# Patient Record
Sex: MALE | Race: WHITE | NOT HISPANIC OR LATINO | Employment: FULL TIME | URBAN - METROPOLITAN AREA
[De-identification: names, ages, dates, MRNs, and addresses within clinical notes are randomized per-mention and may not be internally consistent; named-entity substitution may affect disease eponyms.]

---

## 2023-08-27 ENCOUNTER — HOSPITAL ENCOUNTER (INPATIENT)
Facility: MEDICAL CENTER | Age: 38
LOS: 10 days | DRG: 602 | End: 2023-09-07
Attending: EMERGENCY MEDICINE | Admitting: INTERNAL MEDICINE

## 2023-08-27 ENCOUNTER — APPOINTMENT (OUTPATIENT)
Dept: RADIOLOGY | Facility: MEDICAL CENTER | Age: 38
DRG: 602 | End: 2023-08-27
Attending: EMERGENCY MEDICINE

## 2023-08-27 DIAGNOSIS — L03.313 CELLULITIS OF CHEST WALL: ICD-10-CM

## 2023-08-27 DIAGNOSIS — E66.01 CLASS 3 SEVERE OBESITY DUE TO EXCESS CALORIES WITH SERIOUS COMORBIDITY AND BODY MASS INDEX (BMI) GREATER THAN OR EQUAL TO 70 IN ADULT (HCC): ICD-10-CM

## 2023-08-27 DIAGNOSIS — L03.311 CELLULITIS OF ABDOMINAL WALL: ICD-10-CM

## 2023-08-27 DIAGNOSIS — E66.01 MORBID OBESITY (HCC): ICD-10-CM

## 2023-08-27 DIAGNOSIS — R26.2 UNABLE TO WALK: ICD-10-CM

## 2023-08-27 DIAGNOSIS — D50.9 IRON DEFICIENCY ANEMIA, UNSPECIFIED IRON DEFICIENCY ANEMIA TYPE: ICD-10-CM

## 2023-08-27 DIAGNOSIS — I10 PRIMARY HYPERTENSION: ICD-10-CM

## 2023-08-27 DIAGNOSIS — R53.81 PHYSICAL DECONDITIONING: ICD-10-CM

## 2023-08-27 DIAGNOSIS — J96.01 ACUTE RESPIRATORY FAILURE WITH HYPOXIA (HCC): ICD-10-CM

## 2023-08-27 LAB
ALBUMIN SERPL BCP-MCNC: 3.7 G/DL (ref 3.2–4.9)
ALBUMIN/GLOB SERPL: 1.4 G/DL
ALP SERPL-CCNC: 75 U/L (ref 30–99)
ALT SERPL-CCNC: 13 U/L (ref 2–50)
ANION GAP SERPL CALC-SCNC: 10 MMOL/L (ref 7–16)
ANISOCYTOSIS BLD QL SMEAR: ABNORMAL
AST SERPL-CCNC: 18 U/L (ref 12–45)
BASOPHILS # BLD AUTO: 0.4 % (ref 0–1.8)
BASOPHILS # BLD: 0.03 K/UL (ref 0–0.12)
BILIRUB SERPL-MCNC: 1 MG/DL (ref 0.1–1.5)
BUN SERPL-MCNC: 12 MG/DL (ref 8–22)
CALCIUM ALBUM COR SERPL-MCNC: 9.1 MG/DL (ref 8.5–10.5)
CALCIUM SERPL-MCNC: 8.9 MG/DL (ref 8.5–10.5)
CHLORIDE SERPL-SCNC: 100 MMOL/L (ref 96–112)
CO2 SERPL-SCNC: 27 MMOL/L (ref 20–33)
COMMENT 1642: NORMAL
CREAT SERPL-MCNC: 0.81 MG/DL (ref 0.5–1.4)
EOSINOPHIL # BLD AUTO: 0.21 K/UL (ref 0–0.51)
EOSINOPHIL NFR BLD: 2.7 % (ref 0–6.9)
ERYTHROCYTE [DISTWIDTH] IN BLOOD BY AUTOMATED COUNT: 56.7 FL (ref 35.9–50)
FLUAV RNA SPEC QL NAA+PROBE: NEGATIVE
FLUBV RNA SPEC QL NAA+PROBE: NEGATIVE
GFR SERPLBLD CREATININE-BSD FMLA CKD-EPI: 115 ML/MIN/1.73 M 2
GLOBULIN SER CALC-MCNC: 2.7 G/DL (ref 1.9–3.5)
GLUCOSE SERPL-MCNC: 113 MG/DL (ref 65–99)
HCT VFR BLD AUTO: 37.5 % (ref 42–52)
HGB BLD-MCNC: 10.5 G/DL (ref 14–18)
IMM GRANULOCYTES # BLD AUTO: 0.03 K/UL (ref 0–0.11)
IMM GRANULOCYTES NFR BLD AUTO: 0.4 % (ref 0–0.9)
LACTATE SERPL-SCNC: 1.4 MMOL/L (ref 0.5–2)
LYMPHOCYTES # BLD AUTO: 1.68 K/UL (ref 1–4.8)
LYMPHOCYTES NFR BLD: 21.4 % (ref 22–41)
MACROCYTES BLD QL SMEAR: ABNORMAL
MCH RBC QN AUTO: 20.7 PG (ref 27–33)
MCHC RBC AUTO-ENTMCNC: 28 G/DL (ref 32.3–36.5)
MCV RBC AUTO: 73.8 FL (ref 81.4–97.8)
MICROCYTES BLD QL SMEAR: ABNORMAL
MONOCYTES # BLD AUTO: 0.81 K/UL (ref 0–0.85)
MONOCYTES NFR BLD AUTO: 10.3 % (ref 0–13.4)
MORPHOLOGY BLD-IMP: NORMAL
NEUTROPHILS # BLD AUTO: 5.09 K/UL (ref 1.82–7.42)
NEUTROPHILS NFR BLD: 64.8 % (ref 44–72)
NRBC # BLD AUTO: 0 K/UL
NRBC BLD-RTO: 0 /100 WBC (ref 0–0.2)
OVALOCYTES BLD QL SMEAR: NORMAL
PLATELET # BLD AUTO: 320 K/UL (ref 164–446)
PLATELET BLD QL SMEAR: NORMAL
PMV BLD AUTO: 9.6 FL (ref 9–12.9)
POIKILOCYTOSIS BLD QL SMEAR: NORMAL
POLYCHROMASIA BLD QL SMEAR: NORMAL
POTASSIUM SERPL-SCNC: 4.1 MMOL/L (ref 3.6–5.5)
PROT SERPL-MCNC: 6.4 G/DL (ref 6–8.2)
RBC # BLD AUTO: 5.08 M/UL (ref 4.7–6.1)
RBC BLD AUTO: PRESENT
RSV RNA SPEC QL NAA+PROBE: NEGATIVE
SARS-COV-2 RNA RESP QL NAA+PROBE: NOTDETECTED
SODIUM SERPL-SCNC: 137 MMOL/L (ref 135–145)
SPECIMEN SOURCE: NORMAL
STOMATOCYTES BLD QL SMEAR: NORMAL
TARGETS BLD QL SMEAR: NORMAL
WBC # BLD AUTO: 7.9 K/UL (ref 4.8–10.8)

## 2023-08-27 PROCEDURE — A9270 NON-COVERED ITEM OR SERVICE: HCPCS | Performed by: EMERGENCY MEDICINE

## 2023-08-27 PROCEDURE — 36415 COLL VENOUS BLD VENIPUNCTURE: CPT

## 2023-08-27 PROCEDURE — 99285 EMERGENCY DEPT VISIT HI MDM: CPT

## 2023-08-27 PROCEDURE — 87040 BLOOD CULTURE FOR BACTERIA: CPT

## 2023-08-27 PROCEDURE — 71045 X-RAY EXAM CHEST 1 VIEW: CPT

## 2023-08-27 PROCEDURE — 700102 HCHG RX REV CODE 250 W/ 637 OVERRIDE(OP): Performed by: EMERGENCY MEDICINE

## 2023-08-27 PROCEDURE — C9803 HOPD COVID-19 SPEC COLLECT: HCPCS | Performed by: EMERGENCY MEDICINE

## 2023-08-27 PROCEDURE — 0241U HCHG SARS-COV-2 COVID-19 NFCT DS RESP RNA 4 TRGT MIC: CPT

## 2023-08-27 PROCEDURE — 83605 ASSAY OF LACTIC ACID: CPT

## 2023-08-27 PROCEDURE — 85025 COMPLETE CBC W/AUTO DIFF WBC: CPT

## 2023-08-27 PROCEDURE — 80053 COMPREHEN METABOLIC PANEL: CPT

## 2023-08-27 RX ORDER — SULFAMETHOXAZOLE AND TRIMETHOPRIM 800; 160 MG/1; MG/1
1 TABLET ORAL EVERY 12 HOURS
Status: DISCONTINUED | OUTPATIENT
Start: 2023-08-27 | End: 2023-08-28

## 2023-08-27 RX ADMIN — SULFAMETHOXAZOLE AND TRIMETHOPRIM 1 TABLET: 800; 160 TABLET ORAL at 21:54

## 2023-08-28 PROBLEM — I10 HTN (HYPERTENSION): Status: ACTIVE | Noted: 2023-08-28

## 2023-08-28 PROBLEM — J96.01 ACUTE RESPIRATORY FAILURE WITH HYPOXIA (HCC): Status: ACTIVE | Noted: 2023-08-28

## 2023-08-28 PROBLEM — L03.311 ABDOMINAL WALL CELLULITIS: Status: ACTIVE | Noted: 2023-08-28

## 2023-08-28 PROBLEM — E66.01 CLASS 3 SEVERE OBESITY DUE TO EXCESS CALORIES WITH SERIOUS COMORBIDITY AND BODY MASS INDEX (BMI) GREATER THAN OR EQUAL TO 70 IN ADULT (HCC): Status: ACTIVE | Noted: 2023-08-28

## 2023-08-28 PROBLEM — R06.02 SHORTNESS OF BREATH: Status: ACTIVE | Noted: 2023-08-28

## 2023-08-28 PROBLEM — E66.813 CLASS 3 SEVERE OBESITY DUE TO EXCESS CALORIES WITH SERIOUS COMORBIDITY AND BODY MASS INDEX (BMI) GREATER THAN OR EQUAL TO 70 IN ADULT (HCC): Status: ACTIVE | Noted: 2023-08-28

## 2023-08-28 LAB
APPEARANCE UR: CLEAR
BACTERIA #/AREA URNS HPF: NEGATIVE /HPF
BILIRUB UR QL STRIP.AUTO: NEGATIVE
COLOR UR: YELLOW
EKG IMPRESSION: NORMAL
EPI CELLS #/AREA URNS HPF: NEGATIVE /HPF
EST. AVERAGE GLUCOSE BLD GHB EST-MCNC: 123 MG/DL
GLUCOSE UR STRIP.AUTO-MCNC: NEGATIVE MG/DL
HBA1C MFR BLD: 5.9 % (ref 4–5.6)
HYALINE CASTS #/AREA URNS LPF: ABNORMAL /LPF
KETONES UR STRIP.AUTO-MCNC: ABNORMAL MG/DL
LEUKOCYTE ESTERASE UR QL STRIP.AUTO: ABNORMAL
MICRO URNS: ABNORMAL
NITRITE UR QL STRIP.AUTO: NEGATIVE
NT-PROBNP SERPL IA-MCNC: 1967 PG/ML (ref 0–125)
PH UR STRIP.AUTO: 5.5 [PH] (ref 5–8)
PROT UR QL STRIP: NEGATIVE MG/DL
RBC # URNS HPF: ABNORMAL /HPF
RBC UR QL AUTO: NEGATIVE
SP GR UR STRIP.AUTO: 1.02
UROBILINOGEN UR STRIP.AUTO-MCNC: 1 MG/DL
WBC #/AREA URNS HPF: ABNORMAL /HPF

## 2023-08-28 PROCEDURE — 83036 HEMOGLOBIN GLYCOSYLATED A1C: CPT

## 2023-08-28 PROCEDURE — 700111 HCHG RX REV CODE 636 W/ 250 OVERRIDE (IP): Performed by: INTERNAL MEDICINE

## 2023-08-28 PROCEDURE — 87086 URINE CULTURE/COLONY COUNT: CPT

## 2023-08-28 PROCEDURE — A9270 NON-COVERED ITEM OR SERVICE: HCPCS | Performed by: EMERGENCY MEDICINE

## 2023-08-28 PROCEDURE — 36415 COLL VENOUS BLD VENIPUNCTURE: CPT

## 2023-08-28 PROCEDURE — 93005 ELECTROCARDIOGRAM TRACING: CPT | Performed by: INTERNAL MEDICINE

## 2023-08-28 PROCEDURE — 99223 1ST HOSP IP/OBS HIGH 75: CPT | Mod: AI | Performed by: INTERNAL MEDICINE

## 2023-08-28 PROCEDURE — 93010 ELECTROCARDIOGRAM REPORT: CPT | Performed by: INTERNAL MEDICINE

## 2023-08-28 PROCEDURE — 700102 HCHG RX REV CODE 250 W/ 637 OVERRIDE(OP): Performed by: INTERNAL MEDICINE

## 2023-08-28 PROCEDURE — A9270 NON-COVERED ITEM OR SERVICE: HCPCS | Performed by: INTERNAL MEDICINE

## 2023-08-28 PROCEDURE — 770006 HCHG ROOM/CARE - MED/SURG/GYN SEMI*

## 2023-08-28 PROCEDURE — 700102 HCHG RX REV CODE 250 W/ 637 OVERRIDE(OP): Performed by: EMERGENCY MEDICINE

## 2023-08-28 PROCEDURE — 83880 ASSAY OF NATRIURETIC PEPTIDE: CPT

## 2023-08-28 PROCEDURE — 81001 URINALYSIS AUTO W/SCOPE: CPT

## 2023-08-28 RX ORDER — FUROSEMIDE 10 MG/ML
40 INJECTION INTRAMUSCULAR; INTRAVENOUS
Status: DISCONTINUED | OUTPATIENT
Start: 2023-08-28 | End: 2023-09-01

## 2023-08-28 RX ORDER — BISACODYL 10 MG
10 SUPPOSITORY, RECTAL RECTAL
Status: DISCONTINUED | OUTPATIENT
Start: 2023-08-28 | End: 2023-09-07 | Stop reason: HOSPADM

## 2023-08-28 RX ORDER — LINEZOLID 600 MG/1
600 TABLET, FILM COATED ORAL EVERY 8 HOURS
Status: DISCONTINUED | OUTPATIENT
Start: 2023-08-28 | End: 2023-08-30

## 2023-08-28 RX ORDER — ENOXAPARIN SODIUM 100 MG/ML
60 INJECTION SUBCUTANEOUS EVERY 12 HOURS
Status: DISCONTINUED | OUTPATIENT
Start: 2023-08-28 | End: 2023-08-31

## 2023-08-28 RX ORDER — AMOXICILLIN 250 MG
2 CAPSULE ORAL 2 TIMES DAILY
Status: DISCONTINUED | OUTPATIENT
Start: 2023-08-28 | End: 2023-09-07 | Stop reason: HOSPADM

## 2023-08-28 RX ORDER — IBUPROFEN 200 MG
800 TABLET ORAL
COMMUNITY
End: 2024-02-01

## 2023-08-28 RX ORDER — IBUPROFEN, ACETAMINOPHEN 125; 250 MG/1; MG/1
1-2 TABLET, FILM COATED ORAL PRN
Status: ON HOLD | COMMUNITY
End: 2023-09-06

## 2023-08-28 RX ORDER — LISINOPRIL 10 MG/1
10 TABLET ORAL
Status: DISCONTINUED | OUTPATIENT
Start: 2023-08-28 | End: 2023-09-07 | Stop reason: HOSPADM

## 2023-08-28 RX ORDER — POLYETHYLENE GLYCOL 3350 17 G/17G
1 POWDER, FOR SOLUTION ORAL
Status: DISCONTINUED | OUTPATIENT
Start: 2023-08-28 | End: 2023-09-07 | Stop reason: HOSPADM

## 2023-08-28 RX ADMIN — LINEZOLID 600 MG: 600 TABLET, FILM COATED ORAL at 22:02

## 2023-08-28 RX ADMIN — LINEZOLID 600 MG: 600 TABLET, FILM COATED ORAL at 14:17

## 2023-08-28 RX ADMIN — FUROSEMIDE 40 MG: 10 INJECTION INTRAMUSCULAR; INTRAVENOUS at 14:18

## 2023-08-28 RX ADMIN — SULFAMETHOXAZOLE AND TRIMETHOPRIM 1 TABLET: 800; 160 TABLET ORAL at 06:46

## 2023-08-28 RX ADMIN — LISINOPRIL 10 MG: 10 TABLET ORAL at 14:17

## 2023-08-28 RX ADMIN — ENOXAPARIN SODIUM 60 MG: 100 INJECTION SUBCUTANEOUS at 17:49

## 2023-08-28 ASSESSMENT — COGNITIVE AND FUNCTIONAL STATUS - GENERAL
MOVING FROM LYING ON BACK TO SITTING ON SIDE OF FLAT BED: A LOT
WALKING IN HOSPITAL ROOM: A LITTLE
SUGGESTED CMS G CODE MODIFIER MOBILITY: CK
TURNING FROM BACK TO SIDE WHILE IN FLAT BAD: A LOT
STANDING UP FROM CHAIR USING ARMS: A LITTLE
DRESSING REGULAR LOWER BODY CLOTHING: A LITTLE
TOILETING: A LITTLE
SUGGESTED CMS G CODE MODIFIER DAILY ACTIVITY: CJ
MOBILITY SCORE: 16
MOVING TO AND FROM BED TO CHAIR: A LITTLE
DAILY ACTIVITIY SCORE: 22
CLIMB 3 TO 5 STEPS WITH RAILING: A LITTLE

## 2023-08-28 ASSESSMENT — ENCOUNTER SYMPTOMS
NAUSEA: 0
PND: 1
CHILLS: 0
FEVER: 0
MYALGIAS: 0
ORTHOPNEA: 1
VOMITING: 0
SHORTNESS OF BREATH: 1
ABDOMINAL PAIN: 1
COUGH: 0
WHEEZING: 0
HEMOPTYSIS: 0

## 2023-08-28 ASSESSMENT — PATIENT HEALTH QUESTIONNAIRE - PHQ9
3. TROUBLE FALLING OR STAYING ASLEEP OR SLEEPING TOO MUCH: SEVERAL DAYS
9. THOUGHTS THAT YOU WOULD BE BETTER OFF DEAD, OR OF HURTING YOURSELF: NOT AT ALL
7. TROUBLE CONCENTRATING ON THINGS, SUCH AS READING THE NEWSPAPER OR WATCHING TELEVISION: SEVERAL DAYS
1. LITTLE INTEREST OR PLEASURE IN DOING THINGS: SEVERAL DAYS
4. FEELING TIRED OR HAVING LITTLE ENERGY: SEVERAL DAYS
8. MOVING OR SPEAKING SO SLOWLY THAT OTHER PEOPLE COULD HAVE NOTICED. OR THE OPPOSITE, BEING SO FIGETY OR RESTLESS THAT YOU HAVE BEEN MOVING AROUND A LOT MORE THAN USUAL: SEVERAL DAYS
6. FEELING BAD ABOUT YOURSELF - OR THAT YOU ARE A FAILURE OR HAVE LET YOURSELF OR YOUR FAMILY DOWN: SEVERAL DAYS
SUM OF ALL RESPONSES TO PHQ QUESTIONS 1-9: 7
2. FEELING DOWN, DEPRESSED, IRRITABLE, OR HOPELESS: SEVERAL DAYS
SUM OF ALL RESPONSES TO PHQ9 QUESTIONS 1 AND 2: 2
5. POOR APPETITE OR OVEREATING: NOT AT ALL

## 2023-08-28 ASSESSMENT — LIFESTYLE VARIABLES
HAVE PEOPLE ANNOYED YOU BY CRITICIZING YOUR DRINKING: NO
ON A TYPICAL DAY WHEN YOU DRINK ALCOHOL HOW MANY DRINKS DO YOU HAVE: 0
DOES PATIENT WANT TO STOP DRINKING: NO
TOTAL SCORE: 0
HOW MANY TIMES IN THE PAST YEAR HAVE YOU HAD 5 OR MORE DRINKS IN A DAY: 0
TOTAL SCORE: 0
TOTAL SCORE: 0
ALCOHOL_USE: NO
CONSUMPTION TOTAL: NEGATIVE
HAVE YOU EVER FELT YOU SHOULD CUT DOWN ON YOUR DRINKING: NO
AVERAGE NUMBER OF DAYS PER WEEK YOU HAVE A DRINK CONTAINING ALCOHOL: 0
EVER FELT BAD OR GUILTY ABOUT YOUR DRINKING: NO
EVER HAD A DRINK FIRST THING IN THE MORNING TO STEADY YOUR NERVES TO GET RID OF A HANGOVER: NO

## 2023-08-28 ASSESSMENT — PAIN DESCRIPTION - PAIN TYPE
TYPE: ACUTE PAIN
TYPE: ACUTE PAIN

## 2023-08-28 NOTE — DISCHARGE PLANNING
Medical Social Work    MSW spoke with ERP regarding pt.  Per report pt usually ambulates with a cane but hasn't been able to ambulate today so his sister encouraged him to come to the hospital.  PT/OT orders requested.

## 2023-08-28 NOTE — ED NOTES
New bariatric bed ordered for pt as current one is not working on one side, trauma RN called to have someone come fix and no one is available at this time

## 2023-08-28 NOTE — DISCHARGE SUMMARY
ED Observation Discharge Summary    Patient:Cole Jaramillo  Patient : 1985  Patient MRN: [id number]  Patient PCP: Pcp Pt States None    Admit Date: 2023  Discharge Date and Time: 23 12:20 PM  Discharge Diagnosis:   1. Morbid obesity (HCC)        2. Physical deconditioning        3. Unable to walk        4. Cellulitis of abdominal wall        5. Cellulitis of chest wall          Discharge Attending: Casandra Taylor D.O.  Discharge Service: ED Observation    ED Course    Care signed out from nighttime ERP.  Patient's been here for about 16 hours.  Initially seen by my colleague Dr. Méndez.  He presented with inability to ambulate due to his morbid obesity.  He has had to call fire twice to help him get up.  His estimated weight here in the ED is 376 kg.  He was noted to have cellulitis of the left chest wall, breast, abdominal wall.  Sepsis protocol was instituted.  Patient was placed on oral antibiotics.  No clear indication for hospital admission at this time.  Case management was involved for placement.    Social work note noted from early this morning.  PT and OT have been requested.    9:48 AM D/W , Kala, regarding patient goals. Has been on a diet, no weight loss. In November he will be able eligible for Medicaid open enrollment.    10:30 AM patient seen at the bedside.  Patient notes that his condition declined starting in December with the poor winter weather.  However, he was still able to get himself up out of his bedroom, get to the bathroom, get to the living room couch.  He lives with a roommate who is just a roommate, not any sort of care provider.  His sister comes by and also helps him.  He states a week ago, he fell and had to call fire department to help him get up.  Then yesterday morning, he was also unable to get up.  He has been trying to lose weight but he has not been particularly successful.  He is highly concerned about being in assisted living or  "long-term care facility.  However, at this point, he is not able to go home.  He cannot get up.  He may meet criteria for inpatient hospitalization due to his chest wall, abdominal wall cellulitis.  Hospitalist has been paged.    10:51 AM discussed with Dr. Kerr, hospitalist regarding patient's disposition.  He agrees to see the patient in consultation and evaluation for possible admission.  Unfortunately, there is worry that given his weight, there are not facilities in the area that can take care of him as they do not have the proper resources.    11:19 AM D/W Dr. Kerr, hospitalist, who after he assessed the patient thinks hell need hospital admit. He will also speak with care coordination.       Discharge Exam:  BP (!) (P) 147/66   Pulse (P) 92   Temp 36.8 °C (98.2 °F) (Temporal)   Resp (P) 16   Ht 1.803 m (5' 11\")   Wt (!) 376 kg (828 lb)   SpO2 (P) 93%   .48 kg/m² .    Constitutional: Awake and alert. Nontoxic  HENT:  Grossly normal  Eyes: Grossly normal  Neck: Normal range of motion  Cardiovascular: Normal heart rate   Thorax & Lungs: No respiratory distress  Abdomen: Nontender  Skin:  No pathologic rash.   Extremities: Well perfused  Psychiatric: Affect normal    Labs  Results for orders placed or performed during the hospital encounter of 08/27/23   Lactic acid (lactate)   Result Value Ref Range    Lactic Acid 1.4 0.5 - 2.0 mmol/L   CBC With Differential   Result Value Ref Range    WBC 7.9 4.8 - 10.8 K/uL    RBC 5.08 4.70 - 6.10 M/uL    Hemoglobin 10.5 (L) 14.0 - 18.0 g/dL    Hematocrit 37.5 (L) 42.0 - 52.0 %    MCV 73.8 (L) 81.4 - 97.8 fL    MCH 20.7 (L) 27.0 - 33.0 pg    MCHC 28.0 (L) 32.3 - 36.5 g/dL    RDW 56.7 (H) 35.9 - 50.0 fL    Platelet Count 320 164 - 446 K/uL    MPV 9.6 9.0 - 12.9 fL    Neutrophils-Polys 64.80 44.00 - 72.00 %    Lymphocytes 21.40 (L) 22.00 - 41.00 %    Monocytes 10.30 0.00 - 13.40 %    Eosinophils 2.70 0.00 - 6.90 %    Basophils 0.40 0.00 - 1.80 %    " Immature Granulocytes 0.40 0.00 - 0.90 %    Nucleated RBC 0.00 0.00 - 0.20 /100 WBC    Neutrophils (Absolute) 5.09 1.82 - 7.42 K/uL    Lymphs (Absolute) 1.68 1.00 - 4.80 K/uL    Monos (Absolute) 0.81 0.00 - 0.85 K/uL    Eos (Absolute) 0.21 0.00 - 0.51 K/uL    Baso (Absolute) 0.03 0.00 - 0.12 K/uL    Immature Granulocytes (abs) 0.03 0.00 - 0.11 K/uL    NRBC (Absolute) 0.00 K/uL    Anisocytosis 2+ (A)     Macrocytosis 1+     Microcytosis 2+ (A)    Comp Metabolic Panel   Result Value Ref Range    Sodium 137 135 - 145 mmol/L    Potassium 4.1 3.6 - 5.5 mmol/L    Chloride 100 96 - 112 mmol/L    Co2 27 20 - 33 mmol/L    Anion Gap 10.0 7.0 - 16.0    Glucose 113 (H) 65 - 99 mg/dL    Bun 12 8 - 22 mg/dL    Creatinine 0.81 0.50 - 1.40 mg/dL    Calcium 8.9 8.5 - 10.5 mg/dL    Correct Calcium 9.1 8.5 - 10.5 mg/dL    AST(SGOT) 18 12 - 45 U/L    ALT(SGPT) 13 2 - 50 U/L    Alkaline Phosphatase 75 30 - 99 U/L    Total Bilirubin 1.0 0.1 - 1.5 mg/dL    Albumin 3.7 3.2 - 4.9 g/dL    Total Protein 6.4 6.0 - 8.2 g/dL    Globulin 2.7 1.9 - 3.5 g/dL    A-G Ratio 1.4 g/dL   Urinalysis    Specimen: Urine   Result Value Ref Range    Color Yellow     Character Clear     Specific Gravity 1.021 <1.035    Ph 5.5 5.0 - 8.0    Glucose Negative Negative mg/dL    Ketones Trace (A) Negative mg/dL    Protein Negative Negative mg/dL    Bilirubin Negative Negative    Urobilinogen, Urine 1.0 Negative    Nitrite Negative Negative    Leukocyte Esterase Small (A) Negative    Occult Blood Negative Negative    Micro Urine Req Microscopic    Blood Culture - Draw one set from central line if present    Specimen: Peripheral; Blood   Result Value Ref Range    Significant Indicator NEG     Source BLD     Site PERIPHERAL     Culture Result       No Growth  Note: Blood cultures are incubated for 5 days and  are monitored continuously.Positive blood cultures  are called to the RN and reported as soon as  they are identified.     Blood Culture - Draw one set from  central line if present    Specimen: Line; Blood   Result Value Ref Range    Significant Indicator NEG     Source BLD     Site LINE     Culture Result       No Growth  Note: Blood cultures are incubated for 5 days and  are monitored continuously.Positive blood cultures  are called to the RN and reported as soon as  they are identified.     CoV-2, Flu A/B, And RSV by PCR (Echolocation)    Specimen: Respirate   Result Value Ref Range    Influenza virus A RNA Negative Negative    Influenza virus B, PCR Negative Negative    RSV, PCR Negative Negative    SARS-CoV-2 by PCR NotDetected     SARS-CoV-2 Source NP Swab    ESTIMATED GFR   Result Value Ref Range    GFR (CKD-EPI) 115 >60 mL/min/1.73 m 2   PLATELET ESTIMATE   Result Value Ref Range    Plt Estimation Normal    MORPHOLOGY   Result Value Ref Range    RBC Morphology Present     Polychromia 1+     Poikilocytosis 1+     Ovalocytes 1+     Target Cells 1+     Stomatocytes 1+    PERIPHERAL SMEAR REVIEW   Result Value Ref Range    Peripheral Smear Review see below    DIFFERENTIAL COMMENT   Result Value Ref Range    Comments-Diff see below    URINE MICROSCOPIC (W/UA)   Result Value Ref Range    WBC 2-5 (A) /hpf    RBC 2-5 (A) /hpf    Bacteria Negative None /hpf    Epithelial Cells Negative /hpf    Hyaline Cast 0-2 /lpf       Radiology  DX-CHEST-PORTABLE (1 VIEW)   Final Result      1.  No acute cardiopulmonary abnormality identified.      2.  Marked enlargement of the cardiac silhouette especially for age      EC-ECHOCARDIOGRAM COMPLETE W/ CONT    (Results Pending)   US-EXTREMITY VENOUS LOWER BILAT    (Results Pending)       My final assessment includes   1. Morbid obesity (HCC)        2. Physical deconditioning        3. Unable to walk        4. Cellulitis of abdominal wall        5. Cellulitis of chest wall          Upon Reevaluation, the patient's condition has: not improved; and will be escalated to hospitalization.    Patient discharged from ED Observation status at 1130AM  (Time) August 28, 2023 (Date).     Total time spent on this ED Observation discharge encounter is > 30 Minutes    Electronically signed by: Casandra Taylor D.O., 8/28/2023 12:20 PM

## 2023-08-28 NOTE — ED NOTES
Pt awake in bed, monitor connected, side rails up, call light in reach, no acute distress, VSS.

## 2023-08-28 NOTE — ED NOTES
Bedside report received from RN Joanne. Pt resting in bed, connected to monitor. Cardiac monitoring IN PLACE, pt on 4lNC, pt is AOx 4, Side rails up. Call light in reach. Family at bedside.

## 2023-08-28 NOTE — ASSESSMENT & PLAN NOTE
Persistently elevated in the ER  Will start lisinopril 10 mg daily  Start IV lasix 40 mg daily  Defer BB until ECHO comes back  Adjust prn  9/2: iv lasix changed to po

## 2023-08-28 NOTE — ED NOTES
Patient completed warm food tray.  Blood drawn and sent.  Purewick functioning to suction.  Awaiting to be transferred to another bariatric bed prior to be transported to the floor.

## 2023-08-28 NOTE — ASSESSMENT & PLAN NOTE
Body mass index is 115.48 kg/m².  Cannot take care of himself now. Not a safe discharge home.  Will need to work with CM/SW about eventual disposition options once patient has his acute medical issues resolved/improved  RD consult  Outpatient bariatric surgery referred

## 2023-08-28 NOTE — H&P
Hospital Medicine History & Physical Note    Date of Service  8/28/2023    Primary Care Physician  Pcp Pt States None    Consultants  none    Specialist Names: none    Code Status  Full Code    Chief Complaint  Chief Complaint   Patient presents with    Other     Pt BIBA from home for issues w/ immobility that started approx 1-2 hours ago. Pt is morbidly obese w/ estimated weight of 700lb. Pt states this is new and can generally walk w/ a cane for stability, and move from chairs       History of Presenting Illness  Cole Jaramillo is a 38 y.o. male with super massive obesity with a BMI of 116 and other unknown medical issues who presented to the hospital with above chief complaint.  Patient states that he struggled with his weight for many years but gained significantly more weight this year.  Up until about a month ago he was able to ambulate around his house and bathe himself and get in the shower.  However over the last couple weeks has had significantly worsening shortness of breath and significantly increasing lower extremity swelling.  He was finally brought into the emergency room because he could no longer move himself and had to call the fire department twice in the last week.  Patient denies any clear chest pain.  He endorses PND and orthopnea.  He has not seen a doctor in over 10 years.    Additionally he was noted to have severe excoriations of the bilateral lower quadrants of the abdomen which she scratches incessantly.  They are clearly infected and the patient was placed on oral Bactrim with absolutely no improvement in cellulitis.  Additionally there is purulent drainage of the left lower quadrant lesion.  Additionally he has significant cellulitis and induration of the left breast and side flank as well that has not improved with oral Bactrim either.  Lastly in the emergency room he was found to be acutely hypoxic requiring 2 to 3 L nasal cannula which is a new diagnosis for him.    I spoke with  the ER physician Dr. Taylor as well as the ER  and social workers.  This patient needs to be admitted to the hospital as inpatient given high concern for new onset heart failure, cellulitis refractory to oral antibiotics, inability to take care of self and inability to move and unsafe discharge.    Patient expressed to me that he absolutely does not want to die and he wants to get help for his medical conditions.  He is willing to stay and do what it will take to get better.    I discussed the plan of care with patient.    Review of Systems  Review of Systems   Constitutional:  Positive for malaise/fatigue. Negative for chills and fever.   Respiratory:  Positive for shortness of breath. Negative for cough, hemoptysis and wheezing.    Cardiovascular:  Positive for orthopnea, leg swelling and PND. Negative for chest pain.   Gastrointestinal:  Positive for abdominal pain. Negative for nausea and vomiting.   Musculoskeletal:  Negative for joint pain and myalgias.   Skin:  Positive for itching.       Past Medical History   has a past medical history of Morbid obesity (HCC).     Surgical History  No prior surgeries     Family History  Family history of weight issues    Social History   reports that he has never smoked. He has never used smokeless tobacco. He reports current alcohol use. He reports that he does not use drugs.    Allergies  Allergies   Allergen Reactions    Other Misc Unspecified     Unknown antibiotic for a staph infection, blocked arteries, rash.        Medications  Prior to Admission Medications   Prescriptions Last Dose Informant Patient Reported? Taking?   Ibuprofen-Acetaminophen (ADVIL DUAL ACTION) 125-250 MG Tab > 2 WEEKS at PRN Patient Yes Yes   Sig: Take 1-2 Tablets by mouth as needed.   Pamabrom 50 MG Cap > 2 WEEKS at PRN Patient Yes Yes   Sig: Take 50 mg by mouth 1 time a day as needed.   ibuprofen (MOTRIN) 200 MG Tab FEW DAYS AGO at PRN Patient Yes Yes   Sig: Take 800 mg by mouth 1  time a day as needed.      Facility-Administered Medications: None       Physical Exam  Temp:  [36.7 °C (98 °F)-36.8 °C (98.2 °F)] 36.8 °C (98.2 °F)  Pulse:  [85-98] 85  Resp:  [12-31] 12  BP: (139-168)/(57-78) 158/72  SpO2:  [90 %-99 %] 93 %  Blood Pressure: (!) 158/72   Temperature: 36.8 °C (98.2 °F)   Pulse: 85   Respiration: 12   Pulse Oximetry: 93 %       Physical Exam  Vitals and nursing note reviewed.   Constitutional:       General: He is not in acute distress.     Appearance: He is well-developed.      Comments: Pleasant and interactive  Laying at 75 degree angle in bed  Body mass index is 115.48 kg/m².     HENT:      Head: Normocephalic and atraumatic.      Nose:      Comments: NC in place     Mouth/Throat:      Pharynx: No oropharyngeal exudate.   Eyes:      General: No scleral icterus.     Pupils: Pupils are equal, round, and reactive to light.   Neck:      Thyroid: No thyromegaly.   Cardiovascular:      Rate and Rhythm: Normal rate and regular rhythm.      Heart sounds: Normal heart sounds. No murmur heard.  Pulmonary:      Effort: Pulmonary effort is normal. No respiratory distress.      Breath sounds: Rales present. No wheezing.      Comments: Distant breath sounds  Abdominal:      General: Bowel sounds are normal. There is no distension.      Palpations: Abdomen is soft.      Tenderness: There is abdominal tenderness (B/L LQ's).   Musculoskeletal:         General: No tenderness. Normal range of motion.      Cervical back: Normal range of motion and neck supple.      Right lower leg: Edema present.      Left lower leg: Edema present.      Comments: Massive tense edema B/L LE's to the mid-thighs  Extensive lichenification of the skin B/L LE's  No clear open lesions of the B/l LE's   Skin:     General: Skin is warm and dry.      Findings: Erythema and lesion present. No rash.      Comments: B/L LQ abdominal wall lesions about 1 cm X 6 cm with some purulence in the LLQ and significant surrounding  "erythema, moderately TTP    Extensive erythema and induration about 20X20 over the entire L breast and L upper flank area   Neurological:      Mental Status: He is alert and oriented to person, place, and time.      Cranial Nerves: No cranial nerve deficit.         Laboratory:  Recent Labs     08/27/23  1805   WBC 7.9   RBC 5.08   HEMOGLOBIN 10.5*   HEMATOCRIT 37.5*   MCV 73.8*   MCH 20.7*   MCHC 28.0*   RDW 56.7*   PLATELETCT 320   MPV 9.6     Recent Labs     08/27/23  1805   SODIUM 137   POTASSIUM 4.1   CHLORIDE 100   CO2 27   GLUCOSE 113*   BUN 12   CREATININE 0.81   CALCIUM 8.9     Recent Labs     08/27/23  1805   ALTSGPT 13   ASTSGOT 18   ALKPHOSPHAT 75   TBILIRUBIN 1.0   GLUCOSE 113*         No results for input(s): \"NTPROBNP\" in the last 72 hours.      No results for input(s): \"TROPONINT\" in the last 72 hours.    Imaging:  DX-CHEST-PORTABLE (1 VIEW)   Final Result      1.  No acute cardiopulmonary abnormality identified.      2.  Marked enlargement of the cardiac silhouette especially for age      EC-ECHOCARDIOGRAM COMPLETE W/ CONT    (Results Pending)       Chest x-ray previously viewed by me shows significant enlarged cardiac silhouette.  Concerning for new onset heart failure    I personally reviewed the CBC, blood cultures, COVID test, CMP.  My clinical insertion interpretation and clinical portability as outlined below.    Assessment/Plan:  Justification for Admission Status  I anticipate this patient will require at least two midnights for appropriate medical management, necessitating inpatient admission because likely new onset heart failure with acute respiratory failure with hypoxia and significant cellulitis refractory to oral antibiotics in the setting of super massive obesity and high risk for death    Patient will need a Med/Surg bed on MEDICAL service .  The need is secondary to above.    * Abdominal wall cellulitis- (present on admission)  Assessment & Plan  Refractory to oral " bactrim  Significant lesions in the B/L LQ's of the abdominal wall along with L breast cellulitis  Switch to IV zyvox and IV CTX empirically  Check MRSA Nares PCR  F/U blood cultures  Will be hard to get wound cultures  Wound care  Very high risk for developing sepsis and abscesses given his massive obesity and other medical issues    Shortness of breath- (present on admission)  Assessment & Plan  Highly c/f new onset CHF  ECHO  IV lasix  Check pro-bnp (unclear how accurate it will be)  Check B/L LE US    HTN (hypertension)- (present on admission)  Assessment & Plan  Persistently elevated in the ER  Will start lisinopril 10 mg daily  Start IV lasix 40 mg daily  Defer BB until ECHO comes back  Adjust prn    Class 3 severe obesity due to excess calories with serious comorbidity and body mass index (BMI) greater than or equal to 70 in adult (HCC)- (present on admission)  Assessment & Plan  Body mass index is 115.48 kg/m².  Cannot take care of himself now. Not a safe discharge home.  Will need to work with CM/SW about eventual disposition options once patient has his acute medical issues resolved/improved  RD consult  Consider initiation of inpatient ozempic (which we have done with other patients)    Acute respiratory failure with hypoxia (HCC)- (present on admission)  Assessment & Plan  Persistently hypoxic requiring 3 L NC  Very concerning for new onset CHF (unknown EF)  Certainly has an element of MC  Consider an ABG        VTE prophylaxis: enoxaparin ppx

## 2023-08-28 NOTE — ED NOTES
Pt moved over to new bariatric bed w/ team of staff and derrick   Pt resting more comfortably and bed is working  Call light in reach  Friend at BS

## 2023-08-28 NOTE — ASSESSMENT & PLAN NOTE
Refractory to oral bactrim  Significant lesions in the B/L LQ's of the abdominal wall along with L breast cellulitis  Check MRSA Nares PCR-- negative  Continue iv zyvox and ceftriaxone  F/U blood cultures  9/2: continue po Augmentin. Afebrile. Cont monitoring

## 2023-08-28 NOTE — ASSESSMENT & PLAN NOTE
Persistently hypoxic requiring 3 L NC  Doppler BLE: limited study, negative for DVT  Echo normal LVEF  Continue iv lasix -- switch to po  Wean O2 as tolerated     9/4: on 2L O2, continue po lasix and wean O2 as tolerated  Walking O2

## 2023-08-28 NOTE — ED PROVIDER NOTES
"ED Provider Note    CHIEF COMPLAINT  Chief Complaint   Patient presents with    Other     Pt BIBA from home for issues w/ immobility that started approx 1-2 hours ago. Pt is morbidly obese w/ estimated weight of 700lb. Pt states this is new and can generally walk w/ a cane for stability, and move from chairs       HPI/ROS    Cole Jaramillo is a 38 y.o. male who presents with difficulty with ambulation.  The patient is morbidly obese but has been doing well at home until the last week or so.  He states he had to call fire twice to help get him up.  Today's sister talked him into coming in for help as he could not get out of the chair.  He states he does have some chronic open wound to the anterior abdominal wall.  He states he has had some dizziness when he gets up recently but no chest pain or change in his breathing pattern.  He states he has been attempting calorie restriction for weight loss without success.  He has not had any vomiting or diarrhea.  He typically walks with a cane.    PAST MEDICAL HISTORY   has a past medical history of Morbid obesity (HCC).    SURGICAL HISTORY  patient denies any surgical history    FAMILY HISTORY  History reviewed. No pertinent family history.    SOCIAL HISTORY  Social History     Tobacco Use    Smoking status: Never    Smokeless tobacco: Never   Substance and Sexual Activity    Alcohol use: Yes     Comment: rarely    Drug use: Never    Sexual activity: Not on file       CURRENT MEDICATIONS  Home Medications       Reviewed by Nicolle Becerril R.N. (Registered Nurse) on 08/27/23 at 1746  Med List Status: Partial     Medication Last Dose Status        Patient Daron Taking any Medications                           ALLERGIES  No Known Allergies    PHYSICAL EXAM  VITAL SIGNS: BP (!) 148/78   Pulse 98   Temp 36.7 °C (98 °F) (Temporal)   Resp (!) 24   Ht 1.803 m (5' 11\")   Wt (!) 318 kg (700 lb)   SpO2 99%   BMI 97.63 kg/m²    In general the patient is unkept and " morbidly obese    HEENT unremarkable    Pulmonary the patient's lungs are diminished throughout    Cardiovascular S1-S2 with a tachycardic rate    GI no tenderness with some superficial ulcerations    Skin no abdominal ulceration as described above the patient also some cellulitic change to the left breast    Extremities swelling and distal pulses are difficult to palpate due to his body habitus    Neurologic examination is grossly intact    DIAGNOSTIC STUDIES  Results for orders placed or performed during the hospital encounter of 08/27/23   Lactic acid (lactate)   Result Value Ref Range    Lactic Acid 1.4 0.5 - 2.0 mmol/L   CBC With Differential   Result Value Ref Range    WBC 7.9 4.8 - 10.8 K/uL    RBC 5.08 4.70 - 6.10 M/uL    Hemoglobin 10.5 (L) 14.0 - 18.0 g/dL    Hematocrit 37.5 (L) 42.0 - 52.0 %    MCV 73.8 (L) 81.4 - 97.8 fL    MCH 20.7 (L) 27.0 - 33.0 pg    MCHC 28.0 (L) 32.3 - 36.5 g/dL    RDW 56.7 (H) 35.9 - 50.0 fL    Platelet Count 320 164 - 446 K/uL    MPV 9.6 9.0 - 12.9 fL    Neutrophils-Polys 64.80 44.00 - 72.00 %    Lymphocytes 21.40 (L) 22.00 - 41.00 %    Monocytes 10.30 0.00 - 13.40 %    Eosinophils 2.70 0.00 - 6.90 %    Basophils 0.40 0.00 - 1.80 %    Immature Granulocytes 0.40 0.00 - 0.90 %    Nucleated RBC 0.00 0.00 - 0.20 /100 WBC    Neutrophils (Absolute) 5.09 1.82 - 7.42 K/uL    Lymphs (Absolute) 1.68 1.00 - 4.80 K/uL    Monos (Absolute) 0.81 0.00 - 0.85 K/uL    Eos (Absolute) 0.21 0.00 - 0.51 K/uL    Baso (Absolute) 0.03 0.00 - 0.12 K/uL    Immature Granulocytes (abs) 0.03 0.00 - 0.11 K/uL    NRBC (Absolute) 0.00 K/uL    Anisocytosis 2+ (A)     Macrocytosis 1+     Microcytosis 2+ (A)    Comp Metabolic Panel   Result Value Ref Range    Sodium 137 135 - 145 mmol/L    Potassium 4.1 3.6 - 5.5 mmol/L    Chloride 100 96 - 112 mmol/L    Co2 27 20 - 33 mmol/L    Anion Gap 10.0 7.0 - 16.0    Glucose 113 (H) 65 - 99 mg/dL    Bun 12 8 - 22 mg/dL    Creatinine 0.81 0.50 - 1.40 mg/dL    Calcium 8.9 8.5  - 10.5 mg/dL    Correct Calcium 9.1 8.5 - 10.5 mg/dL    AST(SGOT) 18 12 - 45 U/L    ALT(SGPT) 13 2 - 50 U/L    Alkaline Phosphatase 75 30 - 99 U/L    Total Bilirubin 1.0 0.1 - 1.5 mg/dL    Albumin 3.7 3.2 - 4.9 g/dL    Total Protein 6.4 6.0 - 8.2 g/dL    Globulin 2.7 1.9 - 3.5 g/dL    A-G Ratio 1.4 g/dL   ESTIMATED GFR   Result Value Ref Range    GFR (CKD-EPI) 115 >60 mL/min/1.73 m 2   PLATELET ESTIMATE   Result Value Ref Range    Plt Estimation Normal    MORPHOLOGY   Result Value Ref Range    RBC Morphology Present     Polychromia 1+     Poikilocytosis 1+     Ovalocytes 1+     Target Cells 1+     Stomatocytes 1+    PERIPHERAL SMEAR REVIEW   Result Value Ref Range    Peripheral Smear Review see below    DIFFERENTIAL COMMENT   Result Value Ref Range    Comments-Diff see below        RADIOLOGY  DX-CHEST-PORTABLE (1 VIEW)   Final Result      1.  No acute cardiopulmonary abnormality identified.      2.  Marked enlargement of the cardiac silhouette especially for age          COURSE & MEDICAL DECISION MAKING    ED Observation Status? Yes; I am placing the patient in to an observation status due to a diagnostic uncertainty as well as therapeutic intensity. Patient placed in observation status at 6:13 PM, 8/27/2023.     Observation plan is as follows: The patient presents with morbid obesity and difficulty with ambulation I suspect this is the source.  Does have some evidence of cellulitic change to the left breast as well as some wounds to the anterior abdominal wall.  Sepsis protocol has been instituted by nursing staff and the patient admitted for ED observation pending laboratory analysis with case management involvement for placement.    2050 the patient is reexamined he is not oxygen dependent.  He states that he feels like he is been third spacing fluid and that he is dehydrated.  Laboratory analysis does not show any evidence of dehydration on reluctant to provide IV hydration as this may third space and make his  current situation even worse.  He does have cellulitic change to the left breast as well as the abdominal wall and he states this is new.  I will place the patient on Bactrim.  At this time it will have a clear indication for admission.  Case management has been notified and the patient will likely require placement for rehabilitation.      FINAL DIAGNOSIS  1.  Morbid obesity  2.  Inability to ambulate  3.  Left breast cellulitis  4.  Abdominal wall cellulitis  5.  Chronic wounds to the anterior abdominal wall       Electronically signed by: Albert Méndez M.D., 8/27/2023 6:11 PM

## 2023-08-28 NOTE — ED NOTES
Pt resting in bed, monitor connected, side rails up, call light in reach, no acute distress, VSS.

## 2023-08-28 NOTE — ED NOTES
PT/OT eval requested, awaiting their eval to determine possible placement of patient.   contacted to assist patient in obtaining medicaid, as patient has no insurance.

## 2023-08-28 NOTE — PROGRESS NOTES
Patient Cole Jaramillo admitted to room 629-1 in bariatric bed via transport.  Patient is A&Ox4, denies pain, voiding with use of purewick, 2L NC in use, mild WOB, LUE PIV flushing and blood return.

## 2023-08-28 NOTE — ED NOTES
Purewick replaced. Pt moved up into bed. Pt unable to fully turn on bariatric bed at this time, anterior skin check completed and clinical photos attached to pt chart.

## 2023-08-28 NOTE — ED NOTES
Med Rec complete per patient  Allergies reviewed  Preferred Pharmacy: [facility]  Patient denies any prescription medications in the last 30 days

## 2023-08-28 NOTE — ED NOTES
Pt resting comfortably in bed. Bed locked and in lowest position. Call light within reach. ABCs intact. No further needs at this time.

## 2023-08-28 NOTE — ED NOTES
Assumed care of patient, bedside report received from off coming RN Awais.  Introduced self as RN, POC discussed, call light in reach, oxygen safety measures in place and fall risk interventions in place.

## 2023-08-28 NOTE — ED TRIAGE NOTES
"Chief Complaint   Patient presents with    Other     Pt BIBA from home for issues w/ immobility that started approx 1-2 hours ago. Pt is morbidly obese w/ estimated weight of 700lb. Pt states this is new and can generally walk w/ a cane for stability, and move from chairs     BP (!) 148/78   Pulse 98   Temp 36.7 °C (98 °F) (Temporal)   Resp (!) 24   Ht 1.803 m (5' 11\")   Wt (!) 318 kg (700 lb)   SpO2 99%   BMI 97.63 kg/m²     Pt here for above cc  Pt denies any PMH or knowing any as he cannot remember when he last went to a doctor- several years ago. Pt does not have a pharmacy either  Pt weight estimated to be at 700lb approx  Pt states mobility is limited but today is new where he could not get up at all     Pt placed on bariatric bed UA to ED  "

## 2023-08-28 NOTE — ED NOTES
Transport and additional staff at bedside for pt transfer onto bariatric bed. Pt placed on 2L oxygen via NC. Pt aox4, NAD, and with all belongings. Transport to S629 bed 1.

## 2023-08-28 NOTE — DISCHARGE PLANNING
SW met with patient and had lengthy conversation regarding patient goals.  Patient desires to get medically cleared in order to go home and live as independently as he can.  He currently has help from his sister, mother, and room mate (although room mate is basic companionship, not care services).  Patient does not have insurance as he opted not to get it during the open enrollment period.  Patient is willing to seek insurance during the next open enrollment period coming up in November.  Patient's long term goals are to not be bed ridden, not to live in a long term facility, and to eventually go to a bariatric surgeon for bariatric surgery.    SW spoke to patient about [facility] and PCA services and will bring resources to patient.  Patient was also interested in pursuing a nutritionist in the future.  Patient has anxiety regarding his transportation home.  Patient feels he would need [facility] to transport him back home but is anxious about all the costs that are accumulating during this time of medical necessities.      Patient states he wants to get as much medical information as he can (ie: diabetes, etc) as he hasn't seen a doctor in 10 years.    MICHAEL responded to consult with Dr Taylor and provided information from conversation with patient.

## 2023-08-29 ENCOUNTER — APPOINTMENT (OUTPATIENT)
Dept: RADIOLOGY | Facility: MEDICAL CENTER | Age: 38
DRG: 602 | End: 2023-08-29
Attending: INTERNAL MEDICINE

## 2023-08-29 LAB
ANION GAP SERPL CALC-SCNC: 10 MMOL/L (ref 7–16)
BASOPHILS # BLD AUTO: 0.3 % (ref 0–1.8)
BASOPHILS # BLD: 0.02 K/UL (ref 0–0.12)
BUN SERPL-MCNC: 8 MG/DL (ref 8–22)
CALCIUM SERPL-MCNC: 8.8 MG/DL (ref 8.5–10.5)
CHLORIDE SERPL-SCNC: 100 MMOL/L (ref 96–112)
CO2 SERPL-SCNC: 28 MMOL/L (ref 20–33)
CREAT SERPL-MCNC: 0.77 MG/DL (ref 0.5–1.4)
EOSINOPHIL # BLD AUTO: 0.29 K/UL (ref 0–0.51)
EOSINOPHIL NFR BLD: 4 % (ref 0–6.9)
ERYTHROCYTE [DISTWIDTH] IN BLOOD BY AUTOMATED COUNT: 59.2 FL (ref 35.9–50)
GFR SERPLBLD CREATININE-BSD FMLA CKD-EPI: 116 ML/MIN/1.73 M 2
GLUCOSE SERPL-MCNC: 107 MG/DL (ref 65–99)
HCT VFR BLD AUTO: 40.1 % (ref 42–52)
HGB BLD-MCNC: 10.6 G/DL (ref 14–18)
IMM GRANULOCYTES # BLD AUTO: 0.01 K/UL (ref 0–0.11)
IMM GRANULOCYTES NFR BLD AUTO: 0.1 % (ref 0–0.9)
LYMPHOCYTES # BLD AUTO: 1.81 K/UL (ref 1–4.8)
LYMPHOCYTES NFR BLD: 25.2 % (ref 22–41)
MCH RBC QN AUTO: 20.3 PG (ref 27–33)
MCHC RBC AUTO-ENTMCNC: 26.4 G/DL (ref 32.3–36.5)
MCV RBC AUTO: 76.8 FL (ref 81.4–97.8)
MONOCYTES # BLD AUTO: 0.8 K/UL (ref 0–0.85)
MONOCYTES NFR BLD AUTO: 11.2 % (ref 0–13.4)
NEUTROPHILS # BLD AUTO: 4.24 K/UL (ref 1.82–7.42)
NEUTROPHILS NFR BLD: 59.2 % (ref 44–72)
NRBC # BLD AUTO: 0 K/UL
NRBC BLD-RTO: 0 /100 WBC (ref 0–0.2)
PLATELET # BLD AUTO: 320 K/UL (ref 164–446)
PMV BLD AUTO: 9.3 FL (ref 9–12.9)
POTASSIUM SERPL-SCNC: 4.8 MMOL/L (ref 3.6–5.5)
RBC # BLD AUTO: 5.22 M/UL (ref 4.7–6.1)
SCCMEC + MECA PNL NOSE NAA+PROBE: NEGATIVE
SODIUM SERPL-SCNC: 138 MMOL/L (ref 135–145)
WBC # BLD AUTO: 7.2 K/UL (ref 4.8–10.8)

## 2023-08-29 PROCEDURE — 700111 HCHG RX REV CODE 636 W/ 250 OVERRIDE (IP): Mod: JZ | Performed by: INTERNAL MEDICINE

## 2023-08-29 PROCEDURE — 700102 HCHG RX REV CODE 250 W/ 637 OVERRIDE(OP): Performed by: INTERNAL MEDICINE

## 2023-08-29 PROCEDURE — 93971 EXTREMITY STUDY: CPT | Mod: RT

## 2023-08-29 PROCEDURE — A9270 NON-COVERED ITEM OR SERVICE: HCPCS | Performed by: INTERNAL MEDICINE

## 2023-08-29 PROCEDURE — 770006 HCHG ROOM/CARE - MED/SURG/GYN SEMI*

## 2023-08-29 PROCEDURE — 99233 SBSQ HOSP IP/OBS HIGH 50: CPT | Performed by: STUDENT IN AN ORGANIZED HEALTH CARE EDUCATION/TRAINING PROGRAM

## 2023-08-29 PROCEDURE — 87641 MR-STAPH DNA AMP PROBE: CPT

## 2023-08-29 PROCEDURE — 97162 PT EVAL MOD COMPLEX 30 MIN: CPT

## 2023-08-29 PROCEDURE — 700111 HCHG RX REV CODE 636 W/ 250 OVERRIDE (IP): Performed by: STUDENT IN AN ORGANIZED HEALTH CARE EDUCATION/TRAINING PROGRAM

## 2023-08-29 PROCEDURE — 97535 SELF CARE MNGMENT TRAINING: CPT

## 2023-08-29 PROCEDURE — A9270 NON-COVERED ITEM OR SERVICE: HCPCS | Performed by: STUDENT IN AN ORGANIZED HEALTH CARE EDUCATION/TRAINING PROGRAM

## 2023-08-29 PROCEDURE — 36415 COLL VENOUS BLD VENIPUNCTURE: CPT

## 2023-08-29 PROCEDURE — 80048 BASIC METABOLIC PNL TOTAL CA: CPT

## 2023-08-29 PROCEDURE — 700102 HCHG RX REV CODE 250 W/ 637 OVERRIDE(OP): Performed by: STUDENT IN AN ORGANIZED HEALTH CARE EDUCATION/TRAINING PROGRAM

## 2023-08-29 PROCEDURE — 97167 OT EVAL HIGH COMPLEX 60 MIN: CPT

## 2023-08-29 PROCEDURE — 85025 COMPLETE CBC W/AUTO DIFF WBC: CPT

## 2023-08-29 RX ORDER — AMMONIUM LACTATE 12 G/100G
LOTION TOPICAL 2 TIMES DAILY
Status: DISCONTINUED | OUTPATIENT
Start: 2023-08-29 | End: 2023-09-07 | Stop reason: HOSPADM

## 2023-08-29 RX ADMIN — SENNOSIDES AND DOCUSATE SODIUM 2 TABLET: 50; 8.6 TABLET ORAL at 05:35

## 2023-08-29 RX ADMIN — FUROSEMIDE 40 MG: 10 INJECTION INTRAMUSCULAR; INTRAVENOUS at 05:36

## 2023-08-29 RX ADMIN — Medication: at 16:42

## 2023-08-29 RX ADMIN — LISINOPRIL 10 MG: 10 TABLET ORAL at 05:36

## 2023-08-29 RX ADMIN — LINEZOLID 600 MG: 600 TABLET, FILM COATED ORAL at 05:32

## 2023-08-29 RX ADMIN — LINEZOLID 600 MG: 600 TABLET, FILM COATED ORAL at 21:57

## 2023-08-29 RX ADMIN — LINEZOLID 600 MG: 600 TABLET, FILM COATED ORAL at 13:23

## 2023-08-29 RX ADMIN — ENOXAPARIN SODIUM 60 MG: 100 INJECTION SUBCUTANEOUS at 05:32

## 2023-08-29 RX ADMIN — CEFTRIAXONE SODIUM 2000 MG: 10 INJECTION, POWDER, FOR SOLUTION INTRAVENOUS at 16:55

## 2023-08-29 RX ADMIN — ENOXAPARIN SODIUM 60 MG: 100 INJECTION SUBCUTANEOUS at 16:40

## 2023-08-29 RX ADMIN — Medication: at 11:45

## 2023-08-29 ASSESSMENT — ENCOUNTER SYMPTOMS
WHEEZING: 1
WEAKNESS: 1
SHORTNESS OF BREATH: 1

## 2023-08-29 ASSESSMENT — COGNITIVE AND FUNCTIONAL STATUS - GENERAL
SUGGESTED CMS G CODE MODIFIER DAILY ACTIVITY: CK
DAILY ACTIVITIY SCORE: 14
DRESSING REGULAR UPPER BODY CLOTHING: A LITTLE
TOILETING: TOTAL
DRESSING REGULAR LOWER BODY CLOTHING: TOTAL
PERSONAL GROOMING: A LITTLE
HELP NEEDED FOR BATHING: A LOT

## 2023-08-29 ASSESSMENT — GAIT ASSESSMENTS: GAIT LEVEL OF ASSIST: UNABLE TO PARTICIPATE

## 2023-08-29 ASSESSMENT — ACTIVITIES OF DAILY LIVING (ADL): TOILETING: INDEPENDENT

## 2023-08-29 NOTE — PROGRESS NOTES
Hospital Medicine Daily Progress Note    Date of Service  8/29/2023    Chief Complaint  Cole Jaramillo is a 38 y.o. male admitted 8/27/2023 with difficulty ambulation and wound care    Hospital Course  38 y.o. male with super massive obesity with a BMI of 116 and other unknown medical issues who presented to the hospital 8/27 with difficulty ambulation and wound care and unable to take care of himself. Up until about a month ago he was able to ambulate around his house and bathe himself and get in the shower.  However over the last couple weeks has had significantly worsening shortness of breath and significantly increasing lower extremity swelling.  He was finally brought into the emergency room because he could no longer move himself and had to call the fire department twice in the last week.  Patient denies any clear chest pain.  He endorses PND and orthopnea.  He has not seen a doctor in over 10 years.    Noted severe excoriations of the bilateral lower quadrants of the abdomen which she scratches incessantly.      Interval Problem Update  -Notes were reviewed from ER, radiology, nursing etc.  -Reviewed labs to date, microbiology for current admit and prior  -Imaging was independently reviewed and interpreted    Noted elevated BNP, ordered iv lasix  Echo and doppler BLE pending  I have reviewed CXR independently, no acute cardiopulmonary abnormalities.     I have discussed this patient's plan of care and discharge plan at IDT rounds today with Case Management, Nursing, Nursing leadership, and other members of the IDT team.    Consultants/Specialty  na    Code Status  Full Code    Disposition  The patient is not medically cleared for discharge to home or a post-acute facility.      I have placed the appropriate orders for post-discharge needs.    Review of Systems  Review of Systems   Respiratory:  Positive for shortness of breath and wheezing.    Cardiovascular:  Positive for leg swelling.   Neurological:   Positive for weakness.   All other systems reviewed and are negative.       Physical Exam  Temp:  [36.1 °C (97 °F)-36.7 °C (98.1 °F)] 36.1 °C (97 °F)  Pulse:  [70-93] 90  Resp:  [18-20] 18  BP: (129-153)/(67-82) 134/69  SpO2:  [92 %-97 %] 97 %    Physical Exam  Vitals and nursing note reviewed.   Constitutional:       Appearance: Normal appearance. He is obese. He is ill-appearing and diaphoretic.   HENT:      Head: Normocephalic and atraumatic.      Mouth/Throat:      Pharynx: Oropharynx is clear.   Eyes:      Pupils: Pupils are equal, round, and reactive to light.   Neck:      Vascular: No carotid bruit.   Cardiovascular:      Rate and Rhythm: Normal rate and regular rhythm.   Pulmonary:      Effort: Pulmonary effort is normal.      Breath sounds: Wheezing present.      Comments: On oxygen supplements  Diminished breath sounds   Abdominal:      General: Abdomen is flat. Bowel sounds are normal.      Palpations: Abdomen is soft. There is no mass.   Musculoskeletal:         General: Normal range of motion.      Cervical back: Neck supple.      Right lower leg: Edema present.      Left lower leg: Edema present.      Comments: Massive tense edema B/L LE's to the mid-thighs  Extensive lichenification of the skin B/L LE's  No clear open lesions of the B/l LE's    Skin:     General: Skin is warm.      Findings: Erythema and lesion present.      Comments: B/L LQ abdominal wall lesions with some purulence in the LLQ and significant surrounding erythema, moderately TTP      Neurological:      General: No focal deficit present.      Mental Status: He is alert and oriented to person, place, and time.   Psychiatric:         Mood and Affect: Mood normal.         Behavior: Behavior normal.         Fluids    Intake/Output Summary (Last 24 hours) at 8/29/2023 1600  Last data filed at 8/29/2023 1000  Gross per 24 hour   Intake 720 ml   Output --   Net 720 ml       Laboratory  Recent Labs     08/27/23  1805 08/29/23  0501   WBC 7.9  7.2   RBC 5.08 5.22   HEMOGLOBIN 10.5* 10.6*   HEMATOCRIT 37.5* 40.1*   MCV 73.8* 76.8*   MCH 20.7* 20.3*   MCHC 28.0* 26.4*   RDW 56.7* 59.2*   PLATELETCT 320 320   MPV 9.6 9.3     Recent Labs     08/27/23  1805 08/29/23  0501   SODIUM 137 138   POTASSIUM 4.1 4.8   CHLORIDE 100 100   CO2 27 28   GLUCOSE 113* 107*   BUN 12 8   CREATININE 0.81 0.77   CALCIUM 8.9 8.8                   Imaging  US-EXTREMITY VENOUS LOWER UNILAT RIGHT         DX-CHEST-PORTABLE (1 VIEW)   Final Result      1.  No acute cardiopulmonary abnormality identified.      2.  Marked enlargement of the cardiac silhouette especially for age      EC-ECHOCARDIOGRAM COMPLETE W/ CONT    (Results Pending)        Assessment/Plan  * Abdominal wall cellulitis- (present on admission)  Assessment & Plan  Refractory to oral bactrim  Significant lesions in the B/L LQ's of the abdominal wall along with L breast cellulitis  Check MRSA Nares PCR  Continue iv zyvox and ceftriaxone  F/U blood cultures  Wound care  Very high risk for developing sepsis and abscesses given his massive obesity and other medical issues    Shortness of breath- (present on admission)  Assessment & Plan  BNP elevated   ECHO  IV lasix  Check B/L LE US    HTN (hypertension)- (present on admission)  Assessment & Plan  Persistently elevated in the ER  Will start lisinopril 10 mg daily  Start IV lasix 40 mg daily  Defer BB until ECHO comes back  Adjust prn    Class 3 severe obesity due to excess calories with serious comorbidity and body mass index (BMI) greater than or equal to 70 in adult (HCC)- (present on admission)  Assessment & Plan  Body mass index is 115.48 kg/m².  Cannot take care of himself now. Not a safe discharge home.  Will need to work with CM/SW about eventual disposition options once patient has his acute medical issues resolved/improved  RD consult  Outpatient bariatric surgery    Acute respiratory failure with hypoxia ([facility])- (present on admission)  Assessment &  Plan  Persistently hypoxic requiring 3 L NC  Very concerning for new onset CHF (unknown EF)  Echo and doppler  Continue iv lasix          VTE prophylaxis:    enoxaparin ppx      I have performed a physical exam and reviewed and updated ROS and Plan today (8/29/2023). In review of yesterday's note (8/28/2023), there are no changes except as documented above.    Patient is has a high medical complexity, complex decision making and is at high risk for complication, morbidity, and mortality.  I spent 51 minutes, reviewing the chart, obtaining and/or reviewing separately obtained history. Performing a medically appropriate examination and evaluation.  Counseling and educating the patient. Ordering and reviewing medications, tests, or procedures.  Documenting clinical information in EPIC. Independently interpreting results and communicating results to patient. Discussing future disposition of care with patient, RN and case management.  This does not include time spent on separately billable procedures/tests.

## 2023-08-29 NOTE — DISCHARGE PLANNING
Case Management Discharge Planning    Admission Date: 8/27/2023  GMLOS: 1.8  ALOS: 1    6-Clicks ADL Score: 22  6-Clicks Mobility Score: 16  PT and/or OT Eval ordered: Yes  Post-acute Referrals Ordered: Yes  Post-acute Choice Obtained: Yes  Has referral(s) been sent to post-acute provider:  No      Anticipated Discharge Dispo: Discharge Disposition: Discharged to home/self care (01)    DME Needed: No    Action(s) Taken: Updated Provider/Nurse on Discharge Plan    Escalations Completed: None    Medically Clear: No    Next Steps: follow up with attending    Barriers to Discharge: Medical clearance    Is the patient up for discharge tomorrow: No  Sw spoke to patient about his discharge plan  Patient stated he was working full time until about three days ago.  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  Patient typically works 40 hours a week at $30.00 per hour.     Patient is single, lives with a friend in a 2 bedroom apartment, $1700 per month in rent.  Patient stated that he has all been called fluffy/plump all of his life. While in Highschool he was very active, played baseball, basketball.  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  Patient stated that due to the weather he stopped going out as much and basically isolated in his apartment.  Patient feels that he was getting weaker and weaker and ultimately he was not able to walk.    SW  told patient that due to his weight finding skilled rehab facilities in the [facility] would be almost impossible.  Patient stated he was thinking that it would difficult to find services.  SW asked patient what he feels needs to happen for him to go home. Patient stated that he needs [facility] to first care of his medical needs so he can walk.  SW stated that his medically needs will be addressed.  That [facility] can offer therapies to help him get stronger so he can go home.  SW asked  "patient if he would be comfortable with a dietician order.  Patient stated he would appreciate this, as he knows he has gained a great of weight.  While he has down loaded numerous apps to help him lose weight, none have helped. Patient told SW that he does not want to stay at the hospital, this is his worst nightmare.  \"I will push myself to get stronger.\"    SW asked about his support system, patient stated that would be his roommate, sister and mom.  That while they will help him out they are not able to lift him or help his walk.          "

## 2023-08-29 NOTE — PROGRESS NOTES
Patient has had an uneventful might. Denies pain. Skin care done. Alert and oriented *4. All needs met at this time. Bed locked and in lowest position. Call light within reach.

## 2023-08-29 NOTE — THERAPY
"Occupational Therapy   Initial Evaluation     Patient Name: Cole Jaramillo  Age:  38 y.o., Sex:  male  Medical Record #: [id number]  Today's Date: 8/29/2023     Precautions  Precautions: Fall Risk  Comments: See weight.    Assessment  Patient is 38 y.o. male who presents to acute for increasing weakness, BLE swelling, and worsening SOB. Pt found to have severe excoriations on bilateral quadrants in which he reports he \"picks at\"  PMH includes super massive obesity. Pt reports at baseline he can get himself in and out of the tub shower, stand for 7-15 minutes at a time, dress himself, and times BMs w/ showers. Pt required total A to roll, mod A for supine>sit using bed features, and total A to return to bed. When seated EOB pt required B UE's to maintain sitting balance. Pt required total A for toilet hygine  and LB dressing. Pt demo'd impaired balance, functional mobility, and activity tolerance impacting functional independence. Will continue to follow while in house.    Of note at this time, there is no FWW, BSC, w/c, janie, or sera-steady that is rated for this pts weight. Escalated to rehab director.     Plan    Occupational Therapy Initial Treatment Plan   Treatment Interventions: (P) Self Care / Activities of Daily Living, Neuro Re-Education / Balance, Therapeutic Exercises, Therapeutic Activity, Adaptive Equipment  Treatment Frequency: (P) 3 Times per Week  Duration: (P) Until Therapy Goals Met    DC Equipment Recommendations: (P) Unable to determine at this time  Discharge Recommendations: (P) Recommend post-acute placement for additional occupational therapy services prior to discharge home (however faciltiies in the area will likely not accept pt.)     Subjective    \"Before the pandemic I was out on the town\"     Objective       08/29/23 1005   Initial Contact Note    Initial Contact Note Order Received and Verified, Occupational Therapy Evaluation in Progress with Full Report to Follow.   Prior Living " Situation   Prior Services None   Housing / Facility 1 Story Apartment / Condo   Bathroom Set up Bathtub / Shower Combination   Equipment Owned Single Point Cane;Sock Aid   Lives with - Patient's Self Care Capacity Unrelated Adult   Comments Pt reports roommate is not helpful, family assists w/ grocery shopping and light IADLs. 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   Prior Level of ADL Function   Self Feeding Independent   Grooming / Hygiene Independent   Bathing Independent   Dressing Independent   Toileting Independent   Comments reports he typically brings legs on to bed to perform LB dressing, is able to make it over the tub, can stand for ~7-15 min at a time, and performs voiding in standing using a wall to brace against. Typically unable to wipe himself, reports he times BMs with his shower.   Prior Level of IADL Function   Medication Management Independent   Laundry Independent   Kitchen Mobility Independent   Shopping Requires Assist   Prior Level Of Mobility Independent With Device in Home   Precautions   Precautions Fall Risk   Comments See weight.   Vitals   O2 (LPM) 2   O2 Delivery Device Nasal Cannula   Pain 0 - 10 Group   Therapist Pain Assessment Post Activity Pain Same as Prior to Activity;Nurse Notified  (c/o bar digging into his legs seated EOB)   Active ROM Upper Body   Comments Limited by body habitus   Strength Upper Body   Comments generlized weakness, equal bilterally   Coordination Upper Body   Comments reports fingers hurt from asssisting himself w/ mobility, reports that they keep snapping into flexion   Balance Assessment   Sitting Balance (Static) Poor +   Sitting Balance (Dynamic) Poor   Weight Shift Sitting Fair   Comments w/ B UE support   Bed Mobility    Supine to Sit Moderate Assist  (w/ heavy use of bed rail and momentum to get LEs off bed)   Sit to Supine Total Assist   Rolling Total Assist to Rt.;Total Assist to Lt.   ADL Assessment   Grooming   (unable to maintain balance at EOB w/out  use of B UE's, SPV level at bed level)   Lower Body Dressing Total Assist   Toileting Total Assist  (purewik in place)   How much help from another person does the patient currently need...   Putting on and taking off regular lower body clothing? 1   Bathing (including washing, rinsing, and drying)? 2   Toileting, which includes using a toilet, bedpan, or urinal? 1   Putting on and taking off regular upper body clothing? 3   Taking care of personal grooming such as brushing teeth? 3   Eating meals? 4   6 Clicks Daily Activity Score 14   Functional Mobility   Sit to Stand Unable to Participate   Comments unable to attempt stand due to pts size, no equipment that supports >750 lbs.   Activity Tolerance   Sitting in Chair NT   Sitting Edge of Bed 10 min   Standing NT   Patient / Family Goals   Patient / Family Goal #1 To go home   Short Term Goals   Short Term Goal # 1 Pt will demo seated grooming w/ SPV   Short Term Goal # 2 W/ weight specific equipment pt will demo standing in preparation for ADL txfs w/ min A   Education Group   Role of Occupational Therapist Patient Response Patient;Acceptance;Explanation;Demonstration;Verbal Demonstration   Occupational Therapy Initial Treatment Plan    Treatment Interventions Self Care / Activities of Daily Living;Neuro Re-Education / Balance;Therapeutic Exercises;Therapeutic Activity;Adaptive Equipment   Treatment Frequency 3 Times per Week   Duration Until Therapy Goals Met   Problem List   Problem List Decreased Active Daily Living Skills;Decreased Homemaking Skills;Safety Awareness Deficits / Cognition;Decreased Activity Tolerance;Impaired Postural Control / Balance   Anticipated Discharge Equipment and Recommendations   DC Equipment Recommendations Unable to determine at this time   Discharge Recommendations Recommend post-acute placement for additional occupational therapy services prior to discharge home  (however faciltiies in the area will likely not accept pt.)    Interdisciplinary Plan of Care Collaboration   IDT Collaboration with  Nursing;Therapy Tech;Physical Therapist;;Certified Nursing Assistant   Patient Position at End of Therapy In Bed;Call Light within Reach;Tray Table within Reach;Phone within Reach  (w/ wound care nurses)   Collaboration Comments Report given, spoke w/ rehab director and  about lack of appropriate equipment required to safely mobilize pt.   Session Information   Date / Session Number  8/29, 1 (1/3, 9/4)

## 2023-08-29 NOTE — PROGRESS NOTES
4 Eyes Skin Assessment Completed by Laurie RN and DAVID Irwin.    Head WDL  Ears WDL  Nose WDL  Mouth WDL  Neck WDL  Breast/Chest WDL  Shoulder Blades WDL  Spine Redness/discoloration lower back  (R) Arm/Elbow/Hand WDL  (L) Arm/Elbow/Hand WDL  Abdomen Redness to pannus, RLQ, and LLQ  Groin Redness  Scrotum/Coccyx/Buttocks WDL  (R) Leg Edema, callous,   (L) Leg Edema, callous, scab  (R) Heel/Foot/Toe Callous  (L) Heel/Foot/Toe Callous    Devices In Places Nasal Cannula, Purewick      Interventions In Place Q2 Turns, bariatric bed    Possible Skin Injury Yes    Pictures Uploaded Into Epic Yes  Wound Consult Placed Yes  RN Wound Prevention Protocol Ordered Yes

## 2023-08-29 NOTE — PROGRESS NOTES
Bedside report received from night RN, pt care assumed, assessment completed. Pt is A&O 4, pt denies current pain. Updated on POC, questions answered. Bed in lowest, locked position, treaded socks on, call light and belongings within reach.

## 2023-08-29 NOTE — CARE PLAN
The patient is Watcher - Medium risk of patient condition declining or worsening    Shift Goals  Clinical Goals: Continue with abx  Patient Goals: Rest  Family Goals: gadiel    Progress made toward(s) clinical / shift goals:    Problem: Knowledge Deficit - Standard  Goal: Patient and family/care givers will demonstrate understanding of plan of care, disease process/condition, diagnostic tests and medications  Outcome: Progressing     Problem: Skin Integrity  Goal: Skin integrity is maintained or improved  Outcome: Progressing     Problem: Fall Risk  Goal: Patient will remain free from falls  Outcome: Progressing

## 2023-08-29 NOTE — THERAPY
Physical Therapy   Initial Evaluation     Patient Name: Cole Jaramillo  Age:  38 y.o., Sex:  male  Medical Record #: [id number]  Today's Date: 8/29/2023     Precautions  Precautions: Fall Risk  Comments: See weight.    Assessment  Cole Jaramillo is a 38 y.o. male with super massive obesity with a BMI of 116 and other unknown medical issues who presented to the hospital. Over the last couple weeks has had significantly worsening shortness of breath and significantly increasing lower extremity swelling.  He was finally brought into the emergency room because he could no longer move himself and had to call the fire department twice in the last week. Additionally he was noted to have severe excoriations of the bilateral lower quadrants of the abdomen which he scratches incessantly.  Additionally he has significant cellulitis and induration of the left breast and side flank as well that has not improved with oral Bactrim either.  Lastly in the emergency room he was found to be acutely hypoxic requiring 2 to 3 L nasal cannula which is a new diagnosis for him.    Upon evaluation, pt heavily relied on momentum to complete functional tasks. Despite numerous attempts, pt unable to roll R/L on own with bed features. Pt was able to transition to sitting EOB, pt unable to demonstrate safe/supported sitting position given body habitus and structural limitations with bariatric bed. 7 people required to assist pt back into bed (primarily clear legs into the bed). At this time, there are no appropriately graded bariatric equipment available for the pt to safely progress mobility, and if pt were to sustain a fall, his weight exceeds max capacity for the immediately available janie lifts. Will follow for skilled services pending availability of bariatric resources (discussed with rehab director).    Plan    Physical Therapy Initial Treatment Plan   Treatment Plan : Bed Mobility, Gait Training, Neuro Re-Education / Balance, Self Care  "/ Home Evaluation, Therapeutic Activities, Therapeutic Exercise  Treatment Frequency: 3 Times per Week  Duration: Other (See Comments) (Pending availability of appropriately graded bariatric equipment to safely utilize towards goals)    DC Equipment Recommendations: Unable to determine at this time  Discharge Recommendations: Recommend post-acute placement for additional physical therapy services prior to discharge home       Subjective    Pt reported goal to get back to walking, and having a fear of being stuck in bed for the rest of his life. Pt reported wounds on abdomen from \"picking.\"     Objective     08/29/23 0957   Initial Contact Note    Initial Contact Note Order Received and Verified, Physical Therapy Evaluation in Progress with Full Report to Follow.   Precautions   Precautions Fall Risk   Comments morbid obesity   Vitals   O2 (LPM) 2   O2 Delivery Device Nasal Cannula   Pain   Pain Scales 0 to 10 Scale    Pain 0 - 10 Group   Pain Rating Scale (NPRS) 0   Prior Living Situation   Prior Services None   Housing / Facility 1 Story Apartment / Condo   Steps Into Home 0   Steps In Home 0   Bathroom Set up Bathtub / Shower Combination   Equipment Owned Single Point Cane   Lives with - Patient's Self Care Capacity Other (Comments)  (Roommate (unable to provide assistance).)   Comments Pt reported decline in mobility/ADLs over the last few days-weeks. Previously pt completed his own dressing, tolieting, and showering. Difficulties with hygiene and would coordinate shower after tolieting. Would stand 7-15 minutes for showers. Has groceries delivered and/or family would assist. 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 Mother and sister only able to intermittently assist with non-physical tasks.   Prior Level of Functional Mobility   Bed Mobility Independent   Transfer Status Independent   Ambulation Independent   Ambulation Distance   (restricted household)   Assistive Devices Used Single Point Cane   History of Falls   History of " Falls No   Cognition    Comments pleasant and cooperative   Active ROM Lower Body    Active ROM Lower Body  X   Comments Limited by body habitus, fair attempts to initiate active ROM of hips/knees/ankles while repositioning in bed   Strength Lower Body   Lower Body Strength  X   Comments Unable to accurately assess due to body habitus and unable to IND lift leg while sitting EOB. BLE Grossly >2-/5   Sensation Lower Body   Lower Extremity Sensation   X   Comments Pt reports intact light touch, but intermittent paresthesias in feet   Balance Assessment   Sitting Balance (Static) Poor +   Comments Pt required posteriorly reclined position onto BUE (extended) for support given body habitus. Impaired truncal stability to remain upright w/out BUE support. Tolerated sitting EOB >15 minutes.   Bed Mobility    Supine to Sit Minimal Assist  (Required HOB elevated and heavy use of overhead trapeze. Pt able to clear legs off the bed. Assist to elevate trunk and additional 2 helpers on stand-by given heavy reliance of momentum during transfer.)   Sit to Supine Total Assist  (assist 7 people to safely assist pt into bed (guide trunk and clear BLE onto the bed) due to poor positioning of hips EOB)   Rolling Total Assist to Lt.;Total Assist to Rt.  (Pt attempted numerous times over extended period to initiate rolling to allow changing of saturated linens. Despite rails and trapeze, pt unable to perform on own. Required 6 people for safe rolling to reposition linen once back in bed)   Comments Variable use of surface max-inflate and surface de-flate to optimize in bed mobility   Gait Analysis   Gait Level Of Assist Unable to Participate   Functional Mobility   Sit to Stand Unable to Participate   Comments Unsafe to attempt standing. Poor sitting posture, and difficulties with positioning given sub-optimal bariatric bed (side rail pressing into posterior thigh). Unable to pick legs up to place on platform box for additional support.  No appropriately rated bariatric equipment available to safely assist with transfers.   Short Term Goals    Short Term Goal # 1 Pt will transfer supine<>sit supervision in 6 visits to improve bed mobility.   Short Term Goal # 2 Pt will transfer STS supervision w/LRAD in 6 visits to improve standing abilities.   Short Term Goal # 3 Pt will ambulate 25' supervision w/LRAD in 6 visits to improve access to environment.   Education Group   Education Provided Role of Physical Therapist   Role of Physical Therapist Patient Response Verbal Demonstration   Additional Comments Discussed prior vs current level of function. Discussed importance of appropriately graded equipment for pt (and current lack of resources) to safety progress mobility   Physical Therapy Initial Treatment Plan    Treatment Plan  Bed Mobility;Gait Training;Neuro Re-Education / Balance;Self Care / Home Evaluation;Therapeutic Activities;Therapeutic Exercise   Treatment Frequency 3 Times per Week   Duration Other (See Comments)  (Pending availability of appropriately graded bariatric equipment to safely utilize towards goals)   Problem List    Problems Impaired Bed Mobility;Impaired Transfers;Impaired Ambulation;Functional Strength Deficit;Functional ROM Deficit;Impaired Balance;Decreased Activity Tolerance   Anticipated Discharge Equipment and Recommendations   DC Equipment Recommendations Unable to determine at this time   Discharge Recommendations Recommend post-acute placement for additional physical therapy services prior to discharge home   Interdisciplinary Plan of Care Collaboration   IDT Collaboration with  Nursing;Occupational Therapist;Certified Nursing Assistant;Other (See Comments)  (Rehab Director)   Patient Position at End of Therapy In Bed;Call Light within Reach;Tray Table within Reach   Collaboration Comments Discussed concern with RN regarding suboptimal management of body fluids (bed/linens saturated in urine), concern for skin  integrity, and poor bed mobility status for staff to safely assist. Discussed with OT/rehab director regarding availability of appropriate valente resources for pt   Session Information   Date / Session Number  8/29-1(1/3, 9/4)

## 2023-08-29 NOTE — DIETARY
NUTRITION SERVICES: BMI - Pt with BMI >40 (=Body mass index is 115.48 kg/m².), Class III obesity. Weight loss counseling not appropriate in acute care setting. RECOMMEND - If appropriate at DC please refer to outpatient nutrition services for weight management.      RD available PRN

## 2023-08-29 NOTE — WOUND TEAM
[facility]  Inpatient Services  Initial Wound and Skin Care Evaluation    Admission Date: 8/27/2023     Last order of IP CONSULT TO WOUND CARE was found on 8/28/2023 from Hospital Encounter on 8/27/2023     HPI, PMH, SH: Reviewed    History reviewed. No pertinent surgical history.  Social History     Tobacco Use    Smoking status: Never    Smokeless tobacco: Never   Substance Use Topics    Alcohol use: Yes     Comment: rarely     Chief Complaint   Patient presents with    Other     Pt BIBA from home for issues w/ immobility that started approx 1-2 hours ago. Pt is morbidly obese w/ estimated weight of 700lb. Pt states this is new and can generally walk w/ a cane for stability, and move from chairs     Diagnosis: Abdominal wall cellulitis [L03.311]    Unit where seen by Wound Team: S629/01     WOUND CONSULT RELATED TO:  abdomen, legs    WOUND TEAM PLAN OF CARE - Frequency of Follow-up:   Nursing to follow dressing orders written for wound care. Contact wound team if area fails to progress, deteriorates or with any questions/concerns if something comes up before next scheduled follow up (See below as to whether wound is following and frequency of wound follow up)   Not following, consult as needed  - abdomen, legs    WOUND HISTORY:   Patient states he picks at his abdomen with his fingernails and that's why he has wounds to the abdomen.   Patient legs dry with no wounds.        WOUND ASSESSMENT/LDA  Wound 08/28/23 Partial Thickness Wound Abdomen Bilateral POA self inflicted (Active)   Date First Assessed/Time First Assessed: 08/28/23 1500   Present on Original Admission: Yes  Primary Wound Type: Partial Thickness Wound  Location: Abdomen  Laterality: Bilateral  Wound Description (Comments): POA self inflicted      Assessments 8/29/2023 10:00 AM   Wound Image      Site Assessment Red   Periwound Assessment Clean;Dry;Intact   Margins Attached edges;Defined edges   Closure Adhesive bandage   Drainage  Amount Scant   Drainage Description Serosanguineous   Treatments Cleansed;Site care   Wound Cleansing Foam Cleanser/Washcloth   Periwound Protectant Skin Protectant Wipes to Periwound   Dressing Status Clean;Dry;Intact   Dressing Changed New   Dressing Cleansing/Solutions Not Applicable   Dressing Options Petrolatum Gauze (yellow);Offloading Dressing - Sacral   Dressing Change/Treatment Frequency Daily, and As Needed   NEXT Dressing Change/Treatment Date 08/30/23   NEXT Weekly Photo (Inpatient Only) 09/05/23   Wound Team Following Not following   Non-staged Wound Description Partial thickness        Vascular:    NOMAN:   No results found.    Lab Values:    Lab Results   Component Value Date/Time    WBC 7.2 08/29/2023 05:01 AM    RBC 5.22 08/29/2023 05:01 AM    HEMOGLOBIN 10.6 (L) 08/29/2023 05:01 AM    HEMATOCRIT 40.1 (L) 08/29/2023 05:01 AM    HBA1C 5.9 (H) 08/28/2023 12:19 PM         Culture Results show:  No results found for this or any previous visit (from the past 720 hour(s)).    Pain Level/Medicated:  Patient denies pain       INTERVENTIONS BY WOUND TEAM:  Chart and images reviewed. Discussed with bedside RN. All areas of concern (based on picture review, LDA review and discussion with bedside RN) have been thoroughly assessed. Documentation of areas based on significant findings. This RN in to assess patient. Performed standard wound care which includes appropriate positioning, dressing removal and non-selective debridement. Pictures and measurements obtained weekly if/when required.    Wound:  Abdomen  Cleansed/Non-selectively Debrided with:  No rinse foam soap and Moist warm washcloth  Primary Dressing:  xeroform gauze  Secondary (Outer) Dressing: sacral offloading dressing    Advanced Wound Care Discharge Planning  Number of Clinicians necessary to complete wound care: 1  Is patient requiring IV pain medications for dressing changes:  No   Length of time for dressing change 5-10 min. (This does not include  chart review, pre-medication time, set up, clean up or time spent charting.)    Interdisciplinary consultation: Patient, Bedside RN, Lawrence HORTON (Wound RN).  Pressure injury and staging reviewed with N/A.    EVALUATION / RATIONALE FOR TREATMENT:     Date:  08/29/23  Wound Status:  Initial evaluation    Patient abdomen with POA self inflicted partial thickness wounds present, appear clean, xeroform (yellow) applied to provide an occlusive dressing that incorporates bacteriostatic protection.  Patient bilateral legs with dry thick skin present, scales noted. Amlactin lotion ordered. Bilateral heels intact and blanching, offloading dressing applied.          Goals: Steady decrease in wound area and depth weekly.    NURSING PLAN OF CARE ORDERS:  Dressing changes: See Dressing Care orders    NUTRITION RECOMMENDATIONS   Wound Team Recommendations:  N/A    DIET ORDERS (From admission to next 24h)       Start     Ordered    08/28/23 1130  Diet Order Diet: Cardiac  ALL MEALS        Question:  Diet:  Answer:  Cardiac    08/28/23 1129                    PREVENTATIVE INTERVENTIONS:    Q shift Luis - performed per nursing policy  Q shift pressure point assessments - performed per nursing policy    Surface/Positioning  Bariatric LOIS - Currently in Place  Reposition q 2 hours - Currently in Place    Offloading/Redistribution  Heel offloading dressing (Silicone dressing) - Applied this Visit      Containment/Moisture Prevention    Dri-rosa pad - Currently in Place  Lerner Catheter - RN's to place  Interdry - Currently in Place    Mobilization      Edge of bed with PT/OT      Anticipated discharge plans:  TBD        Vac Discharge Needs:  Vac Discharge plan is purely a recommendation from wound team and not a requirement for discharge unless otherwise stated by physician.  Not Applicable Pt not on a wound vac

## 2023-08-30 LAB
ANION GAP SERPL CALC-SCNC: 8 MMOL/L (ref 7–16)
BACTERIA UR CULT: NORMAL
BUN SERPL-MCNC: 6 MG/DL (ref 8–22)
CALCIUM SERPL-MCNC: 7.9 MG/DL (ref 8.5–10.5)
CHLORIDE SERPL-SCNC: 99 MMOL/L (ref 96–112)
CO2 SERPL-SCNC: 31 MMOL/L (ref 20–33)
CREAT SERPL-MCNC: 0.58 MG/DL (ref 0.5–1.4)
ERYTHROCYTE [DISTWIDTH] IN BLOOD BY AUTOMATED COUNT: 58.3 FL (ref 35.9–50)
FERRITIN SERPL-MCNC: 12.2 NG/ML (ref 22–322)
GFR SERPLBLD CREATININE-BSD FMLA CKD-EPI: 127 ML/MIN/1.73 M 2
GLUCOSE SERPL-MCNC: 100 MG/DL (ref 65–99)
HCT VFR BLD AUTO: 37.1 % (ref 42–52)
HGB BLD-MCNC: 9.9 G/DL (ref 14–18)
IRON SATN MFR SERPL: 9 % (ref 15–55)
IRON SERPL-MCNC: 32 UG/DL (ref 50–180)
MAGNESIUM SERPL-MCNC: 2.1 MG/DL (ref 1.5–2.5)
MCH RBC QN AUTO: 20.6 PG (ref 27–33)
MCHC RBC AUTO-ENTMCNC: 26.7 G/DL (ref 32.3–36.5)
MCV RBC AUTO: 77.3 FL (ref 81.4–97.8)
PHOSPHATE SERPL-MCNC: 3.7 MG/DL (ref 2.5–4.5)
PLATELET # BLD AUTO: 292 K/UL (ref 164–446)
PMV BLD AUTO: 9.4 FL (ref 9–12.9)
POTASSIUM SERPL-SCNC: 4.3 MMOL/L (ref 3.6–5.5)
RBC # BLD AUTO: 4.8 M/UL (ref 4.7–6.1)
SIGNIFICANT IND 70042: NORMAL
SITE SITE: NORMAL
SODIUM SERPL-SCNC: 138 MMOL/L (ref 135–145)
SOURCE SOURCE: NORMAL
TIBC SERPL-MCNC: 355 UG/DL (ref 250–450)
UIBC SERPL-MCNC: 323 UG/DL (ref 110–370)
WBC # BLD AUTO: 6.7 K/UL (ref 4.8–10.8)

## 2023-08-30 PROCEDURE — 85027 COMPLETE CBC AUTOMATED: CPT

## 2023-08-30 PROCEDURE — 700111 HCHG RX REV CODE 636 W/ 250 OVERRIDE (IP): Performed by: STUDENT IN AN ORGANIZED HEALTH CARE EDUCATION/TRAINING PROGRAM

## 2023-08-30 PROCEDURE — 83540 ASSAY OF IRON: CPT

## 2023-08-30 PROCEDURE — 84100 ASSAY OF PHOSPHORUS: CPT

## 2023-08-30 PROCEDURE — 82728 ASSAY OF FERRITIN: CPT

## 2023-08-30 PROCEDURE — 83735 ASSAY OF MAGNESIUM: CPT

## 2023-08-30 PROCEDURE — 80048 BASIC METABOLIC PNL TOTAL CA: CPT

## 2023-08-30 PROCEDURE — A9270 NON-COVERED ITEM OR SERVICE: HCPCS | Performed by: INTERNAL MEDICINE

## 2023-08-30 PROCEDURE — 770006 HCHG ROOM/CARE - MED/SURG/GYN SEMI*

## 2023-08-30 PROCEDURE — 700111 HCHG RX REV CODE 636 W/ 250 OVERRIDE (IP): Performed by: INTERNAL MEDICINE

## 2023-08-30 PROCEDURE — 83550 IRON BINDING TEST: CPT

## 2023-08-30 PROCEDURE — 36415 COLL VENOUS BLD VENIPUNCTURE: CPT

## 2023-08-30 PROCEDURE — 700102 HCHG RX REV CODE 250 W/ 637 OVERRIDE(OP): Performed by: INTERNAL MEDICINE

## 2023-08-30 PROCEDURE — 99232 SBSQ HOSP IP/OBS MODERATE 35: CPT | Performed by: STUDENT IN AN ORGANIZED HEALTH CARE EDUCATION/TRAINING PROGRAM

## 2023-08-30 RX ORDER — ACETAMINOPHEN 325 MG/1
650 TABLET ORAL EVERY 6 HOURS PRN
Status: DISCONTINUED | OUTPATIENT
Start: 2023-08-30 | End: 2023-09-07 | Stop reason: HOSPADM

## 2023-08-30 RX ADMIN — FUROSEMIDE 40 MG: 10 INJECTION INTRAMUSCULAR; INTRAVENOUS at 04:53

## 2023-08-30 RX ADMIN — LISINOPRIL 10 MG: 10 TABLET ORAL at 04:53

## 2023-08-30 RX ADMIN — Medication: at 06:00

## 2023-08-30 RX ADMIN — SENNOSIDES AND DOCUSATE SODIUM 2 TABLET: 50; 8.6 TABLET ORAL at 04:53

## 2023-08-30 RX ADMIN — Medication: at 17:02

## 2023-08-30 RX ADMIN — ENOXAPARIN SODIUM 60 MG: 100 INJECTION SUBCUTANEOUS at 04:53

## 2023-08-30 RX ADMIN — SENNOSIDES AND DOCUSATE SODIUM 2 TABLET: 50; 8.6 TABLET ORAL at 17:02

## 2023-08-30 RX ADMIN — ENOXAPARIN SODIUM 60 MG: 100 INJECTION SUBCUTANEOUS at 17:02

## 2023-08-30 RX ADMIN — CEFTRIAXONE SODIUM 2000 MG: 10 INJECTION, POWDER, FOR SOLUTION INTRAVENOUS at 04:52

## 2023-08-30 RX ADMIN — LINEZOLID 600 MG: 600 TABLET, FILM COATED ORAL at 04:53

## 2023-08-30 ASSESSMENT — ENCOUNTER SYMPTOMS
SHORTNESS OF BREATH: 1
WEAKNESS: 1
WHEEZING: 1

## 2023-08-30 NOTE — CARE PLAN
The patient is Watcher - Medium risk of patient condition declining or worsening    Shift Goals  Clinical Goals: Wound care, nutrition consult  Patient Goals: Comfort  Family Goals: gadiel    Progress made toward(s) clinical / shift goals:    Problem: Knowledge Deficit - Standard  Goal: Patient and family/care givers will demonstrate understanding of plan of care, disease process/condition, diagnostic tests and medications  Outcome: Progressing     Problem: Skin Integrity  Goal: Skin integrity is maintained or improved  Outcome: Progressing     Problem: Fall Risk  Goal: Patient will remain free from falls  Outcome: Progressing

## 2023-08-30 NOTE — DISCHARGE PLANNING
NORBERTO called Kassy at [facility] to find out about pricing and availability for a bariatric bed, bedside commode, and walker.      [phone]  NORBERTO received a voice message from Kassy stating [facility] is not doing any special orders at this time.  Kassy recommended this DPA reach out to [facility].    @[phone]  Agency/Facility Name: [facility] [phone]  Spoke To: Angela  Outcome: NORBERTO called to get a quote on a bariatric bed, bedside commode and walker.  Angela will do some research and give this DPA a call back with a quote.

## 2023-08-30 NOTE — PROGRESS NOTES
Hospital Medicine Daily Progress Note    Date of Service  8/30/2023    Chief Complaint  Cole Jaramillo is a 38 y.o. male admitted 8/27/2023 with difficulty ambulation and wound care    Hospital Course  38 y.o. male with super massive obesity with a BMI of 116 and other unknown medical issues who presented to the hospital 8/27 with difficulty ambulation and wound care and unable to take care of himself. Up until about a month ago he was able to ambulate around his house and bathe himself and get in the shower.  However over the last couple weeks has had significantly worsening shortness of breath and significantly increasing lower extremity swelling.  He was finally brought into the emergency room because he could no longer move himself and had to call the fire department twice in the last week.  Patient denies any clear chest pain.  He endorses PND and orthopnea.  He has not seen a doctor in over 10 years.    Noted severe excoriations of the bilateral lower quadrants of the abdomen which she scratches incessantly.      Interval Problem Update  -Notes were reviewed from ER, radiology, nursing etc.  -Reviewed labs to date, microbiology for current admit and prior  -Imaging was independently reviewed and interpreted    Noted elevated BNP, ordered iv lasix  Echo pending  Continue iv lasix  Wean O2  Discussed with dietician  MRSA swab neg, dc zyvox.     I have discussed this patient's plan of care and discharge plan at IDT rounds today with Case Management, Nursing, Nursing leadership, and other members of the IDT team.    Consultants/Specialty  na    Code Status  Full Code    Disposition  Medically Cleared  I have placed the appropriate orders for post-discharge needs.    Review of Systems  Review of Systems   Respiratory:  Positive for shortness of breath and wheezing.    Cardiovascular:  Positive for leg swelling.   Neurological:  Positive for weakness.   All other systems reviewed and are negative.       Physical  Exam  Temp:  [36.1 °C (97 °F)-36.6 °C (97.9 °F)] 36.2 °C (97.1 °F)  Pulse:  [86-93] 93  Resp:  [18-22] 20  BP: (119-165)/(63-87) 123/63  SpO2:  [90 %-99 %] 90 %    Physical Exam  Vitals and nursing note reviewed.   Constitutional:       Appearance: Normal appearance. He is obese. He is ill-appearing and diaphoretic.   HENT:      Head: Normocephalic and atraumatic.      Mouth/Throat:      Pharynx: Oropharynx is clear.   Eyes:      Pupils: Pupils are equal, round, and reactive to light.   Neck:      Vascular: No carotid bruit.   Cardiovascular:      Rate and Rhythm: Normal rate and regular rhythm.   Pulmonary:      Effort: Pulmonary effort is normal.      Breath sounds: Wheezing present.      Comments: On oxygen supplements  Diminished breath sounds   Abdominal:      General: Abdomen is flat. Bowel sounds are normal.      Palpations: Abdomen is soft. There is no mass.   Musculoskeletal:         General: Normal range of motion.      Cervical back: Neck supple.      Right lower leg: Edema present.      Left lower leg: Edema present.      Comments: Massive tense edema B/L LE's to the mid-thighs  Extensive lichenification of the skin B/L LE's  No clear open lesions of the B/l LE's    Skin:     General: Skin is warm.      Findings: Erythema and lesion present.      Comments: B/L LQ abdominal wall lesions with some purulence in the LLQ and significant surrounding erythema, moderately TTP      Neurological:      General: No focal deficit present.      Mental Status: He is alert and oriented to person, place, and time.   Psychiatric:         Mood and Affect: Mood normal.         Behavior: Behavior normal.         Fluids    Intake/Output Summary (Last 24 hours) at 8/30/2023 1516  Last data filed at 8/30/2023 1200  Gross per 24 hour   Intake --   Output 2950 ml   Net -2950 ml         Laboratory  Recent Labs     08/27/23  1805 08/29/23  0501 08/30/23  0721   WBC 7.9 7.2 6.7   RBC 5.08 5.22 4.80   HEMOGLOBIN 10.5* 10.6* 9.9*    HEMATOCRIT 37.5* 40.1* 37.1*   MCV 73.8* 76.8* 77.3*   MCH 20.7* 20.3* 20.6*   MCHC 28.0* 26.4* 26.7*   RDW 56.7* 59.2* 58.3*   PLATELETCT 320 320 292   MPV 9.6 9.3 9.4       Recent Labs     08/27/23  1805 08/29/23  0501 08/30/23  0721   SODIUM 137 138 138   POTASSIUM 4.1 4.8 4.3   CHLORIDE 100 100 99   CO2 27 28 31   GLUCOSE 113* 107* 100*   BUN 12 8 6*   CREATININE 0.81 0.77 0.58   CALCIUM 8.9 8.8 7.9*                     Imaging  US-EXTREMITY VENOUS LOWER UNILAT RIGHT   Final Result      DX-CHEST-PORTABLE (1 VIEW)   Final Result      1.  No acute cardiopulmonary abnormality identified.      2.  Marked enlargement of the cardiac silhouette especially for age      EC-ECHOCARDIOGRAM COMPLETE W/ CONT    (Results Pending)          Assessment/Plan  * Abdominal wall cellulitis- (present on admission)  Assessment & Plan  Refractory to oral bactrim  Significant lesions in the B/L LQ's of the abdominal wall along with L breast cellulitis  Check MRSA Nares PCR  Continue iv zyvox and ceftriaxone  F/U blood cultures  Wound care  Very high risk for developing sepsis and abscesses given his massive obesity and other medical issues  8/30: MRSA swab negative, I have discontinued zyvox  Continue iv ceftriaxone     Shortness of breath- (present on admission)  Assessment & Plan  BNP elevated   ECHO  IV lasix  Wean O2 as tolerated     HTN (hypertension)- (present on admission)  Assessment & Plan  Persistently elevated in the ER  Will start lisinopril 10 mg daily  Start IV lasix 40 mg daily  Defer BB until ECHO comes back  Adjust prn    Class 3 severe obesity due to excess calories with serious comorbidity and body mass index (BMI) greater than or equal to 70 in adult (HCC)- (present on admission)  Assessment & Plan  Body mass index is 115.48 kg/m².  Cannot take care of himself now. Not a safe discharge home.  Will need to work with CM/SW about eventual disposition options once patient has his acute medical issues  resolved/improved  RD consult  Outpatient bariatric surgery    Acute respiratory failure with hypoxia (HCC)- (present on admission)  Assessment & Plan  Persistently hypoxic requiring 3 L NC  Very concerning for new onset CHF (unknown EF)  Doppler BLE: limited study, negative for DVT  Echo pending  Continue iv lasix   Wean O2 as tolerated          VTE prophylaxis:    enoxaparin ppx      I have performed a physical exam and reviewed and updated ROS and Plan today (8/30/2023). In review of yesterday's note (8/29/2023), there are no changes except as documented above.

## 2023-08-30 NOTE — DIETARY
Nutrition Services: Weight loss Education Consult   Day 2 of admit.  Cole Jaramillo is a 38 y.o. male with admitting DX of Abdominal wall cellulitis [L03.311]    RD able to visit pt at bedside to provide weight loss education. Pt shared that he has followed numerous diets and calorie range recommendations that have been difficult to sustain. RD expressed importance of eating a range of calories that sustain energy levels while including an adequate calorie deficit for heathy weight loss. RD discussed MyPlate which included education on whole grains, lean proteins, healthy fats, reducing added sugar, and portion control. RD and pt discussed prioritizing nutrient dense foods and emphasizing proteins and fiber for prolonged satiety. RD also provided patient with a calorie range that will be used while inpatient to promote healthy weight loss though encouraged patient to request an RD consult for further nutritional concerns that should arise. RD provided handout reinforcing topics discussed.     Evaluation:   Actual Body Weight: 376 kg (828 lbs)  Ideal Body weight: 75.5 kg (166 lbs)  Adjusted Body weight: 151 kg (332 lbs)    Estimated nutritional needs:  REE/MSJ x 1.0= 4700 kcals (minus 300-500 kcals for weight loss: 4517-9206 kcals)  Total calories: 11-14 kcal/kg 7247-2830 kcals; 7880-4943 kcals (22-25 kcal/kg IBW); 2892-5908 kcals (18-23 kcal/kg Adj. BW)   Total protein: >189 g protein (>2.5 g/kg IBW)    Pt demonstrated readiness for change and evidence of learning. RD able to answer all questions to patient's satisfaction. No other education needs identified at this time. RD provided resources for outpatient weight management dietitian services. Consider referral to outpatient nutrition services for continuation of education as indicated or per pt preferences.     Please re-consult RD as indicated.

## 2023-08-30 NOTE — CARE PLAN
The patient is Stable - Low risk of patient condition declining or worsening    Shift Goals  Clinical Goals: Skin care  Patient Goals: comfort  Family Goals: gadiel    Progress made toward(s) clinical / shift goals:    Problem: Knowledge Deficit - Standard  Goal: Patient and family/care givers will demonstrate understanding of plan of care, disease process/condition, diagnostic tests and medications  Description: Target End Date:  1-3 days or as soon as patient condition allows    Document in Patient Education    1.  Patient and family/caregiver oriented to unit, equipment, visitation policy and means for communicating concern  2.  Complete/review Learning Assessment  3.  Assess knowledge level of disease process/condition, treatment plan, diagnostic tests and medications  4.  Explain disease process/condition, treatment plan, diagnostic tests and medications  Outcome: Progressing       Patient is not progressing towards the following goals:      Problem: Fall Risk  Goal: Patient will remain free from falls  Description: Target End Date:  Prior to discharge or change in level of care    Document interventions on the Adela Jenkins Fall Risk Assessment    1.  Assess for fall risk factors  2.  Implement fall precautions  Outcome: Not Met

## 2023-08-31 PROBLEM — D50.9 IRON DEFICIENCY ANEMIA: Status: ACTIVE | Noted: 2023-08-31

## 2023-08-31 LAB
ANION GAP SERPL CALC-SCNC: 8 MMOL/L (ref 7–16)
BUN SERPL-MCNC: 8 MG/DL (ref 8–22)
CALCIUM SERPL-MCNC: 8.3 MG/DL (ref 8.5–10.5)
CHLORIDE SERPL-SCNC: 98 MMOL/L (ref 96–112)
CO2 SERPL-SCNC: 33 MMOL/L (ref 20–33)
CREAT SERPL-MCNC: 0.65 MG/DL (ref 0.5–1.4)
ERYTHROCYTE [DISTWIDTH] IN BLOOD BY AUTOMATED COUNT: 57.7 FL (ref 35.9–50)
GFR SERPLBLD CREATININE-BSD FMLA CKD-EPI: 122 ML/MIN/1.73 M 2
GLUCOSE SERPL-MCNC: 106 MG/DL (ref 65–99)
HCT VFR BLD AUTO: 38.7 % (ref 42–52)
HGB BLD-MCNC: 10.3 G/DL (ref 14–18)
LMWH PPP CHRO-ACNC: 0.1 U/ML
MCH RBC QN AUTO: 20.4 PG (ref 27–33)
MCHC RBC AUTO-ENTMCNC: 26.6 G/DL (ref 32.3–36.5)
MCV RBC AUTO: 76.6 FL (ref 81.4–97.8)
PLATELET # BLD AUTO: 306 K/UL (ref 164–446)
PMV BLD AUTO: 9.4 FL (ref 9–12.9)
POTASSIUM SERPL-SCNC: 4.1 MMOL/L (ref 3.6–5.5)
RBC # BLD AUTO: 5.05 M/UL (ref 4.7–6.1)
SODIUM SERPL-SCNC: 139 MMOL/L (ref 135–145)
WBC # BLD AUTO: 7.2 K/UL (ref 4.8–10.8)

## 2023-08-31 PROCEDURE — A9270 NON-COVERED ITEM OR SERVICE: HCPCS | Performed by: INTERNAL MEDICINE

## 2023-08-31 PROCEDURE — 97530 THERAPEUTIC ACTIVITIES: CPT | Mod: CQ

## 2023-08-31 PROCEDURE — 85027 COMPLETE CBC AUTOMATED: CPT

## 2023-08-31 PROCEDURE — 700102 HCHG RX REV CODE 250 W/ 637 OVERRIDE(OP): Performed by: INTERNAL MEDICINE

## 2023-08-31 PROCEDURE — 97535 SELF CARE MNGMENT TRAINING: CPT

## 2023-08-31 PROCEDURE — 80048 BASIC METABOLIC PNL TOTAL CA: CPT

## 2023-08-31 PROCEDURE — 700105 HCHG RX REV CODE 258: Performed by: STUDENT IN AN ORGANIZED HEALTH CARE EDUCATION/TRAINING PROGRAM

## 2023-08-31 PROCEDURE — 700111 HCHG RX REV CODE 636 W/ 250 OVERRIDE (IP): Performed by: INTERNAL MEDICINE

## 2023-08-31 PROCEDURE — 85520 HEPARIN ASSAY: CPT

## 2023-08-31 PROCEDURE — 99232 SBSQ HOSP IP/OBS MODERATE 35: CPT | Performed by: STUDENT IN AN ORGANIZED HEALTH CARE EDUCATION/TRAINING PROGRAM

## 2023-08-31 PROCEDURE — 36415 COLL VENOUS BLD VENIPUNCTURE: CPT

## 2023-08-31 PROCEDURE — 700111 HCHG RX REV CODE 636 W/ 250 OVERRIDE (IP): Mod: JZ | Performed by: STUDENT IN AN ORGANIZED HEALTH CARE EDUCATION/TRAINING PROGRAM

## 2023-08-31 PROCEDURE — 770006 HCHG ROOM/CARE - MED/SURG/GYN SEMI*

## 2023-08-31 RX ORDER — ENOXAPARIN SODIUM 100 MG/ML
80 INJECTION SUBCUTANEOUS EVERY 12 HOURS
Status: DISCONTINUED | OUTPATIENT
Start: 2023-08-31 | End: 2023-09-07 | Stop reason: HOSPADM

## 2023-08-31 RX ORDER — FERROUS SULFATE 325(65) MG
325 TABLET ORAL
Status: DISCONTINUED | OUTPATIENT
Start: 2023-09-01 | End: 2023-09-07 | Stop reason: HOSPADM

## 2023-08-31 RX ADMIN — Medication: at 04:42

## 2023-08-31 RX ADMIN — ENOXAPARIN SODIUM 80 MG: 100 INJECTION SUBCUTANEOUS at 17:26

## 2023-08-31 RX ADMIN — ENOXAPARIN SODIUM 60 MG: 100 INJECTION SUBCUTANEOUS at 06:29

## 2023-08-31 RX ADMIN — FUROSEMIDE 40 MG: 10 INJECTION INTRAMUSCULAR; INTRAVENOUS at 08:54

## 2023-08-31 RX ADMIN — SENNOSIDES AND DOCUSATE SODIUM 2 TABLET: 50; 8.6 TABLET ORAL at 06:29

## 2023-08-31 RX ADMIN — SODIUM CHLORIDE 250 MG: 9 INJECTION, SOLUTION INTRAVENOUS at 09:53

## 2023-08-31 RX ADMIN — LISINOPRIL 10 MG: 10 TABLET ORAL at 06:29

## 2023-08-31 RX ADMIN — CEFTRIAXONE SODIUM 2000 MG: 10 INJECTION, POWDER, FOR SOLUTION INTRAVENOUS at 08:54

## 2023-08-31 RX ADMIN — Medication: at 17:26

## 2023-08-31 ASSESSMENT — ENCOUNTER SYMPTOMS
WHEEZING: 1
WEAKNESS: 1
SHORTNESS OF BREATH: 1

## 2023-08-31 ASSESSMENT — COGNITIVE AND FUNCTIONAL STATUS - GENERAL
TURNING FROM BACK TO SIDE WHILE IN FLAT BAD: UNABLE
DRESSING REGULAR UPPER BODY CLOTHING: A LITTLE
MOVING FROM LYING ON BACK TO SITTING ON SIDE OF FLAT BED: UNABLE
SUGGESTED CMS G CODE MODIFIER DAILY ACTIVITY: CK
SUGGESTED CMS G CODE MODIFIER MOBILITY: CL
MOVING TO AND FROM BED TO CHAIR: UNABLE
STANDING UP FROM CHAIR USING ARMS: A LITTLE
HELP NEEDED FOR BATHING: A LOT
PERSONAL GROOMING: A LITTLE
DAILY ACTIVITIY SCORE: 14
DRESSING REGULAR LOWER BODY CLOTHING: TOTAL
CLIMB 3 TO 5 STEPS WITH RAILING: TOTAL
TOILETING: TOTAL
WALKING IN HOSPITAL ROOM: A LITTLE
MOBILITY SCORE: 10

## 2023-08-31 ASSESSMENT — GAIT ASSESSMENTS
GAIT LEVEL OF ASSIST: STANDBY ASSIST
DISTANCE (FEET): 12

## 2023-08-31 NOTE — CARE PLAN
The patient is Stable - Low risk of patient condition declining or worsening    Shift Goals  Clinical Goals: Dressing Change, reposition  Patient Goals: reposition  Family Goals: gadiel    Progress made toward(s) clinical / shift goals:    Problem: Knowledge Deficit - Standard  Goal: Patient and family/care givers will demonstrate understanding of plan of care, disease process/condition, diagnostic tests and medications  Description: Target End Date:  1-3 days or as soon as patient condition allows    Document in Patient Education    1.  Patient and family/caregiver oriented to unit, equipment, visitation policy and means for communicating concern  2.  Complete/review Learning Assessment  3.  Assess knowledge level of disease process/condition, treatment plan, diagnostic tests and medications  4.  Explain disease process/condition, treatment plan, diagnostic tests and medications  Outcome: Progressing     Patient dressing changes performed this shift.    Patient is not progressing towards the following goals:      Problem: Fall Risk  Goal: Patient will remain free from falls  Description: Target End Date:  Prior to discharge or change in level of care    Document interventions on the Adela Jenkins Fall Risk Assessment    1.  Assess for fall risk factors  2.  Implement fall precautions  Outcome: Not Progressing

## 2023-08-31 NOTE — THERAPY
Physical Therapy   Daily Treatment     Patient Name: Cole Jaramillo  Age:  38 y.o., Sex:  male  Medical Record #: [id number]  Today's Date: 8/31/2023     Precautions  Precautions: (P) Fall Risk    Assessment    The pt was eager to get seated EOB and work on standing, c/o how uncomfortable the bed it and causing increase in Rt knee pain. The pt did require assist w/sup->sit w/HOB elevated, he was able to slowly manage LE's off the bed assist required w/trunk. Once seated and his feet were on floor the pt could hold static sit w/supervision only. Today the pt managed STS from bed->window ledge w/CGA and could hold standing w/ledge support w/supervision. Once standing secured the pt side stepped along the window ledge 12' x 1, does exhibit WOB.   The pt was indep around his apartment w/SPC, gets food delivered, he is home bound. PT will trial FWW next tx session. The pt is very motivated in his recovery and was excited that he was able to sit and come to stand w/little assistance.   PT will cont to follow, planning on seeing M-F to get pt back to his baseline. The pt will need bariatric FWW and HHS upon d/c.     Plan    Treatment Plan Status: (P) Modify Current Treatment Plan  Type of Treatment: Bed Mobility, Gait Training, Neuro Re-Education / Balance, Self Care / Home Evaluation, Therapeutic Activities, Therapeutic Exercise  Treatment Frequency: (P) 5 Times per Week  Treatment Duration: Other (See Comments) (Pending availability of appropriately graded bariatric equipment to safely utilize towards goals)    DC Equipment Recommendations: (P)  (bariatric FWW)  Discharge Recommendations: (P) Recommend post-acute placement for additional physical therapy services prior to discharge home    Objective       08/31/23 1322   Charge Group   PT Therapeutic Activities (Units) 3   Total Time Spent   PT Therapeutic Activities Time Spent (Mins) 40   Precautions   Precautions Fall Risk   Other Treatments   Other Treatments Provided  EOB x 8 mins w/supervision once his feet were on the floor.   Balance   Sitting Balance (Static) Fair   Sitting Balance (Dynamic) Fair   Standing Balance (Static) Fair -   Standing Balance (Dynamic) Fair -   Weight Shift Sitting Fair   Weight Shift Standing Fair   Comments standing @ window ledge.   Bed Mobility    Supine to Sit Moderate Assist  (HOB elevated)   Sit to Supine Maximal Assist   Scooting Minimal Assist  (seated)   Skilled Intervention Verbal Cuing;Postural Facilitation;Compensatory Strategies   Comments Bed mobility primarily limited by the bed itself. The pt does struggle w/LE management onto the bed and he needed to use the rails to assist w/sit->supine transition.   Gait Analysis   Gait Level Of Assist Standby Assist   Assistive Device   (window ledge)   Distance (Feet) 12  (side stepping along window ledge)   # of Times Distance was Traveled 1   Skilled Intervention Verbal Cuing   Comments Pt was able to wt shift to allow for side stepping, no assistance required. PT will trial FWW next tx session.   Functional Mobility   Sit to Stand Contact Guard Assist  (EOB->window ledge)   Skilled Intervention Verbal Cuing;Postural Facilitation;Compensatory Strategies   Comments Positioned the bed to allow for STS closer to the window ledge. Once the pt's feet were on the floor he could manage the stand w/CGA. The pt held standing 8-10 mins working on side stepping once he had better breathe control. Currently there is no w/c or chair that will support the pt's wt to allow for OOB.   How much difficulty does the patient currently have...   Turning over in bed (including adjusting bedclothes, sheets and blankets)? 1   Sitting down on and standing up from a chair with arms (e.g., wheelchair, bedside commode, etc.) 1   Moving from lying on back to sitting on the side of the bed? 1   How much help from another person does the patient currently need...   Moving to and from a bed to a chair (including a wheelchair)?  3   Need to walk in a hospital room? 3   Climbing 3-5 steps with a railing? 1   6 clicks Mobility Score 10   Short Term Goals    Short Term Goal # 1 Pt will transfer supine<>sit supervision in 6 visits to improve bed mobility.   Goal Outcome # 1 goal not met   Short Term Goal # 2 Pt will transfer STS supervision w/LRAD in 6 visits to improve standing abilities.   Goal Outcome # 2 Goal not met   Short Term Goal # 3 Pt will ambulate 25' supervision w/LRAD in 6 visits to improve access to environment.   Goal Outcome # 3 Goal not met   Education Group   Role of Physical Therapist Patient Response Patient;Acceptance;Explanation;Action Demonstration   Physical Therapy Treatment Plan   Physical Therapy Treatment Plan Modify Current Treatment Plan   Treatment Frequency 5 Times per Week   Anticipated Discharge Equipment and Recommendations   DC Equipment Recommendations   (bariatric FWW)   Discharge Recommendations Recommend post-acute placement for additional physical therapy services prior to discharge home   Interdisciplinary Plan of Care Collaboration   IDT Collaboration with  Physical Therapist;Occupational Therapist;Nursing   Patient Position at End of Therapy In Bed;Call Light within Reach;Tray Table within Reach;Phone within Reach   Collaboration Comments Nrsg present during PT session   Session Information   Date / Session Number  8/31--2 (1/5, 9/10) PTA/1   Supervising Physical Therapist (PTA Treatments Only)   Supervising Physical Therapist Liana Pablo

## 2023-08-31 NOTE — PROGRESS NOTES
Hospital Medicine Daily Progress Note    Date of Service  8/31/2023    Chief Complaint  Cole Jaramillo is a 38 y.o. male admitted 8/27/2023 with difficulty ambulation and wound care    Hospital Course  38 y.o. male with super massive obesity with a BMI of 116 and other unknown medical issues who presented to the hospital 8/27 with difficulty ambulation and wound care and unable to take care of himself. Up until about a month ago he was able to ambulate around his house and bathe himself and get in the shower.  However over the last couple weeks has had significantly worsening shortness of breath and significantly increasing lower extremity swelling.  He was finally brought into the emergency room because he could no longer move himself and had to call the fire department twice in the last week.  Patient denies any clear chest pain.  He endorses PND and orthopnea.  He has not seen a doctor in over 10 years.    Noted severe excoriations of the bilateral lower quadrants of the abdomen which she scratches incessantly.      Interval Problem Update  -Notes were reviewed from ER, radiology, nursing etc.  -Reviewed labs to date, microbiology for current admit and prior  -Imaging was independently reviewed and interpreted    Noted elevated BNP, ordered iv lasix  Echo pending  On room air  Continue iv lasix  Discussed with dietician  MRSA swab neg, dc zyvox.   PT/OT    I have discussed this patient's plan of care and discharge plan at IDT rounds today with Case Management, Nursing, Nursing leadership, and other members of the IDT team.    Consultants/Specialty  na    Code Status  Full Code    Disposition  The patient is not medically cleared for discharge to home or a post-acute facility.      I have placed the appropriate orders for post-discharge needs.    Review of Systems  Review of Systems   Respiratory:  Positive for shortness of breath and wheezing.    Cardiovascular:  Positive for leg swelling.   Neurological:   Positive for weakness.   All other systems reviewed and are negative.       Physical Exam  Temp:  [35.9 °C (96.7 °F)-37.1 °C (98.7 °F)] 35.9 °C (96.7 °F)  Pulse:  [79-95] 79  Resp:  [18-20] 20  BP: (133-149)/(78-80) 133/78  SpO2:  [94 %-97 %] 94 %    Physical Exam  Vitals and nursing note reviewed.   Constitutional:       Appearance: Normal appearance. He is obese. He is ill-appearing and diaphoretic.   HENT:      Head: Normocephalic and atraumatic.      Mouth/Throat:      Pharynx: Oropharynx is clear.   Eyes:      Pupils: Pupils are equal, round, and reactive to light.   Neck:      Vascular: No carotid bruit.   Cardiovascular:      Rate and Rhythm: Normal rate and regular rhythm.   Pulmonary:      Effort: Pulmonary effort is normal.      Breath sounds: Wheezing present.      Comments: On oxygen supplements  Diminished breath sounds   Abdominal:      General: Abdomen is flat. Bowel sounds are normal.      Palpations: Abdomen is soft. There is no mass.   Musculoskeletal:         General: Normal range of motion.      Cervical back: Neck supple.      Right lower leg: Edema present.      Left lower leg: Edema present.      Comments: Massive tense edema B/L LE's to the mid-thighs  Extensive lichenification of the skin B/L LE's  No clear open lesions of the B/l LE's    Skin:     General: Skin is warm.      Findings: Erythema and lesion present.      Comments: B/L LQ abdominal wall lesions with some purulence in the LLQ and significant surrounding erythema, moderately TTP      Neurological:      General: No focal deficit present.      Mental Status: He is alert and oriented to person, place, and time.   Psychiatric:         Mood and Affect: Mood normal.         Behavior: Behavior normal.         Fluids    Intake/Output Summary (Last 24 hours) at 8/31/2023 1524  Last data filed at 8/31/2023 1000  Gross per 24 hour   Intake --   Output 1550 ml   Net -1550 ml         Laboratory  Recent Labs     08/29/23  0501 08/30/23  0745  08/31/23 0227   WBC 7.2 6.7 7.2   RBC 5.22 4.80 5.05   HEMOGLOBIN 10.6* 9.9* 10.3*   HEMATOCRIT 40.1* 37.1* 38.7*   MCV 76.8* 77.3* 76.6*   MCH 20.3* 20.6* 20.4*   MCHC 26.4* 26.7* 26.6*   RDW 59.2* 58.3* 57.7*   PLATELETCT 320 292 306   MPV 9.3 9.4 9.4       Recent Labs     08/29/23  0501 08/30/23  0721 08/31/23 0227   SODIUM 138 138 139   POTASSIUM 4.8 4.3 4.1   CHLORIDE 100 99 98   CO2 28 31 33   GLUCOSE 107* 100* 106*   BUN 8 6* 8   CREATININE 0.77 0.58 0.65   CALCIUM 8.8 7.9* 8.3*                     Imaging  US-EXTREMITY VENOUS LOWER UNILAT RIGHT   Final Result      DX-CHEST-PORTABLE (1 VIEW)   Final Result      1.  No acute cardiopulmonary abnormality identified.      2.  Marked enlargement of the cardiac silhouette especially for age      EC-ECHOCARDIOGRAM COMPLETE W/ CONT    (Results Pending)          Assessment/Plan  * Abdominal wall cellulitis- (present on admission)  Assessment & Plan  Refractory to oral bactrim  Significant lesions in the B/L LQ's of the abdominal wall along with L breast cellulitis  Check MRSA Nares PCR  Continue iv zyvox and ceftriaxone  F/U blood cultures  Wound care  Very high risk for developing sepsis and abscesses given his massive obesity and other medical issues  8/30: MRSA swab negative, I have discontinued zyvox  Continue iv ceftriaxone     Iron deficiency anemia  Assessment & Plan  Denies active bleeding  Iron profile c/w LINDSEY  Patient does not have colonoscopy in the past  Iron replacement protocol  I have advised patient to follow up with GI as outpatient to further address LINDSEY      Shortness of breath- (present on admission)  Assessment & Plan  BNP elevated   ECHO  IV lasix  Wean O2 as tolerated     HTN (hypertension)- (present on admission)  Assessment & Plan  Persistently elevated in the ER  Will start lisinopril 10 mg daily  Start IV lasix 40 mg daily  Defer BB until ECHO comes back  Adjust prn    Class 3 severe obesity due to excess calories with serious comorbidity  and body mass index (BMI) greater than or equal to 70 in adult (HCC)- (present on admission)  Assessment & Plan  Body mass index is 115.48 kg/m².  Cannot take care of himself now. Not a safe discharge home.  Will need to work with CM/SW about eventual disposition options once patient has his acute medical issues resolved/improved  RD consult  Outpatient bariatric surgery    Acute respiratory failure with hypoxia (HCC)- (present on admission)  Assessment & Plan  Persistently hypoxic requiring 3 L NC  Very concerning for new onset CHF (unknown EF)  Doppler BLE: limited study, negative for DVT  Echo pending  Continue iv lasix   Wean O2 as tolerated          VTE prophylaxis:    enoxaparin ppx      I have performed a physical exam and reviewed and updated ROS and Plan today (8/31/2023). In review of yesterday's note (8/30/2023), there are no changes except as documented above.

## 2023-08-31 NOTE — THERAPY
Occupational Therapy  Daily Treatment     Patient Name: Cole Jaramillo  Age:  38 y.o., Sex:  male  Medical Record #: [id number]  Today's Date: 8/31/2023     Precautions  Precautions: Fall Risk  Comments: See weight.    Assessment    Pt seen for OT treatment. Pt washed face seated EOB with supv. Pt required mod-max A for bed mobility and CGA to stand EOB with four people present for safety. Pt reported that most recently, he was bathing using sponge baths instead of getting into the shower due to difficulty. Pt also reported sitting to complete cooking generally. Pt current functional performance limited by weakness and impaired activity tolerance. Pt will continue to benefit from skilled OT while admitted to acute care.     Plan    Treatment Plan Status: Continue Current Treatment Plan  Type of Treatment: Self Care / Activities of Daily Living, Adaptive Equipment, Neuro Re-Education / Balance, Therapeutic Exercises, Therapeutic Activity  Treatment Frequency: 3 Times per Week  Treatment Duration: Until Therapy Goals Met    DC Equipment Recommendations: Unable to determine at this time  Discharge Recommendations: Recommend post-acute placement for additional occupational therapy services prior to discharge home     Objective     08/31/23 1315   Pain 0 - 10 Group   Therapist Pain Assessment Post Activity Pain Same as Prior to Activity;Nurse Notified  (not rated, agreeable to session)   Cognition    Cognition / Consciousness WDL   Comments Pleasant, cooperative, very motivated   Other Treatments   Other Treatments Provided Pt reported that most recently he was having significant difficulty with bathing due to stepping over the bathtub and was doing sponge baths just prior to admission. Discussed possible DME. Pt also reported that he normally sits to cook all his meals.   Balance   Sitting Balance (Static) Fair   Sitting Balance (Dynamic) Fair -   Standing Balance (Static) Fair -   Standing Balance (Dynamic) Fair -    Weight Shift Sitting Fair   Weight Shift Standing Fair   Skilled Intervention Verbal Cuing;Facilitation   Comments w/ bed moved behind pt for safety and use of wall for stability   Bed Mobility    Supine to Sit Moderate Assist   Sit to Supine Maximal Assist   Rolling Moderate Assist to Lt.   Skilled Intervention Verbal Cuing;Compensatory Strategies;Facilitation;Sequencing   Comments HOB slightly elevated, assist x4 present   Activities of Daily Living   Grooming Supervision;Seated  (washed face)   Lower Body Dressing Total Assist   Skilled Intervention Verbal Cuing;Facilitation   Functional Mobility   Sit to Stand Contact Guard Assist   Bed, Chair, Wheelchair Transfer Contact Guard Assist   Mobility EOB>side steps only   Skilled Intervention Verbal Cuing;Facilitation   Comments w/ bed moved behind pt for safety and use of wall for stability   Visual Perception   Visual Perception  Not Tested   Activity Tolerance   Sitting in Chair NT   Sitting Edge of Bed >10 min   Standing ~3-5 min   Comments reports of fatigue at end of session   Patient / Family Goals   Patient / Family Goal #1 To go home   Goal #1 Outcome Progressing as expected   Short Term Goals   Short Term Goal # 1 Pt will demo seated grooming w/ SPV   Goal Outcome # 1 Goal met, new goal added   Short Term Goal # 1 B  W/ weight specific equipment, pt will perform ADL transfer to BSC w/ min A   Short Term Goal # 2 W/ weight specific equipment pt will demo standing in preparation for ADL txfs w/ min A   Goal Outcome # 2 Progressing as expected   Short Term Goal # 3 Pt will perform LB dressing w/ min A and AE PRN   Education Group   Education Provided Role of Occupational Therapist;Activities of Daily Living   Role of Occupational Therapist Patient Response Patient;Acceptance;Explanation;Verbal Demonstration   ADL Patient Response Patient;Acceptance;Explanation;Demonstration;Verbal Demonstration;Action Demonstration   Occupational Therapy Treatment Plan     O.T. Treatment Plan Continue Current Treatment Plan   Treatment Interventions Self Care / Activities of Daily Living;Adaptive Equipment;Neuro Re-Education / Balance;Therapeutic Exercises;Therapeutic Activity   Treatment Frequency 3 Times per Week   Duration Until Therapy Goals Met

## 2023-08-31 NOTE — DISCHARGE PLANNING
"Agency/Facility Name: [facility]  Outcome: DPA received a voice message from Angela stating they are not able to  provide bariatric equipment for the pt.  DPA was referred to [facility] - [phone]    @[phone]  Agency/Facility Name: [facility] - [phone]  Spoke To: Sree  Outcome: DPA called stating we have a pt that is in need of a bariatric bed, bedside commode, and a walker.  Sree will have a  give this DPA a call back.      @[phone]  Agency/Facility Name: [facility]  Spoke To: Sayda Paul  Outcome: Beds accommodate up to 1000 lbs.  $150.00 per day for rental  Trapeze is available  Bed has power  on it.  Pt can be transport on the bed.  Lift power with extension, go from 40\" to 50\" wide, scale on bed, alarm.  There is an option to purchase.  Sayda will give this DPA a call back tomorrow with a quote.    Size wise  - possible walker and commode.        "

## 2023-08-31 NOTE — ASSESSMENT & PLAN NOTE
Denies active bleeding  Iron profile c/w LINDSEY  Patient does not have colonoscopy in the past  Iron replacement protocol  I have advised patient to follow up with GI as outpatient to further address LINDSEY

## 2023-09-01 ENCOUNTER — APPOINTMENT (OUTPATIENT)
Dept: CARDIOLOGY | Facility: MEDICAL CENTER | Age: 38
DRG: 602 | End: 2023-09-01
Attending: STUDENT IN AN ORGANIZED HEALTH CARE EDUCATION/TRAINING PROGRAM

## 2023-09-01 LAB
BACTERIA BLD CULT: NORMAL
BACTERIA BLD CULT: NORMAL
LV EJECT FRACT MOD 2C 99903: 57.26
LV EJECT FRACT MOD 4C 99902: 80.08
LV EJECT FRACT MOD BP 99901: 72.09
SIGNIFICANT IND 70042: NORMAL
SIGNIFICANT IND 70042: NORMAL
SITE SITE: NORMAL
SITE SITE: NORMAL
SOURCE SOURCE: NORMAL
SOURCE SOURCE: NORMAL

## 2023-09-01 PROCEDURE — 700102 HCHG RX REV CODE 250 W/ 637 OVERRIDE(OP): Performed by: STUDENT IN AN ORGANIZED HEALTH CARE EDUCATION/TRAINING PROGRAM

## 2023-09-01 PROCEDURE — A9270 NON-COVERED ITEM OR SERVICE: HCPCS | Performed by: NURSE PRACTITIONER

## 2023-09-01 PROCEDURE — 93306 TTE W/DOPPLER COMPLETE: CPT | Mod: 26 | Performed by: STUDENT IN AN ORGANIZED HEALTH CARE EDUCATION/TRAINING PROGRAM

## 2023-09-01 PROCEDURE — 700102 HCHG RX REV CODE 250 W/ 637 OVERRIDE(OP): Performed by: NURSE PRACTITIONER

## 2023-09-01 PROCEDURE — 97116 GAIT TRAINING THERAPY: CPT | Mod: CQ

## 2023-09-01 PROCEDURE — 99232 SBSQ HOSP IP/OBS MODERATE 35: CPT | Performed by: STUDENT IN AN ORGANIZED HEALTH CARE EDUCATION/TRAINING PROGRAM

## 2023-09-01 PROCEDURE — A9270 NON-COVERED ITEM OR SERVICE: HCPCS | Performed by: STUDENT IN AN ORGANIZED HEALTH CARE EDUCATION/TRAINING PROGRAM

## 2023-09-01 PROCEDURE — 97530 THERAPEUTIC ACTIVITIES: CPT | Mod: CQ

## 2023-09-01 PROCEDURE — 700111 HCHG RX REV CODE 636 W/ 250 OVERRIDE (IP): Mod: JZ | Performed by: INTERNAL MEDICINE

## 2023-09-01 PROCEDURE — A9270 NON-COVERED ITEM OR SERVICE: HCPCS | Performed by: INTERNAL MEDICINE

## 2023-09-01 PROCEDURE — 700102 HCHG RX REV CODE 250 W/ 637 OVERRIDE(OP): Performed by: INTERNAL MEDICINE

## 2023-09-01 PROCEDURE — 700117 HCHG RX CONTRAST REV CODE 255: Performed by: STUDENT IN AN ORGANIZED HEALTH CARE EDUCATION/TRAINING PROGRAM

## 2023-09-01 PROCEDURE — 770006 HCHG ROOM/CARE - MED/SURG/GYN SEMI*

## 2023-09-01 PROCEDURE — 700111 HCHG RX REV CODE 636 W/ 250 OVERRIDE (IP): Performed by: STUDENT IN AN ORGANIZED HEALTH CARE EDUCATION/TRAINING PROGRAM

## 2023-09-01 PROCEDURE — 700101 HCHG RX REV CODE 250: Performed by: STUDENT IN AN ORGANIZED HEALTH CARE EDUCATION/TRAINING PROGRAM

## 2023-09-01 PROCEDURE — 93306 TTE W/DOPPLER COMPLETE: CPT

## 2023-09-01 RX ORDER — FUROSEMIDE 40 MG/1
40 TABLET ORAL
Status: DISCONTINUED | OUTPATIENT
Start: 2023-09-01 | End: 2023-09-07 | Stop reason: HOSPADM

## 2023-09-01 RX ORDER — AMOXICILLIN AND CLAVULANATE POTASSIUM 875; 125 MG/1; MG/1
1 TABLET, FILM COATED ORAL EVERY 12 HOURS
Status: COMPLETED | OUTPATIENT
Start: 2023-09-01 | End: 2023-09-04

## 2023-09-01 RX ADMIN — SENNOSIDES AND DOCUSATE SODIUM 2 TABLET: 50; 8.6 TABLET ORAL at 06:01

## 2023-09-01 RX ADMIN — Medication: at 17:06

## 2023-09-01 RX ADMIN — HUMAN ALBUMIN MICROSPHERES AND PERFLUTREN 3 ML: 10; .22 INJECTION, SOLUTION INTRAVENOUS at 12:00

## 2023-09-01 RX ADMIN — ENOXAPARIN SODIUM 80 MG: 100 INJECTION SUBCUTANEOUS at 17:06

## 2023-09-01 RX ADMIN — FERROUS SULFATE TAB 325 MG (65 MG ELEMENTAL FE) 325 MG: 325 (65 FE) TAB at 10:42

## 2023-09-01 RX ADMIN — CEFTRIAXONE SODIUM 2000 MG: 10 INJECTION, POWDER, FOR SOLUTION INTRAVENOUS at 06:01

## 2023-09-01 RX ADMIN — FUROSEMIDE 40 MG: 10 INJECTION INTRAMUSCULAR; INTRAVENOUS at 06:01

## 2023-09-01 RX ADMIN — LISINOPRIL 10 MG: 10 TABLET ORAL at 06:01

## 2023-09-01 RX ADMIN — ACETAMINOPHEN 650 MG: 325 TABLET, FILM COATED ORAL at 19:52

## 2023-09-01 RX ADMIN — ENOXAPARIN SODIUM 80 MG: 100 INJECTION SUBCUTANEOUS at 06:01

## 2023-09-01 RX ADMIN — AMOXICILLIN AND CLAVULANATE POTASSIUM 1 TABLET: 875; 125 TABLET, FILM COATED ORAL at 17:06

## 2023-09-01 RX ADMIN — Medication: at 06:01

## 2023-09-01 RX ADMIN — FUROSEMIDE 40 MG: 40 TABLET ORAL at 17:06

## 2023-09-01 ASSESSMENT — FIBROSIS 4 INDEX: FIB4 SCORE: 0.64

## 2023-09-01 ASSESSMENT — COGNITIVE AND FUNCTIONAL STATUS - GENERAL
MOVING TO AND FROM BED TO CHAIR: UNABLE
SUGGESTED CMS G CODE MODIFIER MOBILITY: CL
MOVING FROM LYING ON BACK TO SITTING ON SIDE OF FLAT BED: A LITTLE
CLIMB 3 TO 5 STEPS WITH RAILING: TOTAL
STANDING UP FROM CHAIR USING ARMS: A LITTLE
MOBILITY SCORE: 12
WALKING IN HOSPITAL ROOM: A LITTLE
TURNING FROM BACK TO SIDE WHILE IN FLAT BAD: UNABLE

## 2023-09-01 ASSESSMENT — ENCOUNTER SYMPTOMS
WEAKNESS: 1
WHEEZING: 1
SHORTNESS OF BREATH: 1

## 2023-09-01 ASSESSMENT — PAIN DESCRIPTION - PAIN TYPE
TYPE: ACUTE PAIN
TYPE: ACUTE PAIN

## 2023-09-01 ASSESSMENT — GAIT ASSESSMENTS
ASSISTIVE DEVICE: FRONT WHEEL WALKER
GAIT LEVEL OF ASSIST: STANDBY ASSIST
DISTANCE (FEET): 12

## 2023-09-01 NOTE — PROGRESS NOTES
Hospital Medicine Daily Progress Note    Date of Service  9/1/2023    Chief Complaint  Cole Jaramillo is a 38 y.o. male admitted 8/27/2023 with difficulty ambulation and wound care    Hospital Course  38 y.o. male with super massive obesity with a BMI of 116 and other unknown medical issues who presented to the hospital 8/27 with difficulty ambulation and wound care and unable to take care of himself. Up until about a month ago he was able to ambulate around his house and bathe himself and get in the shower.  However over the last couple weeks has had significantly worsening shortness of breath and significantly increasing lower extremity swelling.  He was finally brought into the emergency room because he could no longer move himself and had to call the fire department twice in the last week.  Patient denies any clear chest pain.  He endorses PND and orthopnea.  He has not seen a doctor in over 10 years.    Noted severe excoriations of the bilateral lower quadrants of the abdomen which she scratches incessantly.      Interval Problem Update  -Notes were reviewed from ER, radiology, nursing etc.  -Reviewed labs to date, microbiology for current admit and prior  -Imaging was independently reviewed and interpreted    BLE swelling improving. I have reviewed echo, normal EF  Dc iv lasix, change to po lasix  Change iv abx to po augmentin to complete course  Pending DME  Discussed with PT/OT    I have discussed this patient's plan of care and discharge plan at IDT rounds today with Case Management, Nursing, Nursing leadership, and other members of the IDT team.    Consultants/Specialty  na    Code Status  Full Code    Disposition  The patient is not medically cleared for discharge to home or a post-acute facility.      I have placed the appropriate orders for post-discharge needs.    Review of Systems  Review of Systems   Respiratory:  Positive for shortness of breath and wheezing.    Cardiovascular:  Positive for leg  swelling.   Neurological:  Positive for weakness.   All other systems reviewed and are negative.       Physical Exam  Temp:  [36.2 °C (97.1 °F)-36.4 °C (97.6 °F)] 36.3 °C (97.3 °F)  Pulse:  [63-98] 96  Resp:  [18-20] 18  BP: (110-133)/(63-72) 122/67  SpO2:  [91 %-99 %] 94 %    Physical Exam  Vitals and nursing note reviewed.   Constitutional:       Appearance: Normal appearance. He is obese. He is ill-appearing and diaphoretic.   HENT:      Head: Normocephalic and atraumatic.      Mouth/Throat:      Pharynx: Oropharynx is clear.   Eyes:      Pupils: Pupils are equal, round, and reactive to light.   Neck:      Vascular: No carotid bruit.   Cardiovascular:      Rate and Rhythm: Normal rate and regular rhythm.   Pulmonary:      Effort: Pulmonary effort is normal.      Breath sounds: Wheezing present.      Comments: On oxygen supplements  Diminished breath sounds   Abdominal:      General: Abdomen is flat. Bowel sounds are normal.      Palpations: Abdomen is soft. There is no mass.   Musculoskeletal:         General: Normal range of motion.      Cervical back: Neck supple.      Right lower leg: Edema present.      Left lower leg: Edema present.      Comments: Massive tense edema B/L LE's to the mid-thighs  Extensive lichenification of the skin B/L LE's  No clear open lesions of the B/l LE's    Skin:     General: Skin is warm.      Findings: Erythema and lesion present.      Comments: B/L LQ abdominal wall lesions with some purulence in the LLQ and significant surrounding erythema, moderately TTP      Neurological:      General: No focal deficit present.      Mental Status: He is alert and oriented to person, place, and time.   Psychiatric:         Mood and Affect: Mood normal.         Behavior: Behavior normal.         Fluids    Intake/Output Summary (Last 24 hours) at 9/1/2023 1520  Last data filed at 9/1/2023 1258  Gross per 24 hour   Intake 360 ml   Output 3950 ml   Net -3590 ml         Laboratory  Recent Labs      08/30/23  0721 08/31/23 0227   WBC 6.7 7.2   RBC 4.80 5.05   HEMOGLOBIN 9.9* 10.3*   HEMATOCRIT 37.1* 38.7*   MCV 77.3* 76.6*   MCH 20.6* 20.4*   MCHC 26.7* 26.6*   RDW 58.3* 57.7*   PLATELETCT 292 306   MPV 9.4 9.4       Recent Labs     08/30/23 0721 08/31/23 0227   SODIUM 138 139   POTASSIUM 4.3 4.1   CHLORIDE 99 98   CO2 31 33   GLUCOSE 100* 106*   BUN 6* 8   CREATININE 0.58 0.65   CALCIUM 7.9* 8.3*                     Imaging  EC-ECHOCARDIOGRAM COMPLETE W/ CONT   Final Result      US-EXTREMITY VENOUS LOWER UNILAT RIGHT   Final Result      DX-CHEST-PORTABLE (1 VIEW)   Final Result      1.  No acute cardiopulmonary abnormality identified.      2.  Marked enlargement of the cardiac silhouette especially for age             Assessment/Plan  * Abdominal wall cellulitis- (present on admission)  Assessment & Plan  Refractory to oral bactrim  Significant lesions in the B/L LQ's of the abdominal wall along with L breast cellulitis  Check MRSA Nares PCR-- negative  Continue iv zyvox and ceftriaxone  F/U blood cultures  9/1: change to iv ceftriaxone to Augmentin to complete the course    Iron deficiency anemia  Assessment & Plan  Denies active bleeding  Iron profile c/w LINDSEY  Patient does not have colonoscopy in the past  Iron replacement protocol  I have advised patient to follow up with GI as outpatient to further address LINDSEY      Shortness of breath- (present on admission)  Assessment & Plan  BNP elevated   ECHO  IV lasix  Wean O2 as tolerated     HTN (hypertension)- (present on admission)  Assessment & Plan  Persistently elevated in the ER  Will start lisinopril 10 mg daily  Start IV lasix 40 mg daily  Defer BB until ECHO comes back  Adjust prn    Class 3 severe obesity due to excess calories with serious comorbidity and body mass index (BMI) greater than or equal to 70 in adult (HCC)- (present on admission)  Assessment & Plan  Body mass index is 115.48 kg/m².  Cannot take care of himself now. Not a safe discharge  home.  Will need to work with CM/SW about eventual disposition options once patient has his acute medical issues resolved/improved  RD consult  Outpatient bariatric surgery referred     Acute respiratory failure with hypoxia (HCC)- (present on admission)  Assessment & Plan  Persistently hypoxic requiring 3 L NC  Doppler BLE: limited study, negative for DVT  Echo normal LVEF  Continue iv lasix -- switch to po  Wean O2 as tolerated          VTE prophylaxis:    enoxaparin ppx      I have performed a physical exam and reviewed and updated ROS and Plan today (9/1/2023). In review of yesterday's note (8/31/2023), there are no changes except as documented above.

## 2023-09-01 NOTE — DISCHARGE PLANNING
Agency/Facility Name: [facility]  Spoke To: Nolvia  Outcome: Quote for bed and accessories:  Commode:  $10.14 per day  Walker:  $8.46 per day  Wheelchair:  $14.28  Bed:    Compella with air: $136.31 per day  W/ trapeze $146.02  Continue lateral rotation to break up lung secretion:  $148.72  W trapez $155.18    Alec will need a day or two if renting for delivery.    Accessories can take a few days also.    Nolvia will send this DPA an e-mail with a quote for purchase of all equipment along with some brochures.

## 2023-09-01 NOTE — CARE PLAN
The patient is Stable - Low risk of patient condition declining or worsening    Shift Goals  Clinical Goals: wean O2, Dressig change, pt safety  Patient Goals: Comfort, get out of bed  Family Goals: gadiel    Progress made toward(s) clinical / shift goals:    Problem: Skin Integrity  Goal: Skin integrity is maintained or improved  Description: Target End Date:  Prior to discharge or change in level of care    Document interventions on Skin Risk/Luis flowsheet groups and corresponding LDA    1.  Assess and monitor skin integrity, appearance and/or temperature  2.  Assess risk factors for impaired skin integrity and/or pressures ulcers  3.  Implement precautions to protect skin integrity in collaboration with interdisciplinary team  4.  Implement pressure ulcer prevention protocol if at risk for skin breakdown  5.  Confirm wound care consult if at risk for skin breakdown  6.  Ensure patient use of pressure relieving devices  (Low air loss bed, waffle overlay, heel protectors, ROHO cushion, etc)  Outcome: Progressing       Patient is not progressing towards the following goals:

## 2023-09-01 NOTE — THERAPY
"Physical Therapy   Daily Treatment     Patient Name: Cole Jaramillo  Age:  38 y.o., Sex:  male  Medical Record #: [id number]  Today's Date: 9/1/2023     Precautions  Precautions: (P) Fall Risk    Assessment    The pt is willing to participate w/PT, \" I want to go home\". Today the pt came out the side of the bed he does @ home, it is primarily limited by the lower bed rail of this bariatric bed which makes moving of LE's to floor difficult. The pt cont to struggle w/LE management back onto the bed. The pt stated he sleeps in a full side bed w/controls for the HOB.   STS from EOB to FWW SBA and could hold static stand and march in place w/SBA. Initiated amb w/FWW from the bed to the sink in the room, approx 12' following w/the bed. The pt's gait slow but steady, he was able to stand @ the sink and brush his teeth w/supervision.  The pt stated he gets dressed daily w/reachers and sock aides and was driving up until March, his walkway was too slippery from the snow/ice.   PT will cont to follow, recommend bariatric FWW,w/c for his apartment, and HHS.     Plan    Treatment Plan Status: (P) Continue Current Treatment Plan  Type of Treatment: Bed Mobility, Gait Training, Neuro Re-Education / Balance, Self Care / Home Evaluation, Therapeutic Activities, Therapeutic Exercise  Treatment Frequency: 5 Times per Week  Treatment Duration: Other (See Comments) (Pending availability of appropriately graded bariatric equipment to safely utilize towards goals)    DC Equipment Recommendations: (P)  (bariatric FWW and bariatric w/c)  Discharge Recommendations: (P) Recommend post-acute placement for additional physical therapy services prior to discharge home    Objective       09/01/23 1050   Charge Group   PT Gait Training (Units) 1   PT Therapeutic Activities (Units) 2   Total Time Spent   PT Gait Training Time Spent (Mins) 8   PT Therapeutic Activities Time Spent (Mins) 30   Precautions   Precautions Fall Risk   Other Treatments "   Other Treatments Provided EOB for WOB following sup->sit transition   Balance   Sitting Balance (Static) Fair +   Sitting Balance (Dynamic) Fair +   Standing Balance (Static) Fair   Standing Balance (Dynamic) Fair -   Weight Shift Sitting Fair   Weight Shift Standing Fair   Comments standing w/bariatric FWW   Bed Mobility    Supine to Sit Moderate Assist  (HOB elevated)   Sit to Supine Maximal Assist  (HOB flat and use of bed frame x 2)   Scooting Contact Guard Assist  (seated)   Rolling Moderate Assist to Lt.;Moderate Assist to Rt.   Comments The pt struggles w/ bed mobility 2* lower railing on the bed which limits his ability to get his LE's to the floor. The pt conts to need LE management onto the bed. Today the pt came out the side of bed he does @ home again bed mobility limited by the bariatric bed he is in. The pt sleeps in a full size bed @ home where the HOB does elevate   Gait Analysis   Gait Level Of Assist Standby Assist   Assistive Device Front Wheel Walker   Distance (Feet) 12   # of Times Distance was Traveled 1   Skilled Intervention Verbal Cuing   Comments Initiated amb w/FWW following w/the bed. Once the pt amb to the sink he was able to static stand and brush his teeth. Pt sat back onto the bed to bring the bed back to his spot. The pt needs to be able to manage slightly further distances to manage his apartment.   Functional Mobility   Sit to Stand Standby Assist  (from EOB->FWW)   Skilled Intervention Verbal Cuing;Postural Facilitation;Compensatory Strategies   Comments No assistance required w/STS, unfortunately there is not a chair for the pt to sit in that will accomodate his wt. The pt would benefit from a bariatric w/c for home for safety and mobility.   How much difficulty does the patient currently have...   Turning over in bed (including adjusting bedclothes, sheets and blankets)? 1   Sitting down on and standing up from a chair with arms (e.g., wheelchair, bedside commode, etc.) 3    Moving from lying on back to sitting on the side of the bed? 1   How much help from another person does the patient currently need...   Moving to and from a bed to a chair (including a wheelchair)? 3   Need to walk in a hospital room? 3   Climbing 3-5 steps with a railing? 1   6 clicks Mobility Score 12   Short Term Goals    Short Term Goal # 1 Pt will transfer supine<>sit supervision in 6 visits to improve bed mobility.   Goal Outcome # 1 goal not met   Short Term Goal # 2 Pt will transfer STS supervision w/LRAD in 6 visits to improve standing abilities.   Goal Outcome # 2 Goal not met   Short Term Goal # 3 Pt will ambulate 25' supervision w/LRAD in 6 visits to improve access to environment.   Goal Outcome # 3 Goal not met   Education Group   Role of Physical Therapist Patient Response Patient;Acceptance;Explanation;Action Demonstration   Physical Therapy Treatment Plan   Physical Therapy Treatment Plan Continue Current Treatment Plan   Anticipated Discharge Equipment and Recommendations   DC Equipment Recommendations   (bariatric FWW and bariatric w/c)   Discharge Recommendations Recommend post-acute placement for additional physical therapy services prior to discharge home   Interdisciplinary Plan of Care Collaboration   IDT Collaboration with  Physical Therapist   Patient Position at End of Therapy In Bed;Call Light within Reach;Tray Table within Reach;Phone within Reach   Collaboration Comments Nrsg notified of pts tx efforts   Session Information   Date / Session Number  9/1--3 (2/5, 9/6) PTA/2   Supervising Physical Therapist (PTA Treatments Only)   Supervising Physical Therapist Liana Pablo

## 2023-09-02 PROCEDURE — 770006 HCHG ROOM/CARE - MED/SURG/GYN SEMI*

## 2023-09-02 PROCEDURE — 700111 HCHG RX REV CODE 636 W/ 250 OVERRIDE (IP): Performed by: STUDENT IN AN ORGANIZED HEALTH CARE EDUCATION/TRAINING PROGRAM

## 2023-09-02 PROCEDURE — 700102 HCHG RX REV CODE 250 W/ 637 OVERRIDE(OP): Performed by: STUDENT IN AN ORGANIZED HEALTH CARE EDUCATION/TRAINING PROGRAM

## 2023-09-02 PROCEDURE — 99232 SBSQ HOSP IP/OBS MODERATE 35: CPT | Performed by: STUDENT IN AN ORGANIZED HEALTH CARE EDUCATION/TRAINING PROGRAM

## 2023-09-02 PROCEDURE — 700102 HCHG RX REV CODE 250 W/ 637 OVERRIDE(OP): Performed by: INTERNAL MEDICINE

## 2023-09-02 PROCEDURE — 97530 THERAPEUTIC ACTIVITIES: CPT

## 2023-09-02 PROCEDURE — A9270 NON-COVERED ITEM OR SERVICE: HCPCS | Performed by: INTERNAL MEDICINE

## 2023-09-02 PROCEDURE — A9270 NON-COVERED ITEM OR SERVICE: HCPCS | Performed by: STUDENT IN AN ORGANIZED HEALTH CARE EDUCATION/TRAINING PROGRAM

## 2023-09-02 PROCEDURE — 97116 GAIT TRAINING THERAPY: CPT

## 2023-09-02 RX ADMIN — ENOXAPARIN SODIUM 80 MG: 100 INJECTION SUBCUTANEOUS at 04:32

## 2023-09-02 RX ADMIN — AMOXICILLIN AND CLAVULANATE POTASSIUM 1 TABLET: 875; 125 TABLET, FILM COATED ORAL at 04:32

## 2023-09-02 RX ADMIN — ENOXAPARIN SODIUM 80 MG: 100 INJECTION SUBCUTANEOUS at 17:40

## 2023-09-02 RX ADMIN — AMOXICILLIN AND CLAVULANATE POTASSIUM 1 TABLET: 875; 125 TABLET, FILM COATED ORAL at 17:40

## 2023-09-02 RX ADMIN — Medication: at 04:37

## 2023-09-02 RX ADMIN — Medication: at 17:41

## 2023-09-02 RX ADMIN — LISINOPRIL 10 MG: 10 TABLET ORAL at 04:32

## 2023-09-02 RX ADMIN — FERROUS SULFATE TAB 325 MG (65 MG ELEMENTAL FE) 325 MG: 325 (65 FE) TAB at 09:45

## 2023-09-02 RX ADMIN — FUROSEMIDE 40 MG: 40 TABLET ORAL at 04:32

## 2023-09-02 ASSESSMENT — GAIT ASSESSMENTS
DISTANCE (FEET): 12
DEVIATION: INCREASED BASE OF SUPPORT;BRADYKINETIC
GAIT LEVEL OF ASSIST: STANDBY ASSIST
ASSISTIVE DEVICE: FRONT WHEEL WALKER

## 2023-09-02 ASSESSMENT — COGNITIVE AND FUNCTIONAL STATUS - GENERAL
MOVING TO AND FROM BED TO CHAIR: UNABLE
MOVING FROM LYING ON BACK TO SITTING ON SIDE OF FLAT BED: A LITTLE
STANDING UP FROM CHAIR USING ARMS: A LITTLE
TURNING FROM BACK TO SIDE WHILE IN FLAT BAD: UNABLE
MOBILITY SCORE: 12
CLIMB 3 TO 5 STEPS WITH RAILING: TOTAL
SUGGESTED CMS G CODE MODIFIER MOBILITY: CL
WALKING IN HOSPITAL ROOM: A LITTLE

## 2023-09-02 ASSESSMENT — ENCOUNTER SYMPTOMS
SHORTNESS OF BREATH: 1
WEAKNESS: 1
WHEEZING: 1

## 2023-09-02 NOTE — PROGRESS NOTES
Hospital Medicine Daily Progress Note    Date of Service  9/2/2023    Chief Complaint  Cole Jaramillo is a 38 y.o. male admitted 8/27/2023 with difficulty ambulation and wound care    Hospital Course  38 y.o. male with super massive obesity with a BMI of 116 and other unknown medical issues who presented to the hospital 8/27 with difficulty ambulation and wound care and unable to take care of himself. Up until about a month ago he was able to ambulate around his house and bathe himself and get in the shower.  However over the last couple weeks has had significantly worsening shortness of breath and significantly increasing lower extremity swelling.  He was finally brought into the emergency room because he could no longer move himself and had to call the fire department twice in the last week.  Patient denies any clear chest pain.  He endorses PND and orthopnea.  He has not seen a doctor in over 10 years.    Noted severe excoriations of the bilateral lower quadrants of the abdomen which she scratches incessantly.      Interval Problem Update  -Notes were reviewed from ER, radiology, nursing etc.  -Reviewed labs to date, microbiology for current admit and prior  -Imaging was independently reviewed and interpreted    BLE swelling improving. I have reviewed echo, normal EF  Dc iv lasix, change to po lasix  Change iv abx to po augmentin to complete course  Pending DME  Discussed with PT/OT, await DME      I have discussed this patient's plan of care and discharge plan at IDT rounds today with Case Management, Nursing, Nursing leadership, and other members of the IDT team.    Consultants/Specialty  na    Code Status  Full Code    Disposition  Medically Cleared  I have placed the appropriate orders for post-discharge needs.    Review of Systems  Review of Systems   Respiratory:  Positive for shortness of breath and wheezing.    Cardiovascular:  Positive for leg swelling.   Neurological:  Positive for weakness.   All  other systems reviewed and are negative.       Physical Exam  Temp:  [36.1 °C (97 °F)-36.4 °C (97.6 °F)] 36.2 °C (97.2 °F)  Pulse:  [] 93  Resp:  [18-20] 19  BP: (101-117)/(54-76) 110/54  SpO2:  [85 %-98 %] 98 %    Physical Exam  Vitals and nursing note reviewed.   Constitutional:       Appearance: Normal appearance. He is obese. He is ill-appearing and diaphoretic.   HENT:      Head: Normocephalic and atraumatic.      Mouth/Throat:      Pharynx: Oropharynx is clear.   Eyes:      Pupils: Pupils are equal, round, and reactive to light.   Neck:      Vascular: No carotid bruit.   Cardiovascular:      Rate and Rhythm: Normal rate and regular rhythm.   Pulmonary:      Effort: Pulmonary effort is normal.      Breath sounds: Wheezing present.      Comments: On oxygen supplements  Diminished breath sounds   Abdominal:      General: Abdomen is flat. Bowel sounds are normal.      Palpations: Abdomen is soft. There is no mass.   Musculoskeletal:         General: Normal range of motion.      Cervical back: Neck supple.      Right lower leg: Edema present.      Left lower leg: Edema present.      Comments: Massive tense edema B/L LE's to the mid-thighs  Extensive lichenification of the skin B/L LE's  No clear open lesions of the B/l LE's    Skin:     General: Skin is warm.      Findings: Erythema and lesion present.      Comments: B/L LQ abdominal wall lesions with some purulence in the LLQ and significant surrounding erythema, moderately TTP      Neurological:      General: No focal deficit present.      Mental Status: He is alert and oriented to person, place, and time.   Psychiatric:         Mood and Affect: Mood normal.         Behavior: Behavior normal.       Fluids    Intake/Output Summary (Last 24 hours) at 9/2/2023 1215  Last data filed at 9/2/2023 1000  Gross per 24 hour   Intake 530 ml   Output 1500 ml   Net -970 ml         Laboratory  Recent Labs     08/31/23  0227   WBC 7.2   RBC 5.05   HEMOGLOBIN 10.3*    HEMATOCRIT 38.7*   MCV 76.6*   MCH 20.4*   MCHC 26.6*   RDW 57.7*   PLATELETCT 306   MPV 9.4       Recent Labs     08/31/23  0227   SODIUM 139   POTASSIUM 4.1   CHLORIDE 98   CO2 33   GLUCOSE 106*   BUN 8   CREATININE 0.65   CALCIUM 8.3*                     Imaging  EC-ECHOCARDIOGRAM COMPLETE W/ CONT   Final Result      US-EXTREMITY VENOUS LOWER UNILAT RIGHT   Final Result      DX-CHEST-PORTABLE (1 VIEW)   Final Result      1.  No acute cardiopulmonary abnormality identified.      2.  Marked enlargement of the cardiac silhouette especially for age             Assessment/Plan  * Abdominal wall cellulitis- (present on admission)  Assessment & Plan  Refractory to oral bactrim  Significant lesions in the B/L LQ's of the abdominal wall along with L breast cellulitis  Check MRSA Nares PCR-- negative  Continue iv zyvox and ceftriaxone  F/U blood cultures  9/2: continue po Augmentin. Afebrile. Cont monitoring     Iron deficiency anemia  Assessment & Plan  Denies active bleeding  Iron profile c/w LINDSEY  Patient does not have colonoscopy in the past  Iron replacement protocol  I have advised patient to follow up with GI as outpatient to further address LINDSEY      Shortness of breath- (present on admission)  Assessment & Plan  BNP elevated   ECHO  IV lasix  Wean O2 as tolerated     HTN (hypertension)- (present on admission)  Assessment & Plan  Persistently elevated in the ER  Will start lisinopril 10 mg daily  Start IV lasix 40 mg daily  Defer BB until ECHO comes back  Adjust prn  9/2: iv lasix changed to po     Class 3 severe obesity due to excess calories with serious comorbidity and body mass index (BMI) greater than or equal to 70 in adult (HCC)- (present on admission)  Assessment & Plan  Body mass index is 115.48 kg/m².  Cannot take care of himself now. Not a safe discharge home.  Will need to work with CM/SW about eventual disposition options once patient has his acute medical issues resolved/improved  RD  consult  Outpatient bariatric surgery referred     Acute respiratory failure with hypoxia (HCC)- (present on admission)  Assessment & Plan  Persistently hypoxic requiring 3 L NC  Doppler BLE: limited study, negative for DVT  Echo normal LVEF  Continue iv lasix -- switch to po  Wean O2 as tolerated          VTE prophylaxis:    enoxaparin ppx      I have performed a physical exam and reviewed and updated ROS and Plan today (9/2/2023). In review of yesterday's note (9/1/2023), there are no changes except as documented above.

## 2023-09-02 NOTE — CARE PLAN
The patient is Stable - Low risk of patient condition declining or worsening    Shift Goals  Clinical Goals: wean off O2  Patient Goals: Comfort, get out of bed  Family Goals: gadiel    Progress made toward(s) clinical / shift goals:  patient remains on O2 3L.       Problem: Knowledge Deficit - Standard  Goal: Patient and family/care givers will demonstrate understanding of plan of care, disease process/condition, diagnostic tests and medications  Outcome: Progressing     Problem: Skin Integrity  Goal: Skin integrity is maintained or improved  Outcome: Progressing     Problem: Fall Risk  Goal: Patient will remain free from falls  Outcome: Progressing

## 2023-09-02 NOTE — CARE PLAN
The patient is Stable - Low risk of patient condition declining or worsening    Shift Goals  Clinical Goals: Monitor O2 sat  Patient Goals: Comfort, get out of bed  Family Goals: gadiel    Progress made toward(s) clinical / shift goals:  Pt's O2 sat >90% with 2L O2.  Pt doesn't try to get OOB by himself.  Prn tylenol given for right hip and legs pain.      Patient is not progressing towards the following goals:

## 2023-09-02 NOTE — THERAPY
Physical Therapy   Daily Treatment     Patient Name: Cole Jaramillo  Age:  38 y.o., Sex:  male  Medical Record #: [id number]  Today's Date: 9/2/2023     Precautions  Precautions: Fall Risk    Assessment    Pt demonstrated improving mobility overall. Pt able to complete all mobility with SBA except sup<>sit, which require min/mod A. Pt demonstrated less reliance on momentum to come into an upright standing posture. Pt able to tolerate standing for 10 minutes with decreased work of breathing as compared to previous session. Able to tolerate EOB 30 minutes total. Demonstrates little reliance on FWW during gait, and just uses minimal pressure on it to help steady himself.  Biggest obstacle for mobility is the bariatric bed/bedrail. Bed zeroed before return to bed, new weight #779. Pt very motivated and cooperative with all therapy interventions.       Plan    Treatment Plan Status: Continue Current Treatment Plan  Type of Treatment: Bed Mobility, Gait Training, Neuro Re-Education / Balance, Self Care / Home Evaluation, Therapeutic Activities, Therapeutic Exercise  Treatment Frequency: 5 Times per Week  Treatment Duration: Other (See Comments) (Pending availability of appropriately graded bariatric equipment to safely utilize towards goals)    DC Equipment Recommendations:  (valente FWW & valente WC)  Discharge Recommendations: Recommend post-acute placement for additional physical therapy services prior to discharge home (expect patient to progress to requiring HH with 1-2 more sessions)     Objective     09/02/23 1404   Precautions   Precautions Fall Risk   Pain 0 - 10 Group   Therapist Pain Assessment Post Activity Pain Same as Prior to Activity;Nurse Notified;0   Cognition    Level of Consciousness Alert   Passive ROM Lower Body   Comments limited by habitus, but functional   Strength Lower Body   Comments demonstrates improve strength and ability to complete upright mobility   Balance   Sitting Balance (Static) Good    Sitting Balance (Dynamic) Fair +   Standing Balance (Static) Fair   Standing Balance (Dynamic) Fair   Weight Shift Sitting Fair   Weight Shift Standing Fair   Skilled Intervention Verbal Cuing   Comments valente FWW   Bed Mobility    Supine to Sit Minimal Assist   Sit to Supine Moderate Assist   Scooting Standby Assist   Skilled Intervention Verbal Cuing;Compensatory Strategies;Facilitation   Comments required assistance with B LE to return to supine, but demonstrated much improved ability to lift legs and assist with placing B LE back into bed   Gait Analysis   Gait Level Of Assist Standby Assist   Assistive Device Front Wheel Walker   Distance (Feet) 12   # of Times Distance was Traveled 2   Deviation Increased Base Of Support;Bradykinetic   Weight Bearing Status no restrictions   Skilled Intervention Verbal Cuing;Sequencing  (sequencing for turns with valente FWW)   Comments Pt demonstrated much improved functional mobility. Pt able to perform STS with SPV and stood for 5 minutes, walked to sink, washed hands, stood for 3 minutes, walked back to bed, walked back to sink and back to bed. Demonstrated greatly improving mobility and biggest obstacle continues to the bariatric bed/bedrail.   Functional Mobility   Sit to Stand Standby Assist   Bed, Chair, Wheelchair Transfer Standby Assist   Transfer Method Stand Step   Mobility gait, standig, bed mob   Skilled Intervention Verbal Cuing;Sequencing;Compensatory Strategies   Comments Biggest obstacle is bed and bedrail. Pt demonstrated less reliance on momentum to gain upright posture.   How much difficulty does the patient currently have...   Turning over in bed (including adjusting bedclothes, sheets and blankets)? 1   Sitting down on and standing up from a chair with arms (e.g., wheelchair, bedside commode, etc.) 3   Moving from lying on back to sitting on the side of the bed? 1   How much help from another person does the patient currently need...   Moving to and from a  bed to a chair (including a wheelchair)? 3   Need to walk in a hospital room? 3   Climbing 3-5 steps with a railing? 1   6 clicks Mobility Score 12   Activity Tolerance   Sitting Edge of Bed 20 min   Standing 10 min   Comments decreased WOB this session   Short Term Goals    Short Term Goal # 1 Pt will transfer supine<>sit supervision in 6 visits to improve bed mobility.   Goal Outcome # 1 Progressing as expected   Short Term Goal # 2 Pt will transfer STS supervision w/LRAD in 6 visits to improve standing abilities.   Goal Outcome # 2 Progressing as expected   Short Term Goal # 3 Pt will ambulate 25' supervision w/LRAD in 6 visits to improve access to environment.   Goal Outcome # 3 Progressing as expected   Education Group   Education Provided Role of Physical Therapist;Gait Training   Role of Physical Therapist Patient Response Patient;Acceptance;Explanation;Action Demonstration   Gait Training Patient Response Patient;Acceptance;Explanation;Demonstration;Verbal Demonstration;Action Demonstration   Physical Therapy Treatment Plan   Physical Therapy Treatment Plan Continue Current Treatment Plan   Anticipated Discharge Equipment and Recommendations   DC Equipment Recommendations   (valente FWW & valente WC)   Discharge Recommendations Recommend post-acute placement for additional physical therapy services prior to discharge home  (expect patient to progress to requiring HH with 1-2 more sessions)   Interdisciplinary Plan of Care Collaboration   IDT Collaboration with  Nursing   Patient Position at End of Therapy In Bed;Call Light within Reach;Tray Table within Reach   Collaboration Comments RN updated post session   Session Information   Date / Session Number  9/2-4(3/5, 9/6)

## 2023-09-03 LAB
ANION GAP SERPL CALC-SCNC: 9 MMOL/L (ref 7–16)
BUN SERPL-MCNC: 8 MG/DL (ref 8–22)
CALCIUM SERPL-MCNC: 8.4 MG/DL (ref 8.5–10.5)
CHLORIDE SERPL-SCNC: 97 MMOL/L (ref 96–112)
CO2 SERPL-SCNC: 33 MMOL/L (ref 20–33)
CREAT SERPL-MCNC: 0.56 MG/DL (ref 0.5–1.4)
ERYTHROCYTE [DISTWIDTH] IN BLOOD BY AUTOMATED COUNT: 59 FL (ref 35.9–50)
GFR SERPLBLD CREATININE-BSD FMLA CKD-EPI: 128 ML/MIN/1.73 M 2
GLUCOSE SERPL-MCNC: 121 MG/DL (ref 65–99)
HCT VFR BLD AUTO: 37.1 % (ref 42–52)
HGB BLD-MCNC: 9.8 G/DL (ref 14–18)
MCH RBC QN AUTO: 20.6 PG (ref 27–33)
MCHC RBC AUTO-ENTMCNC: 26.4 G/DL (ref 32.3–36.5)
MCV RBC AUTO: 77.9 FL (ref 81.4–97.8)
PLATELET # BLD AUTO: 300 K/UL (ref 164–446)
PMV BLD AUTO: 9.3 FL (ref 9–12.9)
POTASSIUM SERPL-SCNC: 4.2 MMOL/L (ref 3.6–5.5)
RBC # BLD AUTO: 4.76 M/UL (ref 4.7–6.1)
SODIUM SERPL-SCNC: 139 MMOL/L (ref 135–145)
WBC # BLD AUTO: 7.5 K/UL (ref 4.8–10.8)

## 2023-09-03 PROCEDURE — 36415 COLL VENOUS BLD VENIPUNCTURE: CPT

## 2023-09-03 PROCEDURE — 770006 HCHG ROOM/CARE - MED/SURG/GYN SEMI*

## 2023-09-03 PROCEDURE — 700102 HCHG RX REV CODE 250 W/ 637 OVERRIDE(OP): Performed by: STUDENT IN AN ORGANIZED HEALTH CARE EDUCATION/TRAINING PROGRAM

## 2023-09-03 PROCEDURE — A9270 NON-COVERED ITEM OR SERVICE: HCPCS | Performed by: INTERNAL MEDICINE

## 2023-09-03 PROCEDURE — 700102 HCHG RX REV CODE 250 W/ 637 OVERRIDE(OP): Performed by: INTERNAL MEDICINE

## 2023-09-03 PROCEDURE — 700102 HCHG RX REV CODE 250 W/ 637 OVERRIDE(OP): Performed by: NURSE PRACTITIONER

## 2023-09-03 PROCEDURE — 80048 BASIC METABOLIC PNL TOTAL CA: CPT

## 2023-09-03 PROCEDURE — 99232 SBSQ HOSP IP/OBS MODERATE 35: CPT | Performed by: STUDENT IN AN ORGANIZED HEALTH CARE EDUCATION/TRAINING PROGRAM

## 2023-09-03 PROCEDURE — A9270 NON-COVERED ITEM OR SERVICE: HCPCS | Performed by: NURSE PRACTITIONER

## 2023-09-03 PROCEDURE — 700111 HCHG RX REV CODE 636 W/ 250 OVERRIDE (IP): Performed by: STUDENT IN AN ORGANIZED HEALTH CARE EDUCATION/TRAINING PROGRAM

## 2023-09-03 PROCEDURE — A9270 NON-COVERED ITEM OR SERVICE: HCPCS | Performed by: STUDENT IN AN ORGANIZED HEALTH CARE EDUCATION/TRAINING PROGRAM

## 2023-09-03 PROCEDURE — 85027 COMPLETE CBC AUTOMATED: CPT

## 2023-09-03 RX ADMIN — AMOXICILLIN AND CLAVULANATE POTASSIUM 1 TABLET: 875; 125 TABLET, FILM COATED ORAL at 05:29

## 2023-09-03 RX ADMIN — Medication: at 05:30

## 2023-09-03 RX ADMIN — FUROSEMIDE 40 MG: 40 TABLET ORAL at 05:29

## 2023-09-03 RX ADMIN — ENOXAPARIN SODIUM 80 MG: 100 INJECTION SUBCUTANEOUS at 05:29

## 2023-09-03 RX ADMIN — LISINOPRIL 10 MG: 10 TABLET ORAL at 05:29

## 2023-09-03 RX ADMIN — ACETAMINOPHEN 650 MG: 325 TABLET, FILM COATED ORAL at 07:19

## 2023-09-03 RX ADMIN — ENOXAPARIN SODIUM 80 MG: 100 INJECTION SUBCUTANEOUS at 18:38

## 2023-09-03 RX ADMIN — Medication: at 18:39

## 2023-09-03 RX ADMIN — AMOXICILLIN AND CLAVULANATE POTASSIUM 1 TABLET: 875; 125 TABLET, FILM COATED ORAL at 18:38

## 2023-09-03 RX ADMIN — FERROUS SULFATE TAB 325 MG (65 MG ELEMENTAL FE) 325 MG: 325 (65 FE) TAB at 07:25

## 2023-09-03 ASSESSMENT — ENCOUNTER SYMPTOMS
WEAKNESS: 1
SHORTNESS OF BREATH: 1
WHEEZING: 1

## 2023-09-03 ASSESSMENT — PAIN DESCRIPTION - PAIN TYPE
TYPE: ACUTE PAIN

## 2023-09-03 NOTE — PROGRESS NOTES
Hospital Medicine Daily Progress Note    Date of Service  9/3/2023    Chief Complaint  Cole Jaramillo is a 38 y.o. male admitted 8/27/2023 with difficulty ambulation and wound care    Hospital Course  38 y.o. male with super massive obesity with a BMI of 116 and other unknown medical issues who presented to the hospital 8/27 with difficulty ambulation and wound care and unable to take care of himself. Up until about a month ago he was able to ambulate around his house and bathe himself and get in the shower.  However over the last couple weeks has had significantly worsening shortness of breath and significantly increasing lower extremity swelling.  He was finally brought into the emergency room because he could no longer move himself and had to call the fire department twice in the last week.  Patient denies any clear chest pain.  He endorses PND and orthopnea.  He has not seen a doctor in over 10 years.    Noted severe excoriations of the bilateral lower quadrants of the abdomen which she scratches incessantly.      Interval Problem Update  -Notes were reviewed from ER, radiology, nursing etc.  -Reviewed labs to date, microbiology for current admit and prior  -Imaging was independently reviewed and interpreted    BLE swelling improving. I have reviewed echo, normal EF  Dc iv lasix, change to po lasix  Change iv abx to po augmentin to complete course  Still requires 1-2 L O2  Eventually needs ambulatory O2    I have discussed this patient's plan of care and discharge plan at IDT rounds today with Case Management, Nursing, Nursing leadership, and other members of the IDT team.    Consultants/Specialty  na    Code Status  Full Code    Disposition  The patient is not medically cleared for discharge to home or a post-acute facility.      I have placed the appropriate orders for post-discharge needs.    Review of Systems  Review of Systems   Respiratory:  Positive for shortness of breath and wheezing.     Cardiovascular:  Positive for leg swelling.   Neurological:  Positive for weakness.   All other systems reviewed and are negative.       Physical Exam  Temp:  [35.9 °C (96.7 °F)-37.2 °C (98.9 °F)] 35.9 °C (96.7 °F)  Pulse:  [84-99] 84  Resp:  [17-19] 18  BP: (104-130)/(56-66) 117/56  SpO2:  [83 %-95 %] 95 %    Physical Exam  Vitals and nursing note reviewed.   Constitutional:       Appearance: Normal appearance. He is obese. He is ill-appearing and diaphoretic.   HENT:      Head: Normocephalic and atraumatic.      Mouth/Throat:      Pharynx: Oropharynx is clear.   Eyes:      Pupils: Pupils are equal, round, and reactive to light.   Neck:      Vascular: No carotid bruit.   Cardiovascular:      Rate and Rhythm: Normal rate and regular rhythm.   Pulmonary:      Effort: Pulmonary effort is normal.      Breath sounds: Wheezing present.      Comments: On oxygen supplements  Diminished breath sounds   Abdominal:      General: Abdomen is flat. Bowel sounds are normal.      Palpations: Abdomen is soft. There is no mass.   Musculoskeletal:         General: Normal range of motion.      Cervical back: Neck supple.      Right lower leg: Edema present.      Left lower leg: Edema present.      Comments: Massive tense edema B/L LE's to the mid-thighs  Extensive lichenification of the skin B/L LE's  No clear open lesions of the B/l LE's    Skin:     General: Skin is warm.      Findings: Erythema and lesion present.      Comments: B/L LQ abdominal wall lesions with some purulence in the LLQ and significant surrounding erythema, moderately TTP      Neurological:      General: No focal deficit present.      Mental Status: He is alert and oriented to person, place, and time.   Psychiatric:         Mood and Affect: Mood normal.         Behavior: Behavior normal.         Fluids    Intake/Output Summary (Last 24 hours) at 9/3/2023 1446  Last data filed at 9/3/2023 1100  Gross per 24 hour   Intake 290 ml   Output 2550 ml   Net -2260 ml          Laboratory  Recent Labs     09/03/23  0036   WBC 7.5   RBC 4.76   HEMOGLOBIN 9.8*   HEMATOCRIT 37.1*   MCV 77.9*   MCH 20.6*   MCHC 26.4*   RDW 59.0*   PLATELETCT 300   MPV 9.3       Recent Labs     09/03/23  0036   SODIUM 139   POTASSIUM 4.2   CHLORIDE 97   CO2 33   GLUCOSE 121*   BUN 8   CREATININE 0.56   CALCIUM 8.4*                     Imaging  EC-ECHOCARDIOGRAM COMPLETE W/ CONT   Final Result      US-EXTREMITY VENOUS LOWER UNILAT RIGHT   Final Result      DX-CHEST-PORTABLE (1 VIEW)   Final Result      1.  No acute cardiopulmonary abnormality identified.      2.  Marked enlargement of the cardiac silhouette especially for age             Assessment/Plan  * Abdominal wall cellulitis- (present on admission)  Assessment & Plan  Refractory to oral bactrim  Significant lesions in the B/L LQ's of the abdominal wall along with L breast cellulitis  Check MRSA Nares PCR-- negative  Continue iv zyvox and ceftriaxone  F/U blood cultures  9/2: continue po Augmentin. Afebrile. Cont monitoring     Iron deficiency anemia  Assessment & Plan  Denies active bleeding  Iron profile c/w LINDSEY  Patient does not have colonoscopy in the past  Iron replacement protocol  I have advised patient to follow up with GI as outpatient to further address LINDSEY      Shortness of breath- (present on admission)  Assessment & Plan  BNP elevated   ECHO  IV lasix  Wean O2 as tolerated     HTN (hypertension)- (present on admission)  Assessment & Plan  Persistently elevated in the ER  Will start lisinopril 10 mg daily  Start IV lasix 40 mg daily  Defer BB until ECHO comes back  Adjust prn  9/2: iv lasix changed to po     Class 3 severe obesity due to excess calories with serious comorbidity and body mass index (BMI) greater than or equal to 70 in adult (HCC)- (present on admission)  Assessment & Plan  Body mass index is 115.48 kg/m².  Cannot take care of himself now. Not a safe discharge home.  Will need to work with CM/SW about eventual disposition  options once patient has his acute medical issues resolved/improved  RD consult  Outpatient bariatric surgery referred     Acute respiratory failure with hypoxia (HCC)- (present on admission)  Assessment & Plan  Persistently hypoxic requiring 3 L NC  Doppler BLE: limited study, negative for DVT  Echo normal LVEF  Continue iv lasix -- switch to po  Wean O2 as tolerated   9/3: still requires 1-2L O2, cont lasix and wean O2 as tolerated  Ambulatory O2 test          VTE prophylaxis:    enoxaparin ppx      I have performed a physical exam and reviewed and updated ROS and Plan today (9/3/2023). In review of yesterday's note (9/2/2023), there are no changes except as documented above.

## 2023-09-03 NOTE — CARE PLAN
The patient is Stable - Low risk of patient condition declining or worsening    Shift Goals  Clinical Goals: Monitor O2 sat  Patient Goals: Comfort, OOB  Family Goals: gadiel    Progress made toward(s) clinical / shift goals:  Pt denied any respiratory problem.  91% on 2L O2 via NC.  Pt denied any pain.  Pt doesn't try to get OOB by himself.  Pt's dressings were changed by day shift RN.  Pt gets Lac-Hydrin lotion for BLE.  Pt has purewick for incontinent of urine.      Patient is not progressing towards the following goals:

## 2023-09-03 NOTE — PROGRESS NOTES
Pt AXOX4. Vitals stable. Dressings changed this shift per order.   Unable to wean off O2 this shift, incentive spirometer given and educated on how to use, pt using 10 X Q hour as instructed.  Fall precautions in place, call light in reach

## 2023-09-04 LAB — LMWH PPP CHRO-ACNC: 0.2 U/ML

## 2023-09-04 PROCEDURE — 85520 HEPARIN ASSAY: CPT

## 2023-09-04 PROCEDURE — 97602 WOUND(S) CARE NON-SELECTIVE: CPT

## 2023-09-04 PROCEDURE — A9270 NON-COVERED ITEM OR SERVICE: HCPCS | Performed by: STUDENT IN AN ORGANIZED HEALTH CARE EDUCATION/TRAINING PROGRAM

## 2023-09-04 PROCEDURE — 700102 HCHG RX REV CODE 250 W/ 637 OVERRIDE(OP): Performed by: NURSE PRACTITIONER

## 2023-09-04 PROCEDURE — 99232 SBSQ HOSP IP/OBS MODERATE 35: CPT | Performed by: STUDENT IN AN ORGANIZED HEALTH CARE EDUCATION/TRAINING PROGRAM

## 2023-09-04 PROCEDURE — 97116 GAIT TRAINING THERAPY: CPT

## 2023-09-04 PROCEDURE — 770006 HCHG ROOM/CARE - MED/SURG/GYN SEMI*

## 2023-09-04 PROCEDURE — 700111 HCHG RX REV CODE 636 W/ 250 OVERRIDE (IP): Performed by: STUDENT IN AN ORGANIZED HEALTH CARE EDUCATION/TRAINING PROGRAM

## 2023-09-04 PROCEDURE — 700102 HCHG RX REV CODE 250 W/ 637 OVERRIDE(OP): Performed by: STUDENT IN AN ORGANIZED HEALTH CARE EDUCATION/TRAINING PROGRAM

## 2023-09-04 PROCEDURE — 700102 HCHG RX REV CODE 250 W/ 637 OVERRIDE(OP): Performed by: INTERNAL MEDICINE

## 2023-09-04 PROCEDURE — 36415 COLL VENOUS BLD VENIPUNCTURE: CPT

## 2023-09-04 PROCEDURE — A9270 NON-COVERED ITEM OR SERVICE: HCPCS | Performed by: INTERNAL MEDICINE

## 2023-09-04 PROCEDURE — 97530 THERAPEUTIC ACTIVITIES: CPT

## 2023-09-04 PROCEDURE — A9270 NON-COVERED ITEM OR SERVICE: HCPCS | Performed by: NURSE PRACTITIONER

## 2023-09-04 RX ORDER — NYSTATIN 100000 [USP'U]/G
POWDER TOPICAL 3 TIMES DAILY
Status: DISCONTINUED | OUTPATIENT
Start: 2023-09-04 | End: 2023-09-07 | Stop reason: HOSPADM

## 2023-09-04 RX ADMIN — LISINOPRIL 10 MG: 10 TABLET ORAL at 05:26

## 2023-09-04 RX ADMIN — MICONAZOLE NITRATE: 20 CREAM TOPICAL at 08:15

## 2023-09-04 RX ADMIN — AMOXICILLIN AND CLAVULANATE POTASSIUM 1 TABLET: 875; 125 TABLET, FILM COATED ORAL at 05:26

## 2023-09-04 RX ADMIN — MICONAZOLE NITRATE: 20 CREAM TOPICAL at 17:10

## 2023-09-04 RX ADMIN — Medication: at 17:09

## 2023-09-04 RX ADMIN — ENOXAPARIN SODIUM 80 MG: 100 INJECTION SUBCUTANEOUS at 05:26

## 2023-09-04 RX ADMIN — ACETAMINOPHEN 650 MG: 325 TABLET, FILM COATED ORAL at 10:20

## 2023-09-04 RX ADMIN — NYSTATIN: 100000 POWDER TOPICAL at 12:00

## 2023-09-04 RX ADMIN — FERROUS SULFATE TAB 325 MG (65 MG ELEMENTAL FE) 325 MG: 325 (65 FE) TAB at 08:14

## 2023-09-04 RX ADMIN — NYSTATIN: 100000 POWDER TOPICAL at 17:10

## 2023-09-04 RX ADMIN — NYSTATIN: 100000 POWDER TOPICAL at 08:14

## 2023-09-04 RX ADMIN — SENNOSIDES AND DOCUSATE SODIUM 2 TABLET: 50; 8.6 TABLET ORAL at 05:26

## 2023-09-04 RX ADMIN — ENOXAPARIN SODIUM 80 MG: 100 INJECTION SUBCUTANEOUS at 17:09

## 2023-09-04 RX ADMIN — ACETAMINOPHEN 650 MG: 325 TABLET, FILM COATED ORAL at 21:28

## 2023-09-04 RX ADMIN — Medication: at 05:26

## 2023-09-04 RX ADMIN — FUROSEMIDE 40 MG: 40 TABLET ORAL at 05:26

## 2023-09-04 ASSESSMENT — COGNITIVE AND FUNCTIONAL STATUS - GENERAL
SUGGESTED CMS G CODE MODIFIER MOBILITY: CL
CLIMB 3 TO 5 STEPS WITH RAILING: A LOT
MOVING TO AND FROM BED TO CHAIR: UNABLE
STANDING UP FROM CHAIR USING ARMS: A LITTLE
WALKING IN HOSPITAL ROOM: A LITTLE
MOBILITY SCORE: 14
TURNING FROM BACK TO SIDE WHILE IN FLAT BAD: UNABLE

## 2023-09-04 ASSESSMENT — GAIT ASSESSMENTS
DISTANCE (FEET): 50
ASSISTIVE DEVICE: FRONT WHEEL WALKER
DEVIATION: INCREASED BASE OF SUPPORT
GAIT LEVEL OF ASSIST: STANDBY ASSIST

## 2023-09-04 ASSESSMENT — ENCOUNTER SYMPTOMS
WEAKNESS: 1
SHORTNESS OF BREATH: 1
WHEEZING: 1

## 2023-09-04 NOTE — THERAPY
Physical Therapy   Daily Treatment     Patient Name: Cole Jaramillo  Age:  38 y.o., Sex:  male  Medical Record #: [id number]  Today's Date: 9/4/2023          Assessment    Rec'd pt alert, in bed, eager to participate w/ PT.  He is able to sit eob w/ min assist.  Spoke at length regarding his bed mobility at home and pt is confident that he will be able to move in/out of bed at home w/o assist.  Once sitting, pt able to stand from the side of the bed w/o assist.  Today, he was able to ambulate 50 ft w/ a vlaente fww.  He does require prolonged standing rests during ambulation.  He leans his forearms on the counter after 12-24 ft, in order to rest.  He was also able to stand for a prolonged period of time in order to be cleaned and to have wound team look at his skin.  He is unable to remain sitting at the bedside due to the nature of the valente bed and the fact that the side rail causes him great discomfort.  All mobility requires time and prolonged rests. Discussed post acute DME for home.  Pt does not have a chair any longer at his home that can accommodate him.  A valente w/c rated for his weight would allow him to sit at his home and assist him when he needs to attend MD appointments.  A valente fww would allow him to increase and improve his gait.      Plan    Treatment Plan Status: Continue Current Treatment Plan  Type of Treatment: Bed Mobility, Gait Training, Neuro Re-Education / Balance, Self Care / Home Evaluation, Therapeutic Activities, Therapeutic Exercise  Treatment Frequency: 5 Times per Week  Treatment Duration: Other (See Comments) (Pending availability of appropriately graded bariatric equipment to safely utilize towards goals)    DC Equipment Recommendations:  (valente w/c and fww)  Discharge Recommendations: Recommend home health for continued physical therapy services        Objective       09/04/23 1113   Balance   Sitting Balance (Static) Fair +   Sitting Balance (Dynamic) Fair +   Standing Balance (Static)  Fair   Standing Balance (Dynamic) Fair   Weight Shift Sitting Fair   Weight Shift Standing Fair   Skilled Intervention Verbal Cuing   Comments valente fww   Bed Mobility    Supine to Sit Minimal Assist   Sit to Supine Moderate Assist   Skilled Intervention Verbal Cuing;Tactile Cuing;Facilitation   Comments Pt unable to elevate LE's onto the bed   Gait Analysis   Gait Level Of Assist Standby Assist   Assistive Device Front Wheel Walker   Distance (Feet) 50   Deviation Increased Base Of Support  (lengthy standing rests, leaning on the sink)   Skilled Intervention Verbal Cuing   Functional Mobility   Sit to Stand Standby Assist   Skilled Intervention Verbal Cuing   Short Term Goals    Short Term Goal # 1 Pt will transfer supine<>sit supervision in 6 visits to improve bed mobility.   Goal Outcome # 1 goal not met   Short Term Goal # 2 Pt will transfer STS supervision w/LRAD in 6 visits to improve standing abilities.   Goal Outcome # 2 Goal not met   Short Term Goal # 3 Pt will ambulate 25' supervision w/LRAD in 6 visits to improve access to environment.   Goal Outcome # 3 Goal not met   Physical Therapy Treatment Plan   Physical Therapy Treatment Plan Continue Current Treatment Plan   Anticipated Discharge Equipment and Recommendations   DC Equipment Recommendations   (valente w/c and fww)   Discharge Recommendations Recommend home health for continued physical therapy services

## 2023-09-04 NOTE — PROGRESS NOTES
Patient received A&Ox4. Denies SOB at this time. Denies pain. Dressing changed completed per orders. Tolerated without issues. Needs addressed at time time.

## 2023-09-04 NOTE — WOUND TEAM
[facility]  Inpatient Services  Wound and Skin Care Follow-up    Admission Date: 8/27/2023     Last order of IP CONSULT TO WOUND CARE was found on 9/4/2023 from Hospital Encounter on 8/27/2023     HPI, PMH, SH: Reviewed    History reviewed. No pertinent surgical history.  Social History     Tobacco Use    Smoking status: Never    Smokeless tobacco: Never   Substance Use Topics    Alcohol use: Yes     Comment: rarely     Chief Complaint   Patient presents with    Other     Pt BIBA from home for issues w/ immobility that started approx 1-2 hours ago. Pt is morbidly obese w/ estimated weight of 700lb. Pt states this is new and can generally walk w/ a cane for stability, and move from chairs     Diagnosis: Abdominal wall cellulitis [L03.311]    Unit where seen by Wound Team: S629/01     WOUND FOLLOW UP RELATED TO:  Bilateral thighs       WOUND TEAM PLAN OF CARE - Frequency of Follow-up:   Nursing to follow dressing orders written for wound care. Contact wound team if area fails to progress, deteriorates or with any questions/concerns if something comes up before next scheduled follow up (See below as to whether wound is following and frequency of wound follow up)  Dressing changes by wound team:                   Not following, consult as needed  -      WOUND HISTORY:       Patient states he picks at his abdomen with his fingernails and that's why he has wounds to the abdomen.   Patient legs dry with no wounds.        WOUND ASSESSMENT/LDA              Vascular:    NOMAN:   No results found.    Lab Values:    Lab Results   Component Value Date/Time    WBC 7.5 09/03/2023 12:36 AM    RBC 4.76 09/03/2023 12:36 AM    HEMOGLOBIN 9.8 (L) 09/03/2023 12:36 AM    HEMATOCRIT 37.1 (L) 09/03/2023 12:36 AM    HBA1C 5.9 (H) 08/28/2023 12:19 PM         Culture Results show:  No results found for this or any previous visit (from the past 720 hour(s)).    Pain Level/Medicated:  None, Tolerated without pain medication        INTERVENTIONS BY WOUND TEAM:  Chart and images reviewed. Discussed with bedside RN. All areas of concern (based on picture review, LDA review and discussion with bedside RN) have been thoroughly assessed. Documentation of areas based on significant findings. This RN in to assess patient. Performed standard wound care which includes appropriate positioning, dressing removal and non-selective debridement. Pictures and measurements obtained weekly if/when required.    Wound:  Buttocks & Post Thighs  Preparation for Dressing removal: Open to air  Cleansed/Non-selectively Debrided with:  No rinse foam soap and Moist warm washcloth  Shelia wound: Cleansed with No rinse foam soap and Moist warm washcloth, Prepped with Nystatin Powder  Primary Dressing:  Miconazole LETICIA    Advanced Wound Care Discharge Planning  Number of Clinicians necessary to complete wound care: 1  Is patient requiring IV pain medications for dressing changes:  No   Length of time for dressing change 10 min. (This does not include chart review, pre-medication time, set up, clean up or time spent charting.)    Interdisciplinary consultation: Patient, Bedside RN, Cally BLAS (Wound RN) and PT x2 .  Pressure injury and staging reviewed with N/A.    EVALUATION / RATIONALE FOR TREATMENT:     Date:  09/04/23  Wound Status:  Initial evaluation    Pt with Chaffing breakdown to Bilateral inner thighs and buttocks. Miconazole LETICIA ordered and applied while pt was up with PT.     Date:  08/29/23  Wound Status:  Initial evaluation    Patient abdomen with POA self inflicted partial thickness wounds present, appear clean, xeroform (yellow) applied to provide an occlusive dressing that incorporates bacteriostatic protection.  Patient bilateral legs with dry thick skin present, scales noted. Amlactin lotion ordered. Bilateral heels intact and blanching, offloading dressing applied.          Goals: Steady decrease in wound area and depth weekly.    NURSING PLAN OF CARE  ORDERS:  Dressing changes: See Dressing Care orders    NUTRITION RECOMMENDATIONS   Wound Team Recommendations:  N/A     DIET ORDERS (From admission to next 24h)       Start     Ordered    08/30/23 1600  Diet Order Diet: Cardiac (4073-9425 kcals per day)  ALL MEALS        Question:  Diet:  Answer:  Cardiac  Comment:  9539-4412 kcals per day    08/30/23 1559    08/30/23 1509  Supplements  ONCE        Question:  Which Supplement  Answer:  BOOST PLUS  Comment:  TID    08/30/23 1508                    PREVENTATIVE INTERVENTIONS:   Q shift Luis - performed per nursing policy  Q shift pressure point assessments - performed per nursing policy    Surface/Positioning  Bariatric LOIS - Currently in Place    Anticipated discharge plans:  TBD and has no advanced wound care needs outpatient         Vac Discharge Needs:  Vac Discharge plan is purely a recommendation from wound team and not a requirement for discharge unless otherwise stated by physician.  Not Applicable Pt not on a wound vac

## 2023-09-04 NOTE — PROGRESS NOTES
Hospital Medicine Daily Progress Note    Date of Service  9/4/2023    Chief Complaint  Cole Jaramillo is a 38 y.o. male admitted 8/27/2023 with difficulty ambulation and wound care    Hospital Course  38 y.o. male with super massive obesity with a BMI of 116 and other unknown medical issues who presented to the hospital 8/27 with difficulty ambulation and wound care and unable to take care of himself. Up until about a month ago he was able to ambulate around his house and bathe himself and get in the shower.  However over the last couple weeks has had significantly worsening shortness of breath and significantly increasing lower extremity swelling.  He was finally brought into the emergency room because he could no longer move himself and had to call the fire department twice in the last week.  Patient denies any clear chest pain.  He endorses PND and orthopnea.  He has not seen a doctor in over 10 years.    Noted severe excoriations of the bilateral lower quadrants of the abdomen which she scratches incessantly.      Interval Problem Update  -Notes were reviewed from ER, radiology, nursing etc.  -Reviewed labs to date, microbiology for current admit and prior  -Imaging was independently reviewed and interpreted    BLE swelling improving. I have reviewed echo, normal EF  Dc iv lasix, change to po lasix  Change iv abx to po augmentin to complete course  On 2L O2  Working with PT today  Pending DME    I have discussed this patient's plan of care and discharge plan at IDT rounds today with Case Management, Nursing, Nursing leadership, and other members of the IDT team.    Consultants/Specialty  na    Code Status  Full Code    Disposition  The patient is medically cleared for discharge to home or a post-acute facility.      I have placed the appropriate orders for post-discharge needs.    Review of Systems  Review of Systems   Respiratory:  Positive for shortness of breath and wheezing.    Cardiovascular:  Positive  for leg swelling.   Neurological:  Positive for weakness.   All other systems reviewed and are negative.       Physical Exam  Temp:  [36.3 °C (97.4 °F)-36.9 °C (98.5 °F)] 36.3 °C (97.4 °F)  Pulse:  [] 95  Resp:  [16] 16  BP: (117-158)/(55-67) 117/55  SpO2:  [92 %-96 %] 92 %    Physical Exam  Vitals and nursing note reviewed.   Constitutional:       Appearance: Normal appearance. He is obese. He is ill-appearing and diaphoretic.   HENT:      Head: Normocephalic and atraumatic.      Mouth/Throat:      Pharynx: Oropharynx is clear.   Eyes:      Pupils: Pupils are equal, round, and reactive to light.   Neck:      Vascular: No carotid bruit.   Cardiovascular:      Rate and Rhythm: Normal rate and regular rhythm.   Pulmonary:      Effort: Pulmonary effort is normal.      Breath sounds: Wheezing present.      Comments: On oxygen supplements  Diminished breath sounds   Abdominal:      General: Abdomen is flat. Bowel sounds are normal.      Palpations: Abdomen is soft. There is no mass.   Musculoskeletal:         General: Normal range of motion.      Cervical back: Neck supple.      Right lower leg: Edema present.      Left lower leg: Edema present.      Comments: Massive tense edema B/L LE's to the mid-thighs  Extensive lichenification of the skin B/L LE's  No clear open lesions of the B/l LE's    Skin:     General: Skin is warm.      Findings: Erythema and lesion present.      Comments: B/L LQ abdominal wall lesions with some purulence in the LLQ and significant surrounding erythema, moderately TTP      Neurological:      General: No focal deficit present.      Mental Status: He is alert and oriented to person, place, and time.   Psychiatric:         Mood and Affect: Mood normal.         Behavior: Behavior normal.         Fluids    Intake/Output Summary (Last 24 hours) at 9/4/2023 1515  Last data filed at 9/4/2023 0900  Gross per 24 hour   Intake 480 ml   Output 1600 ml   Net -1120 ml         Laboratory  Recent Labs      09/03/23  0036   WBC 7.5   RBC 4.76   HEMOGLOBIN 9.8*   HEMATOCRIT 37.1*   MCV 77.9*   MCH 20.6*   MCHC 26.4*   RDW 59.0*   PLATELETCT 300   MPV 9.3       Recent Labs     09/03/23  0036   SODIUM 139   POTASSIUM 4.2   CHLORIDE 97   CO2 33   GLUCOSE 121*   BUN 8   CREATININE 0.56   CALCIUM 8.4*                     Imaging  EC-ECHOCARDIOGRAM COMPLETE W/ CONT   Final Result      US-EXTREMITY VENOUS LOWER UNILAT RIGHT   Final Result      DX-CHEST-PORTABLE (1 VIEW)   Final Result      1.  No acute cardiopulmonary abnormality identified.      2.  Marked enlargement of the cardiac silhouette especially for age             Assessment/Plan  * Abdominal wall cellulitis- (present on admission)  Assessment & Plan  Refractory to oral bactrim  Significant lesions in the B/L LQ's of the abdominal wall along with L breast cellulitis  Check MRSA Nares PCR-- negative  Continue iv zyvox and ceftriaxone  F/U blood cultures  9/2: continue po Augmentin. Afebrile. Cont monitoring     Iron deficiency anemia  Assessment & Plan  Denies active bleeding  Iron profile c/w LINDSEY  Patient does not have colonoscopy in the past  Iron replacement protocol  I have advised patient to follow up with GI as outpatient to further address LINDSEY      Shortness of breath- (present on admission)  Assessment & Plan  BNP elevated   ECHO  IV lasix  Wean O2 as tolerated     HTN (hypertension)- (present on admission)  Assessment & Plan  Persistently elevated in the ER  Will start lisinopril 10 mg daily  Start IV lasix 40 mg daily  Defer BB until ECHO comes back  Adjust prn  9/2: iv lasix changed to po     Class 3 severe obesity due to excess calories with serious comorbidity and body mass index (BMI) greater than or equal to 70 in adult (HCC)- (present on admission)  Assessment & Plan  Body mass index is 115.48 kg/m².  Cannot take care of himself now. Not a safe discharge home.  Will need to work with CM/SW about eventual disposition options once patient has his  acute medical issues resolved/improved  RD consult  Outpatient bariatric surgery referred     Acute respiratory failure with hypoxia (HCC)- (present on admission)  Assessment & Plan  Persistently hypoxic requiring 3 L NC  Doppler BLE: limited study, negative for DVT  Echo normal LVEF  Continue iv lasix -- switch to po  Wean O2 as tolerated     9/4: on 2L O2, continue po lasix and wean O2 as tolerated  Walking O2           VTE prophylaxis:    enoxaparin ppx      I have performed a physical exam and reviewed and updated ROS and Plan today (9/4/2023). In review of yesterday's note (9/3/2023), there are no changes except as documented above.

## 2023-09-05 ENCOUNTER — PATIENT OUTREACH (OUTPATIENT)
Dept: SCHEDULING | Facility: IMAGING CENTER | Age: 38
End: 2023-09-05

## 2023-09-05 PROCEDURE — 700111 HCHG RX REV CODE 636 W/ 250 OVERRIDE (IP): Performed by: STUDENT IN AN ORGANIZED HEALTH CARE EDUCATION/TRAINING PROGRAM

## 2023-09-05 PROCEDURE — 99232 SBSQ HOSP IP/OBS MODERATE 35: CPT | Performed by: STUDENT IN AN ORGANIZED HEALTH CARE EDUCATION/TRAINING PROGRAM

## 2023-09-05 PROCEDURE — 97530 THERAPEUTIC ACTIVITIES: CPT | Mod: CQ

## 2023-09-05 PROCEDURE — 700102 HCHG RX REV CODE 250 W/ 637 OVERRIDE(OP): Performed by: INTERNAL MEDICINE

## 2023-09-05 PROCEDURE — A9270 NON-COVERED ITEM OR SERVICE: HCPCS | Performed by: STUDENT IN AN ORGANIZED HEALTH CARE EDUCATION/TRAINING PROGRAM

## 2023-09-05 PROCEDURE — 97116 GAIT TRAINING THERAPY: CPT | Mod: CQ

## 2023-09-05 PROCEDURE — 97535 SELF CARE MNGMENT TRAINING: CPT

## 2023-09-05 PROCEDURE — A9270 NON-COVERED ITEM OR SERVICE: HCPCS | Performed by: INTERNAL MEDICINE

## 2023-09-05 PROCEDURE — 770006 HCHG ROOM/CARE - MED/SURG/GYN SEMI*

## 2023-09-05 PROCEDURE — 700102 HCHG RX REV CODE 250 W/ 637 OVERRIDE(OP): Performed by: STUDENT IN AN ORGANIZED HEALTH CARE EDUCATION/TRAINING PROGRAM

## 2023-09-05 RX ORDER — AMOXICILLIN AND CLAVULANATE POTASSIUM 875; 125 MG/1; MG/1
1 TABLET, FILM COATED ORAL EVERY 12 HOURS
Status: COMPLETED | OUTPATIENT
Start: 2023-09-05 | End: 2023-09-07

## 2023-09-05 RX ADMIN — FUROSEMIDE 40 MG: 40 TABLET ORAL at 06:02

## 2023-09-05 RX ADMIN — NYSTATIN: 100000 POWDER TOPICAL at 06:03

## 2023-09-05 RX ADMIN — MICONAZOLE NITRATE: 20 CREAM TOPICAL at 17:18

## 2023-09-05 RX ADMIN — ENOXAPARIN SODIUM 80 MG: 100 INJECTION SUBCUTANEOUS at 17:15

## 2023-09-05 RX ADMIN — ENOXAPARIN SODIUM 80 MG: 100 INJECTION SUBCUTANEOUS at 06:01

## 2023-09-05 RX ADMIN — MICONAZOLE NITRATE: 20 CREAM TOPICAL at 06:03

## 2023-09-05 RX ADMIN — NYSTATIN: 100000 POWDER TOPICAL at 17:18

## 2023-09-05 RX ADMIN — Medication: at 06:03

## 2023-09-05 RX ADMIN — Medication: at 17:17

## 2023-09-05 RX ADMIN — FERROUS SULFATE TAB 325 MG (65 MG ELEMENTAL FE) 325 MG: 325 (65 FE) TAB at 08:39

## 2023-09-05 RX ADMIN — NYSTATIN: 100000 POWDER TOPICAL at 12:14

## 2023-09-05 RX ADMIN — LISINOPRIL 10 MG: 10 TABLET ORAL at 06:02

## 2023-09-05 RX ADMIN — AMOXICILLIN AND CLAVULANATE POTASSIUM 1 TABLET: 875; 125 TABLET, FILM COATED ORAL at 17:15

## 2023-09-05 ASSESSMENT — COGNITIVE AND FUNCTIONAL STATUS - GENERAL
TURNING FROM BACK TO SIDE WHILE IN FLAT BAD: UNABLE
SUGGESTED CMS G CODE MODIFIER MOBILITY: CL
SUGGESTED CMS G CODE MODIFIER DAILY ACTIVITY: CK
MOBILITY SCORE: 13
MOVING FROM LYING ON BACK TO SITTING ON SIDE OF FLAT BED: A LITTLE
CLIMB 3 TO 5 STEPS WITH RAILING: A LOT
HELP NEEDED FOR BATHING: A LITTLE
DAILY ACTIVITIY SCORE: 15
DRESSING REGULAR UPPER BODY CLOTHING: A LITTLE
MOVING TO AND FROM BED TO CHAIR: UNABLE
WALKING IN HOSPITAL ROOM: A LITTLE
STANDING UP FROM CHAIR USING ARMS: A LITTLE
DRESSING REGULAR LOWER BODY CLOTHING: TOTAL
TOILETING: TOTAL
PERSONAL GROOMING: A LITTLE

## 2023-09-05 ASSESSMENT — GAIT ASSESSMENTS
DISTANCE (FEET): 12
ASSISTIVE DEVICE: FRONT WHEEL WALKER
GAIT LEVEL OF ASSIST: STANDBY ASSIST

## 2023-09-05 ASSESSMENT — ENCOUNTER SYMPTOMS
WEAKNESS: 1
SHORTNESS OF BREATH: 1
WHEEZING: 1

## 2023-09-05 ASSESSMENT — FIBROSIS 4 INDEX: FIB4 SCORE: 0.65

## 2023-09-05 ASSESSMENT — PAIN DESCRIPTION - PAIN TYPE: TYPE: ACUTE PAIN

## 2023-09-05 NOTE — THERAPY
"Occupational Therapy  Daily Treatment     Patient Name: Cole Jaramillo  Age:  38 y.o., Sex:  male  Medical Record #: [id number]  Today's Date: 9/5/2023     Precautions  Precautions: (P) Fall Risk  Comments: See weight.    Assessment    Pt seen for OT tx session. Pt very eager and motivated to participate. Pt demo'd supine -> sit at EOB w/ SBA and use of bed features. Pt able to stand w/ SBA using valente FWW, once in standing pt required total A for toilet hygiene (incont of BM), and linens changed. Pt demo'd functional mobility to sink w/ FWW and performed standing grooming w/ SBA. Pt would benefit from sitting EOB for meals and standing for linen changes in the future with nursing staff.     Plan    Treatment Plan Status: (P) Continue Current Treatment Plan  Type of Treatment: Self Care / Activities of Daily Living, Adaptive Equipment, Neuro Re-Education / Balance, Therapeutic Exercises, Therapeutic Activity  Treatment Frequency: 3 Times per Week  Treatment Duration: Until Therapy Goals Met    DC Equipment Recommendations: (P) Bed Side Commode (valente commode rated to pts weight)  Discharge Recommendations: (P) Recommend home health for continued occupational therapy services    Subjective    \"This is incredible being able to get up twice in a day, thank you so much!\"     Objective       09/05/23 2454   Precautions   Precautions Fall Risk   Vitals   O2 (LPM) 2   O2 Delivery Device Nasal Cannula   Pain 0 - 10 Group   Therapist Pain Assessment Post Activity Pain Same as Prior to Activity;Nurse Notified  (no c/o pain during session)   Cognition    Cognition / Consciousness WDL   Level of Consciousness Alert   Comments pleasent, cooperative, very motivated and appears to internalize ed   Other Treatments   Other Treatments Provided Linens changed while pt standing at sink. Ed/tained pt that valente FWW is technically not rated to his weight, pt gave consent to use it.   Balance   Sitting Balance (Static) Good   Sitting Balance " (Dynamic) Fair +   Standing Balance (Static) Fair   Standing Balance (Dynamic) Fair   Weight Shift Sitting Fair   Weight Shift Standing Fair   Skilled Intervention Verbal Cuing   Comments w/ Valente FWW and at sink counter   Bed Mobility    Supine to Sit Supervised  (HOB elevated)   Sit to Supine Moderate Assist  (for B LE's)   Scooting Standby Assist  (scooting to EOB)   Skilled Intervention Verbal Cuing;Tactile Cuing;Facilitation   Activities of Daily Living   Grooming Standby Assist;Standing  (washed hands and face at sink)   Lower Body Dressing Total Assist   Toileting Total Assist   Skilled Intervention Tactile Cuing;Verbal Cuing;Facilitation   How much help from another person does the patient currently need...   Putting on and taking off regular lower body clothing? 1   Bathing (including washing, rinsing, and drying)? 3   Toileting, which includes using a toilet, bedpan, or urinal? 1   Putting on and taking off regular upper body clothing? 3   Taking care of personal grooming such as brushing teeth? 3   Eating meals? 4   6 Clicks Daily Activity Score 15   Functional Mobility   Sit to Stand Standby Assist   Bed, Chair, Wheelchair Transfer Standby Assist   Mobility from EOB <> sink w/ valente FWW   Skilled Intervention Verbal Cuing   Activity Tolerance   Sitting in Chair NT   Sitting Edge of Bed 30 min   Standing 6-7 min total   Patient / Family Goals   Patient / Family Goal #1 To go home   Goal #1 Outcome Progressing as expected   Short Term Goals   Short Term Goal # 1 Pt will demo seated grooming w/ SPV   Goal Outcome # 1 Goal met, new goal added   Short Term Goal # 1 B  W/ weight specific equipment, pt will perform ADL transfer to BSC w/ min A   Goal Outcome  # 1 B Goal met   Short Term Goal # 2 W/ weight specific equipment pt will demo standing in preparation for ADL txfs w/ min A   Goal Outcome # 2 Goal met, new goal added   Short Term Goal # 2 B  Using weight specific equipment pt will demo ADL txf w/ SPV    Short Term Goal # 3 Pt will perform LB dressing w/ min A and AE PRN   Goal Outcome # 3 Progressing as expected   Education Group   Role of Occupational Therapist Patient Response Patient;Acceptance;Explanation;Verbal Demonstration   ADL Patient Response Patient;Acceptance;Explanation;Demonstration;Verbal Demonstration;Action Demonstration   Adaptive Equipment Patient Response Patient;Acceptance;Explanation;Demonstration;Verbal Demonstration;Action Demonstration   Additional Comments extensive ed/training on obtaining a bidet for BM management, ideally with hand remote, otherwise controls would be blocked by body habitus   Occupational Therapy Treatment Plan    O.T. Treatment Plan Continue Current Treatment Plan   Anticipated Discharge Equipment and Recommendations   DC Equipment Recommendations Bed Side Commode  (valente commode rated to pts weight)   Discharge Recommendations Recommend home health for continued occupational therapy services   Interdisciplinary Plan of Care Collaboration   IDT Collaboration with  Nursing   Patient Position at End of Therapy In Bed;Call Light within Reach;Tray Table within Reach;Phone within Reach   Collaboration Comments report given   Session Information   Date / Session Number  9/5, 3 (1/3, 9/11)

## 2023-09-05 NOTE — THERAPY
Physical Therapy   Daily Treatment     Patient Name: Cole Jaramillo  Age:  38 y.o., Sex:  male  Medical Record #: [id number]  Today's Date: 9/5/2023     Precautions  Precautions: (P) Fall Risk    Assessment    Pt always willing to participate w/PT. Today the pt did not require assist w/sup->sit w/HOB elevated 2* railing falling off the bed, the pt more comfortable sitting EOB wo/the railing. SBA w/functional transfers and amb w/FWW. Today the pt more fatigued, amb 12' x 2, sit down rest on EOB. Stood @ sink w/supervision to brush his teeth.   The pt is now approp for d/c home w/bariatric FWW and w/c. The pt's RA SATS mid-80's, cont to require 2L O2.   PT will cont to follow.     Plan    Treatment Plan Status: (P) Continue Current Treatment Plan  Type of Treatment: Bed Mobility, Gait Training, Neuro Re-Education / Balance, Self Care / Home Evaluation, Therapeutic Activities, Therapeutic Exercise  Treatment Frequency: 5 Times per Week  Treatment Duration: Other (See Comments) (Pending availability of appropriately graded bariatric equipment to safely utilize towards goals)    DC Equipment Recommendations: (P) Front-Wheel Walker, Wheelchair (Bariatric FWW and w/c)  Discharge Recommendations: (P) Recommend home health for continued physical therapy services     Objective       09/05/23 1118   Charge Group   PT Gait Training (Units) 1   PT Therapeutic Activities (Units) 2   Total Time Spent   PT Gait Training Time Spent (Mins) 10   PT Therapeutic Activities Time Spent (Mins) 30   Precautions   Precautions Fall Risk   Pain 0 - 10 Group   Pain Rating Scale (NPRS) 0   Other Treatments   Other Treatments Provided EOB x 20 mins before and between his amb efforts. Static standing @ the sink to brush his teeth. The pt's RA SATS seated VCA92-92%, elevates to 90-91 on 2L O2. The pt encouraged to cont to use his IS. Nrsg notified of O2 findings.   Balance   Sitting Balance (Static) Good   Sitting Balance (Dynamic) Fair +   Standing  Balance (Static) Fair   Standing Balance (Dynamic) Fair   Weight Shift Sitting Fair   Weight Shift Standing Fair   Comments standing w/valente FWW and @ sink surface   Bed Mobility    Supine to Sit Contact Guard Assist  (HOB elevated)   Sit to Supine Moderate Assist  (for LE management)   Scooting Standby Assist  (seated)   Skilled Intervention Verbal Cuing;Postural Facilitation;Compensatory Strategies   Comments Improvement w/sup->sit transition w/HOB elevated, CGA 2* bed rail off the bed. No bed rail allowed the pt to sit comfortably wo/the railing cutting into his legs. The pt conts to need LE assist to elevate legs onto the bed.   Gait Analysis   Gait Level Of Assist Standby Assist   Assistive Device Front Wheel Walker   Distance (Feet) 12   # of Times Distance was Traveled 2   Skilled Intervention Verbal Cuing;Postural Facilitation;Compensatory Strategies   Comments Pt more fatigued today, did manage to amb 12' x 2. The pt was able to step up onto scale using rails on the scale.   Functional Mobility   Sit to Stand Standby Assist   Bed, Chair, Wheelchair Transfer Standby Assist   How much difficulty does the patient currently have...   Turning over in bed (including adjusting bedclothes, sheets and blankets)? 1   Sitting down on and standing up from a chair with arms (e.g., wheelchair, bedside commode, etc.) 3   Moving from lying on back to sitting on the side of the bed? 1   How much help from another person does the patient currently need...   Moving to and from a bed to a chair (including a wheelchair)? 3   Need to walk in a hospital room? 3   Climbing 3-5 steps with a railing? 2   6 clicks Mobility Score 13   Short Term Goals    Short Term Goal # 1 Pt will transfer supine<>sit supervision in 6 visits to improve bed mobility.   Goal Outcome # 1 goal not met   Short Term Goal # 2 Pt will transfer STS supervision w/LRAD in 6 visits to improve standing abilities.   Goal Outcome # 2 Goal not met   Short Term Goal #  3 Pt will ambulate 25' supervision w/LRAD in 6 visits to improve access to environment.   Goal Outcome # 3 Goal not met   Education Group   Gait Training Patient Response Patient;Acceptance;Explanation;Action Demonstration   Physical Therapy Treatment Plan   Physical Therapy Treatment Plan Continue Current Treatment Plan   Anticipated Discharge Equipment and Recommendations   DC Equipment Recommendations Front-Wheel Walker;Wheelchair  (Bariatric FWW and w/c)   Discharge Recommendations Recommend home health for continued physical therapy services   Interdisciplinary Plan of Care Collaboration   IDT Collaboration with  Nursing   Patient Position at End of Therapy In Bed;Call Light within Reach;Tray Table within Reach;Phone within Reach   Collaboration Comments Nrsg notified of bed rail (to leave off) and RA O2   Session Information   Date / Session Number  9/5--6 (5/5, 9/6) PTA/1   Supervising Physical Therapist (PTA Treatments Only)   Supervising Physical Therapist Cally Capps

## 2023-09-05 NOTE — DISCHARGE PLANNING
@0925  DPA e-mailed Qamar with [employer] requesting a purchase price for the following:  Bedside commode, walker, and a wheelchair.

## 2023-09-05 NOTE — PROGRESS NOTES
Hospital Medicine Daily Progress Note    Date of Service  9/5/2023    Chief Complaint  Cole Jaramillo is a 38 y.o. male admitted 8/27/2023 with difficulty ambulation and wound care    Hospital Course  38 y.o. male with super massive obesity with a BMI of 116 and other unknown medical issues who presented to the hospital 8/27 with difficulty ambulation and wound care and unable to take care of himself. Up until about a month ago he was able to ambulate around his house and bathe himself and get in the shower.  However over the last couple weeks has had significantly worsening shortness of breath and significantly increasing lower extremity swelling.  He was finally brought into the emergency room because he could no longer move himself and had to call the fire department twice in the last week.  Patient denies any clear chest pain.  He endorses PND and orthopnea.  He has not seen a doctor in over 10 years.    Noted severe excoriations of the bilateral lower quadrants of the abdomen which she scratches incessantly.      Interval Problem Update  -Notes were reviewed from ER, radiology, nursing etc.  -Reviewed labs to date, microbiology for current admit and prior  -Imaging was independently reviewed and interpreted    BLE swelling improving. I have reviewed echo, normal EF  Dc iv lasix, change to po lasix  Change iv abx to po augmentin to complete course of 10 days  On 2L O2  Working with PT today  Pending DME    I have discussed this patient's plan of care and discharge plan at IDT rounds today with Case Management, Nursing, Nursing leadership, and other members of the IDT team.    Consultants/Specialty  na    Code Status  Full Code    Disposition  The patient is medically cleared for discharge to home or a post-acute facility.      I have placed the appropriate orders for post-discharge needs.    Review of Systems  Review of Systems   Respiratory:  Positive for shortness of breath and wheezing.    Cardiovascular:   Positive for leg swelling.   Neurological:  Positive for weakness.   All other systems reviewed and are negative.       Physical Exam  Temp:  [36.2 °C (97.1 °F)-36.9 °C (98.4 °F)] 36.9 °C (98.4 °F)  Pulse:  [] 99  Resp:  [16-19] 18  BP: (111-149)/(62-74) 111/62  SpO2:  [91 %-96 %] 94 %    Physical Exam  Vitals and nursing note reviewed.   Constitutional:       Appearance: Normal appearance. He is obese. He is ill-appearing and diaphoretic.   HENT:      Head: Normocephalic and atraumatic.      Mouth/Throat:      Pharynx: Oropharynx is clear.   Eyes:      Pupils: Pupils are equal, round, and reactive to light.   Neck:      Vascular: No carotid bruit.   Cardiovascular:      Rate and Rhythm: Normal rate and regular rhythm.   Pulmonary:      Effort: Pulmonary effort is normal.      Breath sounds: Wheezing present.      Comments: On oxygen supplements  Diminished breath sounds   Abdominal:      General: Abdomen is flat. Bowel sounds are normal.      Palpations: Abdomen is soft. There is no mass.   Musculoskeletal:         General: Normal range of motion.      Cervical back: Neck supple.      Right lower leg: Edema present.      Left lower leg: Edema present.      Comments: Massive tense edema B/L LE's to the mid-thighs  Extensive lichenification of the skin B/L LE's  No clear open lesions of the B/l LE's    Skin:     General: Skin is warm.      Findings: Erythema and lesion present.      Comments: B/L LQ abdominal wall lesions with some purulence in the LLQ and significant surrounding erythema, moderately TTP      Neurological:      General: No focal deficit present.      Mental Status: He is alert and oriented to person, place, and time.   Psychiatric:         Mood and Affect: Mood normal.         Behavior: Behavior normal.         Fluids    Intake/Output Summary (Last 24 hours) at 9/5/2023 1658  Last data filed at 9/5/2023 0602  Gross per 24 hour   Intake 360 ml   Output 1600 ml   Net -1240 ml        Laboratory  Recent Labs     09/03/23  0036   WBC 7.5   RBC 4.76   HEMOGLOBIN 9.8*   HEMATOCRIT 37.1*   MCV 77.9*   MCH 20.6*   MCHC 26.4*   RDW 59.0*   PLATELETCT 300   MPV 9.3     Recent Labs     09/03/23  0036   SODIUM 139   POTASSIUM 4.2   CHLORIDE 97   CO2 33   GLUCOSE 121*   BUN 8   CREATININE 0.56   CALCIUM 8.4*                   Imaging  EC-ECHOCARDIOGRAM COMPLETE W/ CONT   Final Result      US-EXTREMITY VENOUS LOWER UNILAT RIGHT   Final Result      DX-CHEST-PORTABLE (1 VIEW)   Final Result      1.  No acute cardiopulmonary abnormality identified.      2.  Marked enlargement of the cardiac silhouette especially for age             Assessment/Plan  * Abdominal wall cellulitis- (present on admission)  Assessment & Plan  Refractory to oral bactrim  Significant lesions in the B/L LQ's of the abdominal wall along with L breast cellulitis  Check MRSA Nares PCR-- negative  Continue iv zyvox and ceftriaxone  F/U blood cultures  9/2: continue po Augmentin. Afebrile. Cont monitoring     Iron deficiency anemia  Assessment & Plan  Denies active bleeding  Iron profile c/w LINDSEY  Patient does not have colonoscopy in the past  Iron replacement protocol  I have advised patient to follow up with GI as outpatient to further address LINDSEY      Shortness of breath- (present on admission)  Assessment & Plan  BNP elevated   ECHO  IV lasix  Wean O2 as tolerated     HTN (hypertension)- (present on admission)  Assessment & Plan  Persistently elevated in the ER  Will start lisinopril 10 mg daily  Start IV lasix 40 mg daily  Defer BB until ECHO comes back  Adjust prn  9/2: iv lasix changed to po     Class 3 severe obesity due to excess calories with serious comorbidity and body mass index (BMI) greater than or equal to 70 in adult (HCC)- (present on admission)  Assessment & Plan  Body mass index is 115.48 kg/m².  Cannot take care of himself now. Not a safe discharge home.  Will need to work with CM/SW about eventual disposition  options once patient has his acute medical issues resolved/improved  RD consult  Outpatient bariatric surgery referred     Acute respiratory failure with hypoxia (HCC)- (present on admission)  Assessment & Plan  Persistently hypoxic requiring 3 L NC  Doppler BLE: limited study, negative for DVT  Echo normal LVEF  Continue iv lasix -- switch to po  Wean O2 as tolerated     9/4: on 2L O2, continue po lasix and wean O2 as tolerated  Walking O2           VTE prophylaxis: lovenox    I have performed a physical exam and reviewed and updated ROS and Plan today (9/5/2023). In review of yesterday's note (9/4/2023), there are no changes except as documented above.

## 2023-09-05 NOTE — CARE PLAN
The patient is Stable - Low risk of patient condition declining or worsening    Shift Goals  Clinical Goals: headache relief  Patient Goals: comfort and rest  Family Goals: EMERSON

## 2023-09-06 PROCEDURE — 700111 HCHG RX REV CODE 636 W/ 250 OVERRIDE (IP): Performed by: STUDENT IN AN ORGANIZED HEALTH CARE EDUCATION/TRAINING PROGRAM

## 2023-09-06 PROCEDURE — RXMED WILLOW AMBULATORY MEDICATION CHARGE: Performed by: STUDENT IN AN ORGANIZED HEALTH CARE EDUCATION/TRAINING PROGRAM

## 2023-09-06 PROCEDURE — A9270 NON-COVERED ITEM OR SERVICE: HCPCS | Performed by: STUDENT IN AN ORGANIZED HEALTH CARE EDUCATION/TRAINING PROGRAM

## 2023-09-06 PROCEDURE — 97116 GAIT TRAINING THERAPY: CPT | Mod: CQ

## 2023-09-06 PROCEDURE — 700102 HCHG RX REV CODE 250 W/ 637 OVERRIDE(OP): Performed by: STUDENT IN AN ORGANIZED HEALTH CARE EDUCATION/TRAINING PROGRAM

## 2023-09-06 PROCEDURE — 99232 SBSQ HOSP IP/OBS MODERATE 35: CPT | Performed by: STUDENT IN AN ORGANIZED HEALTH CARE EDUCATION/TRAINING PROGRAM

## 2023-09-06 PROCEDURE — A9270 NON-COVERED ITEM OR SERVICE: HCPCS | Performed by: NURSE PRACTITIONER

## 2023-09-06 PROCEDURE — 700102 HCHG RX REV CODE 250 W/ 637 OVERRIDE(OP): Performed by: INTERNAL MEDICINE

## 2023-09-06 PROCEDURE — A9270 NON-COVERED ITEM OR SERVICE: HCPCS | Performed by: INTERNAL MEDICINE

## 2023-09-06 PROCEDURE — 700102 HCHG RX REV CODE 250 W/ 637 OVERRIDE(OP): Performed by: NURSE PRACTITIONER

## 2023-09-06 PROCEDURE — 770006 HCHG ROOM/CARE - MED/SURG/GYN SEMI*

## 2023-09-06 PROCEDURE — 97530 THERAPEUTIC ACTIVITIES: CPT | Mod: CQ

## 2023-09-06 RX ORDER — FUROSEMIDE 40 MG/1
40 TABLET ORAL DAILY
Qty: 30 TABLET | Refills: 0 | Status: SHIPPED | OUTPATIENT
Start: 2023-09-07 | End: 2023-10-07

## 2023-09-06 RX ORDER — NYSTATIN 100000 [USP'U]/G
1 POWDER TOPICAL 3 TIMES DAILY
Qty: 30 G | Refills: 0 | Status: SHIPPED | OUTPATIENT
Start: 2023-09-06 | End: 2023-09-17

## 2023-09-06 RX ORDER — FERROUS SULFATE 325(65) MG
325 TABLET ORAL
Qty: 30 TABLET | Refills: 0 | Status: SHIPPED | OUTPATIENT
Start: 2023-09-07 | End: 2023-10-07

## 2023-09-06 RX ORDER — LISINOPRIL 10 MG/1
10 TABLET ORAL DAILY
Qty: 30 TABLET | Refills: 0 | Status: SHIPPED | OUTPATIENT
Start: 2023-09-07 | End: 2023-10-07

## 2023-09-06 RX ADMIN — MICONAZOLE NITRATE: 20 CREAM TOPICAL at 18:00

## 2023-09-06 RX ADMIN — NYSTATIN: 100000 POWDER TOPICAL at 12:00

## 2023-09-06 RX ADMIN — NYSTATIN: 100000 POWDER TOPICAL at 05:42

## 2023-09-06 RX ADMIN — MICONAZOLE NITRATE: 20 CREAM TOPICAL at 05:45

## 2023-09-06 RX ADMIN — Medication: at 18:00

## 2023-09-06 RX ADMIN — FERROUS SULFATE TAB 325 MG (65 MG ELEMENTAL FE) 325 MG: 325 (65 FE) TAB at 09:27

## 2023-09-06 RX ADMIN — ENOXAPARIN SODIUM 80 MG: 100 INJECTION SUBCUTANEOUS at 17:07

## 2023-09-06 RX ADMIN — ACETAMINOPHEN 650 MG: 325 TABLET, FILM COATED ORAL at 00:51

## 2023-09-06 RX ADMIN — NYSTATIN: 100000 POWDER TOPICAL at 18:00

## 2023-09-06 RX ADMIN — Medication: at 05:44

## 2023-09-06 RX ADMIN — AMOXICILLIN AND CLAVULANATE POTASSIUM 1 TABLET: 875; 125 TABLET, FILM COATED ORAL at 05:43

## 2023-09-06 RX ADMIN — FUROSEMIDE 40 MG: 40 TABLET ORAL at 05:43

## 2023-09-06 RX ADMIN — ENOXAPARIN SODIUM 80 MG: 100 INJECTION SUBCUTANEOUS at 05:42

## 2023-09-06 RX ADMIN — LISINOPRIL 10 MG: 10 TABLET ORAL at 05:43

## 2023-09-06 RX ADMIN — AMOXICILLIN AND CLAVULANATE POTASSIUM 1 TABLET: 875; 125 TABLET, FILM COATED ORAL at 17:07

## 2023-09-06 ASSESSMENT — GAIT ASSESSMENTS
DISTANCE (FEET): 50
ASSISTIVE DEVICE: FRONT WHEEL WALKER
GAIT LEVEL OF ASSIST: STANDBY ASSIST

## 2023-09-06 ASSESSMENT — COGNITIVE AND FUNCTIONAL STATUS - GENERAL
TURNING FROM BACK TO SIDE WHILE IN FLAT BAD: UNABLE
MOBILITY SCORE: 13
MOVING TO AND FROM BED TO CHAIR: UNABLE
STANDING UP FROM CHAIR USING ARMS: A LITTLE
MOVING FROM LYING ON BACK TO SITTING ON SIDE OF FLAT BED: A LITTLE
WALKING IN HOSPITAL ROOM: A LITTLE
SUGGESTED CMS G CODE MODIFIER MOBILITY: CL
CLIMB 3 TO 5 STEPS WITH RAILING: A LOT

## 2023-09-06 ASSESSMENT — PAIN DESCRIPTION - PAIN TYPE: TYPE: ACUTE PAIN

## 2023-09-06 ASSESSMENT — ENCOUNTER SYMPTOMS
WHEEZING: 1
WEAKNESS: 1
SHORTNESS OF BREATH: 1

## 2023-09-06 NOTE — THERAPY
Physical Therapy   Daily Treatment     Patient Name: Cole Jaramillo  Age:  38 y.o., Sex:  male  Medical Record #: [id number]  Today's Date: 9/6/2023     Precautions  Precautions: (P) Fall Risk    Assessment    Pt feeling better today, not as fatigued. Functionally the pt is now supervision w/sup->sit w/HOB elevated, STS w/FWW, and SBA amb w/FWW tolerated 50' on 2L O2. The pt is now able to manage short distances wo/FWW, he prefers the walker for balance/support.   The pt is now cleared for d/c home w/bariatric FWW and bariatric wheelchair. The pt would benefit from HHS to manage his O2 which is new this admission, PT for ongoing strengthening program, and OT for ADL/IADL's.   PT will cont to follow.     Plan    Treatment Plan Status: (P) Modify Current Treatment Plan  Type of Treatment: Bed Mobility, Gait Training, Neuro Re-Education / Balance, Self Care / Home Evaluation, Therapeutic Activities, Therapeutic Exercise  Treatment Frequency: (P) Other (See Comments) (6x's/wk)  Treatment Duration: (P) Until Therapy Goals Met    DC Equipment Recommendations: (P) Front-Wheel Walker, Wheelchair (bariatric)  Discharge Recommendations: (P) Recommend home health for continued physical therapy services    Objective       09/06/23 1055   Charge Group   PT Gait Training (Units) 1   PT Therapeutic Activities (Units) 2   Total Time Spent   PT Gait Training Time Spent (Mins) 10   PT Therapeutic Activities Time Spent (Mins) 28   PT Total Time Spent (Calculated) 38   Precautions   Precautions Fall Risk   Pain 0 - 10 Group   Pain Rating Scale (NPRS) 0   Therapist Pain Assessment During Activity;Nurse Notified   Other Treatments   Other Treatments Provided The pt required EOB time before initiating STS for WOB. Pericare provided while pt stood @ sink, he completed simple ADL's.   Balance   Sitting Balance (Static) Good   Sitting Balance (Dynamic) Fair +   Standing Balance (Static) Fair +   Standing Balance (Dynamic) Fair   Weight Shift  Sitting Fair   Weight Shift Standing Fair   Comments Nathan FWW and sink   Bed Mobility    Supine to Sit Supervised  (HOB elevated and use of railing)   Sit to Supine   (pt left seated on the EOB)   Scooting Supervised  (seated)   Skilled Intervention Verbal Cuing;Postural Facilitation;Compensatory Strategies   Comments Pt stated that getting back into his bed @ home will be easier 2* the size of the bed and bed surface. The pt conts to need LE assist to elevate his leg onto this particular bed.   Gait Analysis   Gait Level Of Assist Standby Assist   Assistive Device Front Wheel Walker   Distance (Feet) 50   # of Times Distance was Traveled 1   Skilled Intervention Verbal Cuing   Comments The pt is now able to amb short distances wo/FWW, the pt prefers the FWW for balance. The pt amb 50' today wo/heavy reliance on the walker, uses more for support.   Functional Mobility   Sit to Stand Supervised   Bed, Chair, Wheelchair Transfer Supervised   Skilled Intervention Verbal Cuing   Comments The pt is now @ supervision level w/functional transfers. The pt could static stand @ sink w/supervision.   How much difficulty does the patient currently have...   Turning over in bed (including adjusting bedclothes, sheets and blankets)? 1   Sitting down on and standing up from a chair with arms (e.g., wheelchair, bedside commode, etc.) 3   Moving from lying on back to sitting on the side of the bed? 1   How much help from another person does the patient currently need...   Moving to and from a bed to a chair (including a wheelchair)? 3   Need to walk in a hospital room? 3   Climbing 3-5 steps with a railing? 2   6 clicks Mobility Score 13   Short Term Goals    Short Term Goal # 1 Pt will transfer supine<>sit supervision in 6 visits to improve bed mobility.   Goal Outcome # 1 goal not met   Short Term Goal # 2 Pt will transfer STS supervision w/LRAD in 6 visits to improve standing abilities.   Goal Outcome # 2 Goal met   Short Term  Goal # 3 Pt will ambulate 25' supervision w/LRAD in 6 visits to improve access to environment.   Goal Outcome # 3 Goal not met   Education Group   Role of Physical Therapist Patient Response Patient;Acceptance;Explanation;Reinforcement Needed   Physical Therapy Treatment Plan   Physical Therapy Treatment Plan Modify Current Treatment Plan   Treatment Frequency Other (See Comments)  (6x's/wk)   Duration Until Therapy Goals Met   Anticipated Discharge Equipment and Recommendations   DC Equipment Recommendations Front-Wheel Walker;Wheelchair  (bariatric)   Discharge Recommendations Recommend home health for continued physical therapy services   Interdisciplinary Plan of Care Collaboration   IDT Collaboration with  Nursing   Patient Position at End of Therapy Seated;Edge of Bed;Call Light within Reach;Tray Table within Reach;Phone within Reach   Collaboration Comments Nrsg notified of pts tx efforts   Session Information   Date / Session Number  9/6--7 (1/6, 9/12) PTA/2   Supervising Physical Therapist (PTA Treatments Only)   Supervising Physical Therapist Liana Pablo

## 2023-09-06 NOTE — PROGRESS NOTES
Hospital Medicine Daily Progress Note    Date of Service  9/6/2023    Chief Complaint  Cole Jaramillo is a 38 y.o. male admitted 8/27/2023 with difficulty ambulation and wound care    Hospital Course  38 y.o. male with super massive obesity with a BMI of 116 and other unknown medical issues who presented to the hospital 8/27 with difficulty ambulation and wound care and unable to take care of himself. Up until about a month ago he was able to ambulate around his house and bathe himself and get in the shower.  However over the last couple weeks has had significantly worsening shortness of breath and significantly increasing lower extremity swelling.  He was finally brought into the emergency room because he could no longer move himself and had to call the fire department twice in the last week.  Patient denies any clear chest pain.  He endorses PND and orthopnea.  He has not seen a doctor in over 10 years.    Noted severe excoriations of the bilateral lower quadrants of the abdomen which she scratches incessantly.      Interval Problem Update  -Notes were reviewed from ER, radiology, nursing etc.  -Reviewed labs to date, microbiology for current admit and prior  -Imaging was independently reviewed and interpreted    BLE swelling improving. I have reviewed echo, normal EF  Continue  po lasix  Change iv abx to po augmentin to complete course of 10 days  On 2L O2, requires 3L with ambulation. Ordered home oxygen  Working with PT today  Pending DME    I have discussed this patient's plan of care and discharge plan at IDT rounds today with Case Management, Nursing, Nursing leadership, and other members of the IDT team.    Consultants/Specialty  na    Code Status  Full Code    Disposition  The patient is medically cleared for discharge to home or a post-acute facility.      I have placed the appropriate orders for post-discharge needs.    Review of Systems  Review of Systems   Respiratory:  Positive for shortness of  breath and wheezing.    Cardiovascular:  Positive for leg swelling.   Neurological:  Positive for weakness.   All other systems reviewed and are negative.       Physical Exam  Temp:  [36 °C (96.8 °F)-36.9 °C (98.4 °F)] 36.2 °C (97.2 °F)  Pulse:  [89-99] 89  Resp:  [18-20] 18  BP: (106-122)/(47-80) 122/80  SpO2:  [91 %-96 %] 92 %    Physical Exam  Vitals and nursing note reviewed.   Constitutional:       Appearance: Normal appearance. He is obese. He is ill-appearing and diaphoretic.   HENT:      Head: Normocephalic and atraumatic.      Mouth/Throat:      Pharynx: Oropharynx is clear.   Eyes:      Pupils: Pupils are equal, round, and reactive to light.   Neck:      Vascular: No carotid bruit.   Cardiovascular:      Rate and Rhythm: Normal rate and regular rhythm.   Pulmonary:      Effort: Pulmonary effort is normal.      Breath sounds: Wheezing present.      Comments: On oxygen supplements  Diminished breath sounds   Abdominal:      General: Abdomen is flat. Bowel sounds are normal.      Palpations: Abdomen is soft. There is no mass.   Musculoskeletal:         General: Normal range of motion.      Cervical back: Neck supple.      Right lower leg: Edema present.      Left lower leg: Edema present.      Comments: Massive tense edema B/L LE's to the mid-thighs  Extensive lichenification of the skin B/L LE's  No clear open lesions of the B/l LE's    Skin:     General: Skin is warm.      Findings: Erythema and lesion present.      Comments: B/L LQ abdominal wall lesions with some purulence in the LLQ and significant surrounding erythema, moderately TTP      Neurological:      General: No focal deficit present.      Mental Status: He is alert and oriented to person, place, and time.   Psychiatric:         Mood and Affect: Mood normal.         Behavior: Behavior normal.         Fluids    Intake/Output Summary (Last 24 hours) at 9/6/2023 1326  Last data filed at 9/6/2023 0559  Gross per 24 hour   Intake 1360 ml   Output 450  ml   Net 910 ml       Laboratory                            Imaging  EC-ECHOCARDIOGRAM COMPLETE W/ CONT   Final Result      US-EXTREMITY VENOUS LOWER UNILAT RIGHT   Final Result      DX-CHEST-PORTABLE (1 VIEW)   Final Result      1.  No acute cardiopulmonary abnormality identified.      2.  Marked enlargement of the cardiac silhouette especially for age             Assessment/Plan  * Abdominal wall cellulitis- (present on admission)  Assessment & Plan  Refractory to oral bactrim  Significant lesions in the B/L LQ's of the abdominal wall along with L breast cellulitis  Check MRSA Nares PCR-- negative  Continue iv zyvox and ceftriaxone  F/U blood cultures  9/2: continue po Augmentin. Afebrile. Cont monitoring     Iron deficiency anemia  Assessment & Plan  Denies active bleeding  Iron profile c/w LINDSEY  Patient does not have colonoscopy in the past  Iron replacement protocol  I have advised patient to follow up with GI as outpatient to further address LINDSEY      Shortness of breath- (present on admission)  Assessment & Plan  BNP elevated   ECHO  IV lasix  Wean O2 as tolerated     HTN (hypertension)- (present on admission)  Assessment & Plan  Persistently elevated in the ER  Will start lisinopril 10 mg daily  Start IV lasix 40 mg daily  Defer BB until ECHO comes back  Adjust prn  9/2: iv lasix changed to po     Class 3 severe obesity due to excess calories with serious comorbidity and body mass index (BMI) greater than or equal to 70 in adult (HCC)- (present on admission)  Assessment & Plan  Body mass index is 115.48 kg/m².  Cannot take care of himself now. Not a safe discharge home.  Will need to work with CM/SW about eventual disposition options once patient has his acute medical issues resolved/improved  RD consult  Outpatient bariatric surgery referred     Acute respiratory failure with hypoxia (HCC)- (present on admission)  Assessment & Plan  Persistently hypoxic requiring 3 L NC  Doppler BLE: limited study, negative  for DVT  Echo normal LVEF  Continue iv lasix -- switch to po  Wean O2 as tolerated     9/4: on 2L O2, continue po lasix and wean O2 as tolerated  Walking O2           VTE prophylaxis: lovenox    I have performed a physical exam and reviewed and updated ROS and Plan today (9/6/2023). In review of yesterday's note (9/5/2023), there are no changes except as documented above.

## 2023-09-06 NOTE — PROGRESS NOTES
Pt rested well throughout the night with minimal interruptions.    Cleansed under all skin folds with foam cleanser and applied scheduled creams.

## 2023-09-06 NOTE — DISCHARGE PLANNING
Received Choice form at   Agency/Facility Name: [facility]  Referral sent per Choice form @ 0866    [facility] will pay for the DME O2 for 30 days.  Per LSW, pt does have a credit card or debit card to put on file with [facility].    DPA is waiting for a e-mail from [facility]    @0992  Agency/Facility Name: [facility]  Spoke To: Adam  Outcome: Adam will see if they can order bariatric equipment for the pt.    @0937  Agency/Facility Name: [facility]  Spoke To:   Outcome:  will give Bri a message to give this DPA a call.    @0952  Agency/Facility Name: [facility]  Spoke To: Bri  Outcome: Bri will see if they can order the equipment.  Bri will give this DPA  a call back.    @1004  Agency/Facility Name: [facility]  Outcome: DPA left a voice message.  Awaiting a call back.    @1014  Agency/Facility Name: [facility]  Spoke To: Priya  Outcome: Priya will check to see if the DME equipment they have will accommodate the pt's weight.  Priya will give this DPA a call back.    @1026  Agency/Facility Name: [facility]  Spoke To: Latonia  Outcome: DME equipment that [facility] has will not accommodate the pt's weight.    @1256  Agency/Facility Name: [facility]  Spoke To: Courtney  Outcome: Referral was not received.  DPA resent via comm mgt, fax #[phone].      @4790  Agency/Facility Name: [facility]  Outcome: DPA received a voice message from Courtney.  DME O2 order received.  [facility] needs the approved services form.

## 2023-09-06 NOTE — FACE TO FACE
"Face to Face Note  -  Durable Medical Equipment    Alyce Flores M.D. - NPI: [id number]  I certify that this patient is under my care and that they had a durable medical equipment(DME)face to face encounter by myself that meets the physician DME face-to-face encounter requirements with this patient on:    Date of encounter:   Patient:                    MRN:                       YOB: 2024  Cole Jaramillo  [id number]  1985     The encounter with the patient was in whole, or in part, for the following medical condition, which is the primary reason for durable medical equipment:  Other - acute respiratory failure    I certify that, based on my findings, the following durable medical equipment is medically necessary:    Oxygen   HOME O2 Saturation Measurements:(Values must be present for Home Oxygen orders)  Room air sat at rest: 90  Room air sat with amb: 80  With liters of O2: 2, O2 sat at rest with O2: 95  With Liters of O2: 3, O2 sat with amb with O2 : 90  Is the patient mobile?: Yes  If patient feels more short of breath, they can go up to 6 liters per minute and contact healthcare provider.    Supporting Symptoms: The patient requires supplemental oxygen, as the following interventions have been tried with limited or no improvement: \"Ambulation with oximetry.    My Clinical findings support the need for the above equipment due to:  Hypoxia  "

## 2023-09-06 NOTE — CARE PLAN
The patient is Stable - Low risk of patient condition declining or worsening    Shift Goals  Clinical Goals: Safety, no falls, safe ambulation, repositioning as tolerated  Patient Goals: rest and sleep  Family Goals: EMERSON    Progress made toward(s) clinical / shift goals:  Patient has remained safe, no falls, repositioned frequently to patient's comfort level, patient has had uninterrupted periods of rest     Patient is not progressing towards the following goals:n/a  Problem: Fall Risk  Goal: Patient will remain free from falls  Outcome: Progressing     Problem: Pain - Standard  Goal: Alleviation of pain or a reduction in pain to the patient’s comfort goal  Outcome: Progressing     Problem: Knowledge Deficit - Standard  Goal: Patient and family/care givers will demonstrate understanding of plan of care, disease process/condition, diagnostic tests and medications  Outcome: Progressing

## 2023-09-07 ENCOUNTER — PHARMACY VISIT (OUTPATIENT)
Dept: PHARMACY | Facility: MEDICAL CENTER | Age: 38
End: 2023-09-07
Payer: COMMERCIAL

## 2023-09-07 VITALS
OXYGEN SATURATION: 95 % | RESPIRATION RATE: 18 BRPM | SYSTOLIC BLOOD PRESSURE: 111 MMHG | BODY MASS INDEX: 44.1 KG/M2 | WEIGHT: 315 LBS | TEMPERATURE: 96.9 F | HEART RATE: 92 BPM | HEIGHT: 71 IN | DIASTOLIC BLOOD PRESSURE: 61 MMHG

## 2023-09-07 LAB
ANION GAP SERPL CALC-SCNC: 6 MMOL/L (ref 7–16)
BUN SERPL-MCNC: 9 MG/DL (ref 8–22)
CALCIUM SERPL-MCNC: 8.5 MG/DL (ref 8.5–10.5)
CHLORIDE SERPL-SCNC: 94 MMOL/L (ref 96–112)
CO2 SERPL-SCNC: 32 MMOL/L (ref 20–33)
CREAT SERPL-MCNC: 0.36 MG/DL (ref 0.5–1.4)
ERYTHROCYTE [DISTWIDTH] IN BLOOD BY AUTOMATED COUNT: 61.6 FL (ref 35.9–50)
GFR SERPLBLD CREATININE-BSD FMLA CKD-EPI: 146 ML/MIN/1.73 M 2
GLUCOSE SERPL-MCNC: 109 MG/DL (ref 65–99)
HCT VFR BLD AUTO: 36.9 % (ref 42–52)
HGB BLD-MCNC: 10 G/DL (ref 14–18)
MCH RBC QN AUTO: 21.4 PG (ref 27–33)
MCHC RBC AUTO-ENTMCNC: 27.1 G/DL (ref 32.3–36.5)
MCV RBC AUTO: 78.8 FL (ref 81.4–97.8)
PLATELET # BLD AUTO: 273 K/UL (ref 164–446)
PMV BLD AUTO: 9.3 FL (ref 9–12.9)
POTASSIUM SERPL-SCNC: 4.3 MMOL/L (ref 3.6–5.5)
RBC # BLD AUTO: 4.68 M/UL (ref 4.7–6.1)
SODIUM SERPL-SCNC: 132 MMOL/L (ref 135–145)
WBC # BLD AUTO: 10.2 K/UL (ref 4.8–10.8)

## 2023-09-07 PROCEDURE — A9270 NON-COVERED ITEM OR SERVICE: HCPCS | Performed by: INTERNAL MEDICINE

## 2023-09-07 PROCEDURE — 36415 COLL VENOUS BLD VENIPUNCTURE: CPT

## 2023-09-07 PROCEDURE — 700102 HCHG RX REV CODE 250 W/ 637 OVERRIDE(OP): Performed by: STUDENT IN AN ORGANIZED HEALTH CARE EDUCATION/TRAINING PROGRAM

## 2023-09-07 PROCEDURE — 700102 HCHG RX REV CODE 250 W/ 637 OVERRIDE(OP): Performed by: NURSE PRACTITIONER

## 2023-09-07 PROCEDURE — 700111 HCHG RX REV CODE 636 W/ 250 OVERRIDE (IP): Performed by: STUDENT IN AN ORGANIZED HEALTH CARE EDUCATION/TRAINING PROGRAM

## 2023-09-07 PROCEDURE — 80048 BASIC METABOLIC PNL TOTAL CA: CPT

## 2023-09-07 PROCEDURE — 85027 COMPLETE CBC AUTOMATED: CPT

## 2023-09-07 PROCEDURE — A9270 NON-COVERED ITEM OR SERVICE: HCPCS | Performed by: STUDENT IN AN ORGANIZED HEALTH CARE EDUCATION/TRAINING PROGRAM

## 2023-09-07 PROCEDURE — 700102 HCHG RX REV CODE 250 W/ 637 OVERRIDE(OP): Performed by: INTERNAL MEDICINE

## 2023-09-07 PROCEDURE — 97530 THERAPEUTIC ACTIVITIES: CPT | Mod: CQ

## 2023-09-07 PROCEDURE — 97535 SELF CARE MNGMENT TRAINING: CPT

## 2023-09-07 PROCEDURE — 99239 HOSP IP/OBS DSCHRG MGMT >30: CPT | Performed by: STUDENT IN AN ORGANIZED HEALTH CARE EDUCATION/TRAINING PROGRAM

## 2023-09-07 PROCEDURE — A9270 NON-COVERED ITEM OR SERVICE: HCPCS | Performed by: NURSE PRACTITIONER

## 2023-09-07 RX ADMIN — ACETAMINOPHEN 650 MG: 325 TABLET, FILM COATED ORAL at 08:21

## 2023-09-07 RX ADMIN — FERROUS SULFATE TAB 325 MG (65 MG ELEMENTAL FE) 325 MG: 325 (65 FE) TAB at 08:22

## 2023-09-07 RX ADMIN — AMOXICILLIN AND CLAVULANATE POTASSIUM 1 TABLET: 875; 125 TABLET, FILM COATED ORAL at 17:32

## 2023-09-07 RX ADMIN — FUROSEMIDE 40 MG: 40 TABLET ORAL at 05:38

## 2023-09-07 RX ADMIN — AMOXICILLIN AND CLAVULANATE POTASSIUM 1 TABLET: 875; 125 TABLET, FILM COATED ORAL at 05:38

## 2023-09-07 RX ADMIN — LISINOPRIL 10 MG: 10 TABLET ORAL at 05:38

## 2023-09-07 RX ADMIN — ENOXAPARIN SODIUM 80 MG: 100 INJECTION SUBCUTANEOUS at 05:38

## 2023-09-07 ASSESSMENT — COGNITIVE AND FUNCTIONAL STATUS - GENERAL
DRESSING REGULAR LOWER BODY CLOTHING: A LITTLE
MOVING FROM LYING ON BACK TO SITTING ON SIDE OF FLAT BED: A LITTLE
SUGGESTED CMS G CODE MODIFIER DAILY ACTIVITY: CK
TURNING FROM BACK TO SIDE WHILE IN FLAT BAD: UNABLE
HELP NEEDED FOR BATHING: A LITTLE
MOBILITY SCORE: 13
DRESSING REGULAR UPPER BODY CLOTHING: A LITTLE
CLIMB 3 TO 5 STEPS WITH RAILING: A LOT
PERSONAL GROOMING: A LITTLE
WALKING IN HOSPITAL ROOM: A LITTLE
MOVING TO AND FROM BED TO CHAIR: UNABLE
SUGGESTED CMS G CODE MODIFIER MOBILITY: CL
DAILY ACTIVITIY SCORE: 17
TOILETING: TOTAL
STANDING UP FROM CHAIR USING ARMS: A LITTLE

## 2023-09-07 ASSESSMENT — GAIT ASSESSMENTS
GAIT LEVEL OF ASSIST: SUPERVISED
ASSISTIVE DEVICE: FRONT WHEEL WALKER
DISTANCE (FEET): 25

## 2023-09-07 ASSESSMENT — PAIN DESCRIPTION - PAIN TYPE
TYPE: ACUTE PAIN
TYPE: ACUTE PAIN

## 2023-09-07 NOTE — CARE PLAN
The patient is Watcher - Medium risk of patient condition declining or worsening    Shift Goals  Clinical Goals: pt will remain safe and free from falls/injury throughout shift.  Patient Goals: rest and sleep  Family Goals: EMERSON    Progress made toward(s) clinical / shift goals:  pt up SBA with FWW. Safety precautions in place. Bed in low position, treaded socks on, personal possessions in reach, call light in reach and hourly rounding in place.     Patient is not progressing towards the following goals:      Problem: Skin Integrity  Goal: Skin integrity is maintained or improved  Outcome: Not Progressing     Frequent incontinence checks with PRN bed linen changes, purewick in use, dressing changes per orders, Qshift skin assessments.

## 2023-09-07 NOTE — DISCHARGE INSTRUCTIONS
Cellulitis, Adult    Cellulitis is a skin infection. The infected area is usually warm, red, swollen, and tender. This condition occurs most often in the arms and lower legs. The infection can travel to the muscles, blood, and underlying tissue and become serious. It is very important to get treated for this condition.  What are the causes?  Cellulitis is caused by bacteria. The bacteria enter through a break in the skin, such as a cut, burn, insect bite, open sore, or crack.  What increases the risk?  This condition is more likely to occur in people who:  Have a weak body defense system (immune system).  Have open wounds on the skin, such as cuts, burns, bites, and scrapes. Bacteria can enter the body through these open wounds.  Are older than 60 years of age.  Have diabetes.  Have a type of long-lasting (chronic) liver disease (cirrhosis) or kidney disease.  Are obese.  Have a skin condition such as:  Itchy rash (eczema).  Slow movement of blood in the veins (venous stasis).  Fluid buildup below the skin (edema).  Have had radiation therapy.  Use IV drugs.  What are the signs or symptoms?  Symptoms of this condition include:  Redness, streaking, or spotting on the skin.  Swollen area of the skin.  Tenderness or pain when an area of the skin is touched.  Warm skin.  A fever.  Chills.  Blisters.  How is this diagnosed?  This condition is diagnosed based on a medical history and physical exam. You may also have tests, including:  Blood tests.  Imaging tests.  How is this treated?  Treatment for this condition may include:  Medicines, such as antibiotic medicines or medicines to treat allergies (antihistamines).  Supportive care, such as rest and application of cold or warm cloths (compresses) to the skin.  Hospital care, if the condition is severe.  The infection usually starts to get better within 1-2 days of treatment.  Follow these instructions at home:    Medicines  Take over-the-counter and prescription  medicines only as told by your health care provider.  If you were prescribed an antibiotic medicine, take it as told by your health care provider. Do not stop taking the antibiotic even if you start to feel better.  General instructions  Drink enough fluid to keep your urine pale yellow.  Do not touch or rub the infected area.  Raise (elevate) the infected area above the level of your heart while you are sitting or lying down.  Apply warm or cold compresses to the affected area as told by your health care provider.  Keep all follow-up visits as told by your health care provider. This is important. These visits let your health care provider make sure a more serious infection is not developing.  Contact a health care provider if:  You have a fever.  Your symptoms do not begin to improve within 1-2 days of starting treatment.  Your bone or joint underneath the infected area becomes painful after the skin has healed.  Your infection returns in the same area or another area.  You notice a swollen bump in the infected area.  You develop new symptoms.  You have a general ill feeling (malaise) with muscle aches and pains.  Get help right away if:  Your symptoms get worse.  You feel very sleepy.  You develop vomiting or diarrhea that persists.  You notice red streaks coming from the infected area.  Your red area gets larger or turns dark in color.  These symptoms may represent a serious problem that is an emergency. Do not wait to see if the symptoms will go away. Get medical help right away. Call your local emergency services (911 in the [place] Do not drive yourself to the hospital.  Summary  Cellulitis is a skin infection. This condition occurs most often in the arms and lower legs.  Treatment for this condition may include medicines, such as antibiotic medicines or antihistamines.  Take over-the-counter and prescription medicines only as told by your health care provider. If you were prescribed an antibiotic medicine, do  not stop taking the antibiotic even if you start to feel better.  Contact a health care provider if your symptoms do not begin to improve within 1-2 days of starting treatment or your symptoms get worse.  Keep all follow-up visits as told by your health care provider. This is important. These visits let your health care provider make sure that a more serious infection is not developing.  This information is not intended to replace advice given to you by your health care provider. Make sure you discuss any questions you have with your health care provider.  Document Revised: 09/28/2022 Document Reviewed: 09/29/2022  Elsevier Patient Education © 2023 [employer] Inc.

## 2023-09-07 NOTE — PROGRESS NOTES
Pt alert/oriented x4, denies pain/discomfort at this time. Tolerating 2L oxygen via nasal cannula, no s/sx respiratory distress. Pt resting in bed, needs met. Call light within reach, personal belongings available, bed in lowest position, treaded socks on, and bed alarm on. Hourly rounding in place.

## 2023-09-07 NOTE — DISCHARGE PLANNING
"@0814  Agency/Facility Name: [facility]  Spoke To: Courtney  Outcome: Oxygen for one month is  $180.00.  DPA will fax the approved services over to [facility].    @0849  Agency/Facility Name: [facility]  Spoke To: Bri  Outcome: Still looking for equipment.      @0910  DPA faxed the approved services for O2 to [facility].  Fax #[phone].    Agency/Facility Name: [facility]  Spoke To: Tiff  Outcome: Tiff found a wheelchair rated up to 1000 lbs and Tiff needs to know the seat width. They range from 22\"-30\".  DPA will get back to Tiff regarding the seat width.    @1032  Agency/Facility Name: [facility]  Spoke To: Elizabeth  Outcome: Approved services was received.  O2 will be delivered bedside within 2 hours.    Agency/Facility Name: [facility]  Spoke To: Tiff  Outcome: The largest wheelchair they can find is 30\".  In home setting pt will not be able to get through doorways.  Will have to get up and fold up wheelchair.                  "

## 2023-09-07 NOTE — DISCHARGE PLANNING
GMT transport cancelled due to exceeding weight limit for valente mast. We are working on new transport plan.   SLC

## 2023-09-07 NOTE — DISCHARGE PLANNING
Case Management Discharge Planning    Admission Date: 8/27/2023  GMLOS: 4.6  ALOS: 10    6-Clicks ADL Score: 15  6-Clicks Mobility Score: 13  PT and/or OT Eval ordered: Yes  Post-acute Referrals Ordered: No  Post-acute Choice Obtained: No  Has referral(s) been sent to post-acute provider:  No      Anticipated Discharge Dispo: Discharge Disposition: Discharged to home/self care (01)    DME Needed: Yes    DME Ordered: Yes    Action(s) Taken: Updated Provider/Nurse on Discharge Plan    Escalations Completed: None    Medically Clear: Yes    Next Steps: follow up with DPA/Management     Barriers to Discharge: DME    Is the patient up for discharge tomorrow: No  Patient is making substantial progress with therapies.    O2 has been ordered, patient is aware that [facility] will pay first month, $180.00 per month. Patient will pay for cost after this, has a credit card/debit with him/and is expecting a call from [employer].    SW spoke to patient to ask where he got his medical bed.  Patient stated it is a hand me down and does not know the name/etc. Patient told MICHAEL that he is aware the trouble we are having in finding him a walker/wheelchair.  Patient stated that the one we are using is rated for 650, and he is able to use it for stabilization and feels it would work. He then pointe to a wheelchair. Saying therapies provided it, 750 lbs and will see if it works today.

## 2023-09-07 NOTE — DISCHARGE PLANNING
Medical Social Work  Patient is medically clear to discharge, needing walker to discharge.   PC to Eveline CALDWELL/Manager, MICHAEL told that patient can take walker.  Can not take the wheelchair as this is only one in the hospital.  MICHAEL told to follow up with finding one for patient and having it mailed to his address.    Sw spoke to patient about his discharge plan, patient is very happy to be going home today.  Will call his parents and have them met him at his apartment once transport is arranged. Michael verified address, patient stated that there are no steps    Michael faxed transport communication form to [facility] for a tentative transport time of 4:00 to  [id number]  [place] [address]    Volt to Dr Flores with an update, also requested if any Rx to please send them to [facility]

## 2023-09-07 NOTE — THERAPY
Physical Therapy Contact Note    Patient Name: Cole Jaramillo  Age:  38 y.o., Sex:  male  Medical Record #: [id number]  Today's Date: 9/7/2023 09/07/23 2880   Short Term Goals    Short Term Goal # 1 Pt will transfer supine<>sit supervision in 6 visits to improve bed mobility.   Short Term Goal # 2 Pt will ambulate 150' supervision w/LRAD and standing rest breaks as needed in 6 visits to improve tolerance to household mobility.   Short Term Goal # 3 Pt will perform 5x STS test in 6 visits to establish functional LE strength baseline and incorporate into HEP.   Interdisciplinary Plan of Care Collaboration   IDT Collaboration with  Physical Therapist Assistant (PTA)   Collaboration Comments Discussed POC and progress with PTA. Updated goals appropriately to reflect changes and improvemements.

## 2023-09-07 NOTE — DISCHARGE SUMMARY
Discharge Summary    CHIEF COMPLAINT ON ADMISSION  Chief Complaint   Patient presents with    Other     Pt BIBA from home for issues w/ immobility that started approx 1-2 hours ago. Pt is morbidly obese w/ estimated weight of 700lb. Pt states this is new and can generally walk w/ a cane for stability, and move from chairs       Reason for Admission  EMS     Admission Date  8/27/2023    CODE STATUS  Full Code    HPI & HOSPITAL COURSE  38 y.o. male with super massive obesity with a BMI of 116, who presented to the hospital 8/27 with difficulty ambulation and wound care and unable to take care of himself. Up until about a month ago he was able to ambulate around his house and bathe himself and get in the shower.  However over the last couple weeks has had significantly worsening shortness of breath and significantly increasing lower extremity swelling. He endorses PND and orthopnea.  He has not seen a doctor in over 10 years.  Noted severe excoriations of the bilateral lower quadrants of the abdomen which she scratches incessantly.      Abdominal wall cellulitis -likely from intense scratch and generalized edema.  He completed the 10 days course of antibiotics.     Morbid obesity  -weight loss education.  He was referred to outpatient bariatric surgery  Bariatric walker and wheelchair were ordered    Acute respiratory failure with hypoxia -desaturated on room air. Echo revealed normal EF 65%, proBNP 1967; respiratory failure is likely due to restrictive respiratory disorder due to morbid obesity.  I also referred him for sleep medicine for MC evaluation. he has been treated with Lasix for overall edema, supportive care. He requires 3 L NC with ambulation.  Home oxygen was ordered.     Iron deficiency anemia -started on iron supplement.  Patient was advised to follow GI with further work-up.    Hypertension -started on lisinopril, BP in good control    Therefore, he is discharged in good and stable condition to  home with organized home healthcare and close outpatient follow-up.    The patient met 2-midnight criteria for an inpatient stay at the time of discharge.    Discharge Date  9/7/2023    FOLLOW UP ITEMS POST DISCHARGE  - Follow up with primary care physician in 1 week.   - Please take the medications as instructed    - Go to the local Emergency Department if you have any worsening condition.       DISCHARGE DIAGNOSES  Principal Problem:    Abdominal wall cellulitis (POA: Yes)  Active Problems:    Acute respiratory failure with hypoxia (HCC) (POA: Yes)    Class 3 severe obesity due to excess calories with serious comorbidity and body mass index (BMI) greater than or equal to 70 in adult (HCC) (POA: Yes)    HTN (hypertension) (POA: Yes)    Shortness of breath (POA: Yes)    Iron deficiency anemia (POA: Unknown)  Resolved Problems:    * No resolved hospital problems. *      FOLLOW UP  No future appointments.  [facility]  [facility] [place] [address]  [phone]  Follow up  Please call [facility] to establish with a Primary Care Provider. This office offers sliding fee scales based on income. Thank you.    [facility]  [address]  [phone]  [facility] [address]  [phone]            MEDICATIONS ON DISCHARGE     Medication List        START taking these medications        Instructions   ferrous sulfate 325 (65 Fe) MG tablet   Take 1 Tablet by mouth every morning with breakfast for 30 days.  Dose: 325 mg     furosemide 40 MG Tabs  Commonly known as: Lasix   Take 1 Tablet by mouth every day for 30 days.  Dose: 40 mg     lisinopril 10 MG Tabs  Commonly known as: Prinivil   Take 1 Tablet by mouth every day for 30 days.  Dose: 10 mg     nystatin powder  Commonly known as: Mycostatin   Apply 1 g topically 3 times a day for 5 days.  Dose: 1 Application            CONTINUE taking these medications        Instructions   ibuprofen 200 MG Tabs  Commonly known as: Motrin   Take 800 mg by  mouth 1 time a day as needed.  Dose: 800 mg     Pamabrom 50 MG Caps   Take 50 mg by mouth 1 time a day as needed.  Dose: 50 mg            STOP taking these medications      Advil Dual Action 125-250 MG Tabs  Generic drug: Ibuprofen-Acetaminophen              Allergies  Allergies   Allergen Reactions    Other Misc Unspecified     Unknown antibiotic for a staph infection, blocked arteries, rash.        DIET  Orders Placed This Encounter   Procedures    Diet Order Diet: Cardiac (6648-2851 kcals per day)     Standing Status:   Standing     Number of Occurrences:   1     Order Specific Question:   Diet:     Answer:   Cardiac [6]     Comments:   8429-3828 kcals per day       ACTIVITY  As tolerated.  Weight bearing as tolerated    CONSULTATIONS      PROCEDURES      LABORATORY  Lab Results   Component Value Date    SODIUM 132 (L) 09/07/2023    POTASSIUM 4.3 09/07/2023    CHLORIDE 94 (L) 09/07/2023    CO2 32 09/07/2023    GLUCOSE 109 (H) 09/07/2023    BUN 9 09/07/2023    CREATININE 0.36 (L) 09/07/2023        Lab Results   Component Value Date    WBC 10.2 09/07/2023    HEMOGLOBIN 10.0 (L) 09/07/2023    HEMATOCRIT 36.9 (L) 09/07/2023    PLATELETCT 273 09/07/2023        Total time of the discharge process exceeds 35 minutes.

## 2023-09-07 NOTE — DISCHARGE PLANNING
Authorization received by leadership for Pt transportation home by [facility].  MICHAEL placed call to [facility] and spoke with Cally.  Cally updated MICHAEL that they are able to accommodate this Pt's transportation at 1800.  MICHAEL called and updated bedside RN.

## 2023-09-07 NOTE — DISCHARGE PLANNING
PT is in will call with [facility].   [facility] will not transport until they receive ASF for PT's transport, that is currently waiting to be approved by leadership for a total of $1,176.  If approved ASF is obtained, please fax to [facility] at [phone]  SLC

## 2023-09-07 NOTE — THERAPY
"Occupational Therapy  Daily Treatment     Patient Name: Cole Jaramillo  Age:  38 y.o., Sex:  male  Medical Record #: [id number]  Today's Date: 9/7/2023     Precautions  Precautions: Fall Risk  Comments: See weight.    Assessment    Pt seen for OT treatment. Pt donned pants with min A using AE and frequent rest breaks. Extensive discussion regarding pt's plans for BADLs including toilet hygiene, showering/bathing, and LB dressing. Discussed DME recommendations and potential places to find bariatric equipment. Pt current functional performance limited largely by strength and impaired activity tolerance. Pt will continue to benefit from skilled OT while admitted to acute care.     Plan    Treatment Plan Status: Continue Current Treatment Plan  Type of Treatment: Self Care / Activities of Daily Living, Adaptive Equipment, Neuro Re-Education / Balance, Therapeutic Exercises, Therapeutic Activity  Treatment Frequency: 3 Times per Week  Treatment Duration: Until Therapy Goals Met    DC Equipment Recommendations: Bed Side Commode, Tub Transfer Bench (Bariatric and rated to pt's weight)  Discharge Recommendations: Recommend home health for continued occupational therapy services     Objective     09/07/23 1501   Pain   Pain Scales 0 to 10 Scale    Pain 0 - 10 Group   Therapist Pain Assessment Post Activity Pain Same as Prior to Activity;Nurse Notified  (not rated, agreeable to session)   Non Verbal Descriptors   Non Verbal Scale  Calm   Cognition    Cognition / Consciousness WDL   Level of Consciousness Alert   Comments Pleasant, cooperative, motivated   Other Treatments   Other Treatments Provided Discussed difficulties w/ ADLs pt was having prior to admission. Pt already has a \"larger toilet\" and a frame around the toilet. Pt also has adaptive strategies for LB dressing using reacher/sock aid and slip on shoes. Pt reported his largest difficulty is stepping over the bathtub for bathing. Discussed possible bariatric DME and " weight requirements on those pieces of DME. Also discussed pt's plans for toilet hygiene since prior to admission he timed it with showers. Pt has plans to install a handheld shower. Pt normally uses an extended sponge for showers and toilet hygiene as needed. Pt has plans to install a handheld shower and/or bidet. Pt also reported some concern w/ standing from his bed at home which is lower than the bed here. Discussed adaptive strategies to make it easier.   Balance   Sitting Balance (Static) Good   Sitting Balance (Dynamic) Fair +   Standing Balance (Static) Fair +   Standing Balance (Dynamic) Fair   Weight Shift Sitting Fair   Weight Shift Standing Fair   Skilled Intervention Verbal Cuing   Comments w/ Bariatric FWW   Bed Mobility    Supine to Sit Supervised   Sit to Supine Minimal Assist  (assist for LE only)   Scooting Supervised   Skilled Intervention Verbal Cuing;Postural Facilitation;Compensatory Strategies   Comments use of rails   Activities of Daily Living   Grooming Standby Assist;Seated  (washed face EOB)   Lower Body Dressing Minimal Assist  (don pants using AE)   Skilled Intervention Verbal Cuing;Facilitation;Compensatory Strategies   Functional Mobility   Sit to Stand Supervised   Mobility EOB>stand to pull up pants>EOB>stand with steps to move towards HOB>supine   Skilled Intervention Verbal Cuing   Comments w/ bariatric FWW   Activity Tolerance   Sitting in Chair NT   Sitting Edge of Bed >15 min   Standing ~1-2 min   Patient / Family Goals   Patient / Family Goal #1 To go home   Goal #1 Outcome Progressing as expected   Short Term Goals   Short Term Goal # 1 Pt will demo seated grooming w/ SPV   Goal Outcome # 1 Goal met, new goal added   Short Term Goal # 1 B  W/ weight specific equipment, pt will perform ADL transfer to BSC w/ min A   Goal Outcome  # 1 B Goal met   Short Term Goal # 2 W/ weight specific equipment pt will demo standing in preparation for ADL txfs w/ min A   Goal Outcome # 2 Goal  met, new goal added   Short Term Goal # 2 B  Using weight specific equipment pt will demo ADL txf w/ SPV   Goal Outcome # 2 B Progressing as expected   Short Term Goal # 3 Pt will perform LB dressing w/ min A and AE PRN   Goal Outcome # 3 Goal met, new goal added   Short Term Goal # 3 B Pt will perform LB dressing w/ supv and AE PRN   Education Group   Education Provided Role of Occupational Therapist;Adaptive Equipment;Activities of Daily Living   Role of Occupational Therapist Patient Response Patient;Acceptance;Explanation;Verbal Demonstration   ADL Patient Response Patient;Acceptance;Explanation;Demonstration;Verbal Demonstration;Action Demonstration   Adaptive Equipment Patient Response Patient;Acceptance;Demonstration;Explanation;Verbal Demonstration;Action Demonstration   Occupational Therapy Treatment Plan    O.T. Treatment Plan Continue Current Treatment Plan   Treatment Interventions Self Care / Activities of Daily Living;Adaptive Equipment;Neuro Re-Education / Balance;Therapeutic Exercises;Therapeutic Activity   Treatment Frequency 3 Times per Week   Duration Until Therapy Goals Met

## 2023-09-07 NOTE — DISCHARGE PLANNING
DC Transport Scheduled    Received request at: 9/7/2023 at 1358    Transport Company Scheduled:  [facility]  Spoke with Zeina at [facility] to schedule transport.    Scheduled Date: 9/7/2023  Scheduled Time: 1600    Destination: [address] [facility]  [id number] [place]     Notified care team of scheduled transport via Voalte.     If there are any changes needed to the DC transportation scheduled, please contact [facility] at ext. [phone] between the hours of 2399-0593 Mon-Fri. If outside those hours, contact the ED Case Manager at ext. [phone]. '

## 2023-09-07 NOTE — THERAPY
"Physical Therapy   Daily Treatment     Patient Name: Cole Jaramillo  Age:  38 y.o., Sex:  male  Medical Record #: [id number]  Today's Date: 9/7/2023     Precautions  Precautions: (P) Fall Risk    Assessment    Pt had a long night in the current bed primarily due to the lower rail had broken off which provided support of the mattress. Long conversation regarding DME, the pt stated he was willing to use DME that was not rated for his weight in order to go home. The pt does uses the FWW primarily for support, energy conservation, and to provide confidence w/amb to allow for longer distances. The w/c the pt sat in today is 34\" w/removable arm rests, it was rated for 750# and the pt weighed in today as 763#.   Unfortunately the pt does not have a surface to sit on @ home, his chair had broken down making it difficult to stand up which is why he came into the hospital. The w/c will allow him to have a place to sit and work.   PT recommending bariatric FWW and 34' w/c w/removable arm rests.   PT will cont to follow.     Plan    Treatment Plan Status: (P) Modify Current Treatment Plan  Type of Treatment: Bed Mobility, Gait Training, Neuro Re-Education / Balance, Self Care / Home Evaluation, Therapeutic Activities, Therapeutic Exercise  Treatment Frequency: (P) 3 Times per Week  Treatment Duration: (P) Until Therapy Goals Met    DC Equipment Recommendations: (P) Front-Wheel Walker, Wheelchair (valente FWW and 34\" w/c w/removable arm rests)  Discharge Recommendations: (P) Recommend home health for continued physical therapy services    Objective       09/07/23 1045   Charge Group   PT Therapeutic Activities (Units) 3   Total Time Spent   PT Therapeutic Activities Time Spent (Mins) 41   Precautions   Precautions Fall Risk   Vitals   O2 (LPM) 2   O2 Delivery Device Nasal Cannula   Pain 0 - 10 Group   Pain Rating Scale (NPRS) 4   Other Treatments   Other Treatments Provided Pt sat on EOB for discussion regarding DME along w/CM. The " pt is agreeable to using FWW and w/c that is not rated for his weight. The walker is used for support,energy conservation and provides security to allow pt to cont working on improving his amb tolerance/distance. The pt is only able to take a few steps wo/AD, primarily for transfers.   Balance   Sitting Balance (Static) Good   Sitting Balance (Dynamic) Fair +   Standing Balance (Static) Fair +   Standing Balance (Dynamic) Fair   Weight Shift Sitting Fair   Weight Shift Standing Fair   Skilled Intervention Verbal Cuing   Comments standing w/bariatric FWW   Bed Mobility    Supine to Sit Supervised  (HOB elevated)   Sit to Supine   (pt left seated on EOB)   Scooting Supervised  (seated)   Skilled Intervention Verbal Cuing;Postural Facilitation;Compensatory Strategies   Comments The pt stated his bed @ home does have bed features to allow him to elevate the head and he also stated his bed @ home will be easier to manage his LE's onto the bed 2* bed size/surface.   Gait Analysis   Gait Level Of Assist Supervised   Assistive Device Front Wheel Walker   Distance (Feet) 25   Skilled Intervention Verbal Cuing   Comments Did not focus on amb today, the pt needed to have a BM. The pt conts to use the walker for support w/his amb efforts and it does provide confidence to allow pt to cont to work on ambulation tolerance and strengthening.   Functional Mobility   Sit to Stand Supervised   Bed, Chair, Wheelchair Transfer Supervised   Skilled Intervention Verbal Cuing   How much difficulty does the patient currently have...   Turning over in bed (including adjusting bedclothes, sheets and blankets)? 1   Sitting down on and standing up from a chair with arms (e.g., wheelchair, bedside commode, etc.) 3   Moving from lying on back to sitting on the side of the bed? 1   How much help from another person does the patient currently need...   Moving to and from a bed to a chair (including a wheelchair)? 3   Need to walk in a hospital  "room? 3   Climbing 3-5 steps with a railing? 2   6 clicks Mobility Score 13   Short Term Goals    Short Term Goal # 1 Pt will transfer supine<>sit supervision in 6 visits to improve bed mobility.   Goal Outcome # 1 goal not met   Short Term Goal # 3 Pt will ambulate 25' supervision w/LRAD in 6 visits to improve access to environment.   Goal Outcome # 3 Goal met   Education Group   Role of Physical Therapist Patient Response Patient;Acceptance;Demonstration;Action Demonstration   Physical Therapy Treatment Plan   Physical Therapy Treatment Plan Modify Current Treatment Plan   Treatment Frequency 3 Times per Week   Duration Until Therapy Goals Met   Anticipated Discharge Equipment and Recommendations   DC Equipment Recommendations Front-Wheel Walker;Wheelchair  (valente FWW and 34\" w/c w/removable arm rests)   Discharge Recommendations Recommend home health for continued physical therapy services   Interdisciplinary Plan of Care Collaboration   IDT Collaboration with  Nursing   Patient Position at End of Therapy Seated;Edge of Bed;Call Light within Reach;Tray Table within Reach;Phone within Reach   Collaboration Comments Nrsg notified of pts tx efforts   Session Information   Date / Session Number  9/7--8 (1/3, 9/13) PTA/3   Supervising Physical Therapist (PTA Treatments Only)   Supervising Physical Therapist Cally Capps           "

## 2023-09-08 NOTE — CARE PLAN
Problem: Skin Integrity  Goal: Skin integrity is maintained or improved  Outcome: Progressing     Problem: Fall Risk  Goal: Patient will remain free from falls  Outcome: Progressing     Problem: Pain - Standard  Goal: Alleviation of pain or a reduction in pain to the patient’s comfort goal  Outcome: Progressing   The patient is Stable - Low risk of patient condition declining or worsening    Shift Goals  Clinical Goals: maintain skin integrity throughout shift  Patient Goals: rest and sleep  Family Goals: EMERSON    Progress made toward(s) clinical / shift goals:  VSS, ambulating.     Patient is not progressing towards the following goals:

## 2024-02-01 ENCOUNTER — APPOINTMENT (OUTPATIENT)
Dept: RADIOLOGY | Facility: MEDICAL CENTER | Age: 39
DRG: 291 | End: 2024-02-01
Attending: EMERGENCY MEDICINE
Payer: COMMERCIAL

## 2024-02-01 ENCOUNTER — HOSPITAL ENCOUNTER (INPATIENT)
Facility: MEDICAL CENTER | Age: 39
LOS: 15 days | DRG: 291 | End: 2024-02-16
Attending: EMERGENCY MEDICINE | Admitting: STUDENT IN AN ORGANIZED HEALTH CARE EDUCATION/TRAINING PROGRAM
Payer: COMMERCIAL

## 2024-02-01 DIAGNOSIS — I50.811 ACUTE RIGHT-SIDED CONGESTIVE HEART FAILURE (HCC): ICD-10-CM

## 2024-02-01 DIAGNOSIS — D50.9 IRON DEFICIENCY ANEMIA, UNSPECIFIED IRON DEFICIENCY ANEMIA TYPE: ICD-10-CM

## 2024-02-01 DIAGNOSIS — R09.02 HYPOXIA: ICD-10-CM

## 2024-02-01 DIAGNOSIS — R60.9 PERIPHERAL EDEMA: ICD-10-CM

## 2024-02-01 DIAGNOSIS — E86.0 DEHYDRATION: ICD-10-CM

## 2024-02-01 DIAGNOSIS — E66.2 OBESITY HYPOVENTILATION SYNDROME (HCC): ICD-10-CM

## 2024-02-01 DIAGNOSIS — E66.01 MORBID OBESITY (HCC): ICD-10-CM

## 2024-02-01 DIAGNOSIS — E87.79 VOLUME OVERLOAD STATE OF HEART: ICD-10-CM

## 2024-02-01 DIAGNOSIS — I50.33 ACUTE ON CHRONIC HEART FAILURE WITH PRESERVED EJECTION FRACTION (HFPEF) (HCC): ICD-10-CM

## 2024-02-01 PROBLEM — E87.70 VOLUME OVERLOAD: Status: ACTIVE | Noted: 2024-02-01

## 2024-02-01 PROBLEM — R53.1 WEAKNESS: Status: ACTIVE | Noted: 2024-02-01

## 2024-02-01 PROBLEM — Z91.89 AT RISK FOR OBSTRUCTIVE SLEEP APNEA: Status: ACTIVE | Noted: 2024-02-01

## 2024-02-01 PROBLEM — R79.89 ELEVATED TROPONIN: Status: ACTIVE | Noted: 2024-02-01

## 2024-02-01 PROBLEM — R60.0 BILATERAL LEG EDEMA: Status: ACTIVE | Noted: 2024-02-01

## 2024-02-01 LAB
ALBUMIN SERPL BCP-MCNC: 3.7 G/DL (ref 3.2–4.9)
ALBUMIN/GLOB SERPL: 1.3 G/DL
ALP SERPL-CCNC: 75 U/L (ref 30–99)
ALT SERPL-CCNC: 5 U/L (ref 2–50)
AMPHET UR QL SCN: NEGATIVE
ANION GAP SERPL CALC-SCNC: 13 MMOL/L (ref 7–16)
ANISOCYTOSIS BLD QL SMEAR: ABNORMAL
APPEARANCE UR: CLEAR
APTT PPP: 29.9 SEC (ref 24.7–36)
AST SERPL-CCNC: 12 U/L (ref 12–45)
BACTERIA #/AREA URNS HPF: NEGATIVE /HPF
BARBITURATES UR QL SCN: NEGATIVE
BASOPHILS # BLD AUTO: 0.7 % (ref 0–1.8)
BASOPHILS # BLD: 0.05 K/UL (ref 0–0.12)
BENZODIAZ UR QL SCN: NEGATIVE
BILIRUB SERPL-MCNC: 1.5 MG/DL (ref 0.1–1.5)
BILIRUB UR QL STRIP.AUTO: NEGATIVE
BUN SERPL-MCNC: 21 MG/DL (ref 8–22)
BZE UR QL SCN: NEGATIVE
CALCIUM ALBUM COR SERPL-MCNC: 8.8 MG/DL (ref 8.5–10.5)
CALCIUM SERPL-MCNC: 8.6 MG/DL (ref 8.5–10.5)
CANNABINOIDS UR QL SCN: NEGATIVE
CHLORIDE SERPL-SCNC: 98 MMOL/L (ref 96–112)
CO2 SERPL-SCNC: 25 MMOL/L (ref 20–33)
COLOR UR: YELLOW
COMMENT 1642: NORMAL
CREAT SERPL-MCNC: 0.96 MG/DL (ref 0.5–1.4)
CREAT UR-MCNC: 139.52 MG/DL
CRP SERPL HS-MCNC: 3.59 MG/DL (ref 0–0.75)
EKG IMPRESSION: NORMAL
EOSINOPHIL # BLD AUTO: 0.23 K/UL (ref 0–0.51)
EOSINOPHIL NFR BLD: 3 % (ref 0–6.9)
EPI CELLS #/AREA URNS HPF: NEGATIVE /HPF
ERYTHROCYTE [DISTWIDTH] IN BLOOD BY AUTOMATED COUNT: 58.5 FL (ref 35.9–50)
ERYTHROCYTE [SEDIMENTATION RATE] IN BLOOD BY WESTERGREN METHOD: 2 MM/HOUR (ref 0–20)
EST. AVERAGE GLUCOSE BLD GHB EST-MCNC: 117 MG/DL
ETHANOL BLD-MCNC: <10.1 MG/DL
FENTANYL UR QL: NEGATIVE
GFR SERPLBLD CREATININE-BSD FMLA CKD-EPI: 102 ML/MIN/1.73 M 2
GLOBULIN SER CALC-MCNC: 2.8 G/DL (ref 1.9–3.5)
GLUCOSE SERPL-MCNC: 100 MG/DL (ref 65–99)
GLUCOSE UR STRIP.AUTO-MCNC: NEGATIVE MG/DL
HBA1C MFR BLD: 5.7 % (ref 4–5.6)
HCT VFR BLD AUTO: 42.4 % (ref 42–52)
HGB BLD-MCNC: 12.1 G/DL (ref 14–18)
HYALINE CASTS #/AREA URNS LPF: ABNORMAL /LPF
HYPOCHROMIA BLD QL SMEAR: ABNORMAL
IMM GRANULOCYTES # BLD AUTO: 0.03 K/UL (ref 0–0.11)
IMM GRANULOCYTES NFR BLD AUTO: 0.4 % (ref 0–0.9)
INR PPP: 1.31 (ref 0.87–1.13)
KETONES UR STRIP.AUTO-MCNC: NEGATIVE MG/DL
LACTATE SERPL-SCNC: 1.2 MMOL/L (ref 0.5–2)
LACTATE SERPL-SCNC: 1.4 MMOL/L (ref 0.5–2)
LDH SERPL L TO P-CCNC: 172 U/L (ref 107–266)
LEUKOCYTE ESTERASE UR QL STRIP.AUTO: NEGATIVE
LG PLATELETS BLD QL SMEAR: NORMAL
LYMPHOCYTES # BLD AUTO: 1.27 K/UL (ref 1–4.8)
LYMPHOCYTES NFR BLD: 16.8 % (ref 22–41)
MACROCYTES BLD QL SMEAR: ABNORMAL
MCH RBC QN AUTO: 22.2 PG (ref 27–33)
MCHC RBC AUTO-ENTMCNC: 28.5 G/DL (ref 32.3–36.5)
MCV RBC AUTO: 77.9 FL (ref 81.4–97.8)
METHADONE UR QL SCN: NEGATIVE
MICRO URNS: ABNORMAL
MICROCYTES BLD QL SMEAR: ABNORMAL
MONOCYTES # BLD AUTO: 0.94 K/UL (ref 0–0.85)
MONOCYTES NFR BLD AUTO: 12.4 % (ref 0–13.4)
MORPHOLOGY BLD-IMP: NORMAL
NEUTROPHILS # BLD AUTO: 5.06 K/UL (ref 1.82–7.42)
NEUTROPHILS NFR BLD: 66.7 % (ref 44–72)
NITRITE UR QL STRIP.AUTO: NEGATIVE
NRBC # BLD AUTO: 0 K/UL
NRBC BLD-RTO: 0 /100 WBC (ref 0–0.2)
NT-PROBNP SERPL IA-MCNC: 4729 PG/ML (ref 0–125)
OPIATES UR QL SCN: NEGATIVE
OVALOCYTES BLD QL SMEAR: NORMAL
OXYCODONE UR QL SCN: NEGATIVE
PCP UR QL SCN: NEGATIVE
PH UR STRIP.AUTO: 5.5 [PH] (ref 5–8)
PLATELET # BLD AUTO: 316 K/UL (ref 164–446)
PLATELET BLD QL SMEAR: NORMAL
PMV BLD AUTO: 9.3 FL (ref 9–12.9)
POIKILOCYTOSIS BLD QL SMEAR: NORMAL
POLYCHROMASIA BLD QL SMEAR: NORMAL
POTASSIUM SERPL-SCNC: 4.7 MMOL/L (ref 3.6–5.5)
PROCALCITONIN SERPL-MCNC: 0.11 NG/ML
PROPOXYPH UR QL SCN: NEGATIVE
PROT SERPL-MCNC: 6.5 G/DL (ref 6–8.2)
PROT UR QL STRIP: 30 MG/DL
PROTHROMBIN TIME: 16.4 SEC (ref 12–14.6)
RBC # BLD AUTO: 5.44 M/UL (ref 4.7–6.1)
RBC # URNS HPF: ABNORMAL /HPF
RBC BLD AUTO: PRESENT
RBC UR QL AUTO: ABNORMAL
SODIUM SERPL-SCNC: 136 MMOL/L (ref 135–145)
SODIUM UR-SCNC: 22 MMOL/L
SP GR UR STRIP.AUTO: 1.02
T3FREE SERPL-MCNC: 2.34 PG/ML (ref 2–4.4)
T4 FREE SERPL-MCNC: 1.11 NG/DL (ref 0.93–1.7)
T4 SERPL-MCNC: 4.7 UG/DL (ref 4–12)
TROPONIN T SERPL-MCNC: 33 NG/L (ref 6–19)
TSH SERPL DL<=0.005 MIU/L-ACNC: 6.92 UIU/ML (ref 0.38–5.33)
UROBILINOGEN UR STRIP.AUTO-MCNC: 1 MG/DL
WBC # BLD AUTO: 7.6 K/UL (ref 4.8–10.8)
WBC #/AREA URNS HPF: ABNORMAL /HPF

## 2024-02-01 PROCEDURE — 84443 ASSAY THYROID STIM HORMONE: CPT

## 2024-02-01 PROCEDURE — 36415 COLL VENOUS BLD VENIPUNCTURE: CPT

## 2024-02-01 PROCEDURE — 83880 ASSAY OF NATRIURETIC PEPTIDE: CPT

## 2024-02-01 PROCEDURE — 93005 ELECTROCARDIOGRAM TRACING: CPT

## 2024-02-01 PROCEDURE — 700105 HCHG RX REV CODE 258: Performed by: EMERGENCY MEDICINE

## 2024-02-01 PROCEDURE — 86140 C-REACTIVE PROTEIN: CPT

## 2024-02-01 PROCEDURE — 93005 ELECTROCARDIOGRAM TRACING: CPT | Performed by: EMERGENCY MEDICINE

## 2024-02-01 PROCEDURE — 84484 ASSAY OF TROPONIN QUANT: CPT

## 2024-02-01 PROCEDURE — 93970 EXTREMITY STUDY: CPT

## 2024-02-01 PROCEDURE — 85025 COMPLETE CBC W/AUTO DIFF WBC: CPT

## 2024-02-01 PROCEDURE — 80307 DRUG TEST PRSMV CHEM ANLYZR: CPT

## 2024-02-01 PROCEDURE — 99285 EMERGENCY DEPT VISIT HI MDM: CPT

## 2024-02-01 PROCEDURE — A9270 NON-COVERED ITEM OR SERVICE: HCPCS | Performed by: STUDENT IN AN ORGANIZED HEALTH CARE EDUCATION/TRAINING PROGRAM

## 2024-02-01 PROCEDURE — 83615 LACTATE (LD) (LDH) ENZYME: CPT

## 2024-02-01 PROCEDURE — 84481 FREE ASSAY (FT-3): CPT

## 2024-02-01 PROCEDURE — 84300 ASSAY OF URINE SODIUM: CPT

## 2024-02-01 PROCEDURE — 700102 HCHG RX REV CODE 250 W/ 637 OVERRIDE(OP): Performed by: STUDENT IN AN ORGANIZED HEALTH CARE EDUCATION/TRAINING PROGRAM

## 2024-02-01 PROCEDURE — 82570 ASSAY OF URINE CREATININE: CPT

## 2024-02-01 PROCEDURE — 85610 PROTHROMBIN TIME: CPT

## 2024-02-01 PROCEDURE — 84439 ASSAY OF FREE THYROXINE: CPT

## 2024-02-01 PROCEDURE — 83036 HEMOGLOBIN GLYCOSYLATED A1C: CPT

## 2024-02-01 PROCEDURE — 84145 PROCALCITONIN (PCT): CPT

## 2024-02-01 PROCEDURE — 82077 ASSAY SPEC XCP UR&BREATH IA: CPT

## 2024-02-01 PROCEDURE — 700111 HCHG RX REV CODE 636 W/ 250 OVERRIDE (IP): Mod: JZ | Performed by: STUDENT IN AN ORGANIZED HEALTH CARE EDUCATION/TRAINING PROGRAM

## 2024-02-01 PROCEDURE — 87086 URINE CULTURE/COLONY COUNT: CPT

## 2024-02-01 PROCEDURE — 81001 URINALYSIS AUTO W/SCOPE: CPT

## 2024-02-01 PROCEDURE — 85652 RBC SED RATE AUTOMATED: CPT

## 2024-02-01 PROCEDURE — 770020 HCHG ROOM/CARE - TELE (206)

## 2024-02-01 PROCEDURE — 99223 1ST HOSP IP/OBS HIGH 75: CPT | Performed by: STUDENT IN AN ORGANIZED HEALTH CARE EDUCATION/TRAINING PROGRAM

## 2024-02-01 PROCEDURE — 71045 X-RAY EXAM CHEST 1 VIEW: CPT

## 2024-02-01 PROCEDURE — 87040 BLOOD CULTURE FOR BACTERIA: CPT | Mod: 91

## 2024-02-01 PROCEDURE — 85730 THROMBOPLASTIN TIME PARTIAL: CPT

## 2024-02-01 PROCEDURE — 83605 ASSAY OF LACTIC ACID: CPT

## 2024-02-01 PROCEDURE — 80053 COMPREHEN METABOLIC PANEL: CPT

## 2024-02-01 RX ORDER — IPRATROPIUM BROMIDE AND ALBUTEROL SULFATE 2.5; .5 MG/3ML; MG/3ML
3 SOLUTION RESPIRATORY (INHALATION)
Status: DISCONTINUED | OUTPATIENT
Start: 2024-02-01 | End: 2024-02-16 | Stop reason: HOSPADM

## 2024-02-01 RX ORDER — LABETALOL HYDROCHLORIDE 5 MG/ML
10 INJECTION, SOLUTION INTRAVENOUS EVERY 4 HOURS PRN
Status: DISCONTINUED | OUTPATIENT
Start: 2024-02-01 | End: 2024-02-02

## 2024-02-01 RX ORDER — GUAIFENESIN/DEXTROMETHORPHAN 100-10MG/5
10 SYRUP ORAL EVERY 6 HOURS PRN
Status: DISCONTINUED | OUTPATIENT
Start: 2024-02-01 | End: 2024-02-16 | Stop reason: HOSPADM

## 2024-02-01 RX ORDER — PROMETHAZINE HYDROCHLORIDE 25 MG/1
12.5-25 TABLET ORAL EVERY 4 HOURS PRN
Status: DISCONTINUED | OUTPATIENT
Start: 2024-02-01 | End: 2024-02-16 | Stop reason: HOSPADM

## 2024-02-01 RX ORDER — FUROSEMIDE 10 MG/ML
40 INJECTION INTRAMUSCULAR; INTRAVENOUS ONCE
Status: DISCONTINUED | OUTPATIENT
Start: 2024-02-01 | End: 2024-02-01

## 2024-02-01 RX ORDER — FUROSEMIDE 10 MG/ML
20 INJECTION INTRAMUSCULAR; INTRAVENOUS ONCE
Status: DISCONTINUED | OUTPATIENT
Start: 2024-02-01 | End: 2024-02-01

## 2024-02-01 RX ORDER — OMEPRAZOLE 20 MG/1
20 CAPSULE, DELAYED RELEASE ORAL 2 TIMES DAILY
Status: DISCONTINUED | OUTPATIENT
Start: 2024-02-01 | End: 2024-02-16 | Stop reason: HOSPADM

## 2024-02-01 RX ORDER — NYSTATIN 100000 [USP'U]/G
POWDER TOPICAL 3 TIMES DAILY
Status: DISPENSED | OUTPATIENT
Start: 2024-02-01 | End: 2024-02-06

## 2024-02-01 RX ORDER — PROMETHAZINE HYDROCHLORIDE 25 MG/1
12.5-25 SUPPOSITORY RECTAL EVERY 4 HOURS PRN
Status: DISCONTINUED | OUTPATIENT
Start: 2024-02-01 | End: 2024-02-16 | Stop reason: HOSPADM

## 2024-02-01 RX ORDER — PREDNISONE 10 MG/1
10 TABLET ORAL DAILY
Status: ON HOLD | COMMUNITY
Start: 2024-01-23 | End: 2024-02-16

## 2024-02-01 RX ORDER — FUROSEMIDE 10 MG/ML
60 INJECTION INTRAMUSCULAR; INTRAVENOUS
Status: DISCONTINUED | OUTPATIENT
Start: 2024-02-01 | End: 2024-02-03

## 2024-02-01 RX ORDER — LISINOPRIL 10 MG/1
10 TABLET ORAL DAILY
Status: ON HOLD | COMMUNITY
End: 2024-02-16

## 2024-02-01 RX ORDER — OMEGA-3 FATTY ACIDS/FISH OIL 300-1000MG
400 CAPSULE ORAL EVERY 4 HOURS PRN
Status: ON HOLD | COMMUNITY
End: 2024-02-16

## 2024-02-01 RX ORDER — CLINDAMYCIN HYDROCHLORIDE 300 MG/1
300 CAPSULE ORAL 3 TIMES DAILY
Status: ON HOLD | COMMUNITY
Start: 2024-01-23 | End: 2024-02-16

## 2024-02-01 RX ORDER — SODIUM CHLORIDE, SODIUM LACTATE, POTASSIUM CHLORIDE, AND CALCIUM CHLORIDE .6; .31; .03; .02 G/100ML; G/100ML; G/100ML; G/100ML
1000 INJECTION, SOLUTION INTRAVENOUS ONCE
Status: COMPLETED | OUTPATIENT
Start: 2024-02-01 | End: 2024-02-01

## 2024-02-01 RX ORDER — IPRATROPIUM BROMIDE AND ALBUTEROL SULFATE 2.5; .5 MG/3ML; MG/3ML
3 SOLUTION RESPIRATORY (INHALATION)
Status: DISCONTINUED | OUTPATIENT
Start: 2024-02-01 | End: 2024-02-01

## 2024-02-01 RX ORDER — CLINDAMYCIN HYDROCHLORIDE 150 MG/1
300 CAPSULE ORAL 3 TIMES DAILY
Status: COMPLETED | OUTPATIENT
Start: 2024-02-01 | End: 2024-02-05

## 2024-02-01 RX ORDER — CARVEDILOL 3.12 MG/1
3.12 TABLET ORAL 2 TIMES DAILY WITH MEALS
Status: DISCONTINUED | OUTPATIENT
Start: 2024-02-01 | End: 2024-02-02

## 2024-02-01 RX ORDER — ATORVASTATIN CALCIUM 40 MG/1
40 TABLET, FILM COATED ORAL EVERY EVENING
Status: DISCONTINUED | OUTPATIENT
Start: 2024-02-01 | End: 2024-02-16 | Stop reason: HOSPADM

## 2024-02-01 RX ORDER — HEPARIN SODIUM 5000 [USP'U]/ML
5000 INJECTION, SOLUTION INTRAVENOUS; SUBCUTANEOUS EVERY 8 HOURS
Status: DISCONTINUED | OUTPATIENT
Start: 2024-02-01 | End: 2024-02-10

## 2024-02-01 RX ORDER — ONDANSETRON 2 MG/ML
4 INJECTION INTRAMUSCULAR; INTRAVENOUS EVERY 4 HOURS PRN
Status: DISCONTINUED | OUTPATIENT
Start: 2024-02-01 | End: 2024-02-16 | Stop reason: HOSPADM

## 2024-02-01 RX ORDER — IPRATROPIUM BROMIDE AND ALBUTEROL SULFATE 2.5; .5 MG/3ML; MG/3ML
3 SOLUTION RESPIRATORY (INHALATION) ONCE
Status: DISCONTINUED | OUTPATIENT
Start: 2024-02-01 | End: 2024-02-01

## 2024-02-01 RX ORDER — LISINOPRIL 10 MG/1
10 TABLET ORAL DAILY
Status: DISCONTINUED | OUTPATIENT
Start: 2024-02-02 | End: 2024-02-16 | Stop reason: HOSPADM

## 2024-02-01 RX ORDER — PROCHLORPERAZINE EDISYLATE 5 MG/ML
5-10 INJECTION INTRAMUSCULAR; INTRAVENOUS EVERY 4 HOURS PRN
Status: DISCONTINUED | OUTPATIENT
Start: 2024-02-01 | End: 2024-02-16 | Stop reason: HOSPADM

## 2024-02-01 RX ORDER — ONDANSETRON 4 MG/1
4 TABLET, ORALLY DISINTEGRATING ORAL EVERY 4 HOURS PRN
Status: DISCONTINUED | OUTPATIENT
Start: 2024-02-01 | End: 2024-02-16 | Stop reason: HOSPADM

## 2024-02-01 RX ORDER — FUROSEMIDE 40 MG/1
40 TABLET ORAL 2 TIMES DAILY
Status: ON HOLD | COMMUNITY
End: 2024-02-16

## 2024-02-01 RX ORDER — ACETAMINOPHEN 325 MG/1
650 TABLET ORAL EVERY 6 HOURS PRN
Status: DISCONTINUED | OUTPATIENT
Start: 2024-02-01 | End: 2024-02-16 | Stop reason: HOSPADM

## 2024-02-01 RX ORDER — ASPIRIN 81 MG/1
81 TABLET ORAL DAILY
Status: DISCONTINUED | OUTPATIENT
Start: 2024-02-02 | End: 2024-02-16 | Stop reason: HOSPADM

## 2024-02-01 RX ADMIN — NYSTATIN: 100000 POWDER TOPICAL at 18:34

## 2024-02-01 RX ADMIN — HEPARIN SODIUM 5000 UNITS: 5000 INJECTION, SOLUTION INTRAVENOUS; SUBCUTANEOUS at 21:36

## 2024-02-01 RX ADMIN — CLINDAMYCIN HYDROCHLORIDE 300 MG: 150 CAPSULE ORAL at 18:35

## 2024-02-01 RX ADMIN — CARVEDILOL 3.12 MG: 3.12 TABLET, FILM COATED ORAL at 17:50

## 2024-02-01 RX ADMIN — FUROSEMIDE 60 MG: 10 INJECTION, SOLUTION INTRAVENOUS at 17:50

## 2024-02-01 RX ADMIN — SODIUM CHLORIDE, POTASSIUM CHLORIDE, SODIUM LACTATE AND CALCIUM CHLORIDE 1000 ML: 600; 310; 30; 20 INJECTION, SOLUTION INTRAVENOUS at 14:18

## 2024-02-01 RX ADMIN — ACETAMINOPHEN 650 MG: 325 TABLET, FILM COATED ORAL at 18:35

## 2024-02-01 RX ADMIN — ATORVASTATIN CALCIUM 40 MG: 40 TABLET, FILM COATED ORAL at 18:35

## 2024-02-01 RX ADMIN — OMEPRAZOLE 20 MG: 20 CAPSULE, DELAYED RELEASE ORAL at 18:35

## 2024-02-01 ASSESSMENT — ENCOUNTER SYMPTOMS
DIZZINESS: 0
MYALGIAS: 0
ABDOMINAL PAIN: 0
BLURRED VISION: 0
DEPRESSION: 0
CHILLS: 0
VOMITING: 0
BACK PAIN: 0
BRUISES/BLEEDS EASILY: 0
PALPITATIONS: 0
HEARTBURN: 1
FEVER: 0
NAUSEA: 0
COUGH: 0
ORTHOPNEA: 1
HEADACHES: 0
WEAKNESS: 1
DOUBLE VISION: 0
SHORTNESS OF BREATH: 1

## 2024-02-01 ASSESSMENT — LIFESTYLE VARIABLES
HOW MANY TIMES IN THE PAST YEAR HAVE YOU HAD 5 OR MORE DRINKS IN A DAY: 0
TOTAL SCORE: 0
ALCOHOL_USE: NO
DOES PATIENT WANT TO STOP DRINKING: NO
HAVE PEOPLE ANNOYED YOU BY CRITICIZING YOUR DRINKING: NO
SUBSTANCE_ABUSE: 0
ON A TYPICAL DAY WHEN YOU DRINK ALCOHOL HOW MANY DRINKS DO YOU HAVE: 0
EVER HAD A DRINK FIRST THING IN THE MORNING TO STEADY YOUR NERVES TO GET RID OF A HANGOVER: NO
TOTAL SCORE: 0
AVERAGE NUMBER OF DAYS PER WEEK YOU HAVE A DRINK CONTAINING ALCOHOL: 0
CONSUMPTION TOTAL: NEGATIVE
TOTAL SCORE: 0
EVER FELT BAD OR GUILTY ABOUT YOUR DRINKING: NO
HAVE YOU EVER FELT YOU SHOULD CUT DOWN ON YOUR DRINKING: NO

## 2024-02-01 ASSESSMENT — COGNITIVE AND FUNCTIONAL STATUS - GENERAL
DRESSING REGULAR UPPER BODY CLOTHING: A LOT
TURNING FROM BACK TO SIDE WHILE IN FLAT BAD: A LOT
MOVING FROM LYING ON BACK TO SITTING ON SIDE OF FLAT BED: A LOT
HELP NEEDED FOR BATHING: A LOT
STANDING UP FROM CHAIR USING ARMS: TOTAL
DAILY ACTIVITIY SCORE: 16
MOBILITY SCORE: 9
TOILETING: A LOT
DRESSING REGULAR LOWER BODY CLOTHING: A LOT
CLIMB 3 TO 5 STEPS WITH RAILING: TOTAL
SUGGESTED CMS G CODE MODIFIER MOBILITY: CM
SUGGESTED CMS G CODE MODIFIER DAILY ACTIVITY: CK
MOVING TO AND FROM BED TO CHAIR: A LOT
WALKING IN HOSPITAL ROOM: TOTAL

## 2024-02-01 ASSESSMENT — PATIENT HEALTH QUESTIONNAIRE - PHQ9
1. LITTLE INTEREST OR PLEASURE IN DOING THINGS: NOT AT ALL
SUM OF ALL RESPONSES TO PHQ9 QUESTIONS 1 AND 2: 0
2. FEELING DOWN, DEPRESSED, IRRITABLE, OR HOPELESS: NOT AT ALL

## 2024-02-01 ASSESSMENT — HEART SCORE
HEART SCORE: 2
HISTORY: SLIGHTLY SUSPICIOUS
ECG: NORMAL
RISK FACTORS: 1-2 RISK FACTORS
TROPONIN: 1-3 TIMES NORMAL LIMIT
AGE: <45

## 2024-02-01 ASSESSMENT — FIBROSIS 4 INDEX: FIB4 SCORE: 0.73

## 2024-02-01 NOTE — ED NOTES
Bedside report received from off going RN/tech: Kelley HERNANDEZ, assumed care of patient.  POC discussed with patient. Call light within reach, all needs addressed at this time.       Fall risk interventions in place: Move the patient closer to the nurse's station, Patient's personal possessions are with in their safe reach, Place socks on patient, Give patient urinal if applicable, and Keep floor surfaces clean and dry (all applicable per Posey Fall risk assessment)   Continuous monitoring: Cardiac Leads, Pulse Ox, or Blood Pressure  IVF/IV medications: Not Applicable   Oxygen: How many liters 2L  Bedside sitter: Not Applicable   Isolation: Not Applicable

## 2024-02-01 NOTE — ED TRIAGE NOTES
Cole Jaramillo  39 y.o.  male  Chief Complaint   Patient presents with    Leg Swelling     Pt c/o increased leg swelling up to thighs x 1 month, making him unable to weight bear due to extent of swelling. Pt also c/o some mild shortness of breath when moving, sats 88% on room air in ED.     Pt BIB [facility] from home. Pt moved onto bariatric bed on arrival to ED & weight obtained from bed scale. Placed on 2L on arrival to ED for sats 88% on room air.

## 2024-02-01 NOTE — ED PROVIDER NOTES
ED Provider Note    CHIEF COMPLAINT  Chief Complaint   Patient presents with    Leg Swelling     Pt c/o increased leg swelling up to thighs x 1 month, making him unable to weight bear due to extent of swelling. Pt also c/o some mild shortness of breath when moving, sats 88% on room air in ED.       EXTERNAL RECORDS REVIEWED  Inpatient Notes patient was admitted to the hospital 8/27/2023 to 9/7/2023 for abdominal wall cellulitis and lower extremity swelling with PND and orthopnea he was at the time morbidly obese with a weight of 727 pounds.  He was found to have a normal ejection fraction of 65% but his BNP was 1968.  He was hypoxic in the assumed that his respiratory failure was due to restrictive disorder due to morbid obesity he was referred for a sleep study for obstructive sleep apnea as well.  He was treated with Lasix and the edema improved and he required home oxygen 3 L nasal cannula with ambulation.  He was diagnosed with hypertension and started on lisinopril.  He was advised to follow-up with his primary care physician and take the medications as instructed.    HPI/ROS  LIMITATION TO HISTORY   Select: : None  OUTSIDE HISTORIAN(S):  none    Cole Jaramillo is a 39 y.o. male who presents by ambulance from home with increased swelling to his thighs progressively worsening over the last month.  The patient was seen here and diagnosed with hypoxia due to the restrictive lung disease and heart failure and placed on Lasix.  He is not taking the Lasix at home because he has not been able to get to the bathroom in time.  He states for the last 2 days he is not been able to get out of bed and has not been eating or drinking very much.  He feels very weak and dehydrated.  He denies any chest pain but has had some coughing and shortness of breath.  He states that his primary care provider prescribed him a few courses of antibiotics and currently he is taking clindamycin.  He denies any fevers or chills.  He states  "he has noticed increased swelling in his legs up his back and over his abdomen.  He denies any diarrhea but feels constipated.  He has no nausea or vomiting.  He states that he is no longer on oxygen at home.    PAST MEDICAL HISTORY   has a past medical history of Morbid obesity (HCC).    SURGICAL HISTORY  patient denies any surgical history    FAMILY HISTORY  No family history on file.    SOCIAL HISTORY  Social History     Tobacco Use    Smoking status: Never    Smokeless tobacco: Never   Substance and Sexual Activity    Alcohol use: Yes     Comment: rarely    Drug use: Never    Sexual activity: Not on file       CURRENT MEDICATIONS  Home Medications       Reviewed by Murali Santa (Pharmacy Tech) on 02/01/24 at 1514  Med List Status: Complete     Medication Last Dose Status   clindamycin (CLEOCIN) 300 MG Cap 2/1/2024 Active   furosemide (LASIX) 40 MG Tab 2 weeks ago Active   Ibuprofen 200 MG Cap 2/1/2024 Active   lisinopril (PRINIVIL) 10 MG Tab couple days ago Active   predniSONE (DELTASONE) 10 MG Tab 1/28/2024 Active                    ALLERGIES  Allergies   Allergen Reactions    Other Misc Unspecified     Unknown antibiotic for a staph infection, blocked arteries, rash.        PHYSICAL EXAM  VITAL SIGNS: BP (!) 159/80   Pulse 79   Temp 36.4 °C (97.5 °F) (Temporal)   Resp (!) 21   Ht 1.803 m (5' 11\")   Wt (!) 330 kg (727 lb)   SpO2 97%   .40 kg/m² positive hypoxia with 88% on room air which is hypoxic    Constitutional: Well developed, Well nourished, No acute distress, Non-toxic appearance.   HEENT: Normocephalic, Atraumatic,  external ears normal, pharynx pink,  Mucous  Membranes dry, No rhinorrhea or mucosal edema  Eyes: PERRL, EOMI, Conjunctiva normal, No discharge.   Neck: Normal range of motion, No tenderness, Supple, No stridor.   Lymphatic: No lymphadenopathy    Cardiovascular: Regular Rate and Rhythm, No murmurs,  rubs, or gallops.   Thorax & Lungs: Lungs clear to auscultation " bilaterally, No respiratory distress, No wheezes, rhales or rhonchi, No chest wall tenderness.  Speaking full sentences  Abdomen: Morbidly obese bowel sounds normal, Soft, non tender, non distended,  No pulsatile masses., no rebound guarding or peritoneal signs.   Skin: Warm, Dry, No erythema, No rash,   Back:  No CVA tenderness,  No spinal tenderness, bony crepitance step offs or instability.   Extremities: Equal, intact distal pulses, No cyanosis, clubbing bilateral 3+ lower extremity edema,  No tenderness.   Musculoskeletal: Decreased range of motion of the patient's bilateral lower extremities  Neurologic: Alert & oriented No focal deficits noted.  Psychiatric: Affect normal, Judgment normal, Mood normal.      DIAGNOSTIC STUDIES / PROCEDURES  EKG  I have independently interpreted this EKG  See below    LABS  Results for orders placed or performed during the hospital encounter of 02/01/24   CBC with Differential   Result Value Ref Range    WBC 7.6 4.8 - 10.8 K/uL    RBC 5.44 4.70 - 6.10 M/uL    Hemoglobin 12.1 (L) 14.0 - 18.0 g/dL    Hematocrit 42.4 42.0 - 52.0 %    MCV 77.9 (L) 81.4 - 97.8 fL    MCH 22.2 (L) 27.0 - 33.0 pg    MCHC 28.5 (L) 32.3 - 36.5 g/dL    RDW 58.5 (H) 35.9 - 50.0 fL    Platelet Count 316 164 - 446 K/uL    MPV 9.3 9.0 - 12.9 fL    Neutrophils-Polys 66.70 44.00 - 72.00 %    Lymphocytes 16.80 (L) 22.00 - 41.00 %    Monocytes 12.40 0.00 - 13.40 %    Eosinophils 3.00 0.00 - 6.90 %    Basophils 0.70 0.00 - 1.80 %    Immature Granulocytes 0.40 0.00 - 0.90 %    Nucleated RBC 0.00 0.00 - 0.20 /100 WBC    Neutrophils (Absolute) 5.06 1.82 - 7.42 K/uL    Lymphs (Absolute) 1.27 1.00 - 4.80 K/uL    Monos (Absolute) 0.94 (H) 0.00 - 0.85 K/uL    Eos (Absolute) 0.23 0.00 - 0.51 K/uL    Baso (Absolute) 0.05 0.00 - 0.12 K/uL    Immature Granulocytes (abs) 0.03 0.00 - 0.11 K/uL    NRBC (Absolute) 0.00 K/uL    Hypochromia 1+     Anisocytosis 1+     Macrocytosis 1+     Microcytosis 1+    Complete Metabolic Panel  (CMP)   Result Value Ref Range    Sodium 136 135 - 145 mmol/L    Potassium 4.7 3.6 - 5.5 mmol/L    Chloride 98 96 - 112 mmol/L    Co2 25 20 - 33 mmol/L    Anion Gap 13.0 7.0 - 16.0    Glucose 100 (H) 65 - 99 mg/dL    Bun 21 8 - 22 mg/dL    Creatinine 0.96 0.50 - 1.40 mg/dL    Calcium 8.6 8.5 - 10.5 mg/dL    Correct Calcium 8.8 8.5 - 10.5 mg/dL    AST(SGOT) 12 12 - 45 U/L    ALT(SGPT) 5 2 - 50 U/L    Alkaline Phosphatase 75 30 - 99 U/L    Total Bilirubin 1.5 0.1 - 1.5 mg/dL    Albumin 3.7 3.2 - 4.9 g/dL    Total Protein 6.5 6.0 - 8.2 g/dL    Globulin 2.8 1.9 - 3.5 g/dL    A-G Ratio 1.3 g/dL   Troponin STAT   Result Value Ref Range    Troponin T 33 (H) 6 - 19 ng/L   Prothrombin Time (PT/INR)   Result Value Ref Range    PT 16.4 (H) 12.0 - 14.6 sec    INR 1.31 (H) 0.87 - 1.13   APTT   Result Value Ref Range    APTT 29.9 24.7 - 36.0 sec   TSH (for screening thyroid dysfunction)   Result Value Ref Range    TSH 6.920 (H) 0.380 - 5.330 uIU/mL   Lactic Acid   Result Value Ref Range    Lactic Acid 1.2 0.5 - 2.0 mmol/L   proBrain Natriuretic Peptide, NT   Result Value Ref Range    NT-proBNP 4729 (H) 0 - 125 pg/mL   ESTIMATED GFR   Result Value Ref Range    GFR (CKD-EPI) 102 >60 mL/min/1.73 m 2   PLATELET ESTIMATE   Result Value Ref Range    Plt Estimation Normal    MORPHOLOGY   Result Value Ref Range    RBC Morphology Present     Large Platelets 1+     Polychromia 1+     Poikilocytosis 1+     Ovalocytes 1+    PERIPHERAL SMEAR REVIEW   Result Value Ref Range    Peripheral Smear Review see below    DIFFERENTIAL COMMENT   Result Value Ref Range    Comments-Diff see below    EKG   Result Value Ref Range    Report       [facility]    Test Date:  2024  Pt Name:    EVERARDO POST               Department: ER  MRN:        [id number]                      Room:       BL 18  Gender:     Male                         Technician: [id number]  :        1985                   Requested By:ER TRIAGE  PROTOCOL  Order #:    [id number]                    Reading MD: REINALDO KAPOOR MD    Measurements  Intervals                                Axis  Rate:       83                           P:          57  IN:         177                          QRS:        121  QRSD:       109                          T:          5  QT:         397  QTc:        467    Interpretive Statements  Sinus rhythm  Low voltage, precordial leads  Probable right ventricular hypertrophy  Compared to ECG 08/28/2023 16:34:05  Right-axis deviation no longer present  Prolonged QT interval no longer present  Electronically Signed On 02- 12:38:50 PST by REINALDO KAPOOR MD          RADIOLOGY  I have independently interpreted the diagnostic imaging associated with this visit and am waiting the final reading from the radiologist.   My preliminary interpretation is as follows: Chest x-ray shows cardiomegaly without infiltrates  Radiologist interpretation:   US-EXTREMITY VENOUS LOWER BILAT   Final Result      DX-CHEST-PORTABLE (1 VIEW)   Final Result      Cardiomegaly.      EC-ECHOCARDIOGRAM COMPLETE W/O CONT    (Results Pending)         COURSE & MEDICAL DECISION MAKING    ED Observation Status? No; Patient does not meet criteria for ED Observation.     INITIAL ASSESSMENT, COURSE AND PLAN  Care Narrative: Cole Jaramillo is a 39 y.o. male who presents by ambulance from home with increased swelling to his thighs progressively worsening over the last month.  The patient was seen here and diagnosed with hypoxia due to the restrictive lung disease and heart failure and placed on Lasix.  He is not taking the Lasix at home because he has not been able to get to the bathroom in time.  He states for the last 2 days he is not been able to get out of bed and has not been eating or drinking very much.  He feels very weak and dehydrated.  He denies any chest pain but has had some coughing and shortness of breath.  He states that his primary care provider  prescribed him a few courses of antibiotics and currently he is taking clindamycin.  He denies any fevers or chills.  He states he has noticed increased swelling in his legs up his back and over his abdomen.  He denies any diarrhea but feels constipated.  He has no nausea or vomiting.  He states that he is no longer on oxygen at home.  He denies any smoking drinking or drug use.  On physical exam he is morbidly obese heart is regular rate and rhythm he is speaking full sentences with 3 L nasal cannula oxygen his lungs are clear to auscultation bilaterally.  On physical exam he is morbidly obese he has bilateral lower extremity edema extending up into his abdomen.  Abdomen is soft without rebound or tenderness.  Lungs are clear to auscultation bilaterally his mucous membranes are dry neurologically he is normal.  HYDRATION: Based on the patient's presentation of Dehydration the patient was given IV fluids. IV Hydration was used because oral hydration was not adequate alone. Upon recheck following hydration, the patient was improved.   Differential diagnosis: UTI, congestive heart failure, hypoxia due to PE pneumonia restrictive lung disease.  ADDITIONAL PROBLEM LIST  Morbid obesity with a weight of 727 pounds  Respiratory failure from restrictive lung disease due to weight with hypoxia  Hypertension  Shortness of breath  Iron deficiency anemia  Abdominal wall cellulitis  DISPOSITION AND DISCUSSIONS    An IV was started and labs were drawn.  The patient's EKG shows low voltage probable right ventricular hypertrophy but no ST abnormalities.  Chest x-ray shows cardiomegaly but no infiltrate or pleural effusion.  Patient is found to be hypoxic at 88% on room air and was placed on 3 L nasal cannula oxygen.    I gave the patient a liter of IV lactated Ringer's because he appears dehydrated with dry mucous membranes and the report of not eating or drinking much.  Blood cultures and lactate were drawn along with the lab work  for evaluation of sepsis.  I ordered ultrasound of the patient's legs to rule out DVT.    Patient's white blood cell count is normal at 7.6 hemoglobin 12.1 platelet count 316 with 16.8 lymphocytes and otherwise normal differential.  He does have hypochromia and Liliana cytosis macrocytosis and microcytosis ovalocytes and poikilocytes.  Patient's comprehensive metabolic panel has normal electrolytes normal kidney function normal glucose and normal liver function test.  Lactate is normal at 1.2.  The patient's troponin is elevated at 33 BNP is elevated at 4729.  His heart score is 2 INR is 1.31 just slightly elevated.  Patient's TSH is markedly elevated at 6.9 indicating some hypothyroidism.  T4 is currently pending.    Patient's ultrasound duplex of his bilateral lower extremities does not show any evidence of DVT but is a very limited study given his body habitus.    I ordered IV Lasix for the patient when his BNP came back elevated.  He will be admitted to the hospitalist service for volume overload and failure to thrive.      I have discussed management of the patient with the following physicians and RUBIA's:  hospitalist Dr Conner will admit the patient for further cardiac workup and care    Discussion of management with other [facility] or appropriate source(s): None     Escalation of care considered, and ultimately not performed:none    Barriers to care at this time, including but not limited to:  Patient has morbid obesity .     Decision tools and prescription drugs considered including, but not limited to: HEART Score 2 .    FINAL DIAGNOSIS  1. Volume overload state of heart    2. Peripheral edema    3. Hypoxia    4. Morbid obesity (HCC)    5. Dehydration           Electronically signed by: Randee George M.D., 2/1/2024 12:33 PM

## 2024-02-01 NOTE — ED NOTES
Fluids started for pt. Pt attempted to provide a urine sample but was unable to use a urinal. Purewick was placed but it did not work. Pt will try for another sample.

## 2024-02-01 NOTE — H&P
Hospital Medicine History & Physical Note    Date of Service  2/1/2024    Primary Care Physician  Pcp Pt States None    Consultants  None    Code Status  Full Code    Chief Complaint  Chief Complaint   Patient presents with    Leg Swelling     Pt c/o increased leg swelling up to thighs x 1 month, making him unable to weight bear due to extent of swelling. Pt also c/o some mild shortness of breath when moving, sats 88% on room air in ED.       History of Presenting Illness  Cole Jaramillo is a 39 y.o. male with severe morbid obesity who presented 2/1/2024 with evaluation for increased lower extremity swelling, weakness.  Patient reported difficulty ambulating for the past 2 weeks, due to increased swelling in lower extremities.  He required 2 L nasal cannula support in ER, also noted to have elevated proBNP with SBP above 160.  Patient initially was given 1 L IVF bolus, then 20 mg IV Lasix ordered by ERP due to concern of volume overload.  Admission requested by ERP.  Admitted to medicine service for further evaluation treatment.    I discussed the plan of care with patient, bedside RN, and pharmacy.    Review of Systems  Review of Systems   Constitutional:  Positive for malaise/fatigue. Negative for chills and fever.   HENT:  Negative for hearing loss and tinnitus.    Eyes:  Negative for blurred vision and double vision.   Respiratory:  Positive for shortness of breath. Negative for cough.    Cardiovascular:  Positive for orthopnea and leg swelling. Negative for chest pain and palpitations.   Gastrointestinal:  Positive for heartburn. Negative for abdominal pain, nausea and vomiting.   Genitourinary:  Negative for dysuria and hematuria.   Musculoskeletal:  Negative for back pain, joint pain and myalgias.   Skin:  Negative for itching and rash.   Neurological:  Positive for weakness. Negative for dizziness and headaches.   Endo/Heme/Allergies:  Negative for environmental allergies. Does not bruise/bleed easily.    Psychiatric/Behavioral:  Negative for depression and substance abuse.    All other systems reviewed and are negative.      Past Medical History   has a past medical history of Morbid obesity (HCC).    Surgical History   has no past surgical history on file.     Family History  family history is not on file.   Family history reviewed with patient. There is family history that is pertinent to the chief complaint.     Social History   reports that he has never smoked. He has never used smokeless tobacco. He reports current alcohol use. He reports that he does not use drugs.    Allergies  Allergies   Allergen Reactions    Other Misc Unspecified     Unknown antibiotic for a staph infection, blocked arteries, rash.        Medications  Prior to Admission Medications   Prescriptions Last Dose Informant Patient Reported? Taking?   Pamabrom 50 MG Cap  Patient Yes No   Sig: Take 50 mg by mouth 1 time a day as needed.   ibuprofen (MOTRIN) 200 MG Tab  Patient Yes No   Sig: Take 800 mg by mouth 1 time a day as needed.      Facility-Administered Medications: None       Physical Exam  Temp:  [36.4 °C (97.5 °F)-36.7 °C (98.1 °F)] 36.7 °C (98.1 °F)  Pulse:  [77-87] 87  Resp:  [20-21] 20  BP: (147-168)/(80-90) 147/90  SpO2:  [88 %-97 %] 94 %  Blood Pressure: (!) 162/83   Temperature: 36.4 °C (97.5 °F)   Pulse: 77   Respiration: (!) 21   Pulse Oximetry: 95 %       Physical Exam  Vitals and nursing note reviewed.   Constitutional:       General: He is not in acute distress.     Appearance: He is obese. He is ill-appearing.   HENT:      Head: Normocephalic and atraumatic.      Nose: Nose normal.      Mouth/Throat:      Mouth: Mucous membranes are moist.      Pharynx: Oropharynx is clear.   Eyes:      General: No scleral icterus.     Extraocular Movements: Extraocular movements intact.   Cardiovascular:      Rate and Rhythm: Normal rate and regular rhythm.      Pulses: Normal pulses.      Heart sounds:      No friction rub.   Pulmonary:       Effort: No respiratory distress.      Breath sounds: Rales present. No wheezing.   Chest:      Chest wall: No tenderness.   Abdominal:      General: There is distension.      Tenderness: There is no abdominal tenderness. There is no guarding.   Musculoskeletal:         General: No tenderness.      Cervical back: Neck supple. No tenderness.      Right lower leg: Edema present.      Left lower leg: Edema present.   Skin:     General: Skin is warm and dry.      Capillary Refill: Capillary refill takes less than 2 seconds.   Neurological:      General: No focal deficit present.      Mental Status: He is alert and oriented to person, place, and time.   Psychiatric:         Mood and Affect: Mood normal.         Laboratory:  Recent Labs     02/01/24  1401   WBC 7.6   RBC 5.44   HEMOGLOBIN 12.1*   HEMATOCRIT 42.4   MCV 77.9*   MCH 22.2*   MCHC 28.5*   RDW 58.5*   PLATELETCT 316   MPV 9.3     Recent Labs     02/01/24  1327   SODIUM 136   POTASSIUM 4.7   CHLORIDE 98   CO2 25   GLUCOSE 100*   BUN 21   CREATININE 0.96   CALCIUM 8.6     Recent Labs     02/01/24  1327   ALTSGPT 5   ASTSGOT 12   ALKPHOSPHAT 75   TBILIRUBIN 1.5   GLUCOSE 100*     Recent Labs     02/01/24  1327   APTT 29.9   INR 1.31*     Recent Labs     02/01/24  1327   NTPROBNP 4729*         Recent Labs     02/01/24  1327   TROPONINT 33*       Imaging:  US-EXTREMITY VENOUS LOWER BILAT   Final Result      DX-CHEST-PORTABLE (1 VIEW)   Final Result      Cardiomegaly.      EC-ECHOCARDIOGRAM COMPLETE W/O CONT    (Results Pending)       X-Ray:  I have personally reviewed the images and compared with prior images.  EKG:  I have personally reviewed the images and compared with prior images.    Assessment/Plan:  Justification for Admission Status  I anticipate this patient will require at least 2 midnights hospitalization, therefore appropriate for inpatient status.    * Volume overload- (present on admission)  Assessment & Plan  Diuresis as able to  tolerate    Bilateral leg edema  Assessment & Plan  Diuresis  Check LE Doppler    Acute on chronic heart failure with preserved ejection fraction (HFpEF) ([facility])  Assessment & Plan  Suspected  EF 65% in September 2023  Repeat echo  Diuresis as tolerated-Lasix 60 mg IV twice daily  Optimize CHF meds as able to tolerate    Hypertension- (present on admission)  Assessment & Plan  Uncontrolled  Lasix, Coreg, lisinopril    Class 3 severe obesity due to excess calories with serious comorbidity and body mass index (BMI) greater than or equal to 70 in adult ([facility])- (present on admission)  Assessment & Plan  Body mass index is 101.4 kg/m².  Diet and lifestyle modification  May benefit from bariatric surgery evaluation    Acute respiratory failure with hypoxia ([facility])- (present on admission)  Assessment & Plan  On 2 L-wean off as tolerated  Diuresis  As needed nebs  Check echo, lower extremity Doppler    Weakness  Assessment & Plan  Weakness  PT/OT    Elevated troponin  Assessment & Plan  Likely secondary to demand ischemia, fluid overload  ASA/statin  Check echo    At risk for obstructive sleep apnea  Assessment & Plan  Outpt sleep study    Abdominal wall cellulitis- (present on admission)  Assessment & Plan  Already taking Clindamycin -- 4days course remaining -- continue        VTE prophylaxis: heparin ppx

## 2024-02-01 NOTE — ED NOTES
Medication history reviewed with patient at bedside.   Med rec is complete  Allergies reviewed.   Patient is currently on a 14 day course of clindamycin 300mg bid, last dose was 2/1/24 am.  Anticoagulants: No    Murali Santa

## 2024-02-02 ENCOUNTER — APPOINTMENT (OUTPATIENT)
Dept: RADIOLOGY | Facility: MEDICAL CENTER | Age: 39
DRG: 291 | End: 2024-02-02
Attending: STUDENT IN AN ORGANIZED HEALTH CARE EDUCATION/TRAINING PROGRAM
Payer: COMMERCIAL

## 2024-02-02 PROBLEM — E66.2 OBESITY HYPOVENTILATION SYNDROME (HCC): Status: ACTIVE | Noted: 2024-02-02

## 2024-02-02 LAB
ALBUMIN SERPL BCP-MCNC: 3.5 G/DL (ref 3.2–4.9)
ALBUMIN/GLOB SERPL: 1.2 G/DL
ALP SERPL-CCNC: 73 U/L (ref 30–99)
ALT SERPL-CCNC: 6 U/L (ref 2–50)
ANION GAP SERPL CALC-SCNC: 11 MMOL/L (ref 7–16)
AST SERPL-CCNC: 7 U/L (ref 12–45)
BASOPHILS # BLD AUTO: 0.6 % (ref 0–1.8)
BASOPHILS # BLD: 0.05 K/UL (ref 0–0.12)
BILIRUB SERPL-MCNC: 1.2 MG/DL (ref 0.1–1.5)
BUN SERPL-MCNC: 20 MG/DL (ref 8–22)
CALCIUM ALBUM COR SERPL-MCNC: 8.9 MG/DL (ref 8.5–10.5)
CALCIUM SERPL-MCNC: 8.5 MG/DL (ref 8.5–10.5)
CHLORIDE SERPL-SCNC: 98 MMOL/L (ref 96–112)
CHOLEST SERPL-MCNC: 88 MG/DL (ref 100–199)
CO2 SERPL-SCNC: 27 MMOL/L (ref 20–33)
CREAT SERPL-MCNC: 1.11 MG/DL (ref 0.5–1.4)
D DIMER PPP IA.FEU-MCNC: 1.8 UG/ML (FEU) (ref 0–0.5)
EKG IMPRESSION: NORMAL
EOSINOPHIL # BLD AUTO: 0.37 K/UL (ref 0–0.51)
EOSINOPHIL NFR BLD: 4.6 % (ref 0–6.9)
ERYTHROCYTE [DISTWIDTH] IN BLOOD BY AUTOMATED COUNT: 59.3 FL (ref 35.9–50)
GFR SERPLBLD CREATININE-BSD FMLA CKD-EPI: 86 ML/MIN/1.73 M 2
GLOBULIN SER CALC-MCNC: 3 G/DL (ref 1.9–3.5)
GLUCOSE SERPL-MCNC: 106 MG/DL (ref 65–99)
HCT VFR BLD AUTO: 39.1 % (ref 42–52)
HDLC SERPL-MCNC: 21 MG/DL
HGB BLD-MCNC: 11.2 G/DL (ref 14–18)
IMM GRANULOCYTES # BLD AUTO: 0.02 K/UL (ref 0–0.11)
IMM GRANULOCYTES NFR BLD AUTO: 0.2 % (ref 0–0.9)
LACTATE SERPL-SCNC: 1.2 MMOL/L (ref 0.5–2)
LDLC SERPL CALC-MCNC: 53 MG/DL
LYMPHOCYTES # BLD AUTO: 1.36 K/UL (ref 1–4.8)
LYMPHOCYTES NFR BLD: 16.9 % (ref 22–41)
MAGNESIUM SERPL-MCNC: 2.2 MG/DL (ref 1.5–2.5)
MCH RBC QN AUTO: 22.4 PG (ref 27–33)
MCHC RBC AUTO-ENTMCNC: 28.6 G/DL (ref 32.3–36.5)
MCV RBC AUTO: 78 FL (ref 81.4–97.8)
MONOCYTES # BLD AUTO: 1.16 K/UL (ref 0–0.85)
MONOCYTES NFR BLD AUTO: 14.4 % (ref 0–13.4)
NEUTROPHILS # BLD AUTO: 5.08 K/UL (ref 1.82–7.42)
NEUTROPHILS NFR BLD: 63.3 % (ref 44–72)
NRBC # BLD AUTO: 0 K/UL
NRBC BLD-RTO: 0 /100 WBC (ref 0–0.2)
PHOSPHATE SERPL-MCNC: 4.1 MG/DL (ref 2.5–4.5)
PLATELET # BLD AUTO: 300 K/UL (ref 164–446)
PMV BLD AUTO: 9.2 FL (ref 9–12.9)
POTASSIUM SERPL-SCNC: 4.5 MMOL/L (ref 3.6–5.5)
PROT SERPL-MCNC: 6.5 G/DL (ref 6–8.2)
RBC # BLD AUTO: 5.01 M/UL (ref 4.7–6.1)
SODIUM SERPL-SCNC: 136 MMOL/L (ref 135–145)
TRIGL SERPL-MCNC: 72 MG/DL (ref 0–149)
TROPONIN T SERPL-MCNC: 35 NG/L (ref 6–19)
WBC # BLD AUTO: 8 K/UL (ref 4.8–10.8)

## 2024-02-02 PROCEDURE — 80053 COMPREHEN METABOLIC PANEL: CPT

## 2024-02-02 PROCEDURE — 700102 HCHG RX REV CODE 250 W/ 637 OVERRIDE(OP): Performed by: STUDENT IN AN ORGANIZED HEALTH CARE EDUCATION/TRAINING PROGRAM

## 2024-02-02 PROCEDURE — 85379 FIBRIN DEGRADATION QUANT: CPT

## 2024-02-02 PROCEDURE — 93005 ELECTROCARDIOGRAM TRACING: CPT | Performed by: STUDENT IN AN ORGANIZED HEALTH CARE EDUCATION/TRAINING PROGRAM

## 2024-02-02 PROCEDURE — 770020 HCHG ROOM/CARE - TELE (206)

## 2024-02-02 PROCEDURE — 83605 ASSAY OF LACTIC ACID: CPT

## 2024-02-02 PROCEDURE — 83735 ASSAY OF MAGNESIUM: CPT

## 2024-02-02 PROCEDURE — 97530 THERAPEUTIC ACTIVITIES: CPT

## 2024-02-02 PROCEDURE — 700111 HCHG RX REV CODE 636 W/ 250 OVERRIDE (IP): Mod: JZ | Performed by: STUDENT IN AN ORGANIZED HEALTH CARE EDUCATION/TRAINING PROGRAM

## 2024-02-02 PROCEDURE — 36415 COLL VENOUS BLD VENIPUNCTURE: CPT

## 2024-02-02 PROCEDURE — 93925 LOWER EXTREMITY STUDY: CPT

## 2024-02-02 PROCEDURE — 97166 OT EVAL MOD COMPLEX 45 MIN: CPT

## 2024-02-02 PROCEDURE — 84100 ASSAY OF PHOSPHORUS: CPT

## 2024-02-02 PROCEDURE — A9270 NON-COVERED ITEM OR SERVICE: HCPCS | Performed by: STUDENT IN AN ORGANIZED HEALTH CARE EDUCATION/TRAINING PROGRAM

## 2024-02-02 PROCEDURE — 94669 MECHANICAL CHEST WALL OSCILL: CPT

## 2024-02-02 PROCEDURE — 85025 COMPLETE CBC W/AUTO DIFF WBC: CPT

## 2024-02-02 PROCEDURE — 97163 PT EVAL HIGH COMPLEX 45 MIN: CPT

## 2024-02-02 PROCEDURE — 93010 ELECTROCARDIOGRAM REPORT: CPT | Performed by: INTERNAL MEDICINE

## 2024-02-02 PROCEDURE — 80061 LIPID PANEL: CPT

## 2024-02-02 PROCEDURE — 99233 SBSQ HOSP IP/OBS HIGH 50: CPT | Performed by: STUDENT IN AN ORGANIZED HEALTH CARE EDUCATION/TRAINING PROGRAM

## 2024-02-02 PROCEDURE — 84484 ASSAY OF TROPONIN QUANT: CPT

## 2024-02-02 RX ORDER — SPIRONOLACTONE 25 MG/1
25 TABLET ORAL
Status: DISCONTINUED | OUTPATIENT
Start: 2024-02-02 | End: 2024-02-03

## 2024-02-02 RX ADMIN — FUROSEMIDE 60 MG: 10 INJECTION, SOLUTION INTRAVENOUS at 05:21

## 2024-02-02 RX ADMIN — OMEPRAZOLE 20 MG: 20 CAPSULE, DELAYED RELEASE ORAL at 17:39

## 2024-02-02 RX ADMIN — HEPARIN SODIUM 5000 UNITS: 5000 INJECTION, SOLUTION INTRAVENOUS; SUBCUTANEOUS at 22:21

## 2024-02-02 RX ADMIN — NYSTATIN: 100000 POWDER TOPICAL at 17:43

## 2024-02-02 RX ADMIN — CARVEDILOL 3.12 MG: 3.12 TABLET, FILM COATED ORAL at 17:39

## 2024-02-02 RX ADMIN — CLINDAMYCIN HYDROCHLORIDE 300 MG: 150 CAPSULE ORAL at 11:33

## 2024-02-02 RX ADMIN — HEPARIN SODIUM 5000 UNITS: 5000 INJECTION, SOLUTION INTRAVENOUS; SUBCUTANEOUS at 13:59

## 2024-02-02 RX ADMIN — HEPARIN SODIUM 5000 UNITS: 5000 INJECTION, SOLUTION INTRAVENOUS; SUBCUTANEOUS at 05:21

## 2024-02-02 RX ADMIN — ACETAMINOPHEN 650 MG: 325 TABLET, FILM COATED ORAL at 20:56

## 2024-02-02 RX ADMIN — FUROSEMIDE 60 MG: 10 INJECTION, SOLUTION INTRAVENOUS at 15:28

## 2024-02-02 RX ADMIN — SPIRONOLACTONE 25 MG: 25 TABLET ORAL at 22:21

## 2024-02-02 RX ADMIN — NYSTATIN: 100000 POWDER TOPICAL at 11:33

## 2024-02-02 RX ADMIN — ATORVASTATIN CALCIUM 40 MG: 40 TABLET, FILM COATED ORAL at 17:39

## 2024-02-02 RX ADMIN — NYSTATIN: 100000 POWDER TOPICAL at 05:26

## 2024-02-02 RX ADMIN — ASPIRIN 81 MG: 81 TABLET, COATED ORAL at 05:21

## 2024-02-02 RX ADMIN — CLINDAMYCIN HYDROCHLORIDE 300 MG: 150 CAPSULE ORAL at 17:39

## 2024-02-02 RX ADMIN — OMEPRAZOLE 20 MG: 20 CAPSULE, DELAYED RELEASE ORAL at 05:20

## 2024-02-02 RX ADMIN — CLINDAMYCIN HYDROCHLORIDE 300 MG: 150 CAPSULE ORAL at 05:20

## 2024-02-02 ASSESSMENT — COGNITIVE AND FUNCTIONAL STATUS - GENERAL
MOVING TO AND FROM BED TO CHAIR: UNABLE
HELP NEEDED FOR BATHING: A LOT
PERSONAL GROOMING: A LITTLE
DRESSING REGULAR UPPER BODY CLOTHING: A LOT
SUGGESTED CMS G CODE MODIFIER DAILY ACTIVITY: CL
DAILY ACTIVITIY SCORE: 13
CLIMB 3 TO 5 STEPS WITH RAILING: TOTAL
MOBILITY SCORE: 6
STANDING UP FROM CHAIR USING ARMS: TOTAL
TOILETING: A LOT
DRESSING REGULAR LOWER BODY CLOTHING: TOTAL
EATING MEALS: A LITTLE
WALKING IN HOSPITAL ROOM: TOTAL
MOVING FROM LYING ON BACK TO SITTING ON SIDE OF FLAT BED: UNABLE
SUGGESTED CMS G CODE MODIFIER MOBILITY: CN
TURNING FROM BACK TO SIDE WHILE IN FLAT BAD: UNABLE

## 2024-02-02 ASSESSMENT — ENCOUNTER SYMPTOMS
VOMITING: 0
CHILLS: 0
MYALGIAS: 0
WEAKNESS: 1
ABDOMINAL PAIN: 0
COUGH: 0
NAUSEA: 0
HEADACHES: 0
FEVER: 0
SHORTNESS OF BREATH: 0
PALPITATIONS: 0
DIZZINESS: 0

## 2024-02-02 ASSESSMENT — PAIN DESCRIPTION - PAIN TYPE
TYPE: ACUTE PAIN
TYPE: ACUTE PAIN

## 2024-02-02 ASSESSMENT — ACTIVITIES OF DAILY LIVING (ADL): TOILETING: REQUIRES ASSIST

## 2024-02-02 ASSESSMENT — GAIT ASSESSMENTS: GAIT LEVEL OF ASSIST: UNABLE TO PARTICIPATE

## 2024-02-02 NOTE — ASSESSMENT & PLAN NOTE
Body mass index is 101.4 kg/m².  Diet and lifestyle modification  May benefit from bariatric surgery evaluation    2/2  Nutrition consult  PT and OT

## 2024-02-02 NOTE — CARE PLAN
Problem: Knowledge Deficit - Standard  Goal: Patient and family/care givers will demonstrate understanding of plan of care, disease process/condition, diagnostic tests and medications  Outcome: Progressing     Problem: Impaired Gas Exchange  Goal: Patient will demonstrate improved ventilation and adequate oxygenation and participate in treatment regimen within the level of ability/situation.  Outcome: Progressing     Problem: Skin Integrity  Goal: Skin integrity is maintained or improved  Outcome: Progressing   The patient is Watcher - Medium risk of patient condition declining or worsening    Shift Goals  Clinical Goals: diuresing, q2 turn  Patient Goals: rest comfort updates    Progress made toward(s) clinical / shift goals:   participated with pt/ot today-eobsit &stand    Patient is not progressing towards the following goals:

## 2024-02-02 NOTE — ASSESSMENT & PLAN NOTE
Control improving    Hold home lisinopril  Continue IV furosemide  Increase spironolactone to 100 mg daily tomorrow.

## 2024-02-02 NOTE — PROGRESS NOTES
4 Eyes Skin Assessment Completed by DAVID Lenz and DAVID Delaney.    Head WDL  Ears WDL  Nose WDL  Mouth WDL  Neck WDL  Breast/Chest Redness and Scar  Shoulder Blades WDL  Spine WDL  (R) Arm/Elbow/Hand WDL  (L) Arm/Elbow/Hand WDL  Abdomen Redness  Groin Redness, Blanching, Excoriation, and Swelling  Scrotum/Coccyx/Buttocks Redness, Blanching, Excoriation, and Moisture Fissure  (R) Leg Redness and Swelling  (L) Leg Redness and Swelling  (R) Heel/Foot/Toe Boggy  (L) Heel/Foot/Toe Boggy          Devices In Places Tele Box, Pulse Ox, and Nasal Cannula      Interventions In Place Gray Ear Foams, InterDry, TAP System, Pillows, Q2 Turns, Low Air Loss Mattress, and Heels Loaded W/Pillows    Possible Skin Injury Yes    Pictures Uploaded Into Epic Yes  Wound Consult Placed Yes  RN Wound Prevention Protocol Ordered Yes

## 2024-02-02 NOTE — ASSESSMENT & PLAN NOTE
Suspected  EF 65% in September 2023  Repeat echo  Diuresis as tolerated-Lasix 60 mg IV twice daily  Optimize CHF meds as able to tolerate    2/2  Likely due to MC/OHS.  Continue forced diuresis with furosemide 60 mg IV BID  Add spironolactone 25 mg daily  Continuous telemetry monitoring during forced diuresis    02/03/24  Decreasing oxygen requirements to 1.5 LPM.  Urine output of 3.1 L yesterday.  Continues on fluid restriction.  Continue furosemide 60 mg IV twice daily.  Increasing spironolactone to 50 mg daily.  Continuous telemetry monitoring during forced diuresis  Echocardiogram completed.  Reading pending.    02/04/24  Echocardiogram showing ejection fraction of 55%, right-sided volume overload D-shaped.  Urine output of 3 L yesterday.  Creatinine remained stable 1.1 to.  Continues on 1 LPM oxygen by nasal.  Continue furosemide 60 mg IV twice daily.  Continue spironolactone 50 mg daily.  Continuous telemetry monitoring during forced diuresis    02/05/24  Urine output of 6.5 L yesterday.  Creatinine stable at 1.04.  Continuing on furosemide 60 mg IV twice daily.    Continuing spironolactone 50 mg daily.  Increase to 100 mg tomorrow.  Continuous telemetry monitoring to monitor for arrhythmias during forced diuresis.

## 2024-02-02 NOTE — CARE PLAN
Problem: Care Map:  Admission Optimal Outcome for the Heart Failure Patient  Goal: Admission:  Optimal Care of the heart failure patient  Outcome: Progressing  Pt educated on POC, all questions answered in regards to disease process, treatment and diet. Pt verbalized understanding and voiced no further questions at this time.     The patient is Stable - Low risk of patient condition declining or worsening    Shift Goals  Clinical Goals: monitor VS, education  Patient Goals: comfort

## 2024-02-02 NOTE — ASSESSMENT & PLAN NOTE
Likely secondary to demand ischemia, fluid overload  ASA/statin  Check echo    02/04/24  Likely due to demand ischemia.  Remaining flat.

## 2024-02-02 NOTE — CARE PLAN
The patient is Stable - Low risk of patient condition declining or worsening    Shift Goals  Clinical Goals: diuretics, VS monitoring  Patient Goals: rest, sleep    Progress made toward(s) clinical / shift goals:      Problem: Skin Integrity  Goal: Skin integrity is maintained or improved  Outcome: Progressing       Patient is not progressing towards the following goals:      Problem: Self Care  Goal: Patient will have the ability to perform ADLs independently or with assistance (bathe, groom, dress, toilet and feed)  Outcome: Not Progressing

## 2024-02-02 NOTE — PROGRESS NOTES
Report received from night shift RN, assumed care of pt. Transported patient to room with all belongings. Pt A&Ox4. Plan of care discussed with pt, labs and chart reviewed. All needs met at this time. Tele box on. On 2L O2 via nasal canula. Call light within reach, bed locked and in lowest position. All fall precautions and hourly rounding in place.

## 2024-02-02 NOTE — ASSESSMENT & PLAN NOTE
Likely due to acute decompensated heart failure in setting of untreated MC/OHS. TTE (2/3/2024) showed LVEF 55% with flattened septum in diastole, consistent with RV volume overload (likely due to hypoxic vasoconstriction from recent URI). Lost ~26-lb since admission from aggressive diuresis.   - IV Lasix reduced to 60mg BID; cont Aldactone 100mg daily. Monitor electrolytes daily.   - Cont daily weights, strict I/Os. On low-salt diet with 2L daily fluid restriction.   - RT/OT protocol; cont telemetry, continuous pulse ox. Wean off oxygen as tolerated.   - IS at bedside; deep breathing encouraged.   - Low threshold for CTA if given TTE findings, high D-dimer  - Nightly CPAP empirically started given very high suspicion for MC  - Cont low-dose Aspirin, Atorvastatin 40mg daily for primary prevention

## 2024-02-02 NOTE — PROGRESS NOTES
Monitor summary:        Rhythm: SR  Rate: 74-85  Ectopy: (R)PAC, (R)PVC  Measurements: 19/.09/.37        12hr chart check

## 2024-02-02 NOTE — THERAPY
Physical Therapy   Initial Evaluation     Patient Name: Cole Jaramillo  Age:  39 y.o., Sex:  male  Medical Record #: [id number]  Today's Date: 2/2/2024     Precautions  Precautions: Fall Risk    Assessment  Patient is 39 y.o. male presenting to acute with increased B LE edema and SOB. PMH of severe morbid obesity. Today, pt required Max - Total A to complete mobility as detailed below. Tolerated sitting EOB x 10-15 minutes with heavy UE support. Able to transition sit to stand with bed height raised and bed elevated using momentum and CGA. Pt able to initiate small side steps at EOB with FWW support. Will benefit from acute PT interventions to address impairments noted below.     Plan    Physical Therapy Initial Treatment Plan   Treatment Plan : Bed Mobility, Equipment, Gait Training, Neuro Re-Education / Balance, Self Care / Home Evaluation, Therapeutic Activities, Therapeutic Exercise  Treatment Frequency: 4 Times per Week  Duration: Until Therapy Goals Met    DC Equipment Recommendations: Unable to determine at this time  Discharge Recommendations:  Currently, recommend placement. However, may be able to return home depending on progression and functional recovery with acute therapy.       Objective     02/02/24 1501   Precautions   Precautions Fall Risk   Vitals   Vitals Comments 1L O2 at rest. Increased to 3L O2 to maintain SpO2 > 90% during activity   Pain 0 - 10 Group   Therapist Pain Assessment During Activity;Nurse Notified  (no pain reported)   Prior Living Situation   [facility]   Housing / Facility 1 Story Apartment / Condo   Steps Into Home 0   Steps In Home 0   Equipment Owned Front-Wheel Walker   Lives with - Patient's Self Care Capacity   (Roommate)   Comments pt reports Roommate is unable to help much. If he needs something he will call his Mom/Sister   Prior Level of Functional Mobility   Bed Mobility Independent   Transfer Status Independent   Ambulation Independent    Ambulation Distance limited household   Assistive Devices Used Front-Wheel Walker   Comments pt reports recent functional decline over past 3 weeks due to acute illness. Prior to that he was ambulatory short distances in apartment and able to stand for easy meals with FWW support   Cognition    Cognition / Consciousness WDL   Level of Consciousness Alert   Comments pleasant, aware of acute PT role and goals from prior admission   Active ROM Lower Body    Active ROM Lower Body  X   Comments B LE limited by body habitus   Strength Lower Body   Lower Body Strength  X   Comments difficulty lifting LE in/out of bed but no overt buckling in standing   Other Treatments   Other Treatments Provided seated anterior lean/weight shift   Balance Assessment   Sitting Balance (Static) Fair   Sitting Balance (Dynamic) Fair -   Standing Balance (Static) Fair -   Standing Balance (Dynamic) Fair -   Weight Shift Sitting Fair   Weight Shift Standing Fair   Comments w/ bariatric FWW   Bed Mobility    Supine to Sit Maximal Assist  (x2-3 people with bed features (railing, trapeze))   Sit to Supine Maximal Assist  (x3 people)   Scooting Maximal Assist  (x2 people to EOB and in supine)   Rolling Maximum Assist to Lt.  (x2 people)   Gait Analysis   Gait Level Of Assist Unable to Participate   Comments was able to initiate 2-3 side steps at EOB   Functional Mobility   Sit to Stand Contact Guard Assist  (bed height raised, momentum strategy)   Bed, Chair, Wheelchair Transfer Unable to Participate   Comments sit <> stand x2 trials, able to tolerate standing x2-3 minutes at a time. Side stepping at EOB   How much difficulty does the patient currently have...   Turning over in bed (including adjusting bedclothes, sheets and blankets)? 1   Sitting down on and standing up from a chair with arms (e.g., wheelchair, bedside commode, etc.) 1   Moving from lying on back to sitting on the side of the bed? 1   How much help from another person does the  Doxycycline Counseling:  Patient counseled regarding possible photosensitivity and increased risk for sunburn.  Patient instructed to avoid sunlight, if possible.  When exposed to sunlight, patients should wear protective clothing, sunglasses, and sunscreen.  The patient was instructed to call the office immediately if the following severe adverse effects occur:  hearing changes, easy bruising/bleeding, severe headache, or vision changes.  The patient verbalized understanding of the proper use and possible adverse effects of doxycycline.  All of the patient's questions and concerns were addressed. patient currently need...   Moving to and from a bed to a chair (including a wheelchair)? 1   Need to walk in a hospital room? 1   Climbing 3-5 steps with a railing? 1   6 clicks Mobility Score 6   Short Term Goals    Short Term Goal # 1 pt will perform supine <> sit without bed features and SPV in 6 vistis   Short Term Goal # 2 pt will perform sit <> stand and functional transfers with valente FWW and SPV to [facility] mobility independence for home in 6 visits   Short Term Goal # 3 pt will ambulate 25 ft with Valente FWW and SPV to access home environment in 6 visits   Education Group   Education Provided Role of Physical Therapist   Role of Physical Therapist Patient Response Patient;Acceptance;Explanation;Verbal Demonstration   Physical Therapy Initial Treatment Plan    Treatment Plan  Bed Mobility;Equipment;Gait Training;Neuro Re-Education / Balance;Self Care / Home Evaluation;Therapeutic Activities;Therapeutic Exercise   Treatment Frequency 4 Times per Week   Duration Until Therapy Goals Met   Problem List    Problems Impaired Bed Mobility;Impaired Transfers;Impaired Ambulation;Functional Strength Deficit;Impaired Balance;Functional ROM Deficit;Decreased Activity Tolerance   Anticipated Discharge Equipment and Recommendations   DC Equipment Recommendations Unable to determine at this time   Discharge Recommendations   (Currently, recommend placement. However, may be able to return home depending on progression and functional recovery with acute therapy.)      Topical Retinoid Pregnancy And Lactation Text: This medication is Pregnancy Category C. It is unknown if this medication is excreted in breast milk. Dapsone Counseling: I discussed with the patient the risks of dapsone including but not limited to hemolytic anemia, agranulocytosis, rashes, methemoglobinemia, kidney failure, peripheral neuropathy, headaches, GI upset, and liver toxicity.  Patients who start dapsone require monitoring including baseline LFTs and weekly CBCs for the first month, then every month thereafter.  The patient verbalized understanding of the proper use and possible adverse effects of dapsone.  All of the patient's questions and concerns were addressed. Tetracycline Counseling: Patient counseled regarding possible photosensitivity and increased risk for sunburn.  Patient instructed to avoid sunlight, if possible.  When exposed to sunlight, patients should wear protective clothing, sunglasses, and sunscreen.  The patient was instructed to call the office immediately if the following severe adverse effects occur:  hearing changes, easy bruising/bleeding, severe headache, or vision changes.  The patient verbalized understanding of the proper use and possible adverse effects of tetracycline.  All of the patient's questions and concerns were addressed. Patient understands to avoid pregnancy while on therapy due to potential birth defects. Tazorac Pregnancy And Lactation Text: This medication is not safe during pregnancy. It is unknown if this medication is excreted in breast milk. Azelaic Acid Counseling: Patient counseled that medicine may cause skin irritation and to avoid applying near the eyes.  In the event of skin irritation, the patient was advised to reduce the amount of the drug applied or use it less frequently.   The patient verbalized understanding of the proper use and possible adverse effects of azelaic acid.  All of the patient's questions and concerns were addressed. High Dose Vitamin A Pregnancy And Lactation Text: High dose vitamin A therapy is contraindicated during pregnancy and breast feeding. Spironolactone Pregnancy And Lactation Text: This medication can cause feminization of the male fetus and should be avoided during pregnancy. The active metabolite is also found in breast milk. Bactrim Counseling:  I discussed with the patient the risks of sulfa antibiotics including but not limited to GI upset, allergic reaction, drug rash, diarrhea, dizziness, photosensitivity, and yeast infections.  Rarely, more serious reactions can occur including but not limited to aplastic anemia, agranulocytosis, methemoglobinemia, blood dyscrasias, liver or kidney failure, lung infiltrates or desquamative/blistering drug rashes. Benzoyl Peroxide Pregnancy And Lactation Text: This medication is Pregnancy Category C. It is unknown if benzoyl peroxide is excreted in breast milk. Use Enhanced Medication Counseling?: No Sarecycline Pregnancy And Lactation Text: This medication is Pregnancy Category D and not consider safe during pregnancy. It is also excreted in breast milk. Tazorac Counseling:  Patient advised that medication is irritating and drying.  Patient may need to apply sparingly and wash off after an hour before eventually leaving it on overnight.  The patient verbalized understanding of the proper use and possible adverse effects of tazorac.  All of the patient's questions and concerns were addressed. Benzoyl Peroxide Counseling: Patient counseled that medicine may cause skin irritation and bleach clothing.  In the event of skin irritation, the patient was advised to reduce the amount of the drug applied or use it less frequently.   The patient verbalized understanding of the proper use and possible adverse effects of benzoyl peroxide.  All of the patient's questions and concerns were addressed. Birth Control Pills Counseling: Birth Control Pill Counseling: I discussed with the patient the potential side effects of OCPs including but not limited to increased risk of stroke, heart attack, thrombophlebitis, deep venous thrombosis, hepatic adenomas, breast changes, GI upset, headaches, and depression.  The patient verbalized understanding of the proper use and possible adverse effects of OCPs. All of the patient's questions and concerns were addressed. Erythromycin Counseling:  I discussed with the patient the risks of erythromycin including but not limited to GI upset, allergic reaction, drug rash, diarrhea, increase in liver enzymes, and yeast infections. Aklief counseling:  Patient advised to apply a pea-sized amount only at bedtime and wait 30 minutes after washing their face before applying.  If too drying, patient may add a non-comedogenic moisturizer.  The most commonly reported side effects including irritation, redness, scaling, dryness, stinging, burning, itching, and increased risk of sunburn.  The patient verbalized understanding of the proper use and possible adverse effects of retinoids.  All of the patient's questions and concerns were addressed. Minocycline Counseling: Patient advised regarding possible photosensitivity and discoloration of the teeth, skin, lips, tongue and gums.  Patient instructed to avoid sunlight, if possible.  When exposed to sunlight, patients should wear protective clothing, sunglasses, and sunscreen.  The patient was instructed to call the office immediately if the following severe adverse effects occur:  hearing changes, easy bruising/bleeding, severe headache, or vision changes.  The patient verbalized understanding of the proper use and possible adverse effects of minocycline.  All of the patient's questions and concerns were addressed. Azithromycin Pregnancy And Lactation Text: This medication is considered safe during pregnancy and is also secreted in breast milk. Isotretinoin Pregnancy And Lactation Text: This medication is Pregnancy Category X and is considered extremely dangerous during pregnancy. It is unknown if it is excreted in breast milk. Aklief Pregnancy And Lactation Text: It is unknown if this medication is safe to use during pregnancy.  It is unknown if this medication is excreted in breast milk.  Breastfeeding women should use the topical cream on the smallest area of the skin for the shortest time needed while breastfeeding.  Do not apply to nipple and areola. Erythromycin Pregnancy And Lactation Text: This medication is Pregnancy Category B and is considered safe during pregnancy. It is also excreted in breast milk. Winlevi Pregnancy And Lactation Text: This medication is considered safe during pregnancy and breastfeeding. Birth Control Pills Pregnancy And Lactation Text: This medication should be avoided if pregnant and for the first 30 days post-partum. Dapsone Pregnancy And Lactation Text: This medication is Pregnancy Category C and is not considered safe during pregnancy or breast feeding. Azelaic Acid Pregnancy And Lactation Text: This medication is considered safe during pregnancy and breast feeding. High Dose Vitamin A Counseling: Side effects reviewed, pt to contact office should one occur. Isotretinoin Counseling: Patient should get monthly blood tests, not donate blood, not drive at night if vision affected, not share medication, and not undergo elective surgery for 6 months after tx completed. Side effects reviewed, pt to contact office should one occur. Doxycycline Pregnancy And Lactation Text: This medication is Pregnancy Category D and not consider safe during pregnancy. It is also excreted in breast milk but is considered safe for shorter treatment courses. Topical Sulfur Applications Pregnancy And Lactation Text: This medication is Pregnancy Category C and has an unknown safety profile during pregnancy. It is unknown if this topical medication is excreted in breast milk. Topical Sulfur Applications Counseling: Topical Sulfur Counseling: Patient counseled that this medication may cause skin irritation or allergic reactions.  In the event of skin irritation, the patient was advised to reduce the amount of the drug applied or use it less frequently.   The patient verbalized understanding of the proper use and possible adverse effects of topical sulfur application.  All of the patient's questions and concerns were addressed. Sarecycline Counseling: Patient advised regarding possible photosensitivity and discoloration of the teeth, skin, lips, tongue and gums.  Patient instructed to avoid sunlight, if possible.  When exposed to sunlight, patients should wear protective clothing, sunglasses, and sunscreen.  The patient was instructed to call the office immediately if the following severe adverse effects occur:  hearing changes, easy bruising/bleeding, severe headache, or vision changes.  The patient verbalized understanding of the proper use and possible adverse effects of sarecycline.  All of the patient's questions and concerns were addressed. Topical Clindamycin Pregnancy And Lactation Text: This medication is Pregnancy Category B and is considered safe during pregnancy. It is unknown if it is excreted in breast milk. Winlevi Counseling:  I discussed with the patient the risks of topical clascoterone including but not limited to erythema, scaling, itching, and stinging. Patient voiced their understanding. Topical Clindamycin Counseling: Patient counseled that this medication may cause skin irritation or allergic reactions.  In the event of skin irritation, the patient was advised to reduce the amount of the drug applied or use it less frequently.   The patient verbalized understanding of the proper use and possible adverse effects of clindamycin.  All of the patient's questions and concerns were addressed. Spironolactone Counseling: Patient advised regarding risks of diarrhea, abdominal pain, hyperkalemia, birth defects (for female patients), liver toxicity and renal toxicity. The patient may need blood work to monitor liver and kidney function and potassium levels while on therapy. The patient verbalized understanding of the proper use and possible adverse effects of spironolactone.  All of the patient's questions and concerns were addressed. Topical Retinoid counseling:  Patient advised to apply a pea-sized amount only at bedtime and wait 30 minutes after washing their face before applying.  If too drying, patient may add a non-comedogenic moisturizer. The patient verbalized understanding of the proper use and possible adverse effects of retinoids.  All of the patient's questions and concerns were addressed. Detail Level: Zone Bactrim Pregnancy And Lactation Text: This medication is Pregnancy Category D and is known to cause fetal risk.  It is also excreted in breast milk. Azithromycin Counseling:  I discussed with the patient the risks of azithromycin including but not limited to GI upset, allergic reaction, drug rash, diarrhea, and yeast infections.

## 2024-02-02 NOTE — DIETARY
NUTRITION SERVICES: BMI - Pt with BMI >40 (=Body mass index is 101.4 kg/m².), Class III obesity. Weight loss counseling not appropriate in acute care setting. RECOMMEND - If appropriate at DC please refer to outpatient nutrition services for weight management.       RD available PRN

## 2024-02-02 NOTE — ASSESSMENT & PLAN NOTE
Negative for DVT or arterial occlusions on ultrasound.    Likely due to fluid overload.  Continue IV forced diuresis  Elevate lower extremities while in bed

## 2024-02-03 ENCOUNTER — APPOINTMENT (OUTPATIENT)
Dept: CARDIOLOGY | Facility: MEDICAL CENTER | Age: 39
DRG: 291 | End: 2024-02-03
Attending: STUDENT IN AN ORGANIZED HEALTH CARE EDUCATION/TRAINING PROGRAM
Payer: COMMERCIAL

## 2024-02-03 LAB
ALBUMIN SERPL BCP-MCNC: 3.7 G/DL (ref 3.2–4.9)
BACTERIA UR CULT: NORMAL
BUN SERPL-MCNC: 20 MG/DL (ref 8–22)
CALCIUM ALBUM COR SERPL-MCNC: 8.6 MG/DL (ref 8.5–10.5)
CALCIUM SERPL-MCNC: 8.4 MG/DL (ref 8.5–10.5)
CHLORIDE SERPL-SCNC: 97 MMOL/L (ref 96–112)
CO2 SERPL-SCNC: 26 MMOL/L (ref 20–33)
CREAT SERPL-MCNC: 1.11 MG/DL (ref 0.5–1.4)
GFR SERPLBLD CREATININE-BSD FMLA CKD-EPI: 86 ML/MIN/1.73 M 2
GLUCOSE SERPL-MCNC: 109 MG/DL (ref 65–99)
LV EJECT FRACT  99904: 55
LV EJECT FRACT MOD 2C 99903: 71.93
LV EJECT FRACT MOD 4C 99902: 55.28
LV EJECT FRACT MOD BP 99901: 63.43
MAGNESIUM SERPL-MCNC: 2.1 MG/DL (ref 1.5–2.5)
PHOSPHATE SERPL-MCNC: 4.6 MG/DL (ref 2.5–4.5)
POTASSIUM SERPL-SCNC: 4.4 MMOL/L (ref 3.6–5.5)
SIGNIFICANT IND 70042: NORMAL
SITE SITE: NORMAL
SODIUM SERPL-SCNC: 133 MMOL/L (ref 135–145)
SOURCE SOURCE: NORMAL

## 2024-02-03 PROCEDURE — 36415 COLL VENOUS BLD VENIPUNCTURE: CPT

## 2024-02-03 PROCEDURE — 770020 HCHG ROOM/CARE - TELE (206)

## 2024-02-03 PROCEDURE — 700117 HCHG RX CONTRAST REV CODE 255: Performed by: STUDENT IN AN ORGANIZED HEALTH CARE EDUCATION/TRAINING PROGRAM

## 2024-02-03 PROCEDURE — 80069 RENAL FUNCTION PANEL: CPT

## 2024-02-03 PROCEDURE — 99233 SBSQ HOSP IP/OBS HIGH 50: CPT | Performed by: STUDENT IN AN ORGANIZED HEALTH CARE EDUCATION/TRAINING PROGRAM

## 2024-02-03 PROCEDURE — 93306 TTE W/DOPPLER COMPLETE: CPT | Mod: 26 | Performed by: INTERNAL MEDICINE

## 2024-02-03 PROCEDURE — 700111 HCHG RX REV CODE 636 W/ 250 OVERRIDE (IP): Performed by: STUDENT IN AN ORGANIZED HEALTH CARE EDUCATION/TRAINING PROGRAM

## 2024-02-03 PROCEDURE — A9270 NON-COVERED ITEM OR SERVICE: HCPCS | Performed by: STUDENT IN AN ORGANIZED HEALTH CARE EDUCATION/TRAINING PROGRAM

## 2024-02-03 PROCEDURE — 83735 ASSAY OF MAGNESIUM: CPT

## 2024-02-03 PROCEDURE — 700102 HCHG RX REV CODE 250 W/ 637 OVERRIDE(OP): Performed by: STUDENT IN AN ORGANIZED HEALTH CARE EDUCATION/TRAINING PROGRAM

## 2024-02-03 PROCEDURE — 700111 HCHG RX REV CODE 636 W/ 250 OVERRIDE (IP): Mod: JZ | Performed by: STUDENT IN AN ORGANIZED HEALTH CARE EDUCATION/TRAINING PROGRAM

## 2024-02-03 PROCEDURE — 700101 HCHG RX REV CODE 250: Performed by: STUDENT IN AN ORGANIZED HEALTH CARE EDUCATION/TRAINING PROGRAM

## 2024-02-03 PROCEDURE — 93306 TTE W/DOPPLER COMPLETE: CPT

## 2024-02-03 PROCEDURE — 97602 WOUND(S) CARE NON-SELECTIVE: CPT

## 2024-02-03 RX ORDER — FUROSEMIDE 10 MG/ML
80 INJECTION INTRAMUSCULAR; INTRAVENOUS
Status: COMPLETED | OUTPATIENT
Start: 2024-02-03 | End: 2024-02-04

## 2024-02-03 RX ORDER — DOCUSATE SODIUM 100 MG/1
100 CAPSULE, LIQUID FILLED ORAL 2 TIMES DAILY
Status: DISCONTINUED | OUTPATIENT
Start: 2024-02-03 | End: 2024-02-11

## 2024-02-03 RX ORDER — SPIRONOLACTONE 25 MG/1
25 TABLET ORAL ONCE
Status: COMPLETED | OUTPATIENT
Start: 2024-02-04 | End: 2024-02-03

## 2024-02-03 RX ORDER — SPIRONOLACTONE 25 MG/1
50 TABLET ORAL
Status: DISCONTINUED | OUTPATIENT
Start: 2024-02-04 | End: 2024-02-05

## 2024-02-03 RX ORDER — FUROSEMIDE 10 MG/ML
20 INJECTION INTRAMUSCULAR; INTRAVENOUS ONCE
Status: COMPLETED | OUTPATIENT
Start: 2024-02-03 | End: 2024-02-03

## 2024-02-03 RX ORDER — POLYETHYLENE GLYCOL 3350 17 G/17G
1 POWDER, FOR SOLUTION ORAL 2 TIMES DAILY
Status: DISCONTINUED | OUTPATIENT
Start: 2024-02-03 | End: 2024-02-04

## 2024-02-03 RX ADMIN — ASPIRIN 81 MG: 81 TABLET, COATED ORAL at 05:56

## 2024-02-03 RX ADMIN — SPIRONOLACTONE 25 MG: 25 TABLET ORAL at 23:32

## 2024-02-03 RX ADMIN — DOCUSATE SODIUM 100 MG: 100 CAPSULE, LIQUID FILLED ORAL at 18:33

## 2024-02-03 RX ADMIN — CLINDAMYCIN HYDROCHLORIDE 300 MG: 150 CAPSULE ORAL at 05:56

## 2024-02-03 RX ADMIN — MUPIROCIN 1 APPLICATION: 20 OINTMENT TOPICAL at 17:52

## 2024-02-03 RX ADMIN — OMEPRAZOLE 20 MG: 20 CAPSULE, DELAYED RELEASE ORAL at 17:51

## 2024-02-03 RX ADMIN — HUMAN ALBUMIN MICROSPHERES AND PERFLUTREN 3 ML: 10; .22 INJECTION, SOLUTION INTRAVENOUS at 15:45

## 2024-02-03 RX ADMIN — ACETAMINOPHEN 650 MG: 325 TABLET, FILM COATED ORAL at 17:59

## 2024-02-03 RX ADMIN — OMEPRAZOLE 20 MG: 20 CAPSULE, DELAYED RELEASE ORAL at 05:56

## 2024-02-03 RX ADMIN — MUPIROCIN 1 APPLICATION: 20 OINTMENT TOPICAL at 12:07

## 2024-02-03 RX ADMIN — NYSTATIN: 100000 POWDER TOPICAL at 08:04

## 2024-02-03 RX ADMIN — NYSTATIN: 100000 POWDER TOPICAL at 12:08

## 2024-02-03 RX ADMIN — FUROSEMIDE 60 MG: 10 INJECTION, SOLUTION INTRAVENOUS at 05:55

## 2024-02-03 RX ADMIN — ACETAMINOPHEN 650 MG: 325 TABLET, FILM COATED ORAL at 08:06

## 2024-02-03 RX ADMIN — FUROSEMIDE 80 MG: 10 INJECTION, SOLUTION INTRAMUSCULAR; INTRAVENOUS at 15:13

## 2024-02-03 RX ADMIN — NYSTATIN: 100000 POWDER TOPICAL at 17:52

## 2024-02-03 RX ADMIN — HEPARIN SODIUM 5000 UNITS: 5000 INJECTION, SOLUTION INTRAVENOUS; SUBCUTANEOUS at 23:32

## 2024-02-03 RX ADMIN — FUROSEMIDE 20 MG: 10 INJECTION, SOLUTION INTRAVENOUS at 09:15

## 2024-02-03 RX ADMIN — CLINDAMYCIN HYDROCHLORIDE 300 MG: 150 CAPSULE ORAL at 17:51

## 2024-02-03 RX ADMIN — CLINDAMYCIN HYDROCHLORIDE 300 MG: 150 CAPSULE ORAL at 12:07

## 2024-02-03 RX ADMIN — HEPARIN SODIUM 5000 UNITS: 5000 INJECTION, SOLUTION INTRAVENOUS; SUBCUTANEOUS at 05:56

## 2024-02-03 RX ADMIN — POLYETHYLENE GLYCOL 3350 1 PACKET: 17 POWDER, FOR SOLUTION ORAL at 18:31

## 2024-02-03 RX ADMIN — ATORVASTATIN CALCIUM 40 MG: 40 TABLET, FILM COATED ORAL at 17:51

## 2024-02-03 RX ADMIN — HEPARIN SODIUM 5000 UNITS: 5000 INJECTION, SOLUTION INTRAVENOUS; SUBCUTANEOUS at 15:14

## 2024-02-03 ASSESSMENT — ENCOUNTER SYMPTOMS
CHILLS: 0
COUGH: 0
NAUSEA: 0
WEAKNESS: 1
VOMITING: 0
PALPITATIONS: 0
ABDOMINAL PAIN: 0
HEADACHES: 0
FEVER: 0
SHORTNESS OF BREATH: 0
MYALGIAS: 0
DIZZINESS: 0

## 2024-02-03 ASSESSMENT — PAIN DESCRIPTION - PAIN TYPE
TYPE: ACUTE PAIN
TYPE: ACUTE PAIN

## 2024-02-03 ASSESSMENT — FIBROSIS 4 INDEX: FIB4 SCORE: 0.38

## 2024-02-03 NOTE — HOSPITAL COURSE
Cole Jaramillo is a 39-year-old male with morbid obesity (BMI 87.62), iron deficiency anemia, who presented on 2/1/2024 with 2-week history of difficulty with ambulation due to severe lower extremity swelling, associated with exertional dyspnea and orthopnea. Symptoms began after likely viral respiratory infection. He became bedbound and activated EMS after consultation with a mobile physician.      Initial exam notable for hypertension (SBP 160s), hypoxemia (requiring 2L NC), distended abdomen with erythema, and severe diffuse anasarca in setting of morbid obesity. Initial weight was 330 kg. Labs showed increased NT-proBNP (4729 mg/dL), mild troponemia, and mild microcytic anemia (12.1 g/dOL, MCV 77.9). D-dimer was also elevated.    He was admitted for acute on chronic heart failure with preserved ejection fraction, likely due to under-treated [facility].     Lower extremity venous Duplex ultrasound (2/3/2024) was limited by body habitus but was negative for DVTs. TTE (2/3/2024) showed LVEF 55% with flattened septum in diastole, consistent with RV volume overload (likely due to hypoxic vasoconstriction from recent URI). No regional wall motion abnormalities were seen.     He was aggressively diuresed via IV Lasix and was started on Aldactone, leading to 12-kg (or 26.45-lb) weight loss. He also received 5-day course of Clindamycin for suspected abdominal wall cellulitis, leading to improvement in pannicular erythema.     He also underwent aggressive reconditioning and strength training with PT and OT, who recommend discharge home with home health. He will benefit from bariatric surgery evaluation given inability to lose weight (having weighed 800-lbs last year).     Of note, he was also hospitalized (8/27-9/7/2023) last year for acute decompensated heart failure, at which time he underwent forced diuresis with plan for outpatient sleep study.     He is being referred to GI for colonoscopy to look for polyps given  history of LINDSEY.

## 2024-02-03 NOTE — CARE PLAN
The patient is Stable - Low risk of patient condition declining or worsening    Shift Goals  Clinical Goals: diures, skin integrity  Patient Goals: comfort  Family Goals: NA    Progress made toward(s) clinical / shift goals:    Problem: Care Map:  Day 2 Optimal Outcome for the Heart Failure Patient  Goal: Day 2:  Optimal Care of the heart failure patient  Outcome: Progressing     Problem: Knowledge Deficit - Standard  Goal: Patient and family/care givers will demonstrate understanding of plan of care, disease process/condition, diagnostic tests and medications  Outcome: Progressing     Problem: Skin Integrity  Goal: Skin integrity is maintained or improved  Outcome: Progressing       Patient is not progressing towards the following goals:

## 2024-02-03 NOTE — WOUND TEAM
[facility]  Inpatient Services  Initial Wound and Skin Care Evaluation    Admission Date: 2/1/2024     Last order of IP CONSULT TO WOUND CARE was found on 2/1/2024 from Hospital Encounter on 2/1/2024     HPI, PMH, SH: Reviewed    No past surgical history on file.  Social History     Tobacco Use    Smoking status: Never    Smokeless tobacco: Never   Substance Use Topics    Alcohol use: Yes     Comment: rarely     Chief Complaint   Patient presents with    Leg Swelling     Pt c/o increased leg swelling up to thighs x 1 month, making him unable to weight bear due to extent of swelling. Pt also c/o some mild shortness of breath when moving, sats 88% on room air in ED.     Diagnosis: Volume overload [E87.70]    Unit where seen by Wound Team: T737/01     WOUND CONSULT RELATED TO:  LLE    WOUND TEAM PLAN OF CARE - Frequency of Follow-up:   Nursing to follow dressing orders written for wound care. Contact wound team if area fails to progress, deteriorates or with any questions/concerns if something comes up before next scheduled follow up (See below as to whether wound is following and frequency of wound follow up)   {Follow-up Frequency:7143747}    WOUND HISTORY:   Pt is a 40 yo male who presented to the ED for increased swelling up to thighs   .  Pt admitted for volume overload, peripheral edema, hypoxia,morbid obesity, dehydration    WOUND ASSESSMENT/LDA        Vascular:    NOMAN:   NOMAN Results, Last 30 Days US-EXTREMITY ARTERY LOWER BILAT    Result Date: 2/2/2024  Narrative Lower Extremity  Arterial Duplex Report  Vascular Laboratory  CONCLUSIONS  1. Limited study due to patient's body habitus and immobility. Many of the  arteries were not visualized.  2. Allowing for the limitations, no hemodynamically significant stenosis  detected.  3. Triphasic flow at the distal posterior tibial and anterior tibial  arteries bilaterally.  [facility]ARDO POST  Exam Date:     02/02/2024 12:54  Room #:     Inpatient   Priority:     Routine  Ht (in):             Wt (lb):  Ordering Physician:        KONSTANTIN WATKINS  Referring Physician:       [id number] KEVIN,  Sonographer:               Delfin Michael                             RVLEIGH  Study Type:                Complete Bilateral  Technical Quality:         Adequate  Age:    39    Gender:     M  MRN:    [id number]  :    1985      BSA:  Indications:     Ulcer of lower extremity  CPT Codes:       17152  ICD Codes:       707.1  History:         Non-healing wound.  Limitations:     Body habitus and immobility.                RIGHT  Waveform        Peak Systolic Velocity (cm/s)                  Prox    Prox-Mid  Mid    Mid-Dist  Distal  Triphasic                         54                       CFA                                                             PFA                                                             SFA  Triphasic                         95                       POP  Triphasic                                          52      AT  Triphasic                                          58      PT                                                             FILIPE                LEFT  Waveform        Peak Systolic Velocity (cm/s)                  Prox    Prox-Mid  Mid    Mid-Dist  Distal  Triphasic                         41                       CFA                                                             PFA  Triphasic       31                41                       SFA  Triphasic                         29                       POP  Triphasic                                          58      AT  Triphasic                                          68      PT                                                             FILIPE  FINDINGS  Very limited exam due to limitations listed above.  Right-  The profunda femoral, femoral, peroneal, proximal posterior tibial and  proximal anterior tibial arteries are not visualized due to limitations   listed above.  Triphasic flow is demonstrated at the distal posterior tibial and anterior  tibial arteries.  Left-  The profunda femoral, peroneal, distal femoral, proximal anterior tibial  and proximal peroneal arteries are not visualized due to limitations listed  above.  Triphasic flow is demonstrated at the distal posterior tibial and anterior  tibial arteries.    Leonel Robin MD  (Electronically Signed)  Final Date:      02 February 2024                   15:19      Lab Values:    Lab Results   Component Value Date/Time    WBC 8.0 02/02/2024 01:31 AM    RBC 5.01 02/02/2024 01:31 AM    HEMOGLOBIN 11.2 (L) 02/02/2024 01:31 AM    HEMATOCRIT 39.1 (L) 02/02/2024 01:31 AM    CREACTPROT 3.59 (H) 02/01/2024 08:44 PM    SEDRATEWES 2 02/01/2024 08:44 PM    HBA1C 5.7 (H) 02/01/2024 02:01 PM         Culture Results show:  No results found for this or any previous visit (from the past 720 hour(s)).    Pain Level/Medicated:  {Pain Medications:02563}       INTERVENTIONS BY WOUND TEAM:  Chart and images reviewed. Discussed with bedside RN. All areas of concern (based on picture review, LDA review and discussion with bedside RN) have been thoroughly assessed. Documentation of areas based on significant findings. This RN in to assess patient. Performed standard wound care which includes appropriate positioning, dressing removal and non-selective debridement. Pictures and measurements obtained weekly if/when required.  {Wound Location & Interventions:6651314}    Advanced Wound Care Discharge Planning  Number of Clinicians necessary to complete wound care: ***  Is patient requiring IV pain medications for dressing changes:  {YES/NO:20266}  Length of time for dressing change *** min. (This does not include chart review, pre-medication time, set up, clean up or time spent charting.)    Interdisciplinary consultation: Patient, Bedside RN (***), {Wound Team:58552}.  Pressure injury and staging reviewed with {Wound  "Team:74576}.    EVALUATION / RATIONALE FOR TREATMENT:     Date:  02/02/24  Wound Status:  {Wound Status:3528544}    ***         Goals: Steady decrease in wound area and depth weekly.    NURSING PLAN OF CARE ORDERS:  {Nursing Plan of Care:3037969}    NUTRITION RECOMMENDATIONS   Wound Team Recommendations:  {IP Wound Care Team Nutrition:9696226}    DIET ORDERS (From admission to next 24h)       Start     Ordered    02/01/24 1502  Diet Order Diet: 2 Gram Sodium  ALL MEALS        Question:  Diet:  Answer:  2 Gram Sodium    02/01/24 1501                    PREVENTATIVE INTERVENTIONS:    Q shift Luis - performed per nursing policy  Q shift pressure point assessments - performed per nursing policy    {RSkin Interventions:9393243}    Anticipated discharge plans:  {Anticipated DC Plans:0934937}        Vac Discharge Needs:  Vac Discharge plan is purely a recommendation from wound team and not a requirement for discharge unless otherwise stated by physician.  {Wound Vac DC Needs:6246971::\"Not Applicable Pt not on a wound vac\"}   "

## 2024-02-03 NOTE — THERAPY
"Occupational Therapy   Initial Evaluation     Patient Name: Cole Jaramillo  Age:  39 y.o., Sex:  male  Medical Record #: [id number]  Today's Date: 2/2/2024     Precautions  Precautions: Fall Risk    Assessment    Patient is 39 y.o. male admitted with B LE edema and SOB. Other pertinent medical history includes morbid obesity, abdominal wall cellulitis, HTN, HFpEF, and weakness. Pt seen for OT evaluation. Pt required total A to don socks, max A (x2-3 person assist) for bed mobility, min A to brush hair while seated EOB, and CGA to stand/take side steps EOB. Pt has support from caregivers for some ADLs and support from his family for some IADLs. Pt current functional performance limited by impaired activity tolerance, impaired balance, and generalized weakness. Pt will benefit from skilled OT while admitted to acute care.      Plan    Occupational Therapy Initial Treatment Plan   Treatment Interventions: Self Care / Activities of Daily Living, Adaptive Equipment, Neuro Re-Education / Balance, Therapeutic Exercises, Therapeutic Activity  Treatment Frequency: 3 Times per Week  Duration: Until Therapy Goals Met    DC Equipment Recommendations: Unable to determine at this time  Discharge Recommendations: Recommend post-acute placement for additional occupational therapy services prior to discharge home     Subjective    \"I recognize you from last time I was here.\" \"Therapy was a good highlight of my last hospital stay\"     Objective     02/02/24 1503   Prior Living Situation   Prior Services correction Home Aide Services   Housing / Facility 1 Story Apartment / Condo   Steps Into Home 0   Steps In Home 0   Bathroom Set up Bathtub / Shower Combination  (bench will not fit due to bathtuv design)   Equipment Owned Front-Wheel Walker;Single Point Cane;Sock Aid   Lives with - Patient's Self Care Capacity Unrelated Adult   Comments Pt lives with a roommate, but reported he is unable to assist. Pt's family assists with groceries " and that he can call his mom or sister for additional assist if needed.   Prior Level of ADL Function   Self Feeding Independent   Grooming / Hygiene Independent   Bathing Requires Assist  (sponge baths only)   Dressing Requires Assist   Toileting Requires Assist   Prior Level of IADL Function   Medication Management Independent   Laundry Requires Assist   Finances Independent   Home Management Independent   Shopping Requires Assist   Prior Level Of Mobility Independent With Device in Home   Occupation (Pre-Hospital Vocational) Requires Sitting, Desk, Computer Tasks  ()   Precautions   Precautions Fall Risk   Pain   Pain Scales 0 to 10 Scale    Pain 0 - 10 Group   Therapist Pain Assessment During Activity;Nurse Notified  (not rated, agreeable to session)   Cognition    Cognition / Consciousness WDL   Level of Consciousness Alert   Comments Very pleasant, cooperative, recalled this therapist from prior admission   Active ROM Upper Body   Comments WFL   Strength Upper Body   Upper Body Strength  WDL   Upper Body Muscle Tone   Upper Body Muscle Tone  WDL   Coordination Upper Body   Comments Appears WFL from observation   Balance Assessment   Sitting Balance (Static) Fair   Sitting Balance (Dynamic) Fair -   Standing Balance (Static) Fair -   Standing Balance (Dynamic) Fair -   Weight Shift Sitting Fair   Weight Shift Standing Fair   Comments w/ bariatric FWW   Bed Mobility    Supine to Sit Maximal Assist   Sit to Supine Maximal Assist   Scooting Maximal Assist   Rolling Maximum Assist to Lt.   Comments x2-3 person assist   ADL Assessment   Grooming Minimal Assist;Seated  (brushed hair EOB)   Lower Body Dressing Total Assist  (don socks)   Functional Mobility   Sit to Stand Contact Guard Assist   Mobility EOB>stand with side steps>EOB>stand>supine   Comments w/ bariatric FWW   Visual Perception   Visual Perception  Not Tested   Activity Tolerance   Sitting in Chair NT   Sitting Edge of Bed >10 min   Standing  <5 min   Comments limited by weakness and fatigue   Patient / Family Goals   Patient / Family Goal #1 to get stronger   Short Term Goals   Short Term Goal # 1 Pt will perform ADL transfer w/ supv   Short Term Goal # 2 Pt will perform seated g/h w/ supv   Short Term Goal # 3 Pt will perform UB dressing w/ supv while seated EOB   Short Term Goal # 4 Pt will perform LB dressing w/ supv and AE PRN   Education Group   Education Provided Role of Occupational Therapist   Role of Occupational Therapist Patient Response Patient;Acceptance;Explanation;Verbal Demonstration   Occupational Therapy Initial Treatment Plan    Treatment Interventions Self Care / Activities of Daily Living;Adaptive Equipment;Neuro Re-Education / Balance;Therapeutic Exercises;Therapeutic Activity   Treatment Frequency 3 Times per Week   Duration Until Therapy Goals Met   Problem List   Problem List Decreased Active Daily Living Skills;Decreased Homemaking Skills;Decreased Functional Mobility;Decreased Activity Tolerance;Limited Knowledge of Post Op Precautions

## 2024-02-03 NOTE — PROGRESS NOTES
[facility] Medicine Daily Progress Note    Date of Service  2/2/2024    Chief Complaint  Cole Jaramillo is a 39 y.o. male admitted 2/1/2024 with leg swelling and inability to walk.    Hospital Course  Cole Jaramillo is a 39 y.o. male who presented on 2/1/2024 for evaluation of worsening lower extremity edema and inability to ambulate.  This is a pleasant gentleman with a history of severe obesity .  He had difficulty ambulating for the past 2 weeks after having a cold and was mostly in bed.  In the emergency room, he was requiring 2 LPM oxygen mask,.  Noted to have elevated NT proBNP of 4000 729.  Blood pressures were elevated in the 160s.  Patient was admitted to the telemetry floor due to volume overload requiring forced IV diuresis.    Interval Problem Update  02/02/24  Patient was seen and examined on the telemetry floor.  He continues on continuous cardiac monitoring.  Continues on 1 LPM oxygen via nasal cannula.  Urine output of 800 L yesterday on furosemide 60 mg IV twice daily.  Starting spironolactone 25 mg daily.  Reviewed patient's discharge summary from his hospitalization on 8/27/2023 to 9/7/2023.  He presented with similar symptoms.  He obtained intensive physical therapy during his hospitalization for strengthening.  He was referred for outpatient sleep study and was treated with IV Lasix.  He was requiring 3 LPM oxygen minus cannula during ambulation.  Additional history gathered from the patient.  He states that he initially weighed approximately 800 pounds and got to about 700 pounds by the time of discharge.  He lost additional 100 pounds and once approximately 600 pounds, when he recently got a cold and was bedridden.  He regained his weight since then.  Most of it fluid he believes.  Resulting in difficulty with ambulation.  Discussed weight management strategies.  Discussed with case management leadership in regards to obtaining additional physical therapy.  Nutrition consult  placed.    I have discussed this patient's plan of care and discharge plan at IDT rounds today with Case Management, Nursing, Nursing leadership, and other members of the IDT team.    Consultants/Specialty  none    Code Status  Full Code    Disposition  The patient is not medically cleared for discharge to home or a post-acute facility.  Anticipate discharge to: home with close outpatient follow-up    I have placed the appropriate orders for post-discharge needs.    Review of Systems  Review of Systems   Constitutional:  Negative for chills and fever.   Respiratory:  Negative for cough and shortness of breath.    Cardiovascular:  Positive for leg swelling. Negative for chest pain and palpitations.   Gastrointestinal:  Negative for abdominal pain, nausea and vomiting.   Musculoskeletal:  Negative for joint pain and myalgias.   Neurological:  Positive for weakness. Negative for dizziness and headaches.        Physical Exam  Temp:  [36.3 °C (97.3 °F)-36.5 °C (97.7 °F)] 36.4 °C (97.5 °F)  Pulse:  [68-87] 71  Resp:  [18-22] 22  BP: ()/(67-82) 124/70  SpO2:  [93 %-98 %] 96 %    Physical Exam  Vitals and nursing note reviewed.   Constitutional:       General: He is not in acute distress.     Appearance: He is obese. He is ill-appearing.      Interventions: Nasal cannula in place.   HENT:      Head: Normocephalic and atraumatic.      Mouth/Throat:      Mouth: Mucous membranes are moist.      Pharynx: Oropharynx is clear. No oropharyngeal exudate.   Eyes:      General: No scleral icterus.        Right eye: No discharge.         Left eye: No discharge.      Conjunctiva/sclera: Conjunctivae normal.   Cardiovascular:      Rate and Rhythm: Normal rate and regular rhythm.      Pulses: Normal pulses.      Heart sounds: Normal heart sounds. No murmur heard.  Pulmonary:      Effort: Pulmonary effort is normal. No respiratory distress.      Breath sounds: Normal breath sounds.   Abdominal:      General: Abdomen is flat. Bowel  sounds are normal. There is no distension.      Palpations: Abdomen is soft.   Musculoskeletal:         General: No swelling.      Cervical back: Neck supple. No tenderness.      Right lower leg: Edema present.      Left lower leg: Edema present.      Comments: Severe diffuse edema in bilateral lower extremities   Skin:     General: Skin is warm and dry.      Coloration: Skin is not pale.   Neurological:      Mental Status: He is alert and oriented to person, place, and time. Mental status is at baseline.      Motor: Weakness (Bilateral lower extremities) present.   Psychiatric:         Thought Content: Thought content normal.         Judgment: Judgment normal.         Fluids    Intake/Output Summary (Last 24 hours) at 2/2/2024 2107  Last data filed at 2/2/2024 1941  Gross per 24 hour   Intake 240 ml   Output 1550 ml   Net -1310 ml       Laboratory  Recent Labs     02/01/24  1401 02/02/24  0131   WBC 7.6 8.0   RBC 5.44 5.01   HEMOGLOBIN 12.1* 11.2*   HEMATOCRIT 42.4 39.1*   MCV 77.9* 78.0*   MCH 22.2* 22.4*   MCHC 28.5* 28.6*   RDW 58.5* 59.3*   PLATELETCT 316 300   MPV 9.3 9.2     Recent Labs     02/01/24  1327 02/02/24  0131   SODIUM 136 136   POTASSIUM 4.7 4.5   CHLORIDE 98 98   CO2 25 27   GLUCOSE 100* 106*   BUN 21 20   CREATININE 0.96 1.11   CALCIUM 8.6 8.5     Recent Labs     02/01/24  1327   APTT 29.9   INR 1.31*         Recent Labs     02/02/24  0131   TRIGLYCERIDE 72   HDL 21*   LDL 53       Imaging  US-EXTREMITY ARTERY LOWER BILAT   Final Result      US-EXTREMITY VENOUS LOWER BILAT   Final Result      DX-CHEST-PORTABLE (1 VIEW)   Final Result      Cardiomegaly.      EC-ECHOCARDIOGRAM COMPLETE W/O CONT    (Results Pending)        Assessment/Plan  * Acute on chronic heart failure with preserved ejection fraction (HFpEF) ([facility])  Assessment & Plan  Suspected  EF 65% in September 2023  Repeat echo  Diuresis as tolerated-Lasix 60 mg IV twice daily  Optimize CHF meds as able to tolerate    2/2  Likely due to  MC/OHS.  Continue forced diuresis with furosemide 60 mg IV BID  Add spironolactone 25 mg daily  Continuous telemetry monitoring during forced diuresis    Acute respiratory failure with hypoxia (HCC)- (present on admission)  Assessment & Plan  On 2 L-wean off as tolerated  Diuresis  As needed nebs  Check echo, lower extremity Doppler    02/02/24  Improving.  Currently on 1 LPM.  Continue IV forced diuresis  Continuous pulse oximetry monitoring    Weakness  Assessment & Plan  Weakness  PT/OT    Elevated troponin  Assessment & Plan  Likely secondary to demand ischemia, fluid overload  ASA/statin  Check echo    At risk for obstructive sleep apnea  Assessment & Plan  Outpt sleep study    Bilateral leg edema  Assessment & Plan  Diuresis  Check LE Doppler    Volume overload- (present on admission)  Assessment & Plan  Diuresis as able to tolerate    Hypertension- (present on admission)  Assessment & Plan  Uncontrolled  Lasix, Coreg, lisinopril    Class 3 severe obesity due to excess calories with serious comorbidity and body mass index (BMI) greater than or equal to 70 in adult (HCC)- (present on admission)  Assessment & Plan  Body mass index is 101.4 kg/m².  Diet and lifestyle modification  May benefit from bariatric surgery evaluation    2/2  Nutrition consult  PT and OT     Abdominal wall cellulitis- (present on admission)  Assessment & Plan  Already taking Clindamycin -- 4days course remaining -- continue         VTE prophylaxis:    heparin ppx      I have performed a physical exam and reviewed and updated ROS and Plan today (2/2/2024). In review of yesterday's note (2/1/2024), there are no changes except as documented above.

## 2024-02-03 NOTE — PROGRESS NOTES
Monitor Summary:     Rhythm: SR  Rate: 71-90  Ectopy: rPVC, rPAC  Measurements: 0.16/0.10/0.43           12 Hour Chart Check

## 2024-02-03 NOTE — WOUND TEAM
[facility]  Inpatient Services  Initial Wound and Skin Care Evaluation    Admission Date: 2/1/2024     Last order of IP CONSULT TO WOUND CARE was found on 2/1/2024 from Hospital Encounter on 2/1/2024     HPI, PMH, SH: Reviewed    No past surgical history on file.  Social History     Tobacco Use    Smoking status: Never    Smokeless tobacco: Never   Substance Use Topics    Alcohol use: Yes     Comment: rarely     Chief Complaint   Patient presents with    Leg Swelling     Pt c/o increased leg swelling up to thighs x 1 month, making him unable to weight bear due to extent of swelling. Pt also c/o some mild shortness of breath when moving, sats 88% on room air in ED.     Diagnosis: Volume overload [E87.70]    Unit where seen by Wound Team: [id number]/01     WOUND CONSULT RELATED TO:  Left thigh/sacrum    WOUND TEAM PLAN OF CARE - Frequency of Follow-up:   Nursing to follow dressing orders written for wound care. Contact wound team if area fails to progress, deteriorates or with any questions/concerns if something comes up before next scheduled follow up (See below as to whether wound is following and frequency of wound follow up)   Not following, consult as needed  - Left buttock POA Stage 2    WOUND HISTORY:     Cole Jaramillo is a 39 y.o. male with severe morbid obesity who presented 2/1/2024 with evaluation for increased lower extremity swelling, weakness.  Patient reported difficulty ambulating for the past 2 weeks, due to increased swelling in lower extremities.  He required 2 L nasal cannula support in ER, also noted to have elevated proBNP with SBP above 160.  Patient initially was given 1 L IVF bolus, then 20 mg IV Lasix ordered by ERP due to concern of volume overload.  Admission requested by ERP.  Admitted to medicine service for further evaluation treatment.        WOUND ASSESSMENT/LDA  Wound 02/01/24 Pressure Injury Thigh;Buttocks Left POA Stage 2 (Active)   Date First Assessed/Time First  Assessed: 24 1702   Present on Original Admission: Yes  Hand Hygiene Completed: Yes  Primary Wound Type: Pressure Injury  Location: Thigh;Buttocks  Laterality: Left  Wound Description (Comments): POA Stage 2      Assessments 2/3/2024  1:00 PM   Wound Image     Site Assessment Pink;Early/partial granulation;Fragile   Periwound Assessment Denuded;Pink   Margins Attached edges;Defined edges   Closure Open to air   Drainage Amount Scant   Drainage Description Serous   Treatments Cleansed;Nonselective debridement;Site care;Offloading   Wound Cleansing Foam Cleanser/Washcloth   Periwound Protectant No-sting Skin Prep   Dressing Status Open to Air   NEXT Weekly Photo (Inpatient Only) 24   Wound Team Following Not following   Pressure Injury Stage Stage 2   Wound Length (cm) 2 cm   Wound Width (cm) 3 cm   Wound Depth (cm) 0.05 cm   Wound Surface Area (cm^2) 6 cm^2   Wound Volume (cm^3) 0.3 cm^3   Shape oval   Wound Odor None        Vascular:    NOMAN:   NOMAN Results, Last 30 Days US-EXTREMITY ARTERY LOWER BILAT    Result Date: 2024  Narrative Lower Extremity  Arterial Duplex Report  Vascular Laboratory  CONCLUSIONS  1. Limited study due to patient's body habitus and immobility. Many of the  arteries were not visualized.  2. Allowing for the limitations, no hemodynamically significant stenosis  detected.  3. Triphasic flow at the distal posterior tibial and anterior tibial  arteries bilaterally.  [facility]  Exam Date:     2024 12:54  Room #:     Inpatient  Priority:     Routine  Ht (in):             Wt (lb):  Ordering Physician:        KONSTANTIN WATKINS  Referring Physician:       [id number] Harris KEVIN,  Sonographer:               Delfin Michael                             RVT  Study Type:                Complete Bilateral  Technical Quality:         Adequate  Age:    39    Gender:     M  MRN:    [id number]  :    1985      BSA:  Indications:     Ulcer of lower  extremity  CPT Codes:       14370  ICD Codes:       707.1  History:         Non-healing wound.  Limitations:     Body habitus and immobility.                RIGHT  Waveform        Peak Systolic Velocity (cm/s)                  Prox    Prox-Mid  Mid    Mid-Dist  Distal  Triphasic                         54                       CFA                                                             PFA                                                             SFA  Triphasic                         95                       POP  Triphasic                                          52      AT  Triphasic                                          58      PT                                                             FILIPE                LEFT  Waveform        Peak Systolic Velocity (cm/s)                  Prox    Prox-Mid  Mid    Mid-Dist  Distal  Triphasic                         41                       CFA                                                             PFA  Triphasic       31                41                       SFA  Triphasic                         29                       POP  Triphasic                                          58      AT  Triphasic                                          68      PT                                                             FILIPE  FINDINGS  Very limited exam due to limitations listed above.  Right-  The profunda femoral, femoral, peroneal, proximal posterior tibial and  proximal anterior tibial arteries are not visualized due to limitations  listed above.  Triphasic flow is demonstrated at the distal posterior tibial and anterior  tibial arteries.  Left-  The profunda femoral, peroneal, distal femoral, proximal anterior tibial  and proximal peroneal arteries are not visualized due to limitations listed  above.  Triphasic flow is demonstrated at the distal posterior tibial and anterior  tibial arteries.    Leonel Robin MD  (Electronically Signed)  Final Date:      02  February 2024                   15:19      Lab Values:    Lab Results   Component Value Date/Time    WBC 8.0 02/02/2024 01:31 AM    RBC 5.01 02/02/2024 01:31 AM    HEMOGLOBIN 11.2 (L) 02/02/2024 01:31 AM    HEMATOCRIT 39.1 (L) 02/02/2024 01:31 AM    CREACTPROT 3.59 (H) 02/01/2024 08:44 PM    SEDRATEWES 2 02/01/2024 08:44 PM    HBA1C 5.7 (H) 02/01/2024 02:01 PM         Culture Results show:  No results found for this or any previous visit (from the past 720 hour(s)).    Pain Level/Medicated:  None, Tolerated without pain medication       INTERVENTIONS BY WOUND TEAM:  Chart and images reviewed. Discussed with bedside RN. All areas of concern (based on picture review, LDA review and discussion with bedside RN) have been thoroughly assessed. Documentation of areas based on significant findings. This RN in to assess patient. Performed standard wound care which includes appropriate positioning, dressing removal and non-selective debridement. Pictures and measurements obtained weekly if/when required.    Wound:  Left buttock/upper thigh - POA Stage 2  Preparation for Dressing removal: Open to air  Cleansed/Non-selectively Debrided with:  No rinse foam soap and Moist warm washcloth  Shelia wound: Cleansed with No rinse foam soap and Moist warm washcloth, Prepped with No Sting  Primary Dressing:  open to air due to incontinence    Advanced Wound Care Discharge Planning  Number of Clinicians necessary to complete wound care: 5  Is patient requiring IV pain medications for dressing changes:  No   Length of time for dressing change 30 min. (This does not include chart review, pre-medication time, set up, clean up or time spent charting.)    Interdisciplinary consultation: Patient, Bedside RN (Angela)    EVALUATION / RATIONALE FOR TREATMENT:     Date:  02/03/24  Wound Status:  Initial evaluation    Patient with denuded partial thickness tissue loss to posterior upper thigh/buttock with non- blanching erythema - POA Stage 2 pressure  injury.  Cleansed area and applied no sting barrier, left open to air. Offloading measures and purwick in place.          Goals: Steady decrease in wound area and depth weekly.    NURSING PLAN OF CARE ORDERS:  Skin care: See Skin Care orders    NUTRITION RECOMMENDATIONS   Wound Team Recommendations:  N/A    DIET ORDERS (From admission to next 24h)       Start     Ordered    02/01/24 1502  Diet Order Diet: 2 Gram Sodium  ALL MEALS        Question:  Diet:  Answer:  2 Gram Sodium    02/01/24 1501                    PREVENTATIVE INTERVENTIONS:    Q shift Luis - performed per nursing policy  Q shift pressure point assessments - performed per nursing policy    Surface/Positioning  Bariatric LOIS - Currently in Place  Reposition q 2 hours - Currently in Place  TAPs Turning system - Currently in Place    Offloading/Redistribution  Heel offloading dressing (Silicone dressing) - Currently in Place  Heel float boots (Prevalon boot) - Currently in Place  Float Heels off Bed with Pillows - Currently in Place           Containment/Moisture Prevention    Purwick/Condom Cath - Currently in Place  Antifungal treatment - Currently in Place  Interdry - Currently in Place    Anticipated discharge plans:  TBD        Vac Discharge Needs:  Vac Discharge plan is purely a recommendation from wound team and not a requirement for discharge unless otherwise stated by physician.  Not Applicable Pt not on a wound vac

## 2024-02-03 NOTE — CARE PLAN
The patient is Stable - Low risk of patient condition declining or worsening    Shift Goals  Clinical Goals: maintain skin integrity, diuresis  Patient Goals: comfort  Family Goals: NA    Progress made toward(s) clinical / shift goals:        Problem: Care Map:  Day 3 Optimal Outcome for the Heart Failure Patient  Goal: Day 3:  Optimal Care of the heart failure patient  Outcome: Progressing     Problem: Knowledge Deficit - Standard  Goal: Patient and family/care givers will demonstrate understanding of plan of care, disease process/condition, diagnostic tests and medications  Outcome: Progressing     Problem: Skin Integrity  Goal: Skin integrity is maintained or improved  Outcome: Progressing       Patient is not progressing towards the following goals:

## 2024-02-03 NOTE — PROGRESS NOTES
Patient is A&Ox4. Complains of a headache, patient medicated per the MAR. Plan of care discussed with the patient, all concerns addressed. Call light is within reach and patient educated on fall precautions, verbalized and demonstrated understanding. Turning for skin integrity in place. Bed in lowest and locked position. Hourly rounding in place.

## 2024-02-04 PROBLEM — I50.811 ACUTE RIGHT-SIDED CONGESTIVE HEART FAILURE (HCC): Status: ACTIVE | Noted: 2024-02-04

## 2024-02-04 LAB
ALBUMIN SERPL BCP-MCNC: 3.5 G/DL (ref 3.2–4.9)
BUN SERPL-MCNC: 20 MG/DL (ref 8–22)
CALCIUM ALBUM COR SERPL-MCNC: 8.8 MG/DL (ref 8.5–10.5)
CALCIUM SERPL-MCNC: 8.4 MG/DL (ref 8.5–10.5)
CHLORIDE SERPL-SCNC: 97 MMOL/L (ref 96–112)
CO2 SERPL-SCNC: 27 MMOL/L (ref 20–33)
CREAT SERPL-MCNC: 1.12 MG/DL (ref 0.5–1.4)
GFR SERPLBLD CREATININE-BSD FMLA CKD-EPI: 85 ML/MIN/1.73 M 2
GLUCOSE SERPL-MCNC: 110 MG/DL (ref 65–99)
MAGNESIUM SERPL-MCNC: 2.1 MG/DL (ref 1.5–2.5)
PHOSPHATE SERPL-MCNC: 4.4 MG/DL (ref 2.5–4.5)
POTASSIUM SERPL-SCNC: 4.5 MMOL/L (ref 3.6–5.5)
SODIUM SERPL-SCNC: 135 MMOL/L (ref 135–145)

## 2024-02-04 PROCEDURE — A9270 NON-COVERED ITEM OR SERVICE: HCPCS | Performed by: STUDENT IN AN ORGANIZED HEALTH CARE EDUCATION/TRAINING PROGRAM

## 2024-02-04 PROCEDURE — 700111 HCHG RX REV CODE 636 W/ 250 OVERRIDE (IP): Performed by: STUDENT IN AN ORGANIZED HEALTH CARE EDUCATION/TRAINING PROGRAM

## 2024-02-04 PROCEDURE — 700102 HCHG RX REV CODE 250 W/ 637 OVERRIDE(OP): Performed by: STUDENT IN AN ORGANIZED HEALTH CARE EDUCATION/TRAINING PROGRAM

## 2024-02-04 PROCEDURE — 83735 ASSAY OF MAGNESIUM: CPT

## 2024-02-04 PROCEDURE — 99233 SBSQ HOSP IP/OBS HIGH 50: CPT | Performed by: STUDENT IN AN ORGANIZED HEALTH CARE EDUCATION/TRAINING PROGRAM

## 2024-02-04 PROCEDURE — 80069 RENAL FUNCTION PANEL: CPT

## 2024-02-04 PROCEDURE — 770020 HCHG ROOM/CARE - TELE (206)

## 2024-02-04 PROCEDURE — 36415 COLL VENOUS BLD VENIPUNCTURE: CPT

## 2024-02-04 RX ORDER — POLYETHYLENE GLYCOL 3350 17 G/17G
1 POWDER, FOR SOLUTION ORAL 3 TIMES DAILY
Status: DISCONTINUED | OUTPATIENT
Start: 2024-02-04 | End: 2024-02-16 | Stop reason: HOSPADM

## 2024-02-04 RX ORDER — FUROSEMIDE 10 MG/ML
60 INJECTION INTRAMUSCULAR; INTRAVENOUS
Status: DISCONTINUED | OUTPATIENT
Start: 2024-02-04 | End: 2024-02-07

## 2024-02-04 RX ADMIN — FUROSEMIDE 60 MG: 10 INJECTION, SOLUTION INTRAVENOUS at 09:09

## 2024-02-04 RX ADMIN — CLINDAMYCIN HYDROCHLORIDE 300 MG: 150 CAPSULE ORAL at 17:23

## 2024-02-04 RX ADMIN — ACETAMINOPHEN 650 MG: 325 TABLET, FILM COATED ORAL at 21:15

## 2024-02-04 RX ADMIN — FUROSEMIDE 60 MG: 10 INJECTION, SOLUTION INTRAVENOUS at 15:02

## 2024-02-04 RX ADMIN — POLYETHYLENE GLYCOL 3350 1 PACKET: 17 POWDER, FOR SOLUTION ORAL at 21:07

## 2024-02-04 RX ADMIN — ACETAMINOPHEN 650 MG: 325 TABLET, FILM COATED ORAL at 01:44

## 2024-02-04 RX ADMIN — DOCUSATE SODIUM 100 MG: 100 CAPSULE, LIQUID FILLED ORAL at 17:22

## 2024-02-04 RX ADMIN — OMEPRAZOLE 20 MG: 20 CAPSULE, DELAYED RELEASE ORAL at 05:43

## 2024-02-04 RX ADMIN — NYSTATIN: 100000 POWDER TOPICAL at 05:42

## 2024-02-04 RX ADMIN — ASPIRIN 81 MG: 81 TABLET, COATED ORAL at 05:41

## 2024-02-04 RX ADMIN — MUPIROCIN 2 ML: 20 OINTMENT TOPICAL at 05:42

## 2024-02-04 RX ADMIN — MUPIROCIN 1 APPLICATION: 20 OINTMENT TOPICAL at 17:23

## 2024-02-04 RX ADMIN — NYSTATIN: 100000 POWDER TOPICAL at 12:04

## 2024-02-04 RX ADMIN — ATORVASTATIN CALCIUM 40 MG: 40 TABLET, FILM COATED ORAL at 17:22

## 2024-02-04 RX ADMIN — FUROSEMIDE 80 MG: 10 INJECTION, SOLUTION INTRAMUSCULAR; INTRAVENOUS at 05:41

## 2024-02-04 RX ADMIN — HEPARIN SODIUM 5000 UNITS: 5000 INJECTION, SOLUTION INTRAVENOUS; SUBCUTANEOUS at 15:01

## 2024-02-04 RX ADMIN — SPIRONOLACTONE 50 MG: 25 TABLET ORAL at 05:41

## 2024-02-04 RX ADMIN — POLYETHYLENE GLYCOL 3350 1 PACKET: 17 POWDER, FOR SOLUTION ORAL at 05:42

## 2024-02-04 RX ADMIN — OMEPRAZOLE 20 MG: 20 CAPSULE, DELAYED RELEASE ORAL at 17:22

## 2024-02-04 RX ADMIN — HEPARIN SODIUM 5000 UNITS: 5000 INJECTION, SOLUTION INTRAVENOUS; SUBCUTANEOUS at 05:41

## 2024-02-04 RX ADMIN — DOCUSATE SODIUM 100 MG: 100 CAPSULE, LIQUID FILLED ORAL at 05:43

## 2024-02-04 RX ADMIN — HEPARIN SODIUM 5000 UNITS: 5000 INJECTION, SOLUTION INTRAVENOUS; SUBCUTANEOUS at 21:07

## 2024-02-04 RX ADMIN — CLINDAMYCIN HYDROCHLORIDE 300 MG: 150 CAPSULE ORAL at 05:41

## 2024-02-04 RX ADMIN — CLINDAMYCIN HYDROCHLORIDE 300 MG: 150 CAPSULE ORAL at 12:04

## 2024-02-04 ASSESSMENT — ENCOUNTER SYMPTOMS
CONSTIPATION: 1
WEAKNESS: 1
COUGH: 0
PALPITATIONS: 0
MYALGIAS: 0
ABDOMINAL PAIN: 0
NAUSEA: 0
FEVER: 0
DIZZINESS: 0
CHILLS: 0
VOMITING: 0
HEADACHES: 0
SHORTNESS OF BREATH: 0
DIARRHEA: 0

## 2024-02-04 ASSESSMENT — FIBROSIS 4 INDEX: FIB4 SCORE: 0.38

## 2024-02-04 ASSESSMENT — PAIN DESCRIPTION - PAIN TYPE
TYPE: ACUTE PAIN

## 2024-02-04 NOTE — DIETARY
Nutrition Services: Nutrition Education Consult   Day 2 of admit.  Cole Jaramillo is a 39 y.o. male with admitting DX of Volume overload [E87.70]    RD able to visit pt at bedside with his mom and 2 sisters to provide nutrition education. RD discussed nutrition tips for weight loss including following the plate method for meal balance and portion control. RD provided MyPlate handout reinforcing topics discussed. Pt demonstrated readiness and evidence of learning. RD able to answer all questions to patient's satisfaction. RD encouraged pt to f/u with outpatient RD.     No other education needs identified at this time. Consider referral to outpatient nutrition services for continuation of education as indicated or per pt preferences.     Please re-consult RD as indicated.

## 2024-02-04 NOTE — CARE PLAN
The patient is Stable - Low risk of patient condition declining or worsening    Shift Goals  Clinical Goals: Diuresis, maintain skin integrity, wean O2  Patient Goals: Comfort, rest, turns  Family Goals: EMERSON    Progress made toward(s) clinical / shift goals:  yes    Patient is not progressing towards the following goals:

## 2024-02-04 NOTE — PROGRESS NOTES
[facility] Medicine Daily Progress Note    Date of Service  2/3/2024    Chief Complaint  Cole Jaramillo is a 39 y.o. male admitted 2/1/2024 with leg swelling and inability to walk.    Hospital Course  oCle Jaramillo is a 39 y.o. male who presented on 2/1/2024 for evaluation of worsening lower extremity edema and inability to ambulate.  This is a pleasant gentleman with a history of severe obesity .  He had difficulty ambulating for the past 2 weeks after having a cold and was mostly in bed.  In the emergency room, he was requiring 2 LPM oxygen mask,.  Noted to have elevated NT proBNP of 4000 729.  Blood pressures were elevated in the 160s.  Patient was admitted to the telemetry floor due to volume overload requiring forced IV diuresis.    Interval Problem Update  02/02/24  Patient was seen and examined on the telemetry floor.  He continues on continuous cardiac monitoring.  Continues on 1 LPM oxygen via nasal cannula.  Urine output of 800 L yesterday on furosemide 60 mg IV twice daily.  Starting spironolactone 25 mg daily.  Reviewed patient's discharge summary from his hospitalization on 8/27/2023 to 9/7/2023.  He presented with similar symptoms.  He obtained intensive physical therapy during his hospitalization for strengthening.  He was referred for outpatient sleep study and was treated with IV Lasix.  He was requiring 3 LPM oxygen minus cannula during ambulation.  Additional history gathered from the patient.  He states that he initially weighed approximately 800 pounds and got to about 700 pounds by the time of discharge.  He lost additional 100 pounds and once approximately 600 pounds, when he recently got a cold and was bedridden.  He regained his weight since then.  Most of it fluid he believes.  Resulting in difficulty with ambulation.  Discussed weight management strategies.  Discussed with case management leadership in regards to obtaining additional physical therapy.  Nutrition consult  placed.    02/03/24  Patient was seen and examined on the telemetry floor.  He continues on continuous cardiac monitoring.  Decreasing oxygen requirements to 1.5 LPM.  Urine output of 3.1 L yesterday.  Continues on fluid restriction.  Increasing furosemide to 80 mg IV twice daily.  Increasing spironolactone to 50 mg daily.  Was able to stand up yesterday with assistance.  Discussed with patient and mother at bedside.    I have discussed this patient's plan of care and discharge plan at IDT rounds today with Case Management, Nursing, Nursing leadership, and other members of the IDT team.    Consultants/Specialty  none    Code Status  Full Code    Disposition  The patient is not medically cleared for discharge to home or a post-acute facility.  Anticipate discharge to: home with close outpatient follow-up    I have placed the appropriate orders for post-discharge needs.    Review of Systems  Review of Systems   Constitutional:  Negative for chills and fever.   Respiratory:  Negative for cough and shortness of breath.    Cardiovascular:  Positive for leg swelling. Negative for chest pain and palpitations.   Gastrointestinal:  Negative for abdominal pain, nausea and vomiting.   Musculoskeletal:  Negative for joint pain and myalgias.   Neurological:  Positive for weakness. Negative for dizziness and headaches.        Physical Exam  Temp:  [36.1 °C (97 °F)-36.6 °C (97.9 °F)] 36.5 °C (97.7 °F)  Pulse:  [66-76] 75  Resp:  [20-22] 20  BP: (108-128)/(67-81) 122/81  SpO2:  [94 %-97 %] 96 %    Physical Exam  Vitals and nursing note reviewed.   Constitutional:       General: He is not in acute distress.     Appearance: He is obese. He is ill-appearing.      Interventions: Nasal cannula in place.   HENT:      Head: Normocephalic and atraumatic.      Mouth/Throat:      Mouth: Mucous membranes are moist.      Pharynx: Oropharynx is clear. No oropharyngeal exudate.   Eyes:      General: No scleral icterus.        Right eye: No  discharge.         Left eye: No discharge.      Conjunctiva/sclera: Conjunctivae normal.   Cardiovascular:      Rate and Rhythm: Normal rate and regular rhythm.      Pulses: Normal pulses.      Heart sounds: Normal heart sounds. No murmur heard.  Pulmonary:      Effort: Pulmonary effort is normal. No respiratory distress.      Breath sounds: Normal breath sounds.   Abdominal:      General: Abdomen is flat. Bowel sounds are normal. There is no distension.      Palpations: Abdomen is soft.   Musculoskeletal:         General: No swelling.      Cervical back: Neck supple. No tenderness.      Right lower leg: Edema present.      Left lower leg: Edema present.      Comments: Severe diffuse edema in bilateral lower extremities   Skin:     General: Skin is warm and dry.      Coloration: Skin is not pale.   Neurological:      Mental Status: He is alert and oriented to person, place, and time. Mental status is at baseline.      Motor: Weakness (Bilateral lower extremities) present.   Psychiatric:         Thought Content: Thought content normal.         Judgment: Judgment normal.         Fluids    Intake/Output Summary (Last 24 hours) at 2/3/2024 1813  Last data filed at 2/3/2024 1600  Gross per 24 hour   Intake 1320 ml   Output 3725 ml   Net -2405 ml       Laboratory  Recent Labs     02/01/24  1401 02/02/24  0131   WBC 7.6 8.0   RBC 5.44 5.01   HEMOGLOBIN 12.1* 11.2*   HEMATOCRIT 42.4 39.1*   MCV 77.9* 78.0*   MCH 22.2* 22.4*   MCHC 28.5* 28.6*   RDW 58.5* 59.3*   PLATELETCT 316 300   MPV 9.3 9.2     Recent Labs     02/01/24  1327 02/02/24  0131 02/03/24  0625   SODIUM 136 136 133*   POTASSIUM 4.7 4.5 4.4   CHLORIDE 98 98 97   CO2 25 27 26   GLUCOSE 100* 106* 109*   BUN 21 20 20   CREATININE 0.96 1.11 1.11   CALCIUM 8.6 8.5 8.4*     Recent Labs     02/01/24  1327   APTT 29.9   INR 1.31*         Recent Labs     02/02/24  0131   TRIGLYCERIDE 72   HDL 21*   LDL 53       Imaging  EC-ECHOCARDIOGRAM COMPLETE W/ CONT          US-EXTREMITY ARTERY LOWER BILAT   Final Result      US-EXTREMITY VENOUS LOWER BILAT   Final Result      DX-CHEST-PORTABLE (1 VIEW)   Final Result      Cardiomegaly.           Assessment/Plan  * Acute on chronic heart failure with preserved ejection fraction (HFpEF) (HCC)- (present on admission)  Assessment & Plan  Suspected  EF 65% in September 2023  Repeat echo  Diuresis as tolerated-Lasix 60 mg IV twice daily  Optimize CHF meds as able to tolerate    2/2  Likely due to MC/OHS.  Continue forced diuresis with furosemide 60 mg IV BID  Add spironolactone 25 mg daily  Continuous telemetry monitoring during forced diuresis    02/03/24  Decreasing oxygen requirements to 1.5 LPM.  Urine output of 3.1 L yesterday.  Continues on fluid restriction.  Increasing furosemide to 80 mg IV twice daily.  Increasing spironolactone to 50 mg daily.  Continuous telemetry monitoring during forced diuresis  Echocardiogram completed.  Reading pending.    Acute respiratory failure with hypoxia (HCC)- (present on admission)  Assessment & Plan  On 2 L-wean off as tolerated  Diuresis  As needed nebs  Check echo, lower extremity Doppler    02/02/24  Improving.  Currently on 1 LPM.  Continue IV forced diuresis  Continuous pulse oximetry monitoring    02/03/24  Currently on 1.5 LPM.  Continuous pulse oximetry monitoring    Weakness  Assessment & Plan  Weakness  PT/OT    Elevated troponin  Assessment & Plan  Likely secondary to demand ischemia, fluid overload  ASA/statin  Check echo    At risk for obstructive sleep apnea  Assessment & Plan  Outpt sleep study    Bilateral leg edema  Assessment & Plan  Diuresis  Check LE Doppler    Volume overload- (present on admission)  Assessment & Plan  Diuresis as able to tolerate    Hypertension- (present on admission)  Assessment & Plan  Uncontrolled  Lasix, Coreg, lisinopril    Class 3 severe obesity due to excess calories with serious comorbidity and body mass index (BMI) greater than or equal to 70 in  adult (HCC)- (present on admission)  Assessment & Plan  Body mass index is 101.4 kg/m².  Diet and lifestyle modification  May benefit from bariatric surgery evaluation    2/2  Nutrition consult  PT and OT     Abdominal wall cellulitis- (present on admission)  Assessment & Plan  Already taking Clindamycin -- 4days course remaining -- continue         VTE prophylaxis:    heparin ppx      I have performed a physical exam and reviewed and updated ROS and Plan today (2/3/2024). In review of yesterday's note (2/2/2024), there are no changes except as documented above.

## 2024-02-04 NOTE — CARE PLAN
The patient is Stable - Low risk of patient condition declining or worsening    Shift Goals  Clinical Goals: Diuresis, maintain skin integrity, wean O2  Patient Goals: Comfort, rest, turns  Family Goals: EMERSON    Progress made toward(s) clinical / shift goals:    Problem: Care Map:  Admission Optimal Outcome for the Heart Failure Patient  Goal: Admission:  Optimal Care of the heart failure patient  Outcome: Progressing     Problem: Knowledge Deficit - Standard  Goal: Patient and family/care givers will demonstrate understanding of plan of care, disease process/condition, diagnostic tests and medications  Outcome: Progressing     Problem: Skin Integrity  Goal: Skin integrity is maintained or improved  Outcome: Progressing     Problem: Pain - Standard  Goal: Alleviation of pain or a reduction in pain to the patient’s comfort goal  Outcome: Progressing       Patient is not progressing towards the following goals:

## 2024-02-04 NOTE — PROGRESS NOTES
Monitor summary:        Rhythm: Sinus rhythm  Rate: 64-98  Ectopy: 11 beats of VTACH  Measurements: .15/.09/.45        12hr chart check

## 2024-02-05 PROBLEM — R60.1 ANASARCA: Status: ACTIVE | Noted: 2024-02-05

## 2024-02-05 LAB
ALBUMIN SERPL BCP-MCNC: 3.5 G/DL (ref 3.2–4.9)
BUN SERPL-MCNC: 19 MG/DL (ref 8–22)
CALCIUM ALBUM COR SERPL-MCNC: 8.7 MG/DL (ref 8.5–10.5)
CALCIUM SERPL-MCNC: 8.3 MG/DL (ref 8.5–10.5)
CHLORIDE SERPL-SCNC: 95 MMOL/L (ref 96–112)
CO2 SERPL-SCNC: 30 MMOL/L (ref 20–33)
CREAT SERPL-MCNC: 1.04 MG/DL (ref 0.5–1.4)
GFR SERPLBLD CREATININE-BSD FMLA CKD-EPI: 93 ML/MIN/1.73 M 2
GLUCOSE SERPL-MCNC: 99 MG/DL (ref 65–99)
MAGNESIUM SERPL-MCNC: 1.9 MG/DL (ref 1.5–2.5)
PHOSPHATE SERPL-MCNC: 3.7 MG/DL (ref 2.5–4.5)
POTASSIUM SERPL-SCNC: 4 MMOL/L (ref 3.6–5.5)
SODIUM SERPL-SCNC: 134 MMOL/L (ref 135–145)

## 2024-02-05 PROCEDURE — 97530 THERAPEUTIC ACTIVITIES: CPT

## 2024-02-05 PROCEDURE — 36415 COLL VENOUS BLD VENIPUNCTURE: CPT

## 2024-02-05 PROCEDURE — 700111 HCHG RX REV CODE 636 W/ 250 OVERRIDE (IP): Performed by: STUDENT IN AN ORGANIZED HEALTH CARE EDUCATION/TRAINING PROGRAM

## 2024-02-05 PROCEDURE — A9270 NON-COVERED ITEM OR SERVICE: HCPCS | Performed by: STUDENT IN AN ORGANIZED HEALTH CARE EDUCATION/TRAINING PROGRAM

## 2024-02-05 PROCEDURE — 80069 RENAL FUNCTION PANEL: CPT

## 2024-02-05 PROCEDURE — 700102 HCHG RX REV CODE 250 W/ 637 OVERRIDE(OP): Performed by: STUDENT IN AN ORGANIZED HEALTH CARE EDUCATION/TRAINING PROGRAM

## 2024-02-05 PROCEDURE — 770020 HCHG ROOM/CARE - TELE (206)

## 2024-02-05 PROCEDURE — 700111 HCHG RX REV CODE 636 W/ 250 OVERRIDE (IP): Mod: JZ | Performed by: STUDENT IN AN ORGANIZED HEALTH CARE EDUCATION/TRAINING PROGRAM

## 2024-02-05 PROCEDURE — 83735 ASSAY OF MAGNESIUM: CPT

## 2024-02-05 PROCEDURE — 99233 SBSQ HOSP IP/OBS HIGH 50: CPT | Performed by: STUDENT IN AN ORGANIZED HEALTH CARE EDUCATION/TRAINING PROGRAM

## 2024-02-05 RX ORDER — SPIRONOLACTONE 25 MG/1
100 TABLET ORAL
Status: DISCONTINUED | OUTPATIENT
Start: 2024-02-06 | End: 2024-02-16 | Stop reason: HOSPADM

## 2024-02-05 RX ORDER — MAGNESIUM SULFATE 1 G/100ML
1 INJECTION INTRAVENOUS ONCE
Status: COMPLETED | OUTPATIENT
Start: 2024-02-05 | End: 2024-02-05

## 2024-02-05 RX ADMIN — HEPARIN SODIUM 5000 UNITS: 5000 INJECTION, SOLUTION INTRAVENOUS; SUBCUTANEOUS at 05:47

## 2024-02-05 RX ADMIN — HEPARIN SODIUM 5000 UNITS: 5000 INJECTION, SOLUTION INTRAVENOUS; SUBCUTANEOUS at 16:08

## 2024-02-05 RX ADMIN — HEPARIN SODIUM 5000 UNITS: 5000 INJECTION, SOLUTION INTRAVENOUS; SUBCUTANEOUS at 21:47

## 2024-02-05 RX ADMIN — CLINDAMYCIN HYDROCHLORIDE 300 MG: 150 CAPSULE ORAL at 05:46

## 2024-02-05 RX ADMIN — ASPIRIN 81 MG: 81 TABLET, COATED ORAL at 05:46

## 2024-02-05 RX ADMIN — MUPIROCIN 2 %: 20 OINTMENT TOPICAL at 17:46

## 2024-02-05 RX ADMIN — ACETAMINOPHEN 650 MG: 325 TABLET, FILM COATED ORAL at 08:27

## 2024-02-05 RX ADMIN — FUROSEMIDE 60 MG: 10 INJECTION, SOLUTION INTRAVENOUS at 05:47

## 2024-02-05 RX ADMIN — OMEPRAZOLE 20 MG: 20 CAPSULE, DELAYED RELEASE ORAL at 05:46

## 2024-02-05 RX ADMIN — POLYETHYLENE GLYCOL 3350 1 PACKET: 17 POWDER, FOR SOLUTION ORAL at 21:47

## 2024-02-05 RX ADMIN — SPIRONOLACTONE 50 MG: 25 TABLET ORAL at 05:46

## 2024-02-05 RX ADMIN — DOCUSATE SODIUM 100 MG: 100 CAPSULE, LIQUID FILLED ORAL at 05:47

## 2024-02-05 RX ADMIN — MAGNESIUM SULFATE IN DEXTROSE 1 G: 10 INJECTION, SOLUTION INTRAVENOUS at 08:37

## 2024-02-05 RX ADMIN — CLINDAMYCIN HYDROCHLORIDE 300 MG: 150 CAPSULE ORAL at 12:31

## 2024-02-05 RX ADMIN — OMEPRAZOLE 20 MG: 20 CAPSULE, DELAYED RELEASE ORAL at 17:47

## 2024-02-05 RX ADMIN — MUPIROCIN 1 ML: 20 OINTMENT TOPICAL at 05:47

## 2024-02-05 RX ADMIN — ATORVASTATIN CALCIUM 40 MG: 40 TABLET, FILM COATED ORAL at 17:47

## 2024-02-05 RX ADMIN — FUROSEMIDE 60 MG: 10 INJECTION, SOLUTION INTRAVENOUS at 16:08

## 2024-02-05 RX ADMIN — POLYETHYLENE GLYCOL 3350 1 PACKET: 17 POWDER, FOR SOLUTION ORAL at 08:27

## 2024-02-05 RX ADMIN — NYSTATIN: 100000 POWDER TOPICAL at 17:47

## 2024-02-05 RX ADMIN — NYSTATIN: 100000 POWDER TOPICAL at 12:32

## 2024-02-05 RX ADMIN — DOCUSATE SODIUM 100 MG: 100 CAPSULE, LIQUID FILLED ORAL at 17:47

## 2024-02-05 RX ADMIN — NYSTATIN: 100000 POWDER TOPICAL at 05:47

## 2024-02-05 ASSESSMENT — ENCOUNTER SYMPTOMS
SHORTNESS OF BREATH: 0
VOMITING: 0
WEAKNESS: 1
FEVER: 0
CHILLS: 0
PALPITATIONS: 0
COUGH: 0
MYALGIAS: 0
DIZZINESS: 0
CONSTIPATION: 1
ABDOMINAL PAIN: 0
HEADACHES: 0
NAUSEA: 0
DIARRHEA: 0

## 2024-02-05 ASSESSMENT — PAIN DESCRIPTION - PAIN TYPE: TYPE: ACUTE PAIN

## 2024-02-05 NOTE — PROGRESS NOTES
Bedside report received from day RN; assumed pt care. Pt assessment complete. Pt AxO4. Reviewed plan of care with pt. Pt tele monitored with sinus rhythm. Chart and labs reviewed. Bed in lowest position, and 2 side rails up. Pt educated on call light; call light with in reach.

## 2024-02-05 NOTE — ASSESSMENT & PLAN NOTE
02/04/24  As per echocardiogram.  Continue aggressive IV diuresis as per below.  Continuous telemetry monitoring to monitor for arrhythmias during forced diuresis.    02/05/24  Urine output of 6.5 L yesterday.  Creatinine stable at 1.04.  Continuing on furosemide 60 mg IV twice daily.    Continuing spironolactone 50 mg daily.  Increase to 100 mg tomorrow.  Continuous telemetry monitoring to monitor for arrhythmias during forced diuresis.

## 2024-02-05 NOTE — ASSESSMENT & PLAN NOTE
02/04/24  Likely contributing to patient's fluid retention  Patient will need outpatient sleep study.  Order has been placed.

## 2024-02-05 NOTE — THERAPY
Physical Therapy   Daily Treatment     Patient Name: Cole Jaramillo  Age:  39 y.o., Sex:  male  Medical Record #: [id number]  Today's Date: 2/5/2024     Precautions  Precautions: Fall Risk    Assessment    Treatment was initiated, but incomplete due to problems with bed. Bed not reliably going up/down and there is a  hole in pt's hover mat. Nsg updated and will change both. PT to attempt again another day.     Plan    Treatment Plan Status:    Type of Treatment: Bed Mobility, Equipment, Gait Training, Neuro Re-Education / Balance, Self Care / Home Evaluation, Therapeutic Activities, Therapeutic Exercise  Treatment Frequency: 4 Times per Week  Treatment Duration: Until Therapy Goals Met    DC Equipment Recommendations: Unable to determine at this time  Discharge Recommendations:  (Currently, recommend placement. However, may be able to return home depending on progression and functional recovery with acute therapy.)

## 2024-02-05 NOTE — THERAPY
OT Contact Note    OT treatment initiated and attempted. Upon entry to room, pt's whole bed was elevated. Multiple attempts to lower to safe height without success. In addition, pt's hover mat had a hole in it. Further mobility deferred due to safety concerns for pt with faulty equipment. Extra time spent attempting to trouble shoot and to re-adjust pt in bed. RN and tech present for end of session and aware. Will follow and complete treatment when safely able to do so.     02/05/24 0986   Treatment Variance   Reason For Missed Therapy Non-Medical - Other (Please Comment)  (Pt safety due to faulty equipment.)   Precautions   Precautions Fall Risk   Pain   Pain Scales 0 to 10 Scale    Pain 0 - 10 Group   Therapist Pain Assessment Post Activity Pain Same as Prior to Activity;Nurse Notified  (headache, not rated)   Cognition    Cognition / Consciousness WDL   Level of Consciousness Alert   Comments Pleasant, cooperative, motivated   Active ROM Upper Body   Active ROM Upper Body  WDL   Comments Encouraged to complete full ROM while in supine to prevent stiffness/discomfort.   Education Group   Education Provided Upper Extremity Range of Motion;Role of Occupational Therapist   Role of Occupational Therapist Patient Response Patient;Acceptance;Explanation;Verbal Demonstration   Upper Ext ROM Patient Response Patient;Acceptance;Explanation;Demonstration;Verbal Demonstration;Action Demonstration   Occupational Therapy Treatment Plan    O.T. Treatment Plan Continue Current Treatment Plan   Treatment Interventions Self Care / Activities of Daily Living;Adaptive Equipment;Neuro Re-Education / Balance;Therapeutic Exercises;Therapeutic Activity   Treatment Frequency 3 Times per Week   Duration Until Therapy Goals Met   Anticipated Discharge Equipment and Recommendations   DC Equipment Recommendations Unable to determine at this time   Discharge Recommendations Recommend post-acute placement for additional occupational therapy  services prior to discharge home   Interdisciplinary Plan of Care Collaboration   IDT Collaboration with  Nursing;Physical Therapist;Therapy Tech   Patient Position at End of Therapy In Bed;Bed Alarm On;Phone within Reach;Tray Table within Reach;Call Light within Reach   Collaboration Comments OT treatment initiated and attempted. Upon entry to room, pt's whole bed was elevated. Multiple attempts to lower to safe height without success. In addition, pt's hover mat had a hole in it. Further mobility deferred due to safety concerns for pt with faulty equipment. Extra time spent attempting to trouble shoot/adjust pt in bed. RN and tech present for end of session and aware. Will follow and complete treatment when safely able to do so.   Session Information   Date / Session Number  2/2 1 (2/3, 2/8) attempted 2/5

## 2024-02-05 NOTE — PROGRESS NOTES
[facility] Daily Progress Note    Date of Service  2/4/2024    Chief Complaint  Cole Jaramillo is a 39 y.o. male admitted 2/1/2024 with leg swelling and inability to walk.    Hospital Course  Cole Jaramillo is a 39 y.o. male who presented on 2/1/2024 for evaluation of worsening lower extremity edema and inability to ambulate.  This is a pleasant gentleman with a history of severe obesity .  He had difficulty ambulating for the past 2 weeks after having a cold and was mostly in bed.  In the emergency room, he was requiring 2 LPM oxygen mask,.  Noted to have elevated NT proBNP of 4000 729.  Blood pressures were elevated in the 160s.  Patient was admitted to the telemetry floor due to volume overload requiring forced IV diuresis.    Interval Problem Update  02/02/24  Patient was seen and examined on the telemetry floor.  He continues on continuous cardiac monitoring.  Continues on 1 LPM oxygen via nasal cannula.  Urine output of 800 L yesterday on furosemide 60 mg IV twice daily.  Starting spironolactone 25 mg daily.  Reviewed patient's discharge summary from his hospitalization on 8/27/2023 to 9/7/2023.  He presented with similar symptoms.  He obtained intensive physical therapy during his hospitalization for strengthening.  He was referred for outpatient sleep study and was treated with IV Lasix.  He was requiring 3 LPM oxygen minus cannula during ambulation.  Additional history gathered from the patient.  He states that he initially weighed approximately 800 pounds and got to about 700 pounds by the time of discharge.  He lost additional 100 pounds and once approximately 600 pounds, when he recently got a cold and was bedridden.  He regained his weight since then.  Most of it fluid he believes.  Resulting in difficulty with ambulation.  Discussed weight management strategies.  Discussed with case management leadership in regards to obtaining additional physical therapy.  Nutrition consult  placed.    02/03/24  Patient was seen and examined on the telemetry floor.  He continues on continuous cardiac monitoring.  Decreasing oxygen requirements to 1.5 LPM.  Urine output of 3.1 L yesterday.  Continues on fluid restriction.  Continue furosemide 60 mg IV twice daily.  Increasing spironolactone to 50 mg daily.  Was able to stand up yesterday with assistance.  Discussed with patient and mother at bedside.    02/04/24  Patient was seen and examined on the telemetry floor.  He continues on continuous cardiac monitoring.  Stable hypoxia on 2 LPM oxygen by nasal cannula.  Urine output of 3 L yesterday.  Creatinine stable at 1.12.  Continuing on furosemide 60 mg IV twice daily.  Continue spironolactone 50 mg daily.  No significant mobilization overnight.  No bowel movement yet.  Aggressive bowel regimen with MiraLAX and docusate.  Patient brought some books on intermittent fasting.      I have discussed this patient's plan of care and discharge plan at IDT rounds today with Case Management, Nursing, Nursing leadership, and other members of the IDT team.    Consultants/Specialty  none    Code Status  Full Code    Disposition  The patient is not medically cleared for discharge to home or a post-acute facility.  Anticipate discharge to: home with close outpatient follow-up    I have placed the appropriate orders for post-discharge needs.    Review of Systems  Review of Systems   Constitutional:  Negative for chills and fever.   Respiratory:  Negative for cough and shortness of breath.    Cardiovascular:  Positive for leg swelling. Negative for chest pain and palpitations.   Gastrointestinal:  Positive for constipation. Negative for abdominal pain, diarrhea, nausea and vomiting.   Musculoskeletal:  Negative for joint pain and myalgias.   Neurological:  Positive for weakness. Negative for dizziness and headaches.        Physical Exam  Temp:  [36.3 °C (97.3 °F)-36.8 °C (98.2 °F)] 36.6 °C (97.8 °F)  Pulse:  [70-86]  76  Resp:  [14-18] 16  BP: (116-133)/(63-76) 119/66  SpO2:  [81 %-97 %] 93 %    Physical Exam  Vitals and nursing note reviewed.   Constitutional:       General: He is not in acute distress.     Appearance: He is obese. He is ill-appearing.      Interventions: Nasal cannula in place.   HENT:      Head: Normocephalic and atraumatic.      Mouth/Throat:      Mouth: Mucous membranes are moist.      Pharynx: Oropharynx is clear. No oropharyngeal exudate.   Eyes:      General: No scleral icterus.        Right eye: No discharge.         Left eye: No discharge.      Conjunctiva/sclera: Conjunctivae normal.   Cardiovascular:      Rate and Rhythm: Normal rate and regular rhythm.      Pulses: Normal pulses.      Heart sounds: Normal heart sounds. No murmur heard.  Pulmonary:      Effort: Pulmonary effort is normal. No respiratory distress.      Breath sounds: Normal breath sounds.   Abdominal:      General: Abdomen is flat. Bowel sounds are normal. There is no distension.      Palpations: Abdomen is soft.   Musculoskeletal:         General: No swelling.      Cervical back: Neck supple. No tenderness.      Right lower leg: Edema present.      Left lower leg: Edema present.      Comments: Severe diffuse edema in bilateral lower extremities   Skin:     General: Skin is warm and dry.      Coloration: Skin is not pale.   Neurological:      Mental Status: He is alert and oriented to person, place, and time. Mental status is at baseline.      Motor: Weakness (Bilateral lower extremities) present.   Psychiatric:         Thought Content: Thought content normal.         Judgment: Judgment normal.         Fluids    Intake/Output Summary (Last 24 hours) at 2/4/2024 1754  Last data filed at 2/4/2024 1721  Gross per 24 hour   Intake 1590 ml   Output 5900 ml   Net -4310 ml       Laboratory  Recent Labs     02/02/24  0131   WBC 8.0   RBC 5.01   HEMOGLOBIN 11.2*   HEMATOCRIT 39.1*   MCV 78.0*   MCH 22.4*   MCHC 28.6*   RDW 59.3*   PLATELETCT  300   MPV 9.2     Recent Labs     02/02/24  0131 02/03/24  0625 02/04/24  0356   SODIUM 136 133* 135   POTASSIUM 4.5 4.4 4.5   CHLORIDE 98 97 97   CO2 27 26 27   GLUCOSE 106* 109* 110*   BUN 20 20 20   CREATININE 1.11 1.11 1.12   CALCIUM 8.5 8.4* 8.4*               Recent Labs     02/02/24  0131   TRIGLYCERIDE 72   HDL 21*   LDL 53       Imaging  EC-ECHOCARDIOGRAM COMPLETE W/ CONT   Final Result      US-EXTREMITY ARTERY LOWER BILAT   Final Result      US-EXTREMITY VENOUS LOWER BILAT   Final Result      DX-CHEST-PORTABLE (1 VIEW)   Final Result      Cardiomegaly.           Assessment/Plan  * Acute right-sided congestive heart failure (HCC)- (present on admission)  Assessment & Plan  02/04/24  As per echocardiogram.  Continue aggressive IV diuresis as per below.  Continuous telemetry monitoring to monitor for arrhythmias during forced diuresis.    Obesity hypoventilation syndrome (HCC)- (present on admission)  Assessment & Plan  02/04/24  Likely contributing to patient's fluid retention  Patient will need outpatient sleep study.  Order has been placed.    Acute on chronic heart failure with preserved ejection fraction (HFpEF) (HCC)- (present on admission)  Assessment & Plan  Suspected  EF 65% in September 2023  Repeat echo  Diuresis as tolerated-Lasix 60 mg IV twice daily  Optimize CHF meds as able to tolerate    2/2  Likely due to MC/OHS.  Continue forced diuresis with furosemide 60 mg IV BID  Add spironolactone 25 mg daily  Continuous telemetry monitoring during forced diuresis    02/03/24  Decreasing oxygen requirements to 1.5 LPM.  Urine output of 3.1 L yesterday.  Continues on fluid restriction.  Continue furosemide 60 mg IV twice daily.  Increasing spironolactone to 50 mg daily.  Continuous telemetry monitoring during forced diuresis  Echocardiogram completed.  Reading pending.    02/04/24  Echocardiogram showing ejection fraction of 55%, right-sided volume overload D-shaped.  Urine output of 3 L yesterday.   Creatinine remained stable 1.1 to.  Continues on 1 LPM oxygen by nasal.  Continue furosemide 60 mg IV twice daily.  Continue spironolactone 50 mg daily.  Continuous telemetry monitoring during forced diuresis    Acute respiratory failure with hypoxia (HCC)- (present on admission)  Assessment & Plan  On 2 L-wean off as tolerated  Diuresis  As needed nebs  Check echo, lower extremity Doppler    02/02/24  Improving.  Currently on 1 LPM.  Continue IV forced diuresis  Continuous pulse oximetry monitoring    02/03/24  Currently on 1.5 LPM.  Continuous pulse oximetry monitoring    Weakness  Assessment & Plan  Weakness  PT/OT    Elevated troponin  Assessment & Plan  Likely secondary to demand ischemia, fluid overload  ASA/statin  Check echo    02/04/24  Likely due to demand ischemia.  Remaining flat.    At risk for obstructive sleep apnea  Assessment & Plan  Outpt sleep study    Bilateral leg edema  Assessment & Plan  Diuresis  Check LE Doppler    Volume overload- (present on admission)  Assessment & Plan  Diuresis as able to tolerate    Hypertension- (present on admission)  Assessment & Plan  Uncontrolled  Lasix, Coreg, lisinopril    Class 3 severe obesity due to excess calories with serious comorbidity and body mass index (BMI) greater than or equal to 70 in adult ([facility])- (present on admission)  Assessment & Plan  Body mass index is 101.4 kg/m².  Diet and lifestyle modification  May benefit from bariatric surgery evaluation    2/2  Nutrition consult  PT and OT     Abdominal wall cellulitis- (present on admission)  Assessment & Plan  Already taking Clindamycin -- 4days course remaining -- continue    02/04/24  Continue clindamycin         VTE prophylaxis:    heparin ppx      I have performed a physical exam and reviewed and updated ROS and Plan today (2/4/2024). In review of yesterday's note (2/3/2024), there are no changes except as documented above.

## 2024-02-05 NOTE — CARE PLAN
The patient is Stable - Low risk of patient condition declining or worsening    Shift Goals  Clinical Goals: Diurese, maintain skin integrity, wean O2  Patient Goals: Comfort, rest, turns,  Family Goals: EMERSON    Progress made toward(s) clinical / shift goals:    Problem: Care Map:  Admission Optimal Outcome for the Heart Failure Patient  Goal: Admission:  Optimal Care of the heart failure patient  Outcome: Progressing     Problem: Knowledge Deficit - Standard  Goal: Patient and family/care givers will demonstrate understanding of plan of care, disease process/condition, diagnostic tests and medications  Outcome: Progressing     Problem: Ineffective Airway Clearance  Goal: Patient will maintain patent airway with clear/clearing breath sounds  Outcome: Progressing     Problem: Impaired Gas Exchange  Goal: Patient will demonstrate improved ventilation and adequate oxygenation and participate in treatment regimen within the level of ability/situation.  Outcome: Progressing     Problem: Skin Integrity  Goal: Skin integrity is maintained or improved  Outcome: Progressing     Problem: Pain - Standard  Goal: Alleviation of pain or a reduction in pain to the patient’s comfort goal  Outcome: Progressing       Patient is not progressing towards the following goals:

## 2024-02-05 NOTE — PROGRESS NOTES
Monitor summary:        Rhythm: Sinus Rhythm  Rate: 71-81  Ectopy: (r)PVC, (r)PAC  Measurements: .17/.08/.40        12hr chart check

## 2024-02-06 LAB
BACTERIA BLD CULT: NORMAL
BACTERIA BLD CULT: NORMAL
MAGNESIUM SERPL-MCNC: 1.9 MG/DL (ref 1.5–2.5)
SIGNIFICANT IND 70042: NORMAL
SIGNIFICANT IND 70042: NORMAL
SITE SITE: NORMAL
SITE SITE: NORMAL
SOURCE SOURCE: NORMAL
SOURCE SOURCE: NORMAL

## 2024-02-06 PROCEDURE — 97530 THERAPEUTIC ACTIVITIES: CPT

## 2024-02-06 PROCEDURE — A9270 NON-COVERED ITEM OR SERVICE: HCPCS | Performed by: STUDENT IN AN ORGANIZED HEALTH CARE EDUCATION/TRAINING PROGRAM

## 2024-02-06 PROCEDURE — 36415 COLL VENOUS BLD VENIPUNCTURE: CPT

## 2024-02-06 PROCEDURE — 83735 ASSAY OF MAGNESIUM: CPT

## 2024-02-06 PROCEDURE — 700111 HCHG RX REV CODE 636 W/ 250 OVERRIDE (IP): Performed by: STUDENT IN AN ORGANIZED HEALTH CARE EDUCATION/TRAINING PROGRAM

## 2024-02-06 PROCEDURE — 700102 HCHG RX REV CODE 250 W/ 637 OVERRIDE(OP): Performed by: STUDENT IN AN ORGANIZED HEALTH CARE EDUCATION/TRAINING PROGRAM

## 2024-02-06 PROCEDURE — 97535 SELF CARE MNGMENT TRAINING: CPT

## 2024-02-06 PROCEDURE — 700111 HCHG RX REV CODE 636 W/ 250 OVERRIDE (IP): Mod: JZ | Performed by: STUDENT IN AN ORGANIZED HEALTH CARE EDUCATION/TRAINING PROGRAM

## 2024-02-06 PROCEDURE — 99232 SBSQ HOSP IP/OBS MODERATE 35: CPT | Performed by: STUDENT IN AN ORGANIZED HEALTH CARE EDUCATION/TRAINING PROGRAM

## 2024-02-06 PROCEDURE — 770020 HCHG ROOM/CARE - TELE (206)

## 2024-02-06 RX ADMIN — MUPIROCIN 1 ML: 20 OINTMENT TOPICAL at 06:21

## 2024-02-06 RX ADMIN — OMEPRAZOLE 20 MG: 20 CAPSULE, DELAYED RELEASE ORAL at 18:15

## 2024-02-06 RX ADMIN — NYSTATIN: 100000 POWDER TOPICAL at 06:10

## 2024-02-06 RX ADMIN — HEPARIN SODIUM 5000 UNITS: 5000 INJECTION, SOLUTION INTRAVENOUS; SUBCUTANEOUS at 13:07

## 2024-02-06 RX ADMIN — NYSTATIN: 100000 POWDER TOPICAL at 18:15

## 2024-02-06 RX ADMIN — ATORVASTATIN CALCIUM 40 MG: 40 TABLET, FILM COATED ORAL at 18:16

## 2024-02-06 RX ADMIN — HEPARIN SODIUM 5000 UNITS: 5000 INJECTION, SOLUTION INTRAVENOUS; SUBCUTANEOUS at 06:11

## 2024-02-06 RX ADMIN — SPIRONOLACTONE 100 MG: 25 TABLET ORAL at 06:10

## 2024-02-06 RX ADMIN — FUROSEMIDE 60 MG: 10 INJECTION, SOLUTION INTRAVENOUS at 18:24

## 2024-02-06 RX ADMIN — HEPARIN SODIUM 5000 UNITS: 5000 INJECTION, SOLUTION INTRAVENOUS; SUBCUTANEOUS at 21:18

## 2024-02-06 RX ADMIN — MUPIROCIN 2 %: 20 OINTMENT TOPICAL at 18:16

## 2024-02-06 RX ADMIN — DOCUSATE SODIUM 100 MG: 100 CAPSULE, LIQUID FILLED ORAL at 06:11

## 2024-02-06 RX ADMIN — OMEPRAZOLE 20 MG: 20 CAPSULE, DELAYED RELEASE ORAL at 06:11

## 2024-02-06 RX ADMIN — ASPIRIN 81 MG: 81 TABLET, COATED ORAL at 06:10

## 2024-02-06 RX ADMIN — ACETAMINOPHEN 650 MG: 325 TABLET, FILM COATED ORAL at 00:36

## 2024-02-06 RX ADMIN — FUROSEMIDE 60 MG: 10 INJECTION, SOLUTION INTRAVENOUS at 06:11

## 2024-02-06 ASSESSMENT — ENCOUNTER SYMPTOMS
WEAKNESS: 1
NAUSEA: 0
HEADACHES: 0
ABDOMINAL PAIN: 0
VOMITING: 0
MYALGIAS: 0
COUGH: 0
CONSTIPATION: 1
DIARRHEA: 0
PALPITATIONS: 0
FEVER: 0
SHORTNESS OF BREATH: 0
CHILLS: 0
DIZZINESS: 0

## 2024-02-06 ASSESSMENT — COGNITIVE AND FUNCTIONAL STATUS - GENERAL
TURNING FROM BACK TO SIDE WHILE IN FLAT BAD: A LOT
WALKING IN HOSPITAL ROOM: A LITTLE
DRESSING REGULAR LOWER BODY CLOTHING: A LOT
DRESSING REGULAR UPPER BODY CLOTHING: A LOT
SUGGESTED CMS G CODE MODIFIER DAILY ACTIVITY: CK
MOBILITY SCORE: 13
HELP NEEDED FOR BATHING: A LOT
CLIMB 3 TO 5 STEPS WITH RAILING: TOTAL
DAILY ACTIVITIY SCORE: 15
SUGGESTED CMS G CODE MODIFIER MOBILITY: CL
MOVING TO AND FROM BED TO CHAIR: A LOT
TOILETING: A LOT
STANDING UP FROM CHAIR USING ARMS: A LOT
PERSONAL GROOMING: A LITTLE
MOVING FROM LYING ON BACK TO SITTING ON SIDE OF FLAT BED: A LITTLE

## 2024-02-06 ASSESSMENT — GAIT ASSESSMENTS
DEVIATION: INCREASED BASE OF SUPPORT
ASSISTIVE DEVICE: FRONT WHEEL WALKER
GAIT LEVEL OF ASSIST: CONTACT GUARD ASSIST

## 2024-02-06 ASSESSMENT — PAIN DESCRIPTION - PAIN TYPE: TYPE: ACUTE PAIN

## 2024-02-06 ASSESSMENT — FIBROSIS 4 INDEX: FIB4 SCORE: 0.38

## 2024-02-06 NOTE — ASSESSMENT & PLAN NOTE
Due to acute on chronic right heart failure.  Suspect due to untreated MC/OHS.  Continue forced IV diuresis.

## 2024-02-06 NOTE — DOCUMENTATION QUERY
[facility]                                                                       Query Response Note      PATIENT:               EVERARDO POST  ACCT #:                  [id number]  MRN:                     [id number]  :                      1985  ADMIT DATE:       2024 11:55 AM  DISCH DATE:          RESPONDING  PROVIDER #:        [id number]           QUERY TEXT:    POA stage 2 pressure injury left thigh/buttocks is documented in the Wound Team Eval.   Can you clarify if this is a relevant diagnosis?    The patient's Clinical Indicators include:  2/3 Wound Team Eval:  Pressure injury thigh; buttocks Left POA Stage 2  Risk Factors: morbid obesity, edema, recently bedbound   Treatment: wound team eval, bariatric LOIS, reposition q 2 h, TAPs turning system, moisture prevention     Thank you,  Reny Ott RN, BSN, CCDS  Clinical   Connect via [employer]  Options provided:   -- POA pressure injury left thigh/ buttocks ruled in   -- Other explanation of clinical findings, please specify   -- Findings of no clinical significance      Query created by: Reny Ott on 2024 11:11 AM    RESPONSE TEXT:    POA pressure injury left thigh/ buttocks ruled in          Electronically signed by:  MAGEN SMITH MD 2024 3:08 PM

## 2024-02-06 NOTE — PROGRESS NOTES
Monitor Summary:     Rhythm: SR  Rate: 76-82  Ectopy: pvc/pac  Measurements: 0.16/0.10/0.42           12 Hour Chart Check

## 2024-02-06 NOTE — THERAPY
Physical Therapy   Daily Treatment     Patient Name: Cole Jaramillo  Age:  39 y.o., Sex:  male  Medical Record #: [id number]  Today's Date: 2/6/2024     Precautions  Precautions: Fall Risk    Assessment    Today, pt needed 3 person max assist to come to EOB with HOB up and used rails. Able to perform sit to stand with cg assist and take sidesteps along EOB using FWW. See details below. Pt is below baseline function, as he reports normally able to walk around his apt.     Plan    Treatment Plan Status: Continue Current Treatment Plan  Type of Treatment: Bed Mobility, Equipment, Gait Training, Neuro Re-Education / Balance, Self Care / Home Evaluation, Therapeutic Activities, Therapeutic Exercise  Treatment Frequency: 4 Times per Week  Treatment Duration: Until Therapy Goals Met    DC Equipment Recommendations: Unable to determine at this time  Discharge Recommendations: Recommend post-acute placement for additional physical therapy services prior to discharge home (depending on progress.)         Objective       02/06/24 0952   Charge Group   Charges  Yes   PT Therapeutic Activities (Units) 3   PT Self Care / Home Evaluation (Units) 1   Total Time Spent   PT Total Time Yes   PT Therapeutic Activities Time Spent (Mins) 45   PT Self Care/Home Evaluation Time Spent (Mins) 15   PT Total Time Spent (Calculated) 60   Precautions   Precautions Fall Risk   Vitals   O2 (LPM) 1   O2 Delivery Device Nasal Cannula   Pain 0 - 10 Group   Therapist Pain Assessment During Activity;Nurse Notified  (pain with sitting upright at EOB on thigh, leans back for relief, not rated)   Cognition    Cognition / Consciousness WDL   Level of Consciousness Alert   Comments motivated, cooperative.   Active ROM Lower Body    Active ROM Lower Body  X   Comments limited by body habitus, but functional for sitting EOB, transfers, ambulation   Strength Lower Body   Lower Body Strength  X   Comments able to move legs across mattress when given time, lacks  endurance with standing   Balance   Sitting Balance (Static) Fair   Sitting Balance (Dynamic) Fair -   Standing Balance (Static) Fair -   Standing Balance (Dynamic) Fair -   Weight Shift Sitting Fair   Weight Shift Standing Fair   Skilled Intervention Verbal Cuing;Sequencing   Comments with FWW   Bed Mobility    Supine to Sit Maximal Assist  (3 person assist, 2 behind to push up, one in front to pull up)   Sit to Supine Maximal Assist  (3 person assist)   Scooting Standby Assist  (to scoot legs to EOB and pivot torso to prepare to sit up)   Rolling Maximal Assist to Rt.  (unable to complete full roll, attempted, shows good effort to allow linen straighten)   Skilled Intervention Verbal Cuing;Sequencing   Comments HOB up, used rails.   Gait Analysis   Gait Level Of Assist Contact Guard Assist   Assistive Device Front Wheel Walker   Distance (Feet)   (sidesteps along EOB, too fearful to walk away from edge)   Deviation Increased Base Of Support   Skilled Intervention Verbal Cuing;Sequencing   Functional Mobility   Sit to Stand Contact Guard Assist   Bed, Chair, Wheelchair Transfer Unable to Participate   Skilled Intervention Verbal Cuing;Sequencing   Comments maintained standing for 5+ minutes   How much difficulty does the patient currently have...   Turning over in bed (including adjusting bedclothes, sheets and blankets)? 2   Sitting down on and standing up from a chair with arms (e.g., wheelchair, bedside commode, etc.) 3   Moving from lying on back to sitting on the side of the bed? 2   How much help from another person does the patient currently need...   Moving to and from a bed to a chair (including a wheelchair)? 2   Need to walk in a hospital room? 3   Climbing 3-5 steps with a railing? 1   6 clicks Mobility Score 13   Activity Tolerance   Standing 5+ minutes   Short Term Goals    Short Term Goal # 1 pt will perform supine <> sit without bed features and SPV in 6 vistis   Goal Outcome # 1 goal not met    Short Term Goal # 2 pt will perform sit <> stand and functional transfers with [facility] FWW and [facility]   Goal Outcome # 2 Goal not met   Short Term Goal # 3 pt will ambulate 25 ft with Valente FWW and SPV to access home environment in 6 visits   Goal Outcome # 3 Goal not met   Education Group   Role of Physical Therapist Patient Response Patient;Acceptance;Explanation;Verbal Demonstration   Additional Comments ed done with pt re bed issues: if bed locks out, best to touch lock button once, then touch each of the locked bed features to unlock then. Bed appears to automatically go into lock after a period of time.   Physical Therapy Treatment Plan   Physical Therapy Treatment Plan Continue Current Treatment Plan   Anticipated Discharge Equipment and Recommendations   DC Equipment Recommendations Unable to determine at this time   Discharge Recommendations Recommend post-acute placement for additional physical therapy services prior to discharge home  (depending on progress.)   Interdisciplinary Plan of Care Collaboration   IDT Collaboration with  Nursing   Patient Position at End of Therapy In Bed;Call Light within Reach;Tray Table within Reach;Phone within Reach   Collaboration Comments nsg updated   Session Information   Date / Session Number  2/6-3 (3/4, 2/8)

## 2024-02-06 NOTE — THERAPY
Occupational Therapy  Daily Treatment     Patient Name: Cole Jaramillo  Age:  39 y.o., Sex:  male  Medical Record #: [id number]  Today's Date: 2/6/2024     Precautions  Precautions: Fall Risk    Assessment    Pt seen for OT treatment with focus on strengthening and body mechanics to facilitate independent mobility. Pt requires substantial extra time for all incremental movements toward EOB, however with extra time is able to move his legs off the bed without assist. Pt does require ample assist to bring his upper body upright into seated position, two caregivers assisting at shoulders and one providing an achor-point anteriorly so he can weight shift forward to pull himself up. Pt provides good effort throughout, highly participatory in all aspects of mobility and self-care tasks as able. Pts ADL independence is limited by debility and body habitus, pt reports caregiver support in-home for showering at baseline and has more recently required increased assist for toileting and dressing. Recommend placement in a post-acute facility that specializes in bariatric rehabilitation for equipment and resources specifically suited for bariatric individuals.     Plan    Treatment Plan Status: Continue Current Treatment Plan  Type of Treatment: Self Care / Activities of Daily Living, Adaptive Equipment, Neuro Re-Education / Balance, Therapeutic Exercises, Therapeutic Activity  Treatment Frequency: 3 Times per Week  Treatment Duration: Until Therapy Goals Met    DC Equipment Recommendations: Unable to determine at this time  Discharge Recommendations: Recommend post-acute placement for additional occupational therapy services prior to discharge home (facility that specializes in bariatric rehabilitation would be ideal for equipment resources)     Objective       02/06/24 1005   Precautions   Precautions Fall Risk   Pain 0 - 10 Group   Therapist Pain Assessment Nurse Notified;0;Post Activity Pain Same as Prior to Activity  "  Cognition    Cognition / Consciousness WDL   Comments pleasant, motivated, participatory   Balance   Sitting Balance (Static) Fair   Sitting Balance (Dynamic) Fair -   Standing Balance (Static) Fair -   Standing Balance (Dynamic) Fair -   Weight Shift Sitting Fair   Weight Shift Standing Fair   Skilled Intervention Verbal Cuing;Tactile Cuing   Comments FWW in standing   Bed Mobility    Supine to Sit Maximal Assist   Sit to Supine Maximal Assist   Scooting Standby Assist   Rolling Maximum Assist to Lt.;Maximal Assist to Rt.   Skilled Intervention Verbal Cuing;Tactile Cuing;Sequencing;Facilitation;Compensatory Strategies   Comments pt gives good effort however 3 people still required 2/2 assist required to acheive upright sit at EOB   Activities of Daily Living   Eating Modified Independent   Grooming Supervision;Seated  (bed level handwashing)   Upper Body Dressing Moderate Assist   Lower Body Dressing Maximal Assist   Toileting Maximal Assist  (continent of b&b but requires purwick and bed pan 2/2 mobility limitations)   Skilled Intervention Verbal Cuing;Tactile Cuing;Compensatory Strategies   How much help from another person does the patient currently need...   Putting on and taking off regular lower body clothing? 2   Bathing (including washing, rinsing, and drying)? 2   Toileting, which includes using a toilet, bedpan, or urinal? 2   Putting on and taking off regular upper body clothing? 2   Taking care of personal grooming such as brushing teeth? 3   Eating meals? 4   6 Clicks Daily Activity Score 15   Functional Mobility   Sit to Stand Contact Guard Assist   Mobility sit>stand and side steps   Skilled Intervention Verbal Cuing;Tactile Cuing;Sequencing   Comments pt reports confidence issues and fear of falling, doesn't \"trust\" his legs   Activity Tolerance   Sitting Edge of Bed 30min   Standing 5min   Comments reports lack of confidence in ability and discomfort at thigh when sitting EOB   Patient / Family " Goals   Patient / Family Goal #1 to get stronger   Goal #1 Outcome Progressing as expected   Short Term Goals   Short Term Goal # 1 Pt will perform ADL transfer w/ supv   Goal Outcome # 1 Goal not met   Short Term Goal # 2 Pt will perform seated g/h w/ supv   Goal Outcome # 2 Progressing as expected   Short Term Goal # 3 Pt will perform UB dressing w/ supv while seated EOB   Goal Outcome # 3 Progressing as expected   Short Term Goal # 4 Pt will perform LB dressing w/ supv and AE PRN   Goal Outcome # 4 Goal not met   Education Group   Education Provided Role of Occupational Therapist;Activities of Daily Living   Role of Occupational Therapist Patient Response Patient;Acceptance;Explanation;Verbal Demonstration   ADL Patient Response Patient;Acceptance;Explanation;Verbal Demonstration   Occupational Therapy Treatment Plan    O.T. Treatment Plan Continue Current Treatment Plan   Anticipated Discharge Equipment and Recommendations   DC Equipment Recommendations Unable to determine at this time   Discharge Recommendations Recommend post-acute placement for additional occupational therapy services prior to discharge home  (facility that specializes in bariatric rehabilitation would be ideal for equipment resources)

## 2024-02-06 NOTE — PROGRESS NOTES
[facility] Medicine Daily Progress Note    Date of Service  2/5/2024    Chief Complaint  Cole Jaramillo is a 39 y.o. male admitted 2/1/2024 with leg swelling and inability to walk.    Hospital Course  Cole Jaramillo is a 39 y.o. male who presented on 2/1/2024 for evaluation of worsening lower extremity edema and inability to ambulate.  This is a pleasant gentleman with a history of severe obesity .  He had difficulty ambulating for the past 2 weeks after having a cold and was mostly in bed.  He became bedbound and activated EMS after consultation with a mobile physician.  In the emergency room, he was requiring 2 LPM oxygen mask,.  Noted to have elevated NT proBNP of 4729.  Blood pressures were elevated in the 160s.  Patient was admitted to the telemetry floor due to volume overload requiring forced IV diuresis.  Physical and occupational therapies were consulted, and the patient underwent aggressive therapies.    Interval Problem Update  02/02/24  Patient was seen and examined on the telemetry floor.  He continues on continuous cardiac monitoring.  Continues on 1 LPM oxygen via nasal cannula.  Urine output of 800 L yesterday on furosemide 60 mg IV twice daily.  Starting spironolactone 25 mg daily.  Reviewed patient's discharge summary from his hospitalization on 8/27/2023 to 9/7/2023.  He presented with similar symptoms.  He obtained intensive physical therapy during his hospitalization for strengthening.  He was referred for outpatient sleep study and was treated with IV Lasix.  He was requiring 3 LPM oxygen minus cannula during ambulation.  Additional history gathered from the patient.  He states that he initially weighed approximately 800 pounds and got to about 700 pounds by the time of discharge.  He lost additional 100 pounds and once approximately 600 pounds, when he recently got a cold and was bedridden.  He regained his weight since then.  Most of it fluid he believes.  Resulting in difficulty  with ambulation.  Discussed weight management strategies.  Discussed with case management leadership in regards to obtaining additional physical therapy.  Nutrition consult placed.    02/03/24  Patient was seen and examined on the telemetry floor.  He continues on continuous cardiac monitoring.  Decreasing oxygen requirements to 1.5 LPM.  Urine output of 3.1 L yesterday.  Continues on fluid restriction.  Continue furosemide 60 mg IV twice daily.  Increasing spironolactone to 50 mg daily.  Was able to stand up yesterday with assistance.  Discussed with patient and mother at bedside.    02/04/24  Patient was seen and examined on the telemetry floor.  He continues on continuous cardiac monitoring.  Stable hypoxia on 2 LPM oxygen by nasal cannula.  Urine output of 3 L yesterday.  Creatinine stable at 1.12.  Continuing on furosemide 60 mg IV twice daily.  Continue spironolactone 50 mg daily.  No significant mobilization overnight.  No bowel movement yet.  Aggressive bowel regimen with MiraLAX and docusate.  Patient brought some books on intermittent fasting.    02/05/24  Patient was seen and examined on the telemetry floor.  He continues on continuous cardiac monitoring.  Stable hypoxia on 2 LPM oxygen by nasal cannula.  Urine output of 6.5 L yesterday.  Creatinine stable at 1.04.  Continuing on furosemide 60 mg IV twice daily.  Continuing spironolactone 50 mg daily.  Increase to 100 mg tomorrow.  Overall feeling better.  Able to move legs more.  Still severe anasarca.        I have discussed this patient's plan of care and discharge plan at IDT rounds today with Case Management, Nursing, Nursing leadership, and other members of the IDT team.    Consultants/Specialty  none    Code Status  Full Code    Disposition  The patient is not medically cleared for discharge to home or a post-acute facility.  Anticipate discharge to: home with close outpatient follow-up    I have placed the appropriate orders for post-discharge  needs.    Review of Systems  Review of Systems   Constitutional:  Negative for chills and fever.   Respiratory:  Negative for cough and shortness of breath.    Cardiovascular:  Positive for leg swelling. Negative for chest pain and palpitations.   Gastrointestinal:  Positive for constipation. Negative for abdominal pain, diarrhea, nausea and vomiting.   Musculoskeletal:  Negative for joint pain and myalgias.   Neurological:  Positive for weakness. Negative for dizziness and headaches.        Physical Exam  Temp:  [36.4 °C (97.5 °F)-36.7 °C (98 °F)] 36.4 °C (97.5 °F)  Pulse:  [72-85] 73  Resp:  [16-20] 20  BP: (114-141)/(66-80) 136/70  SpO2:  [91 %-95 %] 94 %    Physical Exam  Vitals and nursing note reviewed.   Constitutional:       General: He is not in acute distress.     Appearance: He is obese. He is ill-appearing.      Interventions: Nasal cannula in place.   HENT:      Head: Normocephalic and atraumatic.      Mouth/Throat:      Mouth: Mucous membranes are moist.      Pharynx: Oropharynx is clear. No oropharyngeal exudate.   Eyes:      General: No scleral icterus.        Right eye: No discharge.         Left eye: No discharge.      Conjunctiva/sclera: Conjunctivae normal.   Cardiovascular:      Rate and Rhythm: Normal rate and regular rhythm.      Pulses: Normal pulses.      Heart sounds: Normal heart sounds. No murmur heard.  Pulmonary:      Effort: Pulmonary effort is normal. No respiratory distress.      Breath sounds: Normal breath sounds.      Comments: Edematous back and chest  Abdominal:      General: Abdomen is flat. Bowel sounds are normal. There is no distension.      Palpations: Abdomen is soft.      Comments: edematous   Musculoskeletal:         General: No swelling.      Cervical back: Neck supple. No tenderness.      Right lower leg: Edema present.      Left lower leg: Edema present.      Comments: Severe diffuse edema in bilateral lower extremities   Skin:     General: Skin is warm and dry.       Coloration: Skin is not pale.      Findings: Erythema (right chest lesion improving, lower abdominal redness improving) present.   Neurological:      Mental Status: He is alert and oriented to person, place, and time. Mental status is at baseline.      Motor: Weakness (Bilateral lower extremities) present.   Psychiatric:         Thought Content: Thought content normal.         Judgment: Judgment normal.         Fluids    Intake/Output Summary (Last 24 hours) at 2/5/2024 2201  Last data filed at 2/5/2024 2146  Gross per 24 hour   Intake 1350 ml   Output 5175 ml   Net -3825 ml       Laboratory        Recent Labs     02/03/24  0625 02/04/24  0356 02/05/24  0206   SODIUM 133* 135 134*   POTASSIUM 4.4 4.5 4.0   CHLORIDE 97 97 95*   CO2 26 27 30   GLUCOSE 109* 110* 99   BUN 20 20 19   CREATININE 1.11 1.12 1.04   CALCIUM 8.4* 8.4* 8.3*                       Imaging  EC-ECHOCARDIOGRAM COMPLETE W/ CONT   Final Result      US-EXTREMITY ARTERY LOWER BILAT   Final Result      US-EXTREMITY VENOUS LOWER BILAT   Final Result      DX-CHEST-PORTABLE (1 VIEW)   Final Result      Cardiomegaly.           Assessment/Plan  * Acute right-sided congestive heart failure (HCC)- (present on admission)  Assessment & Plan  02/04/24  As per echocardiogram.  Continue aggressive IV diuresis as per below.  Continuous telemetry monitoring to monitor for arrhythmias during forced diuresis.    02/05/24  Urine output of 6.5 L yesterday.  Creatinine stable at 1.04.  Continuing on furosemide 60 mg IV twice daily.    Continuing spironolactone 50 mg daily.  Increase to 100 mg tomorrow.  Continuous telemetry monitoring to monitor for arrhythmias during forced diuresis.    Anasarca- (present on admission)  Assessment & Plan  Due to acute on chronic right heart failure.  Suspect due to untreated MC/OHS.  Continue forced IV diuresis.    Obesity hypoventilation syndrome (HCC)- (present on admission)  Assessment & Plan  02/04/24  Likely contributing to  patient's fluid retention  Patient will need outpatient sleep study.  Order has been placed.    Acute on chronic heart failure with preserved ejection fraction (HFpEF) (HCC)- (present on admission)  Assessment & Plan  Suspected  EF 65% in September 2023  Repeat echo  Diuresis as tolerated-Lasix 60 mg IV twice daily  Optimize CHF meds as able to tolerate    2/2  Likely due to MC/OHS.  Continue forced diuresis with furosemide 60 mg IV BID  Add spironolactone 25 mg daily  Continuous telemetry monitoring during forced diuresis    02/03/24  Decreasing oxygen requirements to 1.5 LPM.  Urine output of 3.1 L yesterday.  Continues on fluid restriction.  Continue furosemide 60 mg IV twice daily.  Increasing spironolactone to 50 mg daily.  Continuous telemetry monitoring during forced diuresis  Echocardiogram completed.  Reading pending.    02/04/24  Echocardiogram showing ejection fraction of 55%, right-sided volume overload D-shaped.  Urine output of 3 L yesterday.  Creatinine remained stable 1.1 to.  Continues on 1 LPM oxygen by nasal.  Continue furosemide 60 mg IV twice daily.  Continue spironolactone 50 mg daily.  Continuous telemetry monitoring during forced diuresis    02/05/24  Urine output of 6.5 L yesterday.  Creatinine stable at 1.04.  Continuing on furosemide 60 mg IV twice daily.    Continuing spironolactone 50 mg daily.  Increase to 100 mg tomorrow.  Continuous telemetry monitoring to monitor for arrhythmias during forced diuresis.    Acute respiratory failure with hypoxia (HCC)- (present on admission)  Assessment & Plan  On 2 L-wean off as tolerated  Diuresis  As needed nebs  Check echo, lower extremity Doppler    02/02/24  Improving.  Currently on 1 LPM.  Continue IV forced diuresis  Continuous pulse oximetry monitoring    02/03/24  Currently on 1.5 LPM.  Continuous pulse oximetry monitoring    02/05/24  Stable  2 LPM oxygen by nasal cannula.  Continuous pulse oximetry monitoring  Continue forced IV  diuresis.    Weakness- (present on admission)  Assessment & Plan  Weakness  PT/OT    Elevated troponin- (present on admission)  Assessment & Plan  Likely secondary to demand ischemia, fluid overload  ASA/statin  Check echo    02/04/24  Likely due to demand ischemia.  Remaining flat.    At risk for obstructive sleep apnea- (present on admission)  Assessment & Plan  Outpt sleep study    Bilateral leg edema  Assessment & Plan  Negative for DVT or arterial occlusions on ultrasound.    Likely due to fluid overload.  Continue IV forced diuresis  Elevate lower extremities while in bed    Volume overload- (present on admission)  Assessment & Plan  Diuresis as able to tolerate    Hypertension- (present on admission)  Assessment & Plan  Control improving    Hold home lisinopril  Continue IV furosemide  Increase spironolactone to 100 mg daily tomorrow.    Class 3 severe obesity due to excess calories with serious comorbidity and body mass index (BMI) greater than or equal to 70 in adult (HCC)- (present on admission)  Assessment & Plan  Body mass index is 101.4 kg/m².  Diet and lifestyle modification  May benefit from bariatric surgery evaluation    2/2  Nutrition consult  PT and OT     Abdominal wall cellulitis- (present on admission)  Assessment & Plan  Already taking Clindamycin -- 4days course remaining -- continue    02/04/24  Continue clindamycin         VTE prophylaxis:    heparin ppx      I have performed a physical exam and reviewed and updated ROS and Plan today (2/5/2024). In review of yesterday's note (2/4/2024), there are no changes except as documented above.

## 2024-02-06 NOTE — CARE PLAN
Pt had no acute events today. Very pleasant/cooperative. A&Ox4. VSS on RA-2LOM (HS). Denies pain. [id number] for skin integrity/comfort. Pt able to stand at bedside today with staff assistance. Skin care provided. Purewick x2 in place for strict I&Os. IV lasix continued. Tolerating 2G Na diet. Will continue close cardiac monitoring.      Problem: Care Map:  Admission Optimal Outcome for the Heart Failure Patient  Goal: Admission:  Optimal Care of the heart failure patient  Outcome: Progressing     Problem: Care Map:  Day 1 Optimal Outcome for the Heart Failure Patient  Goal: Day 1:  Optimal Care of the heart failure patient  Outcome: Progressing     Problem: Care Map:  Day 2 Optimal Outcome for the Heart Failure Patient  Goal: Day 2:  Optimal Care of the heart failure patient  Outcome: Progressing     Problem: Care Map:  Day 3 Optimal Outcome for the Heart Failure Patient  Goal: Day 3:  Optimal Care of the heart failure patient  Outcome: Progressing     Problem: Care Map:  Day Before Discharge Optimal Outcome for the Heart Failure Patient  Goal: Day Before Discharge:  Optimal Care of the heart failure patient  Outcome: Progressing     Problem: Care Map:  Day of Discharge Optimal Outcome for the Heart Failure Patient  Goal: Day of Discharge:  Optimal Care of the heart failure patient  Outcome: Progressing     Problem: Knowledge Deficit - Standard  Goal: Patient and family/care givers will demonstrate understanding of plan of care, disease process/condition, diagnostic tests and medications  Outcome: Progressing     Problem: Knowledge Deficit - COPD  Goal: Patient/significant other demonstrates understanding of disease process, utilization of the Action Plan, medications and discharge instruction  Outcome: Progressing     Problem: Risk for Infection - COPD  Goal: Patient will remain free from signs and symptoms of infection  Outcome: Progressing     Problem: Nutrition - Advanced  Goal: Patient will display progressive  weight gain toward goal have adequate food and fluid intake  Outcome: Progressing     Problem: Ineffective Airway Clearance  Goal: Patient will maintain patent airway with clear/clearing breath sounds  Outcome: Progressing     Problem: Impaired Gas Exchange  Goal: Patient will demonstrate improved ventilation and adequate oxygenation and participate in treatment regimen within the level of ability/situation.  Outcome: Progressing     Problem: Risk for Aspiration  Goal: Patient's risk for aspiration will be absent or decrease  Outcome: Progressing     Problem: Self Care  Goal: Patient will have the ability to perform ADLs independently or with assistance (bathe, groom, dress, toilet and feed)  Outcome: Progressing     Problem: Skin Integrity  Goal: Skin integrity is maintained or improved  Outcome: Progressing     Problem: Fall Risk  Goal: Patient will remain free from falls  Outcome: Progressing     Problem: Pain - Standard  Goal: Alleviation of pain or a reduction in pain to the patient’s comfort goal  Outcome: Progressing

## 2024-02-06 NOTE — CARE PLAN
The patient is Stable - Low risk of patient condition declining or worsening    Shift Goals  Clinical Goals: Diurese, maintain skin integrity, comfort  Patient Goals: Comfort, rest  Family Goals: EMERSON    Progress made toward(s) clinical / shift goals:    Problem: Care Map:  Admission Optimal Outcome for the Heart Failure Patient  Goal: Admission:  Optimal Care of the heart failure patient  Outcome: Progressing     Problem: Impaired Gas Exchange  Goal: Patient will demonstrate improved ventilation and adequate oxygenation and participate in treatment regimen within the level of ability/situation.  Outcome: Progressing     Problem: Self Care  Goal: Patient will have the ability to perform ADLs independently or with assistance (bathe, groom, dress, toilet and feed)  Outcome: Progressing     Problem: Skin Integrity  Goal: Skin integrity is maintained or improved  Outcome: Progressing     Problem: Pain - Standard  Goal: Alleviation of pain or a reduction in pain to the patient’s comfort goal  Outcome: Progressing       Patient is not progressing towards the following goals:

## 2024-02-07 LAB
ANION GAP SERPL CALC-SCNC: 8 MMOL/L (ref 7–16)
BUN SERPL-MCNC: 16 MG/DL (ref 8–22)
CALCIUM SERPL-MCNC: 8.4 MG/DL (ref 8.5–10.5)
CHLORIDE SERPL-SCNC: 93 MMOL/L (ref 96–112)
CO2 SERPL-SCNC: 36 MMOL/L (ref 20–33)
CREAT SERPL-MCNC: 0.92 MG/DL (ref 0.5–1.4)
GFR SERPLBLD CREATININE-BSD FMLA CKD-EPI: 108 ML/MIN/1.73 M 2
GLUCOSE SERPL-MCNC: 107 MG/DL (ref 65–99)
MAGNESIUM SERPL-MCNC: 1.9 MG/DL (ref 1.5–2.5)
POTASSIUM SERPL-SCNC: 3.8 MMOL/L (ref 3.6–5.5)
SODIUM SERPL-SCNC: 137 MMOL/L (ref 135–145)

## 2024-02-07 PROCEDURE — 700111 HCHG RX REV CODE 636 W/ 250 OVERRIDE (IP): Performed by: STUDENT IN AN ORGANIZED HEALTH CARE EDUCATION/TRAINING PROGRAM

## 2024-02-07 PROCEDURE — 700102 HCHG RX REV CODE 250 W/ 637 OVERRIDE(OP): Performed by: STUDENT IN AN ORGANIZED HEALTH CARE EDUCATION/TRAINING PROGRAM

## 2024-02-07 PROCEDURE — 36415 COLL VENOUS BLD VENIPUNCTURE: CPT

## 2024-02-07 PROCEDURE — A9270 NON-COVERED ITEM OR SERVICE: HCPCS | Performed by: STUDENT IN AN ORGANIZED HEALTH CARE EDUCATION/TRAINING PROGRAM

## 2024-02-07 PROCEDURE — 99232 SBSQ HOSP IP/OBS MODERATE 35: CPT | Performed by: STUDENT IN AN ORGANIZED HEALTH CARE EDUCATION/TRAINING PROGRAM

## 2024-02-07 PROCEDURE — 80048 BASIC METABOLIC PNL TOTAL CA: CPT

## 2024-02-07 PROCEDURE — 700111 HCHG RX REV CODE 636 W/ 250 OVERRIDE (IP): Mod: JZ | Performed by: STUDENT IN AN ORGANIZED HEALTH CARE EDUCATION/TRAINING PROGRAM

## 2024-02-07 PROCEDURE — 770001 HCHG ROOM/CARE - MED/SURG/GYN PRIV*

## 2024-02-07 PROCEDURE — 83735 ASSAY OF MAGNESIUM: CPT

## 2024-02-07 RX ORDER — FUROSEMIDE 10 MG/ML
80 INJECTION INTRAMUSCULAR; INTRAVENOUS
Status: DISCONTINUED | OUTPATIENT
Start: 2024-02-07 | End: 2024-02-11

## 2024-02-07 RX ADMIN — OMEPRAZOLE 20 MG: 20 CAPSULE, DELAYED RELEASE ORAL at 17:48

## 2024-02-07 RX ADMIN — MUPIROCIN 1 APPLICATION: 20 OINTMENT TOPICAL at 17:48

## 2024-02-07 RX ADMIN — FUROSEMIDE 80 MG: 10 INJECTION, SOLUTION INTRAMUSCULAR; INTRAVENOUS at 16:08

## 2024-02-07 RX ADMIN — ACETAMINOPHEN 650 MG: 325 TABLET, FILM COATED ORAL at 04:22

## 2024-02-07 RX ADMIN — ASPIRIN 81 MG: 81 TABLET, COATED ORAL at 05:46

## 2024-02-07 RX ADMIN — HEPARIN SODIUM 5000 UNITS: 5000 INJECTION, SOLUTION INTRAVENOUS; SUBCUTANEOUS at 05:46

## 2024-02-07 RX ADMIN — SPIRONOLACTONE 100 MG: 25 TABLET ORAL at 05:46

## 2024-02-07 RX ADMIN — HEPARIN SODIUM 5000 UNITS: 5000 INJECTION, SOLUTION INTRAVENOUS; SUBCUTANEOUS at 16:09

## 2024-02-07 RX ADMIN — ATORVASTATIN CALCIUM 40 MG: 40 TABLET, FILM COATED ORAL at 17:48

## 2024-02-07 RX ADMIN — OMEPRAZOLE 20 MG: 20 CAPSULE, DELAYED RELEASE ORAL at 05:46

## 2024-02-07 RX ADMIN — FUROSEMIDE 60 MG: 10 INJECTION, SOLUTION INTRAVENOUS at 05:46

## 2024-02-07 RX ADMIN — MUPIROCIN 2 %: 20 OINTMENT TOPICAL at 05:46

## 2024-02-07 ASSESSMENT — PAIN DESCRIPTION - PAIN TYPE
TYPE: ACUTE PAIN

## 2024-02-07 ASSESSMENT — ENCOUNTER SYMPTOMS
CONSTIPATION: 1
VOMITING: 0
DIARRHEA: 0
NAUSEA: 0
CHILLS: 0
HEADACHES: 0
WEAKNESS: 1
MYALGIAS: 0
COUGH: 0
DIZZINESS: 0
ABDOMINAL PAIN: 0
PALPITATIONS: 0
FEVER: 0
SHORTNESS OF BREATH: 0

## 2024-02-07 ASSESSMENT — FIBROSIS 4 INDEX: FIB4 SCORE: 0.38

## 2024-02-07 NOTE — DISCHARGE PLANNING
Case Management Discharge Planning    Admission Date: 2/1/2024  GMLOS: 3.9  ALOS: 6    6-Clicks ADL Score: 15  6-Clicks Mobility Score: 13  PT and/or OT Eval ordered: Yes  Post-acute Referrals Ordered: Yes  Post-acute Choice Obtained: No  Has referral(s) been sent to post-acute provider:  Yes      Anticipated Discharge Dispo: Discharge Disposition: D/T to SNF with Medicare cert in anticipation of skilled care (03)    DME Needed: Pending PT/OT recs    Action(s) Taken: Updated Provider/Nurse on Discharge Plan, DC Assessment Complete (See below), and Referral(s) sent    0915, RN CM visited Pt to obtain assessment information. Pt is in agreement to send referrals to SNF for placement.     Pt provided his SSN: [id number]    Escalations Completed: None    Medically Clear: No    Next Steps: CM will continue to assist Pt with discharge needs.      Barriers to Discharge: Medical clearance and Pending Placement    Is the patient up for discharge tomorrow: No    Care Transition Team Assessment  RN DONNA met with Pt at bedside to obtain assessment informations. Demographics verified. RN DONNA introduced self and purpose of visit.   Pt lives with roommate in a 3 story apartment on the ground level apartment with no steps to main entrance.  Pt has  mother for support.  Pt has Dr. Qamar Terry for PCP  Pt needs assistance with ADLs prior to hospitalization.  Pt sometimes uses walker.      Information Source  Orientation Level: Oriented X4  Information Given By: Patient  Who is responsible for making decisions for patient? : Patient    Elopement Risk  Legal Hold: No  Ambulatory or Self Mobile in Wheelchair: Yes  Disoriented: No  Psychiatric Symptoms: None  History of Wandering: No  Elopement this Admit: No  Vocalizing Wanting to Leave: No  Displays Behaviors, Body Language Wanting to Leave: No-Not at Risk for Elopement  Elopement Risk: Not at Risk for Elopement    Interdisciplinary Discharge Planning  Primary Care Physician: Dr. Foote  Mara  Lives with - Patient's Self Care Capacity: Unrelated Adult  Patient or legal guardian wants to designate a caregiver: No  Support Systems: Parent, Friends / Neighbors  Housing / Facility: 3 Story Apartment / Condo (Pt lives on the ground level with no steps to main entrance)  Prior Services: long term Home Aide Services  Durable Medical Equipment: Walker    Discharge Preparedness  What is your plan after discharge?: Skilled nursing facility  What are your discharge supports?: Parent  Prior Functional Level: Needs Assist with Activities of Daily Living, Independent with Medication Management    Functional Assesment  Prior Functional Level: Needs Assist with Activities of Daily Living, Independent with Medication Management    Finances  Financial Barriers to Discharge: No  Prescription Coverage: Yes    Vision / Hearing Impairment  Vision Impairment : No  Hearing Impairment : No        Domestic Abuse  Have you ever been the victim of abuse or violence?: No  Physical Abuse or Sexual Abuse: No  Verbal Abuse or Emotional Abuse: No  Possible Abuse/Neglect Reported to:: Not Applicable    Psychological Assessment  History of Substance Abuse: None  History of Psychiatric Problems: No         Anticipated Discharge Information  Discharge Disposition: D/T to SNF with Medicare cert in anticipation of skilled care (03)

## 2024-02-07 NOTE — CARE PLAN
The patient is Stable - Low risk of patient condition declining or worsening    Shift Goals  Clinical Goals: (P) Daily weights, Diurese  Patient Goals: (P) Comfort, rest  Family Goals: (P) Not present    Progress made toward(s) clinical / shift goals:      Problem: Care Map:  Day 3 Optimal Outcome for the Heart Failure Patient  Goal: Day 3:  Optimal Care of the heart failure patient throughout shift as seen by decreased WOB and lessening edema.   2/7/2024 1311 by Lisset Hawk, Student  Outcome: Progressing  Note: Pt weights being monitored daily. Assessed WOB. Providing lasix as BP tolerates encouraging mobility as much as possible. Monitoring labs. Discussed POC with Pt. Pt verbalizes understanding.   2/7/2024 1310 by Lisset Hawk, Student  Outcome: Progressing     Problem: Skin Integrity  Goal: Skin integrity is maintained or improved throughout shift as seen by no worsening skin breakdown.   2/7/2024 1311 by Lisset Hawk, Student  Outcome: Progressing  Note: Assessing skin including pressure points a minimum of Q shift. Using trapeze bar to reposition attempting turning as much as possible. Cleansed all folds and reapplied skin protectant.   2/7/2024 1310 by Lisset Hawk, Student  Outcome: Progressing     Problem: Mobility  Goal: Patient's capacity to carry out activities will improve this shift as seen by pt being out of bed X2.   Outcome: Progressing  Flowsheets (Taken 2/7/2024 1311)  Mobility:   Encouraged mobilization per interdisciplinary team recommendations   Provided assistive devices   Monitored for signs of activity intolerance   Provided rest periods between activities (Pended)  Level of Mobility: Level IV (Pended)  Activity Performed:   Stand   Edge of bed   Back to bed (Pended)  Time Activity Tolerated: 20 min (Pended)  # of Times Distance was Traveled: 2 (Pended)  Assistance: Assistance of Two or More (Pended)  Note: Assisted pt with mobilizing to EOB and standing X2.  Utilized assistive devices such as hover mat for person assist out of bed but once standing Pt could stand independently using FWW.        Patient is not progressing towards the following goals:

## 2024-02-07 NOTE — CARE PLAN
The patient is Watcher - Medium risk of patient condition declining or worsening    Shift Goals  Clinical Goals: diurese, comfort, repositioning  Patient Goals: rest  Family Goals: gadiel    Progress made toward(s) clinical / shift goals:        Problem: Knowledge Deficit - Standard  Goal: Patient and family/care givers will demonstrate understanding of plan of care, disease process/condition, diagnostic tests and medications  Outcome: Progressing     Problem: Risk for Infection - COPD  Goal: Patient will remain free from signs and symptoms of infection  Outcome: Progressing     Problem: Nutrition - Advanced  Goal: Patient will display progressive weight gain toward goal have adequate food and fluid intake  Outcome: Progressing       Patient is not progressing towards the following goals:

## 2024-02-07 NOTE — PROGRESS NOTES
[facility] Medicine Daily Progress Note    Date of Service  2/6/2024    Chief Complaint  Cole Jaramillo is a 39 y.o. male admitted 2/1/2024 with leg swelling and inability to walk.    Hospital Course  Cole Jaramillo is a 39 y.o. male who presented on 2/1/2024 for evaluation of worsening lower extremity edema and inability to ambulate.  This is a pleasant gentleman with a history of severe obesity .  He had difficulty ambulating for the past 2 weeks after having a cold and was mostly in bed.  He became bedbound and activated EMS after consultation with a mobile physician.  In the emergency room, he was requiring 2 LPM oxygen mask,.  Noted to have elevated NT proBNP of 4729.  Blood pressures were elevated in the 160s.  Patient was admitted to the telemetry floor due to volume overload requiring forced IV diuresis.  Physical and occupational therapies were consulted, and the patient underwent aggressive therapies.    Interval Problem Update  02/02/24  Patient was seen and examined on the telemetry floor.  He continues on continuous cardiac monitoring.  Continues on 1 LPM oxygen via nasal cannula.  Urine output of 800 L yesterday on furosemide 60 mg IV twice daily.  Starting spironolactone 25 mg daily.  Reviewed patient's discharge summary from his hospitalization on 8/27/2023 to 9/7/2023.  He presented with similar symptoms.  He obtained intensive physical therapy during his hospitalization for strengthening.  He was referred for outpatient sleep study and was treated with IV Lasix.  He was requiring 3 LPM oxygen minus cannula during ambulation.  Additional history gathered from the patient.  He states that he initially weighed approximately 800 pounds and got to about 700 pounds by the time of discharge.  He lost additional 100 pounds and once approximately 600 pounds, when he recently got a cold and was bedridden.  He regained his weight since then.  Most of it fluid he believes.  Resulting in difficulty  with ambulation.  Discussed weight management strategies.  Discussed with case management leadership in regards to obtaining additional physical therapy.  Nutrition consult placed.    02/03/24  Patient was seen and examined on the telemetry floor.  He continues on continuous cardiac monitoring.  Decreasing oxygen requirements to 1.5 LPM.  Urine output of 3.1 L yesterday.  Continues on fluid restriction.  Continue furosemide 60 mg IV twice daily.  Increasing spironolactone to 50 mg daily.  Was able to stand up yesterday with assistance.  Discussed with patient and mother at bedside.    02/04/24  Patient was seen and examined on the telemetry floor.  He continues on continuous cardiac monitoring.  Stable hypoxia on 2 LPM oxygen by nasal cannula.  Urine output of 3 L yesterday.  Creatinine stable at 1.12.  Continuing on furosemide 60 mg IV twice daily.  Continue spironolactone 50 mg daily.  No significant mobilization overnight.  No bowel movement yet.  Aggressive bowel regimen with MiraLAX and docusate.  Patient brought some books on intermittent fasting.    02/05/24  Patient was seen and examined on the telemetry floor.  He continues on continuous cardiac monitoring.  Stable hypoxia on 2 LPM oxygen by nasal cannula.  Urine output of 6.5 L yesterday.  Creatinine stable at 1.04.  Continuing on furosemide 60 mg IV twice daily.  Continuing spironolactone 50 mg daily.  Increase to 100 mg tomorrow.  Overall feeling better.  Able to move legs more.  Still severe anasarca.    2/6 patient seen and examined at bedside  Care plan discussed with the mom at bedside, all the questions were answered  The patient and the mom agreed to go to SNF  Patient completed a 5 days clindamycin    Continue IV Lasix-I ordered a.m. CMP to monitor electrolytes  PT OT recommended SNF    I have discussed this patient's plan of care and discharge plan at IDT rounds today with Case Management, Nursing, Nursing leadership, and other members of the IDT  team.    Consultants/Specialty  none    Code Status  Full Code    Disposition  The patient is not medically cleared for discharge to home or a post-acute facility.      I have placed the appropriate orders for post-discharge needs.    Review of Systems  Review of Systems   Constitutional:  Negative for chills and fever.   Respiratory:  Negative for cough and shortness of breath.    Cardiovascular:  Positive for leg swelling. Negative for chest pain and palpitations.   Gastrointestinal:  Positive for constipation. Negative for abdominal pain, diarrhea, nausea and vomiting.   Musculoskeletal:  Negative for joint pain and myalgias.   Neurological:  Positive for weakness. Negative for dizziness and headaches.        Physical Exam  Temp:  [36.4 °C (97.5 °F)-36.5 °C (97.7 °F)] 36.4 °C (97.5 °F)  Pulse:  [73-82] 75  Resp:  [18-20] 20  BP: (120-139)/(69-79) 138/74  SpO2:  [90 %-95 %] 93 %    Physical Exam  Vitals and nursing note reviewed.   Constitutional:       General: He is not in acute distress.     Appearance: He is obese. He is ill-appearing.      Interventions: Nasal cannula in place.   HENT:      Head: Normocephalic and atraumatic.      Mouth/Throat:      Mouth: Mucous membranes are moist.      Pharynx: Oropharynx is clear. No oropharyngeal exudate.   Eyes:      General: No scleral icterus.        Right eye: No discharge.         Left eye: No discharge.      Conjunctiva/sclera: Conjunctivae normal.   Cardiovascular:      Rate and Rhythm: Normal rate and regular rhythm.      Pulses: Normal pulses.      Heart sounds: Normal heart sounds. No murmur heard.  Pulmonary:      Effort: Pulmonary effort is normal. No respiratory distress.      Breath sounds: Normal breath sounds.      Comments: Edematous back and chest  Abdominal:      General: Abdomen is flat. Bowel sounds are normal. There is no distension.      Palpations: Abdomen is soft.      Comments: edematous   Musculoskeletal:         General: No swelling.       Cervical back: Neck supple. No tenderness.      Right lower leg: Edema present.      Left lower leg: Edema present.      Comments: Severe diffuse edema in bilateral lower extremities   Skin:     General: Skin is warm and dry.      Coloration: Skin is not pale.      Findings: Erythema (right chest lesion improving, lower abdominal redness improving) present.   Neurological:      Mental Status: He is alert and oriented to person, place, and time. Mental status is at baseline.      Motor: Weakness (Bilateral lower extremities) present.   Psychiatric:         Thought Content: Thought content normal.         Judgment: Judgment normal.         Fluids    Intake/Output Summary (Last 24 hours) at 2/6/2024 1831  Last data filed at 2/6/2024 1819  Gross per 24 hour   Intake 800 ml   Output 4625 ml   Net -3825 ml       Laboratory        Recent Labs     02/04/24  0356 02/05/24  0206   SODIUM 135 134*   POTASSIUM 4.5 4.0   CHLORIDE 97 95*   CO2 27 30   GLUCOSE 110* 99   BUN 20 19   CREATININE 1.12 1.04   CALCIUM 8.4* 8.3*                       Imaging  EC-ECHOCARDIOGRAM COMPLETE W/ CONT   Final Result      US-EXTREMITY ARTERY LOWER BILAT   Final Result      US-EXTREMITY VENOUS LOWER BILAT   Final Result      DX-CHEST-PORTABLE (1 VIEW)   Final Result      Cardiomegaly.           Assessment/Plan  * Acute right-sided congestive heart failure (HCC)- (present on admission)  Assessment & Plan  02/04/24  As per echocardiogram.  Continue aggressive IV diuresis as per below.  Continuous telemetry monitoring to monitor for arrhythmias during forced diuresis.    02/05/24  Urine output of 6.5 L yesterday.  Creatinine stable at 1.04.  Continuing on furosemide 60 mg IV twice daily.    Continuing spironolactone 50 mg daily.  Increase to 100 mg tomorrow.  Continuous telemetry monitoring to monitor for arrhythmias during forced diuresis.    Anasarca- (present on admission)  Assessment & Plan  Due to acute on chronic right heart failure.  Suspect  due to untreated MC/OHS.  Continue forced IV diuresis.    Obesity hypoventilation syndrome (HCC)- (present on admission)  Assessment & Plan  02/04/24  Likely contributing to patient's fluid retention  Patient will need outpatient sleep study.  Order has been placed.    Weakness- (present on admission)  Assessment & Plan  Weakness  PT/OT    Elevated troponin- (present on admission)  Assessment & Plan  Likely secondary to demand ischemia, fluid overload  ASA/statin  Check echo    02/04/24  Likely due to demand ischemia.  Remaining flat.    At risk for obstructive sleep apnea- (present on admission)  Assessment & Plan  Outpt sleep study    Bilateral leg edema  Assessment & Plan  Negative for DVT or arterial occlusions on ultrasound.    Likely due to fluid overload.  Continue IV forced diuresis  Elevate lower extremities while in bed    Acute on chronic heart failure with preserved ejection fraction (HFpEF) (HCC)- (present on admission)  Assessment & Plan  Suspected  EF 65% in September 2023  Repeat echo  Diuresis as tolerated-Lasix 60 mg IV twice daily  Optimize CHF meds as able to tolerate    2/2  Likely due to MC/OHS.  Continue forced diuresis with furosemide 60 mg IV BID  Add spironolactone 25 mg daily  Continuous telemetry monitoring during forced diuresis    02/03/24  Decreasing oxygen requirements to 1.5 LPM.  Urine output of 3.1 L yesterday.  Continues on fluid restriction.  Continue furosemide 60 mg IV twice daily.  Increasing spironolactone to 50 mg daily.  Continuous telemetry monitoring during forced diuresis  Echocardiogram completed.  Reading pending.    02/04/24  Echocardiogram showing ejection fraction of 55%, right-sided volume overload D-shaped.  Urine output of 3 L yesterday.  Creatinine remained stable 1.1 to.  Continues on 1 LPM oxygen by nasal.  Continue furosemide 60 mg IV twice daily.  Continue spironolactone 50 mg daily.  Continuous telemetry monitoring during forced  diuresis    02/05/24  Urine output of 6.5 L yesterday.  Creatinine stable at 1.04.  Continuing on furosemide 60 mg IV twice daily.    Continuing spironolactone 50 mg daily.  Increase to 100 mg tomorrow.  Continuous telemetry monitoring to monitor for arrhythmias during forced diuresis.    Volume overload- (present on admission)  Assessment & Plan  Diuresis as able to tolerate    Hypertension- (present on admission)  Assessment & Plan  Control improving    Hold home lisinopril  Continue IV furosemide  Increase spironolactone to 100 mg daily tomorrow.    Class 3 severe obesity due to excess calories with serious comorbidity and body mass index (BMI) greater than or equal to 70 in adult ([facility])- (present on admission)  Assessment & Plan  Body mass index is 101.4 kg/m².  Diet and lifestyle modification  May benefit from bariatric surgery evaluation    2/2  Nutrition consult  PT and OT     Acute respiratory failure with hypoxia ([facility])- (present on admission)  Assessment & Plan  On 2 L-wean off as tolerated  Diuresis  As needed nebs  Check echo, lower extremity Doppler    02/02/24  Improving.  Currently on 1 LPM.  Continue IV forced diuresis  Continuous pulse oximetry monitoring    02/03/24  Currently on 1.5 LPM.  Continuous pulse oximetry monitoring    02/05/24  Stable  2 LPM oxygen by nasal cannula.  Continuous pulse oximetry monitoring  Continue forced IV diuresis.    Abdominal wall cellulitis- (present on admission)  Assessment & Plan  Already taking Clindamycin -- 4days course remaining -- continue    02/04/24  Continue clindamycin         VTE prophylaxis: Heparin    I have performed a physical exam and reviewed and updated ROS and Plan today (2/6/2024). In review of yesterday's note (2/5/2024), there are no changes except as documented above.    I spent 36 minutes for chart review, meeting and examining the patient, documenting, ordering medications and tests, interpreting the results independently, and communicating  with the other health professionals.

## 2024-02-07 NOTE — PROGRESS NOTES
Bedside report received from day RN; assumed pt care. Pt assessment complete. Pt A&Ox4. Reviewed plan of care with pt. Pt on tele. On room air. All needs met at this time. Chart and labs reviewed. Bed in lowest position, and 2 side rails up. Pt educated on call light; call light with in reach.

## 2024-02-07 NOTE — PROGRESS NOTES
Monitor Summary:     Rhythm: SR  Rate: 73-88  Ectopy: (R)PVC  Measurements: .17/.10/.37                 12 Hour Chart Check

## 2024-02-08 LAB
ANION GAP SERPL CALC-SCNC: 9 MMOL/L (ref 7–16)
BUN SERPL-MCNC: 15 MG/DL (ref 8–22)
CALCIUM SERPL-MCNC: 8.6 MG/DL (ref 8.5–10.5)
CHLORIDE SERPL-SCNC: 93 MMOL/L (ref 96–112)
CO2 SERPL-SCNC: 35 MMOL/L (ref 20–33)
CREAT SERPL-MCNC: 0.96 MG/DL (ref 0.5–1.4)
GFR SERPLBLD CREATININE-BSD FMLA CKD-EPI: 102 ML/MIN/1.73 M 2
GLUCOSE SERPL-MCNC: 105 MG/DL (ref 65–99)
MAGNESIUM SERPL-MCNC: 1.9 MG/DL (ref 1.5–2.5)
POTASSIUM SERPL-SCNC: 3.7 MMOL/L (ref 3.6–5.5)
SODIUM SERPL-SCNC: 137 MMOL/L (ref 135–145)

## 2024-02-08 PROCEDURE — 36415 COLL VENOUS BLD VENIPUNCTURE: CPT

## 2024-02-08 PROCEDURE — 99232 SBSQ HOSP IP/OBS MODERATE 35: CPT | Performed by: STUDENT IN AN ORGANIZED HEALTH CARE EDUCATION/TRAINING PROGRAM

## 2024-02-08 PROCEDURE — 700102 HCHG RX REV CODE 250 W/ 637 OVERRIDE(OP): Performed by: STUDENT IN AN ORGANIZED HEALTH CARE EDUCATION/TRAINING PROGRAM

## 2024-02-08 PROCEDURE — 700111 HCHG RX REV CODE 636 W/ 250 OVERRIDE (IP): Performed by: STUDENT IN AN ORGANIZED HEALTH CARE EDUCATION/TRAINING PROGRAM

## 2024-02-08 PROCEDURE — 770001 HCHG ROOM/CARE - MED/SURG/GYN PRIV*

## 2024-02-08 PROCEDURE — A9270 NON-COVERED ITEM OR SERVICE: HCPCS | Performed by: STUDENT IN AN ORGANIZED HEALTH CARE EDUCATION/TRAINING PROGRAM

## 2024-02-08 PROCEDURE — 83735 ASSAY OF MAGNESIUM: CPT

## 2024-02-08 PROCEDURE — 80048 BASIC METABOLIC PNL TOTAL CA: CPT

## 2024-02-08 RX ADMIN — OMEPRAZOLE 20 MG: 20 CAPSULE, DELAYED RELEASE ORAL at 16:44

## 2024-02-08 RX ADMIN — ATORVASTATIN CALCIUM 40 MG: 40 TABLET, FILM COATED ORAL at 16:44

## 2024-02-08 RX ADMIN — HEPARIN SODIUM 5000 UNITS: 5000 INJECTION, SOLUTION INTRAVENOUS; SUBCUTANEOUS at 14:48

## 2024-02-08 RX ADMIN — FUROSEMIDE 80 MG: 10 INJECTION, SOLUTION INTRAMUSCULAR; INTRAVENOUS at 05:37

## 2024-02-08 RX ADMIN — MUPIROCIN 1 DOSE: 20 OINTMENT TOPICAL at 16:46

## 2024-02-08 RX ADMIN — HEPARIN SODIUM 5000 UNITS: 5000 INJECTION, SOLUTION INTRAVENOUS; SUBCUTANEOUS at 06:00

## 2024-02-08 RX ADMIN — DOCUSATE SODIUM 100 MG: 100 CAPSULE, LIQUID FILLED ORAL at 16:45

## 2024-02-08 RX ADMIN — FUROSEMIDE 80 MG: 10 INJECTION, SOLUTION INTRAMUSCULAR; INTRAVENOUS at 14:48

## 2024-02-08 RX ADMIN — ASPIRIN 81 MG: 81 TABLET, COATED ORAL at 05:38

## 2024-02-08 RX ADMIN — OMEPRAZOLE 20 MG: 20 CAPSULE, DELAYED RELEASE ORAL at 05:38

## 2024-02-08 RX ADMIN — SPIRONOLACTONE 100 MG: 25 TABLET ORAL at 05:38

## 2024-02-08 RX ADMIN — MUPIROCIN 1 APPLICATION: 20 OINTMENT TOPICAL at 05:43

## 2024-02-08 RX ADMIN — HEPARIN SODIUM 5000 UNITS: 5000 INJECTION, SOLUTION INTRAVENOUS; SUBCUTANEOUS at 21:25

## 2024-02-08 ASSESSMENT — ENCOUNTER SYMPTOMS
PALPITATIONS: 0
FEVER: 0
WEAKNESS: 1
DIZZINESS: 0
ABDOMINAL PAIN: 0
CONSTIPATION: 1
MYALGIAS: 0
CHILLS: 0
SHORTNESS OF BREATH: 0
NAUSEA: 0
VOMITING: 0
COUGH: 0
DIARRHEA: 0
HEADACHES: 0

## 2024-02-08 ASSESSMENT — FIBROSIS 4 INDEX: FIB4 SCORE: 0.38

## 2024-02-08 ASSESSMENT — PAIN DESCRIPTION - PAIN TYPE: TYPE: ACUTE PAIN

## 2024-02-08 NOTE — DISCHARGE PLANNING
1125  Agency/Facility Name: [facility]   Outcome: DPA attempted to leave VM but Qamar's mailbox is full. DPA to attempt to call a later time.     1127  Agency/Facility Name: [facility]   Outcome: DPA left VM regarding referral. DPA awaiting call back/response.

## 2024-02-08 NOTE — PROGRESS NOTES
[facility] Medicine Daily Progress Note    Date of Service  2/7/2024    Chief Complaint  Cole Jaramillo is a 39 y.o. male admitted 2/1/2024 with leg swelling and inability to walk.    Hospital Course  Cole Jaramillo is a 39 y.o. male who presented on 2/1/2024 for evaluation of worsening lower extremity edema and inability to ambulate.  This is a pleasant gentleman with a history of severe obesity .  He had difficulty ambulating for the past 2 weeks after having a cold and was mostly in bed.  He became bedbound and activated EMS after consultation with a mobile physician.  In the emergency room, he was requiring 2 LPM oxygen mask,.  Noted to have elevated NT proBNP of 4729.  Blood pressures were elevated in the 160s.  Patient was admitted to the telemetry floor due to volume overload requiring forced IV diuresis.  Physical and occupational therapies were consulted, and the patient underwent aggressive therapies.    Interval Problem Update  02/02/24  Patient was seen and examined on the telemetry floor.  He continues on continuous cardiac monitoring.  Continues on 1 LPM oxygen via nasal cannula.  Urine output of 800 L yesterday on furosemide 60 mg IV twice daily.  Starting spironolactone 25 mg daily.  Reviewed patient's discharge summary from his hospitalization on 8/27/2023 to 9/7/2023.  He presented with similar symptoms.  He obtained intensive physical therapy during his hospitalization for strengthening.  He was referred for outpatient sleep study and was treated with IV Lasix.  He was requiring 3 LPM oxygen minus cannula during ambulation.  Additional history gathered from the patient.  He states that he initially weighed approximately 800 pounds and got to about 700 pounds by the time of discharge.  He lost additional 100 pounds and once approximately 600 pounds, when he recently got a cold and was bedridden.  He regained his weight since then.  Most of it fluid he believes.  Resulting in difficulty  with ambulation.  Discussed weight management strategies.  Discussed with case management leadership in regards to obtaining additional physical therapy.  Nutrition consult placed.    02/03/24  Patient was seen and examined on the telemetry floor.  He continues on continuous cardiac monitoring.  Decreasing oxygen requirements to 1.5 LPM.  Urine output of 3.1 L yesterday.  Continues on fluid restriction.  Continue furosemide 60 mg IV twice daily.  Increasing spironolactone to 50 mg daily.  Was able to stand up yesterday with assistance.  Discussed with patient and mother at bedside.    02/04/24  Patient was seen and examined on the telemetry floor.  He continues on continuous cardiac monitoring.  Stable hypoxia on 2 LPM oxygen by nasal cannula.  Urine output of 3 L yesterday.  Creatinine stable at 1.12.  Continuing on furosemide 60 mg IV twice daily.  Continue spironolactone 50 mg daily.  No significant mobilization overnight.  No bowel movement yet.  Aggressive bowel regimen with MiraLAX and docusate.  Patient brought some books on intermittent fasting.    02/05/24  Patient was seen and examined on the telemetry floor.  He continues on continuous cardiac monitoring.  Stable hypoxia on 2 LPM oxygen by nasal cannula.  Urine output of 6.5 L yesterday.  Creatinine stable at 1.04.  Continuing on furosemide 60 mg IV twice daily.  Continuing spironolactone 50 mg daily.  Increase to 100 mg tomorrow.  Overall feeling better.  Able to move legs more.  Still severe anasarca.    2/7 patient seen and examined at bedside  Care plan discussed with the mom at bedside, all the questions were answered  Patient is still very edematous, I increase his Lasix to 80 mg twice daily  Plan to DC to SNF  Patient completed a 5 days clindamycin    Continue IV Lasix-I ordered a.m. CMP to monitor electrolytes  PT OT recommended SNF    I have discussed this patient's plan of care and discharge plan at IDT rounds today with Case Management,  Nursing, Nursing leadership, and other members of the IDT team.    Consultants/Specialty  none    Code Status  Full Code    Disposition  The patient is medically cleared for discharge to home or a post-acute facility.      I have placed the appropriate orders for post-discharge needs.    Review of Systems  Review of Systems   Constitutional:  Negative for chills and fever.   Respiratory:  Negative for cough and shortness of breath.    Cardiovascular:  Positive for leg swelling. Negative for chest pain and palpitations.   Gastrointestinal:  Positive for constipation. Negative for abdominal pain, diarrhea, nausea and vomiting.   Musculoskeletal:  Negative for joint pain and myalgias.   Neurological:  Positive for weakness. Negative for dizziness and headaches.        Physical Exam  Temp:  [36.4 °C (97.5 °F)-36.5 °C (97.7 °F)] 36.5 °C (97.7 °F)  Pulse:  [75-85] 84  Resp:  [16-18] 18  BP: (122-137)/(69-73) 134/72  SpO2:  [90 %-96 %] 90 %    Physical Exam  Vitals and nursing note reviewed.   Constitutional:       General: He is not in acute distress.     Appearance: He is obese. He is ill-appearing.      Interventions: Nasal cannula in place.   HENT:      Head: Normocephalic and atraumatic.      Mouth/Throat:      Mouth: Mucous membranes are moist.      Pharynx: Oropharynx is clear. No oropharyngeal exudate.   Eyes:      General: No scleral icterus.        Right eye: No discharge.         Left eye: No discharge.      Conjunctiva/sclera: Conjunctivae normal.   Cardiovascular:      Rate and Rhythm: Normal rate and regular rhythm.      Pulses: Normal pulses.      Heart sounds: Normal heart sounds. No murmur heard.  Pulmonary:      Effort: Pulmonary effort is normal. No respiratory distress.      Breath sounds: Normal breath sounds.      Comments: Edematous back and chest  Abdominal:      General: Abdomen is flat. Bowel sounds are normal. There is no distension.      Palpations: Abdomen is soft.      Comments: edematous    Musculoskeletal:         General: No swelling.      Cervical back: Neck supple. No tenderness.      Right lower leg: Edema present.      Left lower leg: Edema present.      Comments: Severe diffuse edema in bilateral lower extremities   Skin:     General: Skin is warm and dry.      Coloration: Skin is not pale.      Findings: Erythema (right chest lesion improving, lower abdominal redness improving) present.   Neurological:      Mental Status: He is alert and oriented to person, place, and time. Mental status is at baseline.      Motor: Weakness (Bilateral lower extremities) present.   Psychiatric:         Thought Content: Thought content normal.         Judgment: Judgment normal.         Fluids    Intake/Output Summary (Last 24 hours) at 2/7/2024 1806  Last data filed at 2/7/2024 1752  Gross per 24 hour   Intake 910 ml   Output 6750 ml   Net -5840 ml       Laboratory        Recent Labs     02/05/24  0206 02/07/24  0419   SODIUM 134* 137   POTASSIUM 4.0 3.8   CHLORIDE 95* 93*   CO2 30 36*   GLUCOSE 99 107*   BUN 19 16   CREATININE 1.04 0.92   CALCIUM 8.3* 8.4*                       Imaging  EC-ECHOCARDIOGRAM COMPLETE W/ CONT   Final Result      US-EXTREMITY ARTERY LOWER BILAT   Final Result      US-EXTREMITY VENOUS LOWER BILAT   Final Result      DX-CHEST-PORTABLE (1 VIEW)   Final Result      Cardiomegaly.           Assessment/Plan  * Acute right-sided congestive heart failure (HCC)- (present on admission)  Assessment & Plan  02/04/24  As per echocardiogram.  Continue aggressive IV diuresis as per below.  Continuous telemetry monitoring to monitor for arrhythmias during forced diuresis.    02/05/24  Urine output of 6.5 L yesterday.  Creatinine stable at 1.04.  Continuing on furosemide 60 mg IV twice daily.    Continuing spironolactone 50 mg daily.  Increase to 100 mg tomorrow.  Continuous telemetry monitoring to monitor for arrhythmias during forced diuresis.    Anasarca- (present on admission)  Assessment &  Plan  Due to acute on chronic right heart failure.  Suspect due to untreated MC/OHS.  Continue forced IV diuresis.    Obesity hypoventilation syndrome (HCC)- (present on admission)  Assessment & Plan  02/04/24  Likely contributing to patient's fluid retention  Patient will need outpatient sleep study.  Order has been placed.    Weakness- (present on admission)  Assessment & Plan  Weakness  PT/OT    Elevated troponin- (present on admission)  Assessment & Plan  Likely secondary to demand ischemia, fluid overload  ASA/statin  Check echo    02/04/24  Likely due to demand ischemia.  Remaining flat.    At risk for obstructive sleep apnea- (present on admission)  Assessment & Plan  Outpt sleep study    Bilateral leg edema  Assessment & Plan  Negative for DVT or arterial occlusions on ultrasound.    Likely due to fluid overload.  Continue IV forced diuresis  Elevate lower extremities while in bed    Acute on chronic heart failure with preserved ejection fraction (HFpEF) (HCC)- (present on admission)  Assessment & Plan  Suspected  EF 65% in September 2023  Repeat echo  Diuresis as tolerated-Lasix 60 mg IV twice daily  Optimize CHF meds as able to tolerate    2/2  Likely due to MC/OHS.  Continue forced diuresis with furosemide 60 mg IV BID  Add spironolactone 25 mg daily  Continuous telemetry monitoring during forced diuresis    02/03/24  Decreasing oxygen requirements to 1.5 LPM.  Urine output of 3.1 L yesterday.  Continues on fluid restriction.  Continue furosemide 60 mg IV twice daily.  Increasing spironolactone to 50 mg daily.  Continuous telemetry monitoring during forced diuresis  Echocardiogram completed.  Reading pending.    02/04/24  Echocardiogram showing ejection fraction of 55%, right-sided volume overload D-shaped.  Urine output of 3 L yesterday.  Creatinine remained stable 1.1 to.  Continues on 1 LPM oxygen by nasal.  Continue furosemide 60 mg IV twice daily.  Continue spironolactone 50 mg daily.  Continuous  telemetry monitoring during forced diuresis    02/05/24  Urine output of 6.5 L yesterday.  Creatinine stable at 1.04.  Continuing on furosemide 60 mg IV twice daily.    Continuing spironolactone 50 mg daily.  Increase to 100 mg tomorrow.  Continuous telemetry monitoring to monitor for arrhythmias during forced diuresis.    Volume overload- (present on admission)  Assessment & Plan  Diuresis as able to tolerate    Hypertension- (present on admission)  Assessment & Plan  Control improving    Hold home lisinopril  Continue IV furosemide  Increase spironolactone to 100 mg daily tomorrow.    Class 3 severe obesity due to excess calories with serious comorbidity and body mass index (BMI) greater than or equal to 70 in adult ([facility])- (present on admission)  Assessment & Plan  Body mass index is 101.4 kg/m².  Diet and lifestyle modification  May benefit from bariatric surgery evaluation    2/2  Nutrition consult  PT and OT     Acute respiratory failure with hypoxia ([facility])- (present on admission)  Assessment & Plan  On 2 L-wean off as tolerated  Diuresis  As needed nebs  Check echo, lower extremity Doppler    02/02/24  Improving.  Currently on 1 LPM.  Continue IV forced diuresis  Continuous pulse oximetry monitoring    02/03/24  Currently on 1.5 LPM.  Continuous pulse oximetry monitoring    02/05/24  Stable  2 LPM oxygen by nasal cannula.  Continuous pulse oximetry monitoring  Continue forced IV diuresis.    Abdominal wall cellulitis- (present on admission)  Assessment & Plan  Already taking Clindamycin -- 4days course remaining -- continue    02/04/24  Continue clindamycin         VTE prophylaxis: Heparin    I have performed a physical exam and reviewed and updated ROS and Plan today (2/7/2024). In review of yesterday's note (2/6/2024), there are no changes except as documented above.    I spent 36 minutes for chart review, meeting and examining the patient, documenting, ordering medications and tests, interpreting the results  independently, and communicating with the other health professionals.

## 2024-02-08 NOTE — PROGRESS NOTES
Bedside report received from day RN; assumed pt care. Pt assessment complete. Pt A&Ox4. Reviewed plan of care with pt. Pt medical, no tele. On 0.5L NC. All needs met at this time. Chart and labs reviewed. Bed in lowest position, and 2 side rails up. Pt educated on call light; call light with in reach.

## 2024-02-08 NOTE — CARE PLAN
The patient is Stable - Low risk of patient condition declining or worsening    Shift Goals  Clinical Goals: diurese, repositioning, hemodynamic stability  Patient Goals: sleep comfortably  Family Goals: gadiel    Progress made toward(s) clinical / shift goals:        Problem: Knowledge Deficit - Standard  Goal: Patient and family/care givers will demonstrate understanding of plan of care, disease process/condition, diagnostic tests and medications  Outcome: Progressing     Problem: Knowledge Deficit - COPD  Goal: Patient/significant other demonstrates understanding of disease process, utilization of the Action Plan, medications and discharge instruction  Outcome: Progressing     Problem: Risk for Infection - COPD  Goal: Patient will remain free from signs and symptoms of infection  Outcome: Progressing     Problem: Nutrition - Advanced  Goal: Patient will display progressive weight gain toward goal have adequate food and fluid intake  Outcome: Progressing     Problem: Pain - Standard  Goal: Alleviation of pain or a reduction in pain to the patient’s comfort goal  Outcome: Progressing       Patient is not progressing towards the following goals:

## 2024-02-09 LAB
ANION GAP SERPL CALC-SCNC: 6 MMOL/L (ref 7–16)
APPEARANCE UR: CLEAR
BACTERIA #/AREA URNS HPF: ABNORMAL /HPF
BILIRUB UR QL STRIP.AUTO: NEGATIVE
BUN SERPL-MCNC: 15 MG/DL (ref 8–22)
CALCIUM SERPL-MCNC: 8.4 MG/DL (ref 8.5–10.5)
CHLORIDE SERPL-SCNC: 90 MMOL/L (ref 96–112)
CO2 SERPL-SCNC: 39 MMOL/L (ref 20–33)
COLOR UR: YELLOW
CREAT SERPL-MCNC: 0.9 MG/DL (ref 0.5–1.4)
EPI CELLS #/AREA URNS HPF: NEGATIVE /HPF
GFR SERPLBLD CREATININE-BSD FMLA CKD-EPI: 111 ML/MIN/1.73 M 2
GLUCOSE SERPL-MCNC: 109 MG/DL (ref 65–99)
GLUCOSE UR STRIP.AUTO-MCNC: NEGATIVE MG/DL
HYALINE CASTS #/AREA URNS LPF: ABNORMAL /LPF
KETONES UR STRIP.AUTO-MCNC: NEGATIVE MG/DL
LEUKOCYTE ESTERASE UR QL STRIP.AUTO: ABNORMAL
MAGNESIUM SERPL-MCNC: 1.9 MG/DL (ref 1.5–2.5)
MICRO URNS: ABNORMAL
NITRITE UR QL STRIP.AUTO: NEGATIVE
PH UR STRIP.AUTO: 8.5 [PH] (ref 5–8)
POTASSIUM SERPL-SCNC: 3.9 MMOL/L (ref 3.6–5.5)
PROT UR QL STRIP: NEGATIVE MG/DL
RBC # URNS HPF: ABNORMAL /HPF
RBC UR QL AUTO: ABNORMAL
SODIUM SERPL-SCNC: 135 MMOL/L (ref 135–145)
SP GR UR STRIP.AUTO: 1.01
UROBILINOGEN UR STRIP.AUTO-MCNC: 1 MG/DL
WBC #/AREA URNS HPF: ABNORMAL /HPF

## 2024-02-09 PROCEDURE — 97110 THERAPEUTIC EXERCISES: CPT

## 2024-02-09 PROCEDURE — A9270 NON-COVERED ITEM OR SERVICE: HCPCS | Performed by: STUDENT IN AN ORGANIZED HEALTH CARE EDUCATION/TRAINING PROGRAM

## 2024-02-09 PROCEDURE — 700102 HCHG RX REV CODE 250 W/ 637 OVERRIDE(OP): Performed by: STUDENT IN AN ORGANIZED HEALTH CARE EDUCATION/TRAINING PROGRAM

## 2024-02-09 PROCEDURE — 81001 URINALYSIS AUTO W/SCOPE: CPT

## 2024-02-09 PROCEDURE — 99232 SBSQ HOSP IP/OBS MODERATE 35: CPT | Performed by: STUDENT IN AN ORGANIZED HEALTH CARE EDUCATION/TRAINING PROGRAM

## 2024-02-09 PROCEDURE — 83735 ASSAY OF MAGNESIUM: CPT

## 2024-02-09 PROCEDURE — 700111 HCHG RX REV CODE 636 W/ 250 OVERRIDE (IP): Performed by: STUDENT IN AN ORGANIZED HEALTH CARE EDUCATION/TRAINING PROGRAM

## 2024-02-09 PROCEDURE — 80048 BASIC METABOLIC PNL TOTAL CA: CPT

## 2024-02-09 PROCEDURE — 770001 HCHG ROOM/CARE - MED/SURG/GYN PRIV*

## 2024-02-09 PROCEDURE — 36415 COLL VENOUS BLD VENIPUNCTURE: CPT

## 2024-02-09 PROCEDURE — 700111 HCHG RX REV CODE 636 W/ 250 OVERRIDE (IP): Mod: JZ | Performed by: STUDENT IN AN ORGANIZED HEALTH CARE EDUCATION/TRAINING PROGRAM

## 2024-02-09 PROCEDURE — 97530 THERAPEUTIC ACTIVITIES: CPT

## 2024-02-09 RX ADMIN — MUPIROCIN 1 APPLICATION: 20 OINTMENT TOPICAL at 16:21

## 2024-02-09 RX ADMIN — FUROSEMIDE 80 MG: 10 INJECTION, SOLUTION INTRAMUSCULAR; INTRAVENOUS at 06:00

## 2024-02-09 RX ADMIN — OMEPRAZOLE 20 MG: 20 CAPSULE, DELAYED RELEASE ORAL at 06:01

## 2024-02-09 RX ADMIN — HEPARIN SODIUM 5000 UNITS: 5000 INJECTION, SOLUTION INTRAVENOUS; SUBCUTANEOUS at 21:03

## 2024-02-09 RX ADMIN — ATORVASTATIN CALCIUM 40 MG: 40 TABLET, FILM COATED ORAL at 16:21

## 2024-02-09 RX ADMIN — HEPARIN SODIUM 5000 UNITS: 5000 INJECTION, SOLUTION INTRAVENOUS; SUBCUTANEOUS at 16:10

## 2024-02-09 RX ADMIN — ACETAMINOPHEN 650 MG: 325 TABLET, FILM COATED ORAL at 01:48

## 2024-02-09 RX ADMIN — ASPIRIN 81 MG: 81 TABLET, COATED ORAL at 06:00

## 2024-02-09 RX ADMIN — FUROSEMIDE 80 MG: 10 INJECTION, SOLUTION INTRAMUSCULAR; INTRAVENOUS at 16:10

## 2024-02-09 RX ADMIN — SPIRONOLACTONE 100 MG: 25 TABLET ORAL at 06:00

## 2024-02-09 RX ADMIN — OMEPRAZOLE 20 MG: 20 CAPSULE, DELAYED RELEASE ORAL at 16:21

## 2024-02-09 RX ADMIN — HEPARIN SODIUM 5000 UNITS: 5000 INJECTION, SOLUTION INTRAVENOUS; SUBCUTANEOUS at 06:00

## 2024-02-09 RX ADMIN — MUPIROCIN 1 APPLICATION: 20 OINTMENT TOPICAL at 06:06

## 2024-02-09 ASSESSMENT — ENCOUNTER SYMPTOMS
HEADACHES: 0
NAUSEA: 0
DIZZINESS: 0
FEVER: 0
CONSTIPATION: 1
DIARRHEA: 0
CHILLS: 0
SHORTNESS OF BREATH: 0
VOMITING: 0
MYALGIAS: 0
ABDOMINAL PAIN: 0
WEAKNESS: 1
COUGH: 0
PALPITATIONS: 0

## 2024-02-09 ASSESSMENT — COGNITIVE AND FUNCTIONAL STATUS - GENERAL
SUGGESTED CMS G CODE MODIFIER DAILY ACTIVITY: CK
STANDING UP FROM CHAIR USING ARMS: A LITTLE
SUGGESTED CMS G CODE MODIFIER MOBILITY: CL
MOBILITY SCORE: 10
PERSONAL GROOMING: A LITTLE
WALKING IN HOSPITAL ROOM: A LITTLE
MOVING FROM LYING ON BACK TO SITTING ON SIDE OF FLAT BED: UNABLE
MOVING TO AND FROM BED TO CHAIR: UNABLE
CLIMB 3 TO 5 STEPS WITH RAILING: TOTAL
DAILY ACTIVITIY SCORE: 15
TOILETING: A LOT
DRESSING REGULAR LOWER BODY CLOTHING: A LOT
DRESSING REGULAR UPPER BODY CLOTHING: A LOT
TURNING FROM BACK TO SIDE WHILE IN FLAT BAD: UNABLE
HELP NEEDED FOR BATHING: A LOT

## 2024-02-09 ASSESSMENT — PAIN DESCRIPTION - PAIN TYPE
TYPE: ACUTE PAIN
TYPE: ACUTE PAIN

## 2024-02-09 ASSESSMENT — GAIT ASSESSMENTS: GAIT LEVEL OF ASSIST: STANDBY ASSIST

## 2024-02-09 ASSESSMENT — FIBROSIS 4 INDEX: FIB4 SCORE: 0.38

## 2024-02-09 NOTE — PROGRESS NOTES
[facility] Medicine Daily Progress Note    Date of Service  2/8/2024    Chief Complaint  Cole Jaramillo is a 39 y.o. male admitted 2/1/2024 with leg swelling and inability to walk.    Hospital Course  Cole Jaramillo is a 39 y.o. male who presented on 2/1/2024 for evaluation of worsening lower extremity edema and inability to ambulate.  This is a pleasant gentleman with a history of severe obesity .  He had difficulty ambulating for the past 2 weeks after having a cold and was mostly in bed.  He became bedbound and activated EMS after consultation with a mobile physician.  In the emergency room, he was requiring 2 LPM oxygen mask,.  Noted to have elevated NT proBNP of 4729.  Blood pressures were elevated in the 160s.  Patient was admitted to the telemetry floor due to volume overload requiring forced IV diuresis.  Physical and occupational therapies were consulted, and the patient underwent aggressive therapies.    Interval Problem Update  02/02/24  Patient was seen and examined on the telemetry floor.  He continues on continuous cardiac monitoring.  Continues on 1 LPM oxygen via nasal cannula.  Urine output of 800 L yesterday on furosemide 60 mg IV twice daily.  Starting spironolactone 25 mg daily.  Reviewed patient's discharge summary from his hospitalization on 8/27/2023 to 9/7/2023.  He presented with similar symptoms.  He obtained intensive physical therapy during his hospitalization for strengthening.  He was referred for outpatient sleep study and was treated with IV Lasix.  He was requiring 3 LPM oxygen minus cannula during ambulation.  Additional history gathered from the patient.  He states that he initially weighed approximately 800 pounds and got to about 700 pounds by the time of discharge.  He lost additional 100 pounds and once approximately 600 pounds, when he recently got a cold and was bedridden.  He regained his weight since then.  Most of it fluid he believes.  Resulting in difficulty  with ambulation.  Discussed weight management strategies.  Discussed with case management leadership in regards to obtaining additional physical therapy.  Nutrition consult placed.    02/03/24  Patient was seen and examined on the telemetry floor.  He continues on continuous cardiac monitoring.  Decreasing oxygen requirements to 1.5 LPM.  Urine output of 3.1 L yesterday.  Continues on fluid restriction.  Continue furosemide 60 mg IV twice daily.  Increasing spironolactone to 50 mg daily.  Was able to stand up yesterday with assistance.  Discussed with patient and mother at bedside.    02/04/24  Patient was seen and examined on the telemetry floor.  He continues on continuous cardiac monitoring.  Stable hypoxia on 2 LPM oxygen by nasal cannula.  Urine output of 3 L yesterday.  Creatinine stable at 1.12.  Continuing on furosemide 60 mg IV twice daily.  Continue spironolactone 50 mg daily.  No significant mobilization overnight.  No bowel movement yet.  Aggressive bowel regimen with MiraLAX and docusate.  Patient brought some books on intermittent fasting.    02/05/24  Patient was seen and examined on the telemetry floor.  He continues on continuous cardiac monitoring.  Stable hypoxia on 2 LPM oxygen by nasal cannula.  Urine output of 6.5 L yesterday.  Creatinine stable at 1.04.  Continuing on furosemide 60 mg IV twice daily.  Continuing spironolactone 50 mg daily.  Increase to 100 mg tomorrow.  Overall feeling better.  Able to move legs more.  Still severe anasarca.    2/8 patient seen and examined at bedside  Patient completed a 5 days clindamycin    Continue IV Lasix 80 mg twice daily, monitor I&O  I ordered a.m. labs to monitor electrolytes and renal function    No SNF acceptance  Plan to continue inpatient physical therapy and Occupational Therapy, discharge home when able  Case discussed with IDT meeting    I have discussed this patient's plan of care and discharge plan at IDT rounds today with Case Management,  Nursing, Nursing leadership, and other members of the IDT team.    Consultants/Specialty  none    Code Status  Full Code    Disposition  The patient is medically cleared for discharge to home or a post-acute facility.      I have placed the appropriate orders for post-discharge needs.    Review of Systems  Review of Systems   Constitutional:  Negative for chills and fever.   Respiratory:  Negative for cough and shortness of breath.    Cardiovascular:  Positive for leg swelling. Negative for chest pain and palpitations.   Gastrointestinal:  Positive for constipation. Negative for abdominal pain, diarrhea, nausea and vomiting.   Musculoskeletal:  Negative for joint pain and myalgias.   Neurological:  Positive for weakness. Negative for dizziness and headaches.        Physical Exam  Temp:  [36.4 °C (97.5 °F)-36.6 °C (97.9 °F)] 36.5 °C (97.7 °F)  Pulse:  [74-85] 79  Resp:  [17-19] 18  BP: (118-146)/(59-77) 146/77  SpO2:  [90 %-93 %] 93 %    Physical Exam  Vitals and nursing note reviewed.   Constitutional:       General: He is not in acute distress.     Appearance: He is obese. He is ill-appearing.      Interventions: Nasal cannula in place.   HENT:      Head: Normocephalic and atraumatic.      Mouth/Throat:      Mouth: Mucous membranes are moist.      Pharynx: Oropharynx is clear. No oropharyngeal exudate.   Eyes:      General: No scleral icterus.        Right eye: No discharge.         Left eye: No discharge.      Conjunctiva/sclera: Conjunctivae normal.   Cardiovascular:      Rate and Rhythm: Normal rate and regular rhythm.      Pulses: Normal pulses.      Heart sounds: Normal heart sounds. No murmur heard.  Pulmonary:      Effort: Pulmonary effort is normal. No respiratory distress.      Breath sounds: Normal breath sounds.      Comments: Edematous back and chest  Abdominal:      General: Abdomen is flat. Bowel sounds are normal. There is no distension.      Palpations: Abdomen is soft.      Comments: edematous    Musculoskeletal:         General: No swelling.      Cervical back: Neck supple. No tenderness.      Right lower leg: Edema present.      Left lower leg: Edema present.      Comments: Severe diffuse edema in bilateral lower extremities   Skin:     General: Skin is warm and dry.      Coloration: Skin is not pale.      Findings: Erythema (right chest lesion improving, lower abdominal redness improving) present.   Neurological:      Mental Status: He is alert and oriented to person, place, and time. Mental status is at baseline.      Motor: Weakness (Bilateral lower extremities) present.   Psychiatric:         Thought Content: Thought content normal.         Judgment: Judgment normal.         Fluids    Intake/Output Summary (Last 24 hours) at 2/8/2024 1751  Last data filed at 2/8/2024 1644  Gross per 24 hour   Intake 480 ml   Output 7750 ml   Net -7270 ml       Laboratory        Recent Labs     02/07/24  0419 02/08/24  0255   SODIUM 137 137   POTASSIUM 3.8 3.7   CHLORIDE 93* 93*   CO2 36* 35*   GLUCOSE 107* 105*   BUN 16 15   CREATININE 0.92 0.96   CALCIUM 8.4* 8.6                       Imaging  EC-ECHOCARDIOGRAM COMPLETE W/ CONT   Final Result      US-EXTREMITY ARTERY LOWER BILAT   Final Result      US-EXTREMITY VENOUS LOWER BILAT   Final Result      DX-CHEST-PORTABLE (1 VIEW)   Final Result      Cardiomegaly.           Assessment/Plan  * Acute right-sided congestive heart failure (HCC)- (present on admission)  Assessment & Plan  02/04/24  As per echocardiogram.  Continue aggressive IV diuresis as per below.  Continuous telemetry monitoring to monitor for arrhythmias during forced diuresis.    02/05/24  Urine output of 6.5 L yesterday.  Creatinine stable at 1.04.  Continuing on furosemide 60 mg IV twice daily.    Continuing spironolactone 50 mg daily.  Increase to 100 mg tomorrow.  Continuous telemetry monitoring to monitor for arrhythmias during forced diuresis.    Anasarca- (present on admission)  Assessment &  Plan  Due to acute on chronic right heart failure.  Suspect due to untreated MC/OHS.  Continue forced IV diuresis.    Obesity hypoventilation syndrome (HCC)- (present on admission)  Assessment & Plan  02/04/24  Likely contributing to patient's fluid retention  Patient will need outpatient sleep study.  Order has been placed.    Weakness- (present on admission)  Assessment & Plan  Weakness  PT/OT    Elevated troponin- (present on admission)  Assessment & Plan  Likely secondary to demand ischemia, fluid overload  ASA/statin  Check echo    02/04/24  Likely due to demand ischemia.  Remaining flat.    At risk for obstructive sleep apnea- (present on admission)  Assessment & Plan  Outpt sleep study    Bilateral leg edema  Assessment & Plan  Negative for DVT or arterial occlusions on ultrasound.    Likely due to fluid overload.  Continue IV forced diuresis  Elevate lower extremities while in bed    Acute on chronic heart failure with preserved ejection fraction (HFpEF) (HCC)- (present on admission)  Assessment & Plan  Suspected  EF 65% in September 2023  Repeat echo  Diuresis as tolerated-Lasix 60 mg IV twice daily  Optimize CHF meds as able to tolerate    2/2  Likely due to MC/OHS.  Continue forced diuresis with furosemide 60 mg IV BID  Add spironolactone 25 mg daily  Continuous telemetry monitoring during forced diuresis    02/03/24  Decreasing oxygen requirements to 1.5 LPM.  Urine output of 3.1 L yesterday.  Continues on fluid restriction.  Continue furosemide 60 mg IV twice daily.  Increasing spironolactone to 50 mg daily.  Continuous telemetry monitoring during forced diuresis  Echocardiogram completed.  Reading pending.    02/04/24  Echocardiogram showing ejection fraction of 55%, right-sided volume overload D-shaped.  Urine output of 3 L yesterday.  Creatinine remained stable 1.1 to.  Continues on 1 LPM oxygen by nasal.  Continue furosemide 60 mg IV twice daily.  Continue spironolactone 50 mg daily.  Continuous  telemetry monitoring during forced diuresis    02/05/24  Urine output of 6.5 L yesterday.  Creatinine stable at 1.04.  Continuing on furosemide 60 mg IV twice daily.    Continuing spironolactone 50 mg daily.  Increase to 100 mg tomorrow.  Continuous telemetry monitoring to monitor for arrhythmias during forced diuresis.    Volume overload- (present on admission)  Assessment & Plan  Diuresis as able to tolerate    Hypertension- (present on admission)  Assessment & Plan  Control improving    Hold home lisinopril  Continue IV furosemide  Increase spironolactone to 100 mg daily tomorrow.    Class 3 severe obesity due to excess calories with serious comorbidity and body mass index (BMI) greater than or equal to 70 in adult ([facility])- (present on admission)  Assessment & Plan  Body mass index is 101.4 kg/m².  Diet and lifestyle modification  May benefit from bariatric surgery evaluation    2/2  Nutrition consult  PT and OT     Acute respiratory failure with hypoxia ([facility])- (present on admission)  Assessment & Plan  On 2 L-wean off as tolerated  Diuresis  As needed nebs  Check echo, lower extremity Doppler    02/02/24  Improving.  Currently on 1 LPM.  Continue IV forced diuresis  Continuous pulse oximetry monitoring    02/03/24  Currently on 1.5 LPM.  Continuous pulse oximetry monitoring    02/05/24  Stable  2 LPM oxygen by nasal cannula.  Continuous pulse oximetry monitoring  Continue forced IV diuresis.    Abdominal wall cellulitis- (present on admission)  Assessment & Plan  Already taking Clindamycin -- 4days course remaining -- continue    02/04/24  Continue clindamycin         VTE prophylaxis: Heparin    I have performed a physical exam and reviewed and updated ROS and Plan today (2/8/2024). In review of yesterday's note (2/7/2024), there are no changes except as documented above.    I spent 36 minutes for chart review, meeting and examining the patient, documenting, ordering medications and tests, interpreting the results  independently, and communicating with the other health professionals.

## 2024-02-09 NOTE — THERAPY
Occupational Therapy  Daily Treatment     Patient Name: Cole Jaramillo  Age:  39 y.o., Sex:  male  Medical Record #: [id number]  Today's Date: 2/9/2024     Precautions  Precautions: (P) Fall Risk    Assessment    Patient tolerated session fair. He was able to sit EOB with max A x 2. Once he was stable on EOB and his feet were on the floor, he was able to complete sit to stand to FWW with CGA x 2 for safety. He stood completing mini squats for strengthening to improve ability to complete functional transfers. He completed side steps prior to sitting for a max x 2 transfer back to supine.     Plan    Treatment Plan Status: (P) Continue Current Treatment Plan  Type of Treatment: Self Care / Activities of Daily Living, Adaptive Equipment, Neuro Re-Education / Balance, Therapeutic Exercises, Therapeutic Activity  Treatment Frequency: 3 Times per Week  Treatment Duration: Until Therapy Goals Met    DC Equipment Recommendations: (P) Unable to determine at this time  Discharge Recommendations: (P) Recommend post-acute placement for additional occupational therapy services prior to discharge home    Subjective    Patient supine in bed agreeable to OT session. Cotreatment with PT due to patient's body habitus and dependent transfer status.      Objective       02/09/24 1201   Treatment Charges   Charges Yes   OT Therapy Activity (Units) 2   Total Time Spent   OT Time Spent Yes   OT Therapy Activity (Minutes) 35    Services   Is patient using  services for this encounter? No   Precautions   Precautions Fall Risk   Pain 0 - 10 Group   Therapist Pain Assessment 0;Prior to Activity   Standing Upper Body Exercises   Standing Upper Body Exercises Yes   Comments Patient completed sit to stand x 2. Patient completed squats using FWW x 3.   Bed Mobility    Supine to Sit Maximal Assist   Sit to Supine Maximal Assist   Scooting Minimal Assist   How much help from another person does the patient currently need...    Putting on and taking off regular lower body clothing? 2   Bathing (including washing, rinsing, and drying)? 2   Toileting, which includes using a toilet, bedpan, or urinal? 2   Putting on and taking off regular upper body clothing? 2   Taking care of personal grooming such as brushing teeth? 3   Eating meals? 4   6 Clicks Daily Activity Score 15   Functional Mobility   Sit to Stand Contact Guard Assist   Mobility supine>stand>side steps>sit>supine  (Patient max A x 2 for supine to sit. Once EOB, CGA x 2  for sit to stand, CGA x 2 for side stepping using FWW, max A x 2  for sit to supine)   Short Term Goals   Short Term Goal # 1 Pt will perform ADL transfer w/ supv   Goal Outcome # 1 Progressing slower than expected   Short Term Goal # 2 Pt will perform seated g/h w/ supv   Goal Outcome # 2 Progressing slower than expected   Short Term Goal # 3 Pt will perform UB dressing w/ supv while seated EOB   Goal Outcome # 3 Progressing as expected   Short Term Goal # 4 Pt will perform LB dressing w/ supv and AE PRN   Goal Outcome # 4 Progressing slower than expected   Education Group   Education Provided Energy Conservation;Other (comments)   Role of Occupational Therapist Patient Response Patient;Acceptance;Explanation;Verbal Demonstration   Energy Conservation Patient Response Patient;Acceptance;Explanation;Verbal Demonstration   Additional Comments Educated on importance of movement   Occupational Therapy Treatment Plan    O.T. Treatment Plan Continue Current Treatment Plan   Anticipated Discharge Equipment and Recommendations   DC Equipment Recommendations Unable to determine at this time   Discharge Recommendations Recommend post-acute placement for additional occupational therapy services prior to discharge home   Interdisciplinary Plan of Care Collaboration   IDT Collaboration with  Physical Therapist;Nursing   Patient Position at End of Therapy In Bed;Call Light within Reach;Phone within Reach;Tray Table within  Reach   Collaboration Comments report given   Session Information   Date / Session Number  2/9 1(1/3, 2/15)

## 2024-02-09 NOTE — PROGRESS NOTES
Hospital Medicine Daily Progress Note    Date of Service  2/9/2024    Chief Complaint  Cole Jaramillo is a 39 y.o. male admitted 2/1/2024 with leg swelling and inability to walk.    Hospital Course  Cole Jaramillo is a 39 y.o. male who presented on 2/1/2024 for evaluation of worsening lower extremity edema and inability to ambulate.  This is a pleasant gentleman with a history of severe obesity .  He had difficulty ambulating for the past 2 weeks after having a cold and was mostly in bed.  He became bedbound and activated EMS after consultation with a mobile physician.  In the emergency room, he was requiring 2 LPM oxygen mask,.  Noted to have elevated NT proBNP of 4729.  Blood pressures were elevated in the 160s.  Patient was admitted to the telemetry floor due to volume overload requiring forced IV diuresis.  Physical and occupational therapies were consulted, and the patient underwent aggressive therapies.    Patient completed a 5 days clindamycin for cellulitis     interval Problem Update  Patient seen and examined at bedside    Reported mild dysuria, I ordered a UA  Still edematous, continue IV Lasix 80 mg daily  Monitor I&O, -5 L in the past 24-hour  I ordered a.m. BMP to monitor electrolytes and renal function    No SNF acceptance  Plan to continue inpatient physical therapy and Occupational Therapy, discharge home when able    Case discussed with IDT meeting    I have discussed this patient's plan of care and discharge plan at IDT rounds today with Case Management, Nursing, Nursing leadership, and other members of the IDT team.    Consultants/Specialty  none    Code Status  Full Code    Disposition  The patient is not medically cleared for discharge to home or a post-acute facility.      I have placed the appropriate orders for post-discharge needs.    Review of Systems  Review of Systems   Constitutional:  Negative for chills and fever.   Respiratory:  Negative for cough and shortness of breath.     Cardiovascular:  Positive for leg swelling. Negative for chest pain and palpitations.   Gastrointestinal:  Positive for constipation. Negative for abdominal pain, diarrhea, nausea and vomiting.   Musculoskeletal:  Negative for joint pain and myalgias.   Neurological:  Positive for weakness. Negative for dizziness and headaches.        Physical Exam  Temp:  [36.5 °C (97.7 °F)-36.6 °C (97.9 °F)] 36.6 °C (97.9 °F)  Pulse:  [74-86] 74  Resp:  [17-20] 20  BP: (115-146)/(66-77) 128/68  SpO2:  [92 %-93 %] 92 %    Physical Exam  Vitals and nursing note reviewed.   Constitutional:       General: He is not in acute distress.     Appearance: He is obese. He is ill-appearing.      Interventions: Nasal cannula in place.   HENT:      Head: Normocephalic and atraumatic.      Mouth/Throat:      Mouth: Mucous membranes are moist.      Pharynx: Oropharynx is clear. No oropharyngeal exudate.   Eyes:      General: No scleral icterus.        Right eye: No discharge.         Left eye: No discharge.      Conjunctiva/sclera: Conjunctivae normal.   Cardiovascular:      Rate and Rhythm: Normal rate and regular rhythm.      Pulses: Normal pulses.      Heart sounds: Normal heart sounds. No murmur heard.  Pulmonary:      Effort: Pulmonary effort is normal. No respiratory distress.      Breath sounds: Normal breath sounds.      Comments: Edematous back and chest  Abdominal:      General: Abdomen is flat. Bowel sounds are normal. There is no distension.      Palpations: Abdomen is soft.      Comments: edematous   Musculoskeletal:         General: No swelling.      Cervical back: Neck supple. No tenderness.      Right lower leg: Edema present.      Left lower leg: Edema present.      Comments: Severe diffuse edema in bilateral lower extremities   Skin:     General: Skin is warm and dry.      Coloration: Skin is not pale.      Findings: Erythema (right chest lesion improving, lower abdominal redness improving) present.   Neurological:      Mental  Status: He is alert and oriented to person, place, and time. Mental status is at baseline.      Motor: Weakness (Bilateral lower extremities) present.   Psychiatric:         Thought Content: Thought content normal.         Judgment: Judgment normal.         Fluids    Intake/Output Summary (Last 24 hours) at 2/9/2024 1506  Last data filed at 2/9/2024 1300  Gross per 24 hour   Intake 810 ml   Output 5800 ml   Net -4990 ml       Laboratory        Recent Labs     02/07/24  0419 02/08/24  0255 02/09/24  0150   SODIUM 137 137 135   POTASSIUM 3.8 3.7 3.9   CHLORIDE 93* 93* 90*   CO2 36* 35* 39*   GLUCOSE 107* 105* 109*   BUN 16 15 15   CREATININE 0.92 0.96 0.90   CALCIUM 8.4* 8.6 8.4*                       Imaging  EC-ECHOCARDIOGRAM COMPLETE W/ CONT   Final Result      US-EXTREMITY ARTERY LOWER BILAT   Final Result      US-EXTREMITY VENOUS LOWER BILAT   Final Result      DX-CHEST-PORTABLE (1 VIEW)   Final Result      Cardiomegaly.           Assessment/Plan  * Acute right-sided congestive heart failure (HCC)- (present on admission)  Assessment & Plan  02/04/24  As per echocardiogram.  Continue aggressive IV diuresis as per below.  Continuous telemetry monitoring to monitor for arrhythmias during forced diuresis.    02/05/24  Urine output of 6.5 L yesterday.  Creatinine stable at 1.04.  Continuing on furosemide 60 mg IV twice daily.    Continuing spironolactone 50 mg daily.  Increase to 100 mg tomorrow.  Continuous telemetry monitoring to monitor for arrhythmias during forced diuresis.    Anasarca- (present on admission)  Assessment & Plan  Due to acute on chronic right heart failure.  Suspect due to untreated MC/OHS.  Continue forced IV diuresis.    Obesity hypoventilation syndrome (HCC)- (present on admission)  Assessment & Plan  02/04/24  Likely contributing to patient's fluid retention  Patient will need outpatient sleep study.  Order has been placed.    Weakness- (present on admission)  Assessment &  Plan  Weakness  PT/OT    Elevated troponin- (present on admission)  Assessment & Plan  Likely secondary to demand ischemia, fluid overload  ASA/statin  Check echo    02/04/24  Likely due to demand ischemia.  Remaining flat.    At risk for obstructive sleep apnea- (present on admission)  Assessment & Plan  Outpt sleep study    Bilateral leg edema  Assessment & Plan  Negative for DVT or arterial occlusions on ultrasound.    Likely due to fluid overload.  Continue IV forced diuresis  Elevate lower extremities while in bed    Acute on chronic heart failure with preserved ejection fraction (HFpEF) (HCC)- (present on admission)  Assessment & Plan  Suspected  EF 65% in September 2023  Repeat echo  Diuresis as tolerated-Lasix 60 mg IV twice daily  Optimize CHF meds as able to tolerate    2/2  Likely due to MC/OHS.  Continue forced diuresis with furosemide 60 mg IV BID  Add spironolactone 25 mg daily  Continuous telemetry monitoring during forced diuresis    02/03/24  Decreasing oxygen requirements to 1.5 LPM.  Urine output of 3.1 L yesterday.  Continues on fluid restriction.  Continue furosemide 60 mg IV twice daily.  Increasing spironolactone to 50 mg daily.  Continuous telemetry monitoring during forced diuresis  Echocardiogram completed.  Reading pending.    02/04/24  Echocardiogram showing ejection fraction of 55%, right-sided volume overload D-shaped.  Urine output of 3 L yesterday.  Creatinine remained stable 1.1 to.  Continues on 1 LPM oxygen by nasal.  Continue furosemide 60 mg IV twice daily.  Continue spironolactone 50 mg daily.  Continuous telemetry monitoring during forced diuresis    02/05/24  Urine output of 6.5 L yesterday.  Creatinine stable at 1.04.  Continuing on furosemide 60 mg IV twice daily.    Continuing spironolactone 50 mg daily.  Increase to 100 mg tomorrow.  Continuous telemetry monitoring to monitor for arrhythmias during forced diuresis.    Volume overload- (present on admission)  Assessment &  Plan  Diuresis as able to tolerate    Hypertension- (present on admission)  Assessment & Plan  Control improving    Hold home lisinopril  Continue IV furosemide  Increase spironolactone to 100 mg daily tomorrow.    Class 3 severe obesity due to excess calories with serious comorbidity and body mass index (BMI) greater than or equal to 70 in adult ([facility])- (present on admission)  Assessment & Plan  Body mass index is 101.4 kg/m².  Diet and lifestyle modification  May benefit from bariatric surgery evaluation    2/2  Nutrition consult  PT and OT     Acute respiratory failure with hypoxia ([facility])- (present on admission)  Assessment & Plan  On 2 L-wean off as tolerated  Diuresis  As needed nebs  Check echo, lower extremity Doppler    02/02/24  Improving.  Currently on 1 LPM.  Continue IV forced diuresis  Continuous pulse oximetry monitoring    02/03/24  Currently on 1.5 LPM.  Continuous pulse oximetry monitoring    02/05/24  Stable  2 LPM oxygen by nasal cannula.  Continuous pulse oximetry monitoring  Continue forced IV diuresis.    Abdominal wall cellulitis- (present on admission)  Assessment & Plan  Already taking Clindamycin -- 4days course remaining -- continue    02/04/24  Continue clindamycin         VTE prophylaxis: Heparin    I have performed a physical exam and reviewed and updated ROS and Plan today (2/9/2024). In review of yesterday's note (2/8/2024), there are no changes except as documented above.    I spent 36 minutes for chart review, meeting and examining the patient, documenting, ordering medications and tests, interpreting the results independently, and communicating with the other health professionals.

## 2024-02-09 NOTE — PROGRESS NOTES
Assumed care of patient. Bedside report received from DAVID Sharp. Patient updated on plan of care. Call light is within reach and fall precautions are in place. All needs met at this time.

## 2024-02-09 NOTE — CARE PLAN
The patient is Stable - Low risk of patient condition declining or worsening    Shift Goals  Clinical Goals: diurese, repositioning, hemodynamic stability  Patient Goals: sleep comfortably  Family Goals: gadiel    Progress made toward(s) clinical / shift goals:        Problem: Care Map:  Admission Optimal Outcome for the Heart Failure Patient  Goal: Admission:  Optimal Care of the heart failure patient  Outcome: Progressing     Problem: Care Map:  Day 1 Optimal Outcome for the Heart Failure Patient  Goal: Day 1:  Optimal Care of the heart failure patient  Outcome: Progressing     Problem: Care Map:  Day 2 Optimal Outcome for the Heart Failure Patient  Goal: Day 2:  Optimal Care of the heart failure patient  Outcome: Progressing     Problem: Care Map:  Day 3 Optimal Outcome for the Heart Failure Patient  Goal: Day 3:  Optimal Care of the heart failure patient  Outcome: Progressing       Patient is not progressing towards the following goals:

## 2024-02-09 NOTE — DISCHARGE PLANNING
1020  Agency/Facility Name: [facility]   Spoke To: Qamar   Outcome: [facility] called regarding referral, per Qamar pt declined due to non-contracted insurance.

## 2024-02-09 NOTE — THERAPY
Physical Therapy   Daily Treatment     Patient Name: Cole Jaramillo  Age:  39 y.o., Sex:  male  Medical Record #: [id number]  Today's Date: 2/9/2024     Precautions  Precautions: Fall Risk    Assessment  Pt with slightly improving mobility; bariatric bed mobility is not a good indicator of how pt is moving, several parts are broken and his bed at home is more firm and easier to get out of than ours; pt is able to perform several long sits and assist with moving legs and arms with about min A but limited by skin/body habitus; pt is extremely motivated and strong given his weight and no equipment or exercise will mimic or improve pt more than his sit to stand abilities; once he is upright he is stand by assist and able to take steps with bariwalker; he has bariwalker at home; would defer to pt for when he feels strong enough to go home as he will mobilize more in his home environment than here due to bed restrictions; he will remain limited in his functional mobility due to his weight which will take an extended amount of time;     Plan    Treatment Plan Status: Continue Current Treatment Plan  Type of Treatment: Bed Mobility, Equipment, Gait Training, Neuro Re-Education / Balance, Self Care / Home Evaluation, Therapeutic Activities, Therapeutic Exercise  Treatment Frequency: 4 Times per Week  Treatment Duration: Until Therapy Goals Met    DC Equipment Recommendations:  34 inch bariatric wheelchair with removable arm rests/leg rests\ for extended household and community distances   Discharge Recommendations: defer to pt for timing of comfort of dc; he will move more at home from his home bed and can complete home health PT; otherwise, would need a higher level of care/hospital for holistic approach at weight loss as unclear if any post acute able to accept pt;       Abridged Subjective/Objective     02/09/24 1205   Cognition    Cognition / Consciousness WDL   Level of Consciousness Alert   Comments motivated and  pleasant but a bit dismissive about timeline of recovery and how impairetive near hourly mobiilty will be to maintain function at his size; reports bed bound state about 1 week;   Balance   Sitting Balance (Static) Fair   Sitting Balance (Dynamic) Fair   Standing Balance (Static) Fair -   Standing Balance (Dynamic) Fair -   Weight Shift Sitting Fair   Weight Shift Standing Fair   Skilled Intervention Tactile Cuing;Verbal Cuing   Comments B UE support in sitting/standing; no losso f balance uses momentum   Bed Mobility    Comments pt is max assist for bed mobility due to poor equipment not weakness, pt was able to perform several long sitting attempts by self and needed guidance for use of bed features etc;   Gait Analysis   Gait Level Of Assist Standby Assist   Assistive Device   (bariwalker)   Skilled Intervention Tactile Cuing;Sequencing;Postural Facilitation;Verbal Cuing   Comments able to take side steps along bed, to point of fatigue; performed standing squats x 5 with 1 min holds until pt fatigue   Functional Mobility   Sit to Stand Standby Assist  (bariwalker)   Short Term Goals    Short Term Goal # 1 pt will perform supine <> sit without bed features and SPV in 6 vistis   Goal Outcome # 1 goal not met   Short Term Goal # 2 pt will perform sit <> stand and functional transfers with valente FWW and SPV to imove mobility independence for home in 6 visits   Goal Outcome # 2 Goal not met   Short Term Goal # 3 pt will ambulate 25 ft with Valente FWW and SPV to access home environment in 6 visits   Goal Outcome # 3 Goal not met

## 2024-02-10 LAB
ANION GAP SERPL CALC-SCNC: 8 MMOL/L (ref 7–16)
BUN SERPL-MCNC: 15 MG/DL (ref 8–22)
CALCIUM SERPL-MCNC: 8.5 MG/DL (ref 8.5–10.5)
CHLORIDE SERPL-SCNC: 91 MMOL/L (ref 96–112)
CO2 SERPL-SCNC: 37 MMOL/L (ref 20–33)
CREAT SERPL-MCNC: 0.91 MG/DL (ref 0.5–1.4)
GFR SERPLBLD CREATININE-BSD FMLA CKD-EPI: 109 ML/MIN/1.73 M 2
GLUCOSE SERPL-MCNC: 119 MG/DL (ref 65–99)
POTASSIUM SERPL-SCNC: 3.9 MMOL/L (ref 3.6–5.5)
SODIUM SERPL-SCNC: 136 MMOL/L (ref 135–145)

## 2024-02-10 PROCEDURE — 90471 IMMUNIZATION ADMIN: CPT

## 2024-02-10 PROCEDURE — 770001 HCHG ROOM/CARE - MED/SURG/GYN PRIV*

## 2024-02-10 PROCEDURE — 700102 HCHG RX REV CODE 250 W/ 637 OVERRIDE(OP): Performed by: STUDENT IN AN ORGANIZED HEALTH CARE EDUCATION/TRAINING PROGRAM

## 2024-02-10 PROCEDURE — 90686 IIV4 VACC NO PRSV 0.5 ML IM: CPT | Performed by: STUDENT IN AN ORGANIZED HEALTH CARE EDUCATION/TRAINING PROGRAM

## 2024-02-10 PROCEDURE — 99232 SBSQ HOSP IP/OBS MODERATE 35: CPT | Performed by: STUDENT IN AN ORGANIZED HEALTH CARE EDUCATION/TRAINING PROGRAM

## 2024-02-10 PROCEDURE — 700111 HCHG RX REV CODE 636 W/ 250 OVERRIDE (IP): Mod: JZ | Performed by: STUDENT IN AN ORGANIZED HEALTH CARE EDUCATION/TRAINING PROGRAM

## 2024-02-10 PROCEDURE — A9270 NON-COVERED ITEM OR SERVICE: HCPCS | Performed by: STUDENT IN AN ORGANIZED HEALTH CARE EDUCATION/TRAINING PROGRAM

## 2024-02-10 PROCEDURE — 700111 HCHG RX REV CODE 636 W/ 250 OVERRIDE (IP): Performed by: STUDENT IN AN ORGANIZED HEALTH CARE EDUCATION/TRAINING PROGRAM

## 2024-02-10 PROCEDURE — 80048 BASIC METABOLIC PNL TOTAL CA: CPT

## 2024-02-10 PROCEDURE — 3E02340 INTRODUCTION OF INFLUENZA VACCINE INTO MUSCLE, PERCUTANEOUS APPROACH: ICD-10-PCS | Performed by: STUDENT IN AN ORGANIZED HEALTH CARE EDUCATION/TRAINING PROGRAM

## 2024-02-10 PROCEDURE — 36415 COLL VENOUS BLD VENIPUNCTURE: CPT

## 2024-02-10 RX ORDER — ENOXAPARIN SODIUM 100 MG/ML
60 INJECTION SUBCUTANEOUS EVERY 12 HOURS
Status: DISCONTINUED | OUTPATIENT
Start: 2024-02-10 | End: 2024-02-11

## 2024-02-10 RX ADMIN — ASPIRIN 81 MG: 81 TABLET, COATED ORAL at 05:45

## 2024-02-10 RX ADMIN — MUPIROCIN 1 APPLICATION: 20 OINTMENT TOPICAL at 05:46

## 2024-02-10 RX ADMIN — OMEPRAZOLE 20 MG: 20 CAPSULE, DELAYED RELEASE ORAL at 17:05

## 2024-02-10 RX ADMIN — SPIRONOLACTONE 100 MG: 25 TABLET ORAL at 05:45

## 2024-02-10 RX ADMIN — MUPIROCIN 1 APPLICATION: 20 OINTMENT TOPICAL at 17:09

## 2024-02-10 RX ADMIN — HEPARIN SODIUM 5000 UNITS: 5000 INJECTION, SOLUTION INTRAVENOUS; SUBCUTANEOUS at 05:46

## 2024-02-10 RX ADMIN — INFLUENZA A VIRUS A/VICTORIA/4897/2022 IVR-238 (H1N1) ANTIGEN (FORMALDEHYDE INACTIVATED), INFLUENZA A VIRUS A/DARWIN/9/2021 SAN-010 (H3N2) ANTIGEN (FORMALDEHYDE INACTIVATED), INFLUENZA B VIRUS B/PHUKET/3073/2013 ANTIGEN (FORMALDEHYDE INACTIVATED), AND INFLUENZA B VIRUS B/MICHIGAN/01/2021 ANTIGEN (FORMALDEHYDE INACTIVATED) 0.5 ML: 15; 15; 15; 15 INJECTION, SUSPENSION INTRAMUSCULAR at 17:06

## 2024-02-10 RX ADMIN — FUROSEMIDE 80 MG: 10 INJECTION, SOLUTION INTRAMUSCULAR; INTRAVENOUS at 17:05

## 2024-02-10 RX ADMIN — FUROSEMIDE 80 MG: 10 INJECTION, SOLUTION INTRAMUSCULAR; INTRAVENOUS at 05:45

## 2024-02-10 RX ADMIN — OMEPRAZOLE 20 MG: 20 CAPSULE, DELAYED RELEASE ORAL at 05:45

## 2024-02-10 RX ADMIN — ENOXAPARIN SODIUM 60 MG: 100 INJECTION SUBCUTANEOUS at 13:27

## 2024-02-10 RX ADMIN — ATORVASTATIN CALCIUM 40 MG: 40 TABLET, FILM COATED ORAL at 17:05

## 2024-02-10 ASSESSMENT — ENCOUNTER SYMPTOMS
NAUSEA: 0
COUGH: 0
PALPITATIONS: 0
CHILLS: 0
HEADACHES: 0
MYALGIAS: 0
CONSTIPATION: 1
ABDOMINAL PAIN: 0
DIZZINESS: 0
DIARRHEA: 0
FEVER: 0
VOMITING: 0
SHORTNESS OF BREATH: 0
WEAKNESS: 1

## 2024-02-10 ASSESSMENT — FIBROSIS 4 INDEX
FIB4 SCORE: 0.38
FIB4 SCORE: 0.38

## 2024-02-10 ASSESSMENT — PAIN DESCRIPTION - PAIN TYPE
TYPE: ACUTE PAIN
TYPE: ACUTE PAIN

## 2024-02-10 NOTE — PROGRESS NOTES
Assumed care of patient. Bedside report received from DAVID Branch. Patient has no c/o pain. Call light is within reach and fall precautions are in place. All needs met at this time.

## 2024-02-10 NOTE — PROGRESS NOTES
Hospital Medicine Daily Progress Note    Date of Service  2/10/2024    Chief Complaint  Cole Jaramillo is a 39 y.o. male admitted 2/1/2024 with leg swelling and inability to walk.    Hospital Course  Cole Jaramillo is a 39 y.o. male who presented on 2/1/2024 for evaluation of worsening lower extremity edema and inability to ambulate.  This is a pleasant gentleman with a history of severe obesity .  He had difficulty ambulating for the past 2 weeks after having a cold and was mostly in bed.  He became bedbound and activated EMS after consultation with a mobile physician.  In the emergency room, he was requiring 2 LPM oxygen mask,.  Noted to have elevated NT proBNP of 4729.  Blood pressures were elevated in the 160s.  Patient was admitted to the telemetry floor due to volume overload requiring forced IV diuresis.  Physical and occupational therapies were consulted, and the patient underwent aggressive therapies.    Patient completed a 5 days clindamycin for cellulitis     interval Problem Update  Patient seen and examined at bedside    Reported mild dysuria, I ordered a UA, negative for UTI  Still edematous, continue IV Lasix 80 mg twice daily  Monitor I&O, -4.7 L in past 24 hours  I ordered a.m. BMP to monitor electrolytes and renal function  Potassium 3.9, creatinine 0.9    No SNF acceptance  Plan to continue inpatient physical therapy and Occupational Therapy, discharge home when able    Case discussed with IDT meeting    I have discussed this patient's plan of care and discharge plan at IDT rounds today with Case Management, Nursing, Nursing leadership, and other members of the IDT team.    Consultants/Specialty  none    Code Status  Full Code    Disposition  The patient is medically cleared for discharge to home or a post-acute facility.      I have placed the appropriate orders for post-discharge needs.    Review of Systems  Review of Systems   Constitutional:  Negative for chills and fever.    Respiratory:  Negative for cough and shortness of breath.    Cardiovascular:  Positive for leg swelling. Negative for chest pain and palpitations.   Gastrointestinal:  Positive for constipation. Negative for abdominal pain, diarrhea, nausea and vomiting.   Musculoskeletal:  Negative for joint pain and myalgias.   Neurological:  Positive for weakness. Negative for dizziness and headaches.        Physical Exam  Temp:  [36.6 °C (97.9 °F)-36.9 °C (98.4 °F)] 36.6 °C (97.9 °F)  Pulse:  [74-87] 74  Resp:  [18-20] 18  BP: (113-131)/(64-81) 127/81  SpO2:  [91 %-92 %] 92 %    Physical Exam  Vitals and nursing note reviewed.   Constitutional:       General: He is not in acute distress.     Appearance: He is obese. He is ill-appearing.      Interventions: Nasal cannula in place.   HENT:      Head: Normocephalic and atraumatic.      Mouth/Throat:      Mouth: Mucous membranes are moist.      Pharynx: Oropharynx is clear. No oropharyngeal exudate.   Eyes:      General: No scleral icterus.        Right eye: No discharge.         Left eye: No discharge.      Conjunctiva/sclera: Conjunctivae normal.   Cardiovascular:      Rate and Rhythm: Normal rate and regular rhythm.      Pulses: Normal pulses.      Heart sounds: Normal heart sounds. No murmur heard.  Pulmonary:      Effort: Pulmonary effort is normal. No respiratory distress.      Breath sounds: Normal breath sounds.      Comments: Edematous back and chest  Abdominal:      General: Abdomen is flat. Bowel sounds are normal. There is no distension.      Palpations: Abdomen is soft.      Comments: edematous   Musculoskeletal:         General: No swelling.      Cervical back: Neck supple. No tenderness.      Right lower leg: Edema present.      Left lower leg: Edema present.      Comments: Severe diffuse edema in bilateral lower extremities   Skin:     General: Skin is warm and dry.      Coloration: Skin is not pale.      Findings: Erythema (right chest lesion improving, lower  abdominal redness improving) present.   Neurological:      Mental Status: He is alert and oriented to person, place, and time. Mental status is at baseline.      Motor: Weakness (Bilateral lower extremities) present.   Psychiatric:         Thought Content: Thought content normal.         Judgment: Judgment normal.         Fluids    Intake/Output Summary (Last 24 hours) at 2/10/2024 1402  Last data filed at 2/10/2024 1141  Gross per 24 hour   Intake 240 ml   Output 5150 ml   Net -4910 ml       Laboratory        Recent Labs     02/08/24  0255 02/09/24  0150 02/10/24  0953   SODIUM 137 135 136   POTASSIUM 3.7 3.9 3.9   CHLORIDE 93* 90* 91*   CO2 35* 39* 37*   GLUCOSE 105* 109* 119*   BUN 15 15 15   CREATININE 0.96 0.90 0.91   CALCIUM 8.6 8.4* 8.5                       Imaging  EC-ECHOCARDIOGRAM COMPLETE W/ CONT   Final Result      US-EXTREMITY ARTERY LOWER BILAT   Final Result      US-EXTREMITY VENOUS LOWER BILAT   Final Result      DX-CHEST-PORTABLE (1 VIEW)   Final Result      Cardiomegaly.           Assessment/Plan  * Acute right-sided congestive heart failure (HCC)- (present on admission)  Assessment & Plan  02/04/24  As per echocardiogram.  Continue aggressive IV diuresis as per below.  Continuous telemetry monitoring to monitor for arrhythmias during forced diuresis.    02/05/24  Urine output of 6.5 L yesterday.  Creatinine stable at 1.04.  Continuing on furosemide 60 mg IV twice daily.    Continuing spironolactone 50 mg daily.  Increase to 100 mg tomorrow.  Continuous telemetry monitoring to monitor for arrhythmias during forced diuresis.    Anasarca- (present on admission)  Assessment & Plan  Due to acute on chronic right heart failure.  Suspect due to untreated MC/OHS.  Continue forced IV diuresis.    Obesity hypoventilation syndrome (HCC)- (present on admission)  Assessment & Plan  02/04/24  Likely contributing to patient's fluid retention  Patient will need outpatient sleep study.  Order has been  placed.    Weakness- (present on admission)  Assessment & Plan  Weakness  PT/OT    Elevated troponin- (present on admission)  Assessment & Plan  Likely secondary to demand ischemia, fluid overload  ASA/statin  Check echo    02/04/24  Likely due to demand ischemia.  Remaining flat.    At risk for obstructive sleep apnea- (present on admission)  Assessment & Plan  Outpt sleep study    Bilateral leg edema  Assessment & Plan  Negative for DVT or arterial occlusions on ultrasound.    Likely due to fluid overload.  Continue IV forced diuresis  Elevate lower extremities while in bed    Acute on chronic heart failure with preserved ejection fraction (HFpEF) (HCC)- (present on admission)  Assessment & Plan  Suspected  EF 65% in September 2023  Repeat echo  Diuresis as tolerated-Lasix 60 mg IV twice daily  Optimize CHF meds as able to tolerate    2/2  Likely due to MC/OHS.  Continue forced diuresis with furosemide 60 mg IV BID  Add spironolactone 25 mg daily  Continuous telemetry monitoring during forced diuresis    02/03/24  Decreasing oxygen requirements to 1.5 LPM.  Urine output of 3.1 L yesterday.  Continues on fluid restriction.  Continue furosemide 60 mg IV twice daily.  Increasing spironolactone to 50 mg daily.  Continuous telemetry monitoring during forced diuresis  Echocardiogram completed.  Reading pending.    02/04/24  Echocardiogram showing ejection fraction of 55%, right-sided volume overload D-shaped.  Urine output of 3 L yesterday.  Creatinine remained stable 1.1 to.  Continues on 1 LPM oxygen by nasal.  Continue furosemide 60 mg IV twice daily.  Continue spironolactone 50 mg daily.  Continuous telemetry monitoring during forced diuresis    02/05/24  Urine output of 6.5 L yesterday.  Creatinine stable at 1.04.  Continuing on furosemide 60 mg IV twice daily.    Continuing spironolactone 50 mg daily.  Increase to 100 mg tomorrow.  Continuous telemetry monitoring to monitor for arrhythmias during forced  diuresis.    Volume overload- (present on admission)  Assessment & Plan  Diuresis as able to tolerate    Hypertension- (present on admission)  Assessment & Plan  Control improving    Hold home lisinopril  Continue IV furosemide  Increase spironolactone to 100 mg daily tomorrow.    Class 3 severe obesity due to excess calories with serious comorbidity and body mass index (BMI) greater than or equal to 70 in adult ([facility])- (present on admission)  Assessment & Plan  Body mass index is 101.4 kg/m².  Diet and lifestyle modification  May benefit from bariatric surgery evaluation    2/2  Nutrition consult  PT and OT     Acute respiratory failure with hypoxia ([facility])- (present on admission)  Assessment & Plan  On 2 L-wean off as tolerated  Diuresis  As needed nebs  Check echo, lower extremity Doppler    02/02/24  Improving.  Currently on 1 LPM.  Continue IV forced diuresis  Continuous pulse oximetry monitoring    02/03/24  Currently on 1.5 LPM.  Continuous pulse oximetry monitoring    02/05/24  Stable  2 LPM oxygen by nasal cannula.  Continuous pulse oximetry monitoring  Continue forced IV diuresis.    Abdominal wall cellulitis- (present on admission)  Assessment & Plan  Already taking Clindamycin -- 4days course remaining -- continue    02/04/24  Continue clindamycin         VTE prophylaxis: Heparin    I have performed a physical exam and reviewed and updated ROS and Plan today (2/10/2024). In review of yesterday's note (2/9/2024), there are no changes except as documented above.    I spent 36 minutes for chart review, meeting and examining the patient, documenting, ordering medications and tests, interpreting the results independently, and communicating with the other health professionals.

## 2024-02-10 NOTE — CARE PLAN
The patient is Stable - Low risk of patient condition declining or worsening    Shift Goals  Clinical Goals: attempt to wean supplemental oxygen, comfort, rest  Patient Goals: comfort, get better and more active  Family Goals: EMERSON    Progress made toward(s) clinical / shift goals:    Problem: Knowledge Deficit - Standard  Goal: Patient and family/care givers will demonstrate understanding of plan of care, disease process/condition, diagnostic tests and medications  Outcome: Progressing  Note: Patient updated on plan of care, including prescribed medication regimen, continued skin care, continued diuresis.   Patient states understanding and agreement with goals of care.     Problem: Knowledge Deficit - COPD  Goal: Patient/significant other demonstrates understanding of disease process, utilization of the Action Plan, medications and discharge instruction  Outcome: Progressing     Problem: Skin Integrity  Goal: Skin integrity is maintained or improved  Outcome: Progressing       Patient is not progressing towards the following goals:

## 2024-02-10 NOTE — CARE PLAN
The patient is Stable - Low risk of patient condition declining or worsening    Shift Goals  Clinical Goals: Diurese, mobilize, turn  Patient Goals: Rest  Family Goals: EMERSON    Progress made toward(s) clinical / shift goals: Patient continues to receive and tolerate IV lasix; I&Os recorded with large amounts of output. Patient was able to mobilize with therapy; he sat at the edge of bed, stood, and performed squats, per PT/OT. Patient mostly turned every two hours throughout the day; denied a few times and education provided.     Problem: Pain - Standard  Goal: Alleviation of pain or a reduction in pain to the patient’s comfort goal  Outcome: Progressing     Problem: Mobility  Goal: Patient's capacity to carry out activities will improve  Outcome: Progressing       Patient is not progressing towards the following goals:

## 2024-02-11 ENCOUNTER — APPOINTMENT (OUTPATIENT)
Dept: RADIOLOGY | Facility: MEDICAL CENTER | Age: 39
DRG: 291 | End: 2024-02-11
Payer: COMMERCIAL

## 2024-02-11 PROBLEM — I13.10 CARDIORENAL SYNDROME: Status: ACTIVE | Noted: 2024-02-11

## 2024-02-11 LAB
ANION GAP SERPL CALC-SCNC: 10 MMOL/L (ref 7–16)
BUN SERPL-MCNC: 15 MG/DL (ref 8–22)
CALCIUM SERPL-MCNC: 9 MG/DL (ref 8.5–10.5)
CHLORIDE SERPL-SCNC: 90 MMOL/L (ref 96–112)
CO2 SERPL-SCNC: 36 MMOL/L (ref 20–33)
CREAT SERPL-MCNC: 0.95 MG/DL (ref 0.5–1.4)
D DIMER PPP IA.FEU-MCNC: 1.37 UG/ML (FEU) (ref 0–0.5)
ERYTHROCYTE [DISTWIDTH] IN BLOOD BY AUTOMATED COUNT: 59.9 FL (ref 35.9–50)
FERRITIN SERPL-MCNC: 64.3 NG/ML (ref 22–322)
GFR SERPLBLD CREATININE-BSD FMLA CKD-EPI: 104 ML/MIN/1.73 M 2
GLUCOSE SERPL-MCNC: 98 MG/DL (ref 65–99)
HCT VFR BLD AUTO: 38.7 % (ref 42–52)
HGB BLD-MCNC: 11.1 G/DL (ref 14–18)
HGB RETIC QN AUTO: 32.4 PG/CELL (ref 29–35)
IMM RETICS NFR: 14.2 % (ref 2.6–16.1)
IRON SATN MFR SERPL: 13 % (ref 15–55)
IRON SERPL-MCNC: 43 UG/DL (ref 50–180)
LMWH PPP CHRO-ACNC: 0.1 U/ML
MAGNESIUM SERPL-MCNC: 1.8 MG/DL (ref 1.5–2.5)
MCH RBC QN AUTO: 22.7 PG (ref 27–33)
MCHC RBC AUTO-ENTMCNC: 28.7 G/DL (ref 32.3–36.5)
MCV RBC AUTO: 79 FL (ref 81.4–97.8)
PHOSPHATE SERPL-MCNC: 3.5 MG/DL (ref 2.5–4.5)
PLATELET # BLD AUTO: 270 K/UL (ref 164–446)
PMV BLD AUTO: 9.2 FL (ref 9–12.9)
POTASSIUM SERPL-SCNC: 3.8 MMOL/L (ref 3.6–5.5)
RBC # BLD AUTO: 4.9 M/UL (ref 4.7–6.1)
RETICS # AUTO: 0.04 M/UL (ref 0.04–0.12)
RETICS/RBC NFR: 0.9 % (ref 0.8–2.6)
SODIUM SERPL-SCNC: 136 MMOL/L (ref 135–145)
TIBC SERPL-MCNC: 339 UG/DL (ref 250–450)
UIBC SERPL-MCNC: 296 UG/DL (ref 110–370)
VIT B12 SERPL-MCNC: 524 PG/ML (ref 211–911)
WBC # BLD AUTO: 6.8 K/UL (ref 4.8–10.8)

## 2024-02-11 PROCEDURE — 85379 FIBRIN DEGRADATION QUANT: CPT

## 2024-02-11 PROCEDURE — 700101 HCHG RX REV CODE 250: Performed by: STUDENT IN AN ORGANIZED HEALTH CARE EDUCATION/TRAINING PROGRAM

## 2024-02-11 PROCEDURE — 94660 CPAP INITIATION&MGMT: CPT

## 2024-02-11 PROCEDURE — 85046 RETICYTE/HGB CONCENTRATE: CPT

## 2024-02-11 PROCEDURE — 770001 HCHG ROOM/CARE - MED/SURG/GYN PRIV*

## 2024-02-11 PROCEDURE — 83735 ASSAY OF MAGNESIUM: CPT

## 2024-02-11 PROCEDURE — 99232 SBSQ HOSP IP/OBS MODERATE 35: CPT | Mod: GC | Performed by: INTERNAL MEDICINE

## 2024-02-11 PROCEDURE — 700102 HCHG RX REV CODE 250 W/ 637 OVERRIDE(OP)

## 2024-02-11 PROCEDURE — 85027 COMPLETE CBC AUTOMATED: CPT

## 2024-02-11 PROCEDURE — 83550 IRON BINDING TEST: CPT

## 2024-02-11 PROCEDURE — A9270 NON-COVERED ITEM OR SERVICE: HCPCS

## 2024-02-11 PROCEDURE — 74018 RADEX ABDOMEN 1 VIEW: CPT

## 2024-02-11 PROCEDURE — 90837 PSYTX W PT 60 MINUTES: CPT | Performed by: SOCIAL WORKER

## 2024-02-11 PROCEDURE — A9270 NON-COVERED ITEM OR SERVICE: HCPCS | Performed by: STUDENT IN AN ORGANIZED HEALTH CARE EDUCATION/TRAINING PROGRAM

## 2024-02-11 PROCEDURE — 700102 HCHG RX REV CODE 250 W/ 637 OVERRIDE(OP): Performed by: STUDENT IN AN ORGANIZED HEALTH CARE EDUCATION/TRAINING PROGRAM

## 2024-02-11 PROCEDURE — 700111 HCHG RX REV CODE 636 W/ 250 OVERRIDE (IP)

## 2024-02-11 PROCEDURE — 82607 VITAMIN B-12: CPT

## 2024-02-11 PROCEDURE — 85520 HEPARIN ASSAY: CPT

## 2024-02-11 PROCEDURE — 82728 ASSAY OF FERRITIN: CPT

## 2024-02-11 PROCEDURE — 80048 BASIC METABOLIC PNL TOTAL CA: CPT

## 2024-02-11 PROCEDURE — 84100 ASSAY OF PHOSPHORUS: CPT

## 2024-02-11 PROCEDURE — 700111 HCHG RX REV CODE 636 W/ 250 OVERRIDE (IP): Mod: JZ | Performed by: STUDENT IN AN ORGANIZED HEALTH CARE EDUCATION/TRAINING PROGRAM

## 2024-02-11 PROCEDURE — 36415 COLL VENOUS BLD VENIPUNCTURE: CPT

## 2024-02-11 PROCEDURE — 83540 ASSAY OF IRON: CPT

## 2024-02-11 RX ORDER — FUROSEMIDE 10 MG/ML
60 INJECTION INTRAMUSCULAR; INTRAVENOUS
Status: DISCONTINUED | OUTPATIENT
Start: 2024-02-11 | End: 2024-02-12

## 2024-02-11 RX ORDER — BISACODYL 5 MG
5 TABLET, DELAYED RELEASE (ENTERIC COATED) ORAL DAILY
Status: DISCONTINUED | OUTPATIENT
Start: 2024-02-11 | End: 2024-02-16 | Stop reason: HOSPADM

## 2024-02-11 RX ORDER — ENOXAPARIN SODIUM 100 MG/ML
80 INJECTION SUBCUTANEOUS EVERY 12 HOURS
Status: DISCONTINUED | OUTPATIENT
Start: 2024-02-11 | End: 2024-02-16 | Stop reason: HOSPADM

## 2024-02-11 RX ORDER — POTASSIUM CHLORIDE 20 MEQ/1
20 TABLET, EXTENDED RELEASE ORAL ONCE
Status: COMPLETED | OUTPATIENT
Start: 2024-02-11 | End: 2024-02-11

## 2024-02-11 RX ORDER — BISACODYL 10 MG
10 SUPPOSITORY, RECTAL RECTAL
Status: DISCONTINUED | OUTPATIENT
Start: 2024-02-11 | End: 2024-02-16 | Stop reason: HOSPADM

## 2024-02-11 RX ORDER — AMOXICILLIN 250 MG
2 CAPSULE ORAL DAILY
Status: DISCONTINUED | OUTPATIENT
Start: 2024-02-11 | End: 2024-02-16 | Stop reason: HOSPADM

## 2024-02-11 RX ADMIN — POLYETHYLENE GLYCOL 3350 1 PACKET: 17 POWDER, FOR SOLUTION ORAL at 15:30

## 2024-02-11 RX ADMIN — OMEPRAZOLE 20 MG: 20 CAPSULE, DELAYED RELEASE ORAL at 18:25

## 2024-02-11 RX ADMIN — MUPIROCIN 1 APPLICATION: 20 OINTMENT TOPICAL at 18:25

## 2024-02-11 RX ADMIN — FUROSEMIDE 80 MG: 10 INJECTION, SOLUTION INTRAMUSCULAR; INTRAVENOUS at 05:27

## 2024-02-11 RX ADMIN — BISACODYL 5 MG: 5 TABLET, COATED ORAL at 15:29

## 2024-02-11 RX ADMIN — SPIRONOLACTONE 100 MG: 25 TABLET ORAL at 05:27

## 2024-02-11 RX ADMIN — POLYETHYLENE GLYCOL 3350 1 PACKET: 17 POWDER, FOR SOLUTION ORAL at 20:55

## 2024-02-11 RX ADMIN — POTASSIUM CHLORIDE 20 MEQ: 1500 TABLET, EXTENDED RELEASE ORAL at 09:20

## 2024-02-11 RX ADMIN — MUPIROCIN 1 APPLICATION: 20 OINTMENT TOPICAL at 09:20

## 2024-02-11 RX ADMIN — ACETAMINOPHEN 650 MG: 325 TABLET, FILM COATED ORAL at 15:31

## 2024-02-11 RX ADMIN — ENOXAPARIN SODIUM 80 MG: 100 INJECTION SUBCUTANEOUS at 18:25

## 2024-02-11 RX ADMIN — FUROSEMIDE 60 MG: 10 INJECTION, SOLUTION INTRAVENOUS at 15:31

## 2024-02-11 RX ADMIN — ENOXAPARIN SODIUM 60 MG: 100 INJECTION SUBCUTANEOUS at 05:27

## 2024-02-11 RX ADMIN — DOCUSATE SODIUM 50 MG AND SENNOSIDES 8.6 MG 2 TABLET: 8.6; 5 TABLET, FILM COATED ORAL at 15:28

## 2024-02-11 RX ADMIN — ASPIRIN 81 MG: 81 TABLET, COATED ORAL at 05:27

## 2024-02-11 RX ADMIN — OMEPRAZOLE 20 MG: 20 CAPSULE, DELAYED RELEASE ORAL at 05:27

## 2024-02-11 RX ADMIN — ATORVASTATIN CALCIUM 40 MG: 40 TABLET, FILM COATED ORAL at 18:25

## 2024-02-11 ASSESSMENT — ENCOUNTER SYMPTOMS
COUGH: 0
WEAKNESS: 1
HEADACHES: 0
ABDOMINAL PAIN: 1
NAUSEA: 0
SHORTNESS OF BREATH: 0
CONSTIPATION: 1
CHILLS: 0
BLOOD IN STOOL: 0

## 2024-02-11 ASSESSMENT — PAIN DESCRIPTION - PAIN TYPE: TYPE: ACUTE PAIN

## 2024-02-11 NOTE — ASSESSMENT & PLAN NOTE
Baseline Cr 0.5-0.6, currently 0.9-1.0. Likely due to cardiorenal syndrome.  - Improving with forced diuresis. Will cont.   - Home Lisinopril on hold since admission.

## 2024-02-11 NOTE — HOSPITAL COURSE
Cole Jaramillo is a 39-year-old male with morbid obesity (BMI 87.62) who presented on 2/1/2024 with 2-week history of difficulty with ambulation due to severe lower extremity swelling, associated with exertional dyspnea and orthopnea. Symptoms began after likely viral respiratory infection. He became bedbound and activated EMS after consultation with a mobile physician.      Initial exam notable for hypertension (SBP 160s), hypoxemia (requiring 2L NC), distended abdomen with erythema, and severe diffuse anasarca in setting of morbid obesity. Initial weight was 330 kg. Labs showed increased NT-proBNP (4729 mg/dL), mild troponemia, and mild microcytic anemia (12.1 g/dOL, MCV 77.9). D-dimer was also elevated.    He was admitted for acute on chronic heart failure with preserved ejection fraction, likely due to under-treated MC/OHS.     Lower extremity venous Duplex ultrasound (2/3/2024) was limited by body habitus but was negative for DVTs. TTE (2/3/2024) showed LVEF 55% with flattened septum in diastole, consistent with RV volume overload (likely due to hypoxic vasoconstriction from recent URI). No regional wall motion abnormalities were seen.     He was aggressively diuresed via IV Lasix and was started on Aldactone, leading to 12-kg (or 26.45-lb) weight loss. He also received 5-day course of Clindamycin for suspected abdominal wall cellulitis, leading to improvement in pannicular erythema.     He also underwent aggressive reconditioning and strength training with PT and OT, who recommend discharge home with home health. He will benefit from bariatric surgery evaluation given inability to lose weight (having weighed 800-lbs last year).     Of note, he was also hospitalized (8/27-9/7/2023) last year for acute decompensated heart failure, at which time he underwent forced diuresis with plan for outpatient sleep study.

## 2024-02-11 NOTE — PROGRESS NOTES
Hospital Medicine Daily Progress Note    Date of Service  2/11/2024    Chief Complaint  Cole Jaramillo is a 39 y.o. male admitted 2/1/2024 with leg swelling and inability to walk.    Hospital Course  Cole Jaramillo is a 39-year-old male with morbid obesity (BMI 87.62), iron deficiency anemia, who presented on 2/1/2024 with 2-week history of difficulty with ambulation due to severe lower extremity swelling, associated with exertional dyspnea and orthopnea. Symptoms began after likely viral respiratory infection. He became bedbound and activated EMS after consultation with a mobile physician.      Initial exam notable for hypertension (SBP 160s), hypoxemia (requiring 2L NC), distended abdomen with erythema, and severe diffuse anasarca in setting of morbid obesity. Initial weight was 330 kg. Labs showed increased NT-proBNP (4729 mg/dL), mild troponemia, and mild microcytic anemia (12.1 g/dOL, MCV 77.9). D-dimer was also elevated.    He was admitted for acute on chronic heart failure with preserved ejection fraction, likely due to under-treated MC/OHS.     Lower extremity venous Duplex ultrasound (2/3/2024) was limited by body habitus but was negative for DVTs. TTE (2/3/2024) showed LVEF 55% with flattened septum in diastole, consistent with RV volume overload (likely due to hypoxic vasoconstriction from recent URI). No regional wall motion abnormalities were seen.     He was aggressively diuresed via IV Lasix and was started on Aldactone, leading to 12-kg (or 26.45-lb) weight loss. He also received 5-day course of Clindamycin for suspected abdominal wall cellulitis, leading to improvement in pannicular erythema.     He also underwent aggressive reconditioning and strength training with PT and OT, who recommend discharge home with home health. He will benefit from bariatric surgery evaluation given inability to lose weight (having weighed 800-lbs last year).     Of note, he was also hospitalized (8/27-9/7/2023)  last year for acute decompensated heart failure, at which time he underwent forced diuresis with plan for outpatient sleep study.     He is being referred to GI for colonoscopy to look for polyps given history of LINDSEY.       interval Problem Update  Volume assessment somewhat confounded by body habitus. Record shows ~26-lb weight loss since day of admission with approx 4.25L UOP in last day.     Labs show stable but elevated bicarb, suggestive of intravascular depletion. Will reduce IV Lasix to 60mg BID.     Microcytic anemia is currently mild, better than when admitted. TSAT is low however, suggestive of LINDSEY. Denies known melena but has not had colonoscopy.     Reports mild post-prandial abdominal discomfort just above umbilicus, associated with palpable mass on exam. XR shows severe colonic stool burden. Bowel regimen advanced.     Expressed desire to receive psychotherapy for binge eating. Behavioral health consult placed.     He reports not following up with sleep study after last admission because he was not aware of rationale for study.     We discussed concern for low cardiac reserve due to untreated MC, predisposing him to frequent decompensations when affected by minor ailments, such as viral URI.     I have discussed this patient's plan of care and discharge plan at IDT rounds today with Case Management, Nursing, Nursing leadership, and other members of the IDT team.    Consultants/Specialty  none    Code Status  Full Code    Disposition  The patient is medically cleared for discharge to home or a post-acute facility.  Anticipate discharge to: home with close outpatient follow-up    I have placed the appropriate orders for post-discharge needs.    Review of Systems  Review of Systems   Constitutional:  Negative for chills.   Respiratory:  Negative for cough and shortness of breath (improving).    Cardiovascular:  Positive for leg swelling. Negative for chest pain.   Gastrointestinal:  Positive for  abdominal pain (mild post-prandial discomfort) and constipation. Negative for blood in stool, melena and nausea.   Musculoskeletal:  Negative for joint pain.   Neurological:  Positive for weakness. Negative for headaches.      Physical Exam  Temp:  [36.2 °C (97.1 °F)-36.7 °C (98.1 °F)] 36.5 °C (97.7 °F)  Pulse:  [79-91] 85  Resp:  [15-24] 22  BP: (130-155)/(72-79) 147/75  SpO2:  [90 %-93 %] 91 %    Physical Exam  Vitals reviewed.   Constitutional:       General: He is not in acute distress.     Appearance: He is obese. He is not ill-appearing.      Interventions: Nasal cannula in place.   HENT:      Head: Atraumatic.      Mouth/Throat:      Mouth: Mucous membranes are moist.      Pharynx: Oropharynx is clear. No oropharyngeal exudate.   Eyes:      Comments: Mild conjunctival pallor   Cardiovascular:      Rate and Rhythm: Normal rate and regular rhythm.      Pulses: Normal pulses.      Heart sounds: Normal heart sounds. No murmur heard.  Pulmonary:      Effort: Pulmonary effort is normal. No respiratory distress.      Breath sounds: Normal breath sounds.      Comments: Edematous back and chest  Abdominal:      General: Abdomen is flat. Bowel sounds are normal. There is no distension.      Palpations: Abdomen is soft. There is mass (palpable supraumbilical mass, likely due to stool burden).      Tenderness: There is no abdominal tenderness. There is no guarding.      Comments: Abdominal distension confounded by very large body habitus. Well-healed bilateral stretch marks noted. No pannicular erythema present.    Musculoskeletal:         General: No swelling.      Cervical back: Neck supple. No tenderness.      Right lower leg: Edema present.      Left lower leg: Edema present.      Comments: Severe diffuse edema in bilateral lower extremities   Skin:     General: Skin is warm and dry.      Capillary Refill: Capillary refill takes 2 to 3 seconds.      Findings: No erythema (no longer present on abdomen).    Neurological:      Mental Status: He is alert. Mental status is at baseline.      Motor: Weakness: Bilateral lower extremities.   Psychiatric:         Thought Content: Thought content normal.         Fluids    Intake/Output Summary (Last 24 hours) at 2/11/2024 1433  Last data filed at 2/11/2024 1200  Gross per 24 hour   Intake 1000 ml   Output 5550 ml   Net -4550 ml       Laboratory  Recent Labs     02/11/24  0735   WBC 6.8   RBC 4.90   HEMOGLOBIN 11.1*   HEMATOCRIT 38.7*   MCV 79.0*   MCH 22.7*   MCHC 28.7*   RDW 59.9*   PLATELETCT 270   MPV 9.2       Recent Labs     02/09/24  0150 02/10/24  0953 02/11/24  0230   SODIUM 135 136 136   POTASSIUM 3.9 3.9 3.8   CHLORIDE 90* 91* 90*   CO2 39* 37* 36*   GLUCOSE 109* 119* 98   BUN 15 15 15   CREATININE 0.90 0.91 0.95   CALCIUM 8.4* 8.5 9.0                       Imaging  [id number] VIEW   Final Result      No evidence of bowel obstruction.   Possible constipation.                  EC-ECHOCARDIOGRAM COMPLETE W/ CONT   Final Result      US-EXTREMITY ARTERY LOWER BILAT   Final Result      US-EXTREMITY VENOUS LOWER BILAT   Final Result      DX-CHEST-PORTABLE (1 VIEW)   Final Result      Cardiomegaly.         Scheduled Medications   Medication Dose Frequency    senna-docusate  2 Tablet DAILY    bisacodyl EC  5 mg DAILY    enoxaparin (LOVENOX) injection  80 mg Q12HRS    furosemide  80 mg BID DIURETIC    spironolactone  100 mg Q DAY    polyethylene glycol/lytes  1 Packet TID    mupirocin   BID    [Held by provider] lisinopril  10 mg DAILY    omeprazole  20 mg BID    aspirin  81 mg DAILY    atorvastatin  40 mg Q EVENING      Assessment/Plan  * Acute respiratory failure with hypoxia (HCC)- (present on admission)  Assessment & Plan  Likely due to acute decompensated heart failure in setting of untreated MC/OHS. TTE (2/3/2024) showed LVEF 55% with flattened septum in diastole, consistent with RV volume overload (likely due to hypoxic vasoconstriction from recent URI). Lost  ~26-lb since admission from aggressive diuresis.   - IV Lasix reduced to 60mg BID; cont Aldactone 100mg daily. Monitor electrolytes daily.   - Cont daily weights, strict I/Os. On low-salt diet with 2L daily fluid restriction.   - RT/OT protocol; cont telemetry, continuous pulse ox. Wean off oxygen as tolerated.   - IS at bedside; deep breathing encouraged.   - Low threshold for CTA if given TTE findings, high D-dimer  - Nightly CPAP empirically started given very high suspicion for MC  - Cont low-dose Aspirin, Atorvastatin 40mg daily for primary prevention      Acute right-sided congestive heart failure (HCC)- (present on admission)  Assessment & Plan  02/04/24  As per echocardiogram.  Continue aggressive IV diuresis as per below.  Continuous telemetry monitoring to monitor for arrhythmias during forced diuresis.    02/05/24  Urine output of 6.5 L yesterday.  Creatinine stable at 1.04.  Continuing on furosemide 60 mg IV twice daily.    Continuing spironolactone 50 mg daily.  Increase to 100 mg tomorrow.  Continuous telemetry monitoring to monitor for arrhythmias during forced diuresis.    At risk for obstructive sleep apnea- (present on admission)  Assessment & Plan  Outpt sleep study    Acute on chronic heart failure with preserved ejection fraction (HFpEF) (HCC)- (present on admission)  Assessment & Plan  Suspected  EF 65% in September 2023  Repeat echo  Diuresis as tolerated-Lasix 60 mg IV twice daily  Optimize CHF meds as able to tolerate    2/2  Likely due to MC/OHS.  Continue forced diuresis with furosemide 60 mg IV BID  Add spironolactone 25 mg daily  Continuous telemetry monitoring during forced diuresis    02/03/24  Decreasing oxygen requirements to 1.5 LPM.  Urine output of 3.1 L yesterday.  Continues on fluid restriction.  Continue furosemide 60 mg IV twice daily.  Increasing spironolactone to 50 mg daily.  Continuous telemetry monitoring during forced diuresis  Echocardiogram completed.  Reading  pending.    02/04/24  Echocardiogram showing ejection fraction of 55%, right-sided volume overload D-shaped.  Urine output of 3 L yesterday.  Creatinine remained stable 1.1 to.  Continues on 1 LPM oxygen by nasal.  Continue furosemide 60 mg IV twice daily.  Continue spironolactone 50 mg daily.  Continuous telemetry monitoring during forced diuresis    02/05/24  Urine output of 6.5 L yesterday.  Creatinine stable at 1.04.  Continuing on furosemide 60 mg IV twice daily.    Continuing spironolactone 50 mg daily.  Increase to 100 mg tomorrow.  Continuous telemetry monitoring to monitor for arrhythmias during forced diuresis.    Class 3 severe obesity due to excess calories with serious comorbidity and body mass index (BMI) greater than or equal to 70 in adult (HCC)- (present on admission)  Assessment & Plan  Body mass index is 101.4 kg/m².  Diet and lifestyle modification  May benefit from bariatric surgery evaluation    2/2  Nutrition consult  PT and OT     Cardiorenal syndrome  Assessment & Plan  Baseline Cr 0.5-0.6, currently 0.9-1.0. Likely due to cardiorenal syndrome.  - Improving with forced diuresis. Will cont.   - Home Lisinopril on hold since admission.    Anasarca- (present on admission)  Assessment & Plan  Due to acute on chronic right heart failure.  Suspect due to untreated MC/OHS.  Continue forced IV diuresis.    Bilateral leg edema  Assessment & Plan  Negative for DVT or arterial occlusions on ultrasound.    Likely due to fluid overload.  Continue IV forced diuresis  Elevate lower extremities while in bed    Iron deficiency anemia- (present on admission)  Assessment & Plan  Cause unknown. Denies known blood loss of history of melena.   - Will need GI referral on discharge for diagnostic colonscopy.   - Started on ferrous sulfate supplementation every other day    Abdominal wall cellulitis- (present on admission)  Assessment & Plan  RESOLVED.   - s/p Clindamycin x5 days      Obesity hypoventilation  syndrome (HCC)- (present on admission)  Assessment & Plan  02/04/24  Likely contributing to patient's fluid retention  Patient will need outpatient sleep study.  Order has been placed.    Weakness- (present on admission)  Assessment & Plan  Weakness  PT/OT    Elevated troponin- (present on admission)  Assessment & Plan  Likely secondary to demand ischemia, fluid overload  ASA/statin  Check echo    02/04/24  Likely due to demand ischemia.  Remaining flat.    Volume overload- (present on admission)  Assessment & Plan  Diuresis as able to tolerate    Hypertension- (present on admission)  Assessment & Plan  Control improving    Hold home lisinopril  Continue IV furosemide  Increase spironolactone to 100 mg daily tomorrow.         VTE prophylaxis: Heparin    I have performed a physical exam and reviewed and updated ROS and Plan today (2/11/2024). In review of yesterday's note (2/10/2024), there are no changes except as documented above.    I spent 36 minutes for chart review, meeting and examining the patient, documenting, ordering medications and tests, interpreting the results independently, and communicating with the other health professionals.

## 2024-02-11 NOTE — CARE PLAN
The patient is Stable - Low risk of patient condition declining or worsening    Shift Goals  Clinical Goals: Maintain skin integrity,   Patient Goals: Get better, get home  Family Goals: EMERSON    Progress made toward(s) clinical / shift goals: Patient q2 turns. Frequent hygiene checks and cleaning to maintain skin integrity. Mepilex placed on posterior thigh and bilateral inner thighs to protect skin tears. Discussed increased ambulation with patient and he is willing and motivated to get home where it is easier to mobilize from his own bed d/t mattress firmness.    Problem: Knowledge Deficit - Standard  Goal: Patient and family/care givers will demonstrate understanding of plan of care, disease process/condition, diagnostic tests and medications  Outcome: Progressing     Problem: Skin Integrity  Goal: Skin integrity is maintained or improved  Outcome: Progressing       Patient is not progressing towards the following goals:

## 2024-02-11 NOTE — PROGRESS NOTES
4 Eyes Skin Assessment Completed by Hattie RN and Sarina RN.    Head WDL  Ears WDL  Nose WDL  Mouth WDL  Neck WDL  Breast/Chest Bruising and Scab  Shoulder Blades WDL  Spine WDL  (R) Arm/Elbow/Hand Bruising  (L) Arm/Elbow/Hand WDL  Abdomen Redness  Groin Redness and Excoriation  Scrotum/Coccyx/Buttocks Redness  (R) Leg Edema  (L) Leg Edema  (R) Heel/Foot/Toe Swelling  (L) Heel/Foot/Toe Swelling          Devices In Places Pulse Ox and Nasal Cannula      Interventions In Place Gray Ear Foams, InterDry, TAP System, Q2 Turns, Low Air Loss Mattress, and Barrier Cream    Possible Skin Injury No    Pictures Uploaded Into Epic Yes  Wound Consult Placed N/A  RN Wound Prevention Protocol Ordered No

## 2024-02-11 NOTE — CARE PLAN
The patient is Stable - Low risk of patient condition declining or worsening    Shift Goals  Clinical Goals: wean off O2, improve skin integrity, Hemodynamic monitoring, lasix  Patient Goals: comfort  Family Goals: gadiel    Progress made toward(s) clinical / shift goals:     Problem: Impaired Gas Exchange  Goal: Patient will demonstrate improved ventilation and adequate oxygenation and participate in treatment regimen within the level of ability/situation.  Outcome: Progressing  Note: Pt updated on POC.  Wean off O2 as tolerated.  Denies SOB/CP.     Problem: Skin Integrity  Goal: Skin integrity is maintained or improved  Outcome: Progressing  Note: Patient educated on POC.  Assessed skin integrity, appearance and/or temperature   Assessed risk factors for impaired skin integrity and/or pressures ulcers   Implemented precautions to protect skin integrity  Implemented pressure ulcer prevention protocol   Confirmed/Placed wound care order  Patient use of pressure relieving devices (TAPS system, LOIS, silicone NC with gray foam)  Q2 hour turns

## 2024-02-11 NOTE — ASSESSMENT & PLAN NOTE
Cause unknown. Denies known blood loss of history of melena.   - Will need GI referral on discharge for diagnostic colonscopy.   - Started on ferrous sulfate supplementation every other day

## 2024-02-12 LAB
ANION GAP SERPL CALC-SCNC: 7 MMOL/L (ref 7–16)
BASE EXCESS BLDA CALC-SCNC: 13 MMOL/L (ref -4–3)
BASE EXCESS BLDV CALC-SCNC: 16 MMOL/L
BODY TEMPERATURE: 36.6 CENTIGRADE
BODY TEMPERATURE: 36.6 CENTIGRADE
BUN SERPL-MCNC: 17 MG/DL (ref 8–22)
CALCIUM SERPL-MCNC: 8.8 MG/DL (ref 8.5–10.5)
CHLORIDE SERPL-SCNC: 89 MMOL/L (ref 96–112)
CO2 SERPL-SCNC: 38 MMOL/L (ref 20–33)
CREAT SERPL-MCNC: 0.96 MG/DL (ref 0.5–1.4)
GFR SERPLBLD CREATININE-BSD FMLA CKD-EPI: 102 ML/MIN/1.73 M 2
GLUCOSE SERPL-MCNC: 113 MG/DL (ref 65–99)
HCO3 BLDA-SCNC: 39 MMOL/L (ref 17–25)
HCO3 BLDV-SCNC: 44 MMOL/L (ref 24–28)
INHALED O2 FLOW RATE: 1 L/MIN (ref 2–10)
INHALED O2 FLOW RATE: 1 L/MIN (ref 2–10)
INHALED O2 FLOW RATE: ABNORMAL L/MIN
INHALED O2 FLOW RATE: ABNORMAL L/MIN
MAGNESIUM SERPL-MCNC: 2 MG/DL (ref 1.5–2.5)
PCO2 BLDA: 56.5 MMHG (ref 26–37)
PCO2 BLDV: 68.2 MMHG (ref 41–51)
PCO2 TEMP ADJ BLDA: 55.5 MMHG (ref 26–37)
PCO2 TEMP ADJ BLDV: 67 MMHG (ref 41–51)
PH BLDA: 7.46 [PH] (ref 7.4–7.5)
PH BLDV: 7.43 [PH] (ref 7.31–7.45)
PH TEMP ADJ BLDA: 7.47 [PH] (ref 7.4–7.5)
PH TEMP ADJ BLDV: 7.43 [PH] (ref 7.31–7.45)
PHOSPHATE SERPL-MCNC: 4.2 MG/DL (ref 2.5–4.5)
PO2 BLDA: 53.9 MMHG (ref 64–87)
PO2 BLDV: 29.7 MMHG (ref 25–40)
PO2 TEMP ADJ BLDA: 52.4 MMHG (ref 64–87)
PO2 TEMP ADJ BLDV: 28.9 MMHG (ref 25–40)
POTASSIUM SERPL-SCNC: 4.1 MMOL/L (ref 3.6–5.5)
SAO2 % BLDA: 86.8 % (ref 93–99)
SAO2 % BLDV: 52.5 %
SODIUM SERPL-SCNC: 134 MMOL/L (ref 135–145)

## 2024-02-12 PROCEDURE — 700111 HCHG RX REV CODE 636 W/ 250 OVERRIDE (IP): Mod: JZ

## 2024-02-12 PROCEDURE — 82803 BLOOD GASES ANY COMBINATION: CPT

## 2024-02-12 PROCEDURE — A9270 NON-COVERED ITEM OR SERVICE: HCPCS | Performed by: STUDENT IN AN ORGANIZED HEALTH CARE EDUCATION/TRAINING PROGRAM

## 2024-02-12 PROCEDURE — 83735 ASSAY OF MAGNESIUM: CPT

## 2024-02-12 PROCEDURE — 700102 HCHG RX REV CODE 250 W/ 637 OVERRIDE(OP): Performed by: STUDENT IN AN ORGANIZED HEALTH CARE EDUCATION/TRAINING PROGRAM

## 2024-02-12 PROCEDURE — 99233 SBSQ HOSP IP/OBS HIGH 50: CPT | Mod: GC | Performed by: INTERNAL MEDICINE

## 2024-02-12 PROCEDURE — 302146

## 2024-02-12 PROCEDURE — 80048 BASIC METABOLIC PNL TOTAL CA: CPT

## 2024-02-12 PROCEDURE — 770001 HCHG ROOM/CARE - MED/SURG/GYN PRIV*

## 2024-02-12 PROCEDURE — 97530 THERAPEUTIC ACTIVITIES: CPT

## 2024-02-12 PROCEDURE — 700102 HCHG RX REV CODE 250 W/ 637 OVERRIDE(OP)

## 2024-02-12 PROCEDURE — A9270 NON-COVERED ITEM OR SERVICE: HCPCS

## 2024-02-12 PROCEDURE — 84100 ASSAY OF PHOSPHORUS: CPT

## 2024-02-12 PROCEDURE — 36415 COLL VENOUS BLD VENIPUNCTURE: CPT

## 2024-02-12 RX ORDER — FUROSEMIDE 10 MG/ML
40 INJECTION INTRAMUSCULAR; INTRAVENOUS
Status: COMPLETED | OUTPATIENT
Start: 2024-02-12 | End: 2024-02-13

## 2024-02-12 RX ADMIN — OMEPRAZOLE 20 MG: 20 CAPSULE, DELAYED RELEASE ORAL at 04:56

## 2024-02-12 RX ADMIN — SPIRONOLACTONE 100 MG: 25 TABLET ORAL at 04:56

## 2024-02-12 RX ADMIN — MUPIROCIN 2 %: 20 OINTMENT TOPICAL at 17:33

## 2024-02-12 RX ADMIN — OMEPRAZOLE 20 MG: 20 CAPSULE, DELAYED RELEASE ORAL at 17:33

## 2024-02-12 RX ADMIN — POLYETHYLENE GLYCOL 3350 1 PACKET: 17 POWDER, FOR SOLUTION ORAL at 10:14

## 2024-02-12 RX ADMIN — POLYETHYLENE GLYCOL 3350 1 PACKET: 17 POWDER, FOR SOLUTION ORAL at 21:32

## 2024-02-12 RX ADMIN — ENOXAPARIN SODIUM 80 MG: 100 INJECTION SUBCUTANEOUS at 17:33

## 2024-02-12 RX ADMIN — FUROSEMIDE 40 MG: 10 INJECTION, SOLUTION INTRAVENOUS at 17:33

## 2024-02-12 RX ADMIN — BISACODYL 5 MG: 5 TABLET, COATED ORAL at 04:56

## 2024-02-12 RX ADMIN — ENOXAPARIN SODIUM 80 MG: 100 INJECTION SUBCUTANEOUS at 04:56

## 2024-02-12 RX ADMIN — MUPIROCIN 2 %: 20 OINTMENT TOPICAL at 04:57

## 2024-02-12 RX ADMIN — ACETAMINOPHEN 650 MG: 325 TABLET, FILM COATED ORAL at 04:55

## 2024-02-12 RX ADMIN — FUROSEMIDE 60 MG: 10 INJECTION, SOLUTION INTRAVENOUS at 04:57

## 2024-02-12 RX ADMIN — ASPIRIN 81 MG: 81 TABLET, COATED ORAL at 04:57

## 2024-02-12 RX ADMIN — ATORVASTATIN CALCIUM 40 MG: 40 TABLET, FILM COATED ORAL at 17:33

## 2024-02-12 RX ADMIN — DOCUSATE SODIUM 50 MG AND SENNOSIDES 8.6 MG 2 TABLET: 8.6; 5 TABLET, FILM COATED ORAL at 04:56

## 2024-02-12 ASSESSMENT — COGNITIVE AND FUNCTIONAL STATUS - GENERAL
WALKING IN HOSPITAL ROOM: A LITTLE
MOVING TO AND FROM BED TO CHAIR: UNABLE
MOBILITY SCORE: 11
MOVING FROM LYING ON BACK TO SITTING ON SIDE OF FLAT BED: A LOT
CLIMB 3 TO 5 STEPS WITH RAILING: TOTAL
STANDING UP FROM CHAIR USING ARMS: A LITTLE
SUGGESTED CMS G CODE MODIFIER MOBILITY: CL
TURNING FROM BACK TO SIDE WHILE IN FLAT BAD: UNABLE

## 2024-02-12 ASSESSMENT — ENCOUNTER SYMPTOMS
FEVER: 0
ABDOMINAL PAIN: 0
SHORTNESS OF BREATH: 0
BLOOD IN STOOL: 0
COUGH: 0
HEADACHES: 0
WEAKNESS: 1
NAUSEA: 0
CONSTIPATION: 1
CHILLS: 0

## 2024-02-12 ASSESSMENT — GAIT ASSESSMENTS
GAIT LEVEL OF ASSIST: CONTACT GUARD ASSIST
DISTANCE (FEET): 6
ASSISTIVE DEVICE: FRONT WHEEL WALKER
DEVIATION: INCREASED BASE OF SUPPORT

## 2024-02-12 ASSESSMENT — PAIN DESCRIPTION - PAIN TYPE
TYPE: ACUTE PAIN
TYPE: ACUTE PAIN

## 2024-02-12 ASSESSMENT — PATIENT HEALTH QUESTIONNAIRE - PHQ9
2. FEELING DOWN, DEPRESSED, IRRITABLE, OR HOPELESS: NOT AT ALL
1. LITTLE INTEREST OR PLEASURE IN DOING THINGS: NOT AT ALL
SUM OF ALL RESPONSES TO PHQ9 QUESTIONS 1 AND 2: 0

## 2024-02-12 ASSESSMENT — FIBROSIS 4 INDEX: FIB4 SCORE: 0.42

## 2024-02-12 NOTE — ASSESSMENT & PLAN NOTE
Cause unknown. Denies known blood loss of history of melena.   - Will need GI referral on discharge for diagnostic colonscopy.   - Continue ferrous sulfate supplementation every other day

## 2024-02-12 NOTE — CARE PLAN
The patient is Stable - Low risk of patient condition declining or worsening    Shift Goals  Clinical Goals: wean O2; I/O; diuresis; maintain skin integrity  Patient Goals: comfort, rest  Family Goals: EMERSON    Progress made toward(s) clinical / shift goals:  Patient has had excellent urine output today. See flowsheets for totals.    Patient is not progressing towards the following goals: Needed to increase to 2L NC. Patient did not tolerate bariatric bed turning. Educated on importance to prevent further skin breakdown. Patient understand,s but gets too uncomfortable with  the bariatric bed tilting side-to-side.       Problem: Impaired Gas Exchange  Goal: Patient will demonstrate improved ventilation and adequate oxygenation and participate in treatment regimen within the level of ability/situation.  Description: Target End Date:  Prior to discharge or change in level of care    1.   Assess/monitor rate/rhythm/depth of effort of respirations  2.   Assess oxygenation as ordered  3.   Administer/titrate oxygen as ordered  4.   Position patient for maximum ventilatory efficiency  5.   Turn, cough, and deep breathe  6.   Vital signs, pulse oximetry  7.   Assess color and body temperature  8.   Assess and monitor breath sounds  9.   Encourage deep-slow or pursed-lip breathing exercises  10. Monitor changes in the level of consciousness and mental status  11. Encourage expectoration of sputum; airway suctioning  12. Elevate the head of the bed and position patient for maximum ventilatory efficiency  13. Provide a calm, quiet environment  14. Limit patient's activity during the acute phase and have patient resume activity gradually and increase as individually tolerated  15. Evaluate sleep patterns and limit stimulants such as caffeine  16. Collaborate with RT to administer medications/treatments as ordered  Outcome: Not Progressing       Problem: Skin Integrity  Goal: Skin integrity is maintained or improved  Description:  Target End Date:  Prior to discharge or change in level of care    Document interventions on Skin Risk/Luis flowsheet groups and corresponding LDA    1.  Assess and monitor skin integrity, appearance and/or temperature  2.  Assess risk factors for impaired skin integrity and/or pressures ulcers  3.  Implement precautions to protect skin integrity in collaboration with interdisciplinary team  4.  Implement pressure ulcer prevention protocol if at risk for skin breakdown  5.  Confirm wound care consult if at risk for skin breakdown  6.  Ensure patient use of pressure relieving devices  (Low air loss bed, waffle overlay, heel protectors, ROHO cushion, etc)  Outcome: Progressing     Problem: Pain - Standard  Goal: Alleviation of pain or a reduction in pain to the patient’s comfort goal  Description: Target End Date:  Prior to discharge or change in level of care    Document on Vitals flowsheet    1.  Document pain using the appropriate pain scale per order or unit policy  2.  Educate and implement non-pharmacologic comfort measures (i.e. relaxation, distraction, massage, cold/heat therapy, etc.)  3.  Pain management medications as ordered  4.  Reassess pain after pain med administration per policy  5.  If opiods administered assess patient's response to pain medication is appropriate per POSS sedation scale  6.  Follow pain management plan developed in collaboration with patient and interdisciplinary team (including palliative care or pain specialists if applicable)  Outcome: Progressing

## 2024-02-12 NOTE — ASSESSMENT & PLAN NOTE
Likely secondary to undiagnosed sleep apnea/obesity hypoventilation syndrome.  -Continue IV diuresis as above (see plan under acute hypoxic respiratory failure)

## 2024-02-12 NOTE — DISCHARGE PLANNING
Case Management Discharge Planning    Admission Date: 2/1/2024  GMLOS: 3.9  ALOS: 11    6-Clicks ADL Score: 15  6-Clicks Mobility Score: 10  PT and/or OT Eval ordered: Yes  Post-acute Referrals Ordered: No  Post-acute Choice Obtained: No  Has referral(s) been sent to post-acute provider:  Yes      Anticipated Discharge Dispo: Discharge Disposition: D/T to SNF with Medicare cert in anticipation of skilled care (03)    DME Needed: No    Action(s) Taken: Per chart review, PT/OT recommendations are for post-acute placement, however pt declined by local facilities due to non contracted insurance or bariatric/max assist. Pt lives with a roommate who is unable to provide much assistance. Pt was previously independent with home distances and will call his mom/sister when he needs anything. Requested DPA expand referrals to [facility], [place], and [facility] SNFs. Unlikely to find contracted facility that can accommodate weight.    Escalations Completed: Leadership    Medically Clear: No    Next Steps: Care coordination will f/u with SNF referrals    Barriers to Discharge: Pending Placement, morbidly obese    Is the patient up for discharge tomorrow: No

## 2024-02-12 NOTE — ASSESSMENT & PLAN NOTE
Body mass index is 93.93 kg/m².  Diet and lifestyle modification  -Bariatric surgery outpatient eval

## 2024-02-12 NOTE — CONSULTS
"[facility]    INPATIENT ASSESSMENT    Name: Cole Jaramillo  MRN: [id number]  : 1985  Age: 39 y.o.  Date of assessment: 2024  PCP: Pcp Pt States None  Persons in attendance: Patient    HPI: Per medical record: \"Cole Jaramillo is a 39 y.o. male who presented on 2024 for evaluation of worsening lower extremity edema and inability to ambulate.  This is a pleasant gentleman with a history of severe obesity .  He had difficulty ambulating for the past 2 weeks after having a cold and was mostly in bed.\" Patient was referred to [facility] for a psychotherapy session.    CHIEF COMPLAINT/PRESENTING ISSUE (as stated by Patient): Cole Jaramillo is a 39 year old male seen sitting up on the hospital bed, alert, oriented, speech clear and coherent. Patient was pleasant and easy to engage in the interview process.    Patient reports over the past 5 years, he has gained weight and decreased his activity level. Patient reports he has been able to maintain work responsibilities, still sees friends and engages with family.    Psychotherapy Session Summary  Cole Jaramillo is a 39 year old male, who reports low motivation, increased weight, and decreased activity. Patient denies a history of depression however states he is not sure what he is feeling or why his energy level has decreased. Patient denies suicidal ideations.    Patient reports his father  a few years ago. Patient reports his parents were . As a child, patient was sent to reside with his father while his sisters stayed with his mother. Patient feels his father was more focused on riding motor cycles with his bike club than parenting. Patient reports being left alone often to manage himself. Patient states he was often angry with his father who felt gambling and hanging with his friends was more important and hands on parenting.    Patient denies abuse or neglect, however feels he has not learned to express " his emotions or process feelings. Clinician shared some tools with patient to encourage accessing his emotions. Patient is open to starting this process and is interested in continuing therapy on an outpatient basis.    Chief Complaint   Patient presents with    Leg Swelling     Pt c/o increased leg swelling up to thighs x 1 month, making him unable to weight bear due to extent of swelling. Pt also c/o some mild shortness of breath when moving, sats 88% on room air in ED.        CURRENT LIVING SITUATION/SOCIAL SUPPORT: Patient resides with a roommate. Patient has two sisters and a close relationship with his mother. Patient is employed remotely full time.    BEHAVIORAL HEALTH TREATMENT HISTORY  Does patient/parent report a history of prior behavioral health treatment for patient?   No:    SAFETY ASSESSMENT - SELF  Does patient acknowledge current or past symptoms of dangerousness to self? no  Does parent/significant other report patient has current or past symptoms of dangerousness to self? N\A  Does presenting problem suggest symptoms of dangerousness to self? No    SAFETY ASSESSMENT - OTHERS  Does patient acknowledge current or past symptoms of aggressive behavior or risk to others? no  Does parent/significant other report patient has current or past symptoms of aggressive behavior or risk to others?  N\A  Does presenting problem suggest symptoms of dangerousness to others? No    Crisis Safety Plan completed and copy given to patient? no    ABUSE/NEGLECT SCREENING  Does patient report feeling “unsafe” in his/her home, or afraid of anyone?  no  Does patient report any history of physical, sexual, or emotional abuse?  no  Does parent or significant other report any of the above? N\A  Is there evidence of neglect by self?  yes  Is there evidence of neglect by a caregiver? no  Does the patient/parent report any history of CPS/APS/police involvement related to suspected abuse/neglect or domestic violence? no  Based on  the information provided during the current assessment, is a mandated report of suspected abuse/neglect being made?  No    SUBSTANCE USE SCREENING  Drug screen-negative      MENTAL STATUS              Participation: Active verbal participation  Grooming: Casual  Orientation: Alert  Behavior: Calm  Eye contact: Good  Mood: Depressed  Affect: Sad  Thought process: Logical  Thought content: Within normal limits  Speech: Volume within normal limits  Perception: Within normal limits  Memory:  No gross evidence of memory deficits  Insight: Adequate  Judgment:  Adequate  Other:    Collateral information:   Source:   Significant other present in person:    Significant other by telephone   [facility]   [facility] Medical Record-review   Other:      Unable to complete full assessment due to:   Acute intoxication   Patient declined to participate/engage   Patient verbally unresponsive   Significant cognitive deficits   Significant perceptual distortions or behavioral disorganization   Other:             CLINICAL IMPRESSIONS:  Primary:  deferred  Secondary:                                         IDENTIFIED NEEDS/PLAN:  [Trigger DISPOSITION list for any items marked]      Imminent safety risk - self  Imminent safety risk - others     Acute substance withdrawal   Psychosis/Impaired reality testing    x Mood/anxiety   Substance use/Addictive behavior    x Maladaptive behavior   Parent/child conflict     Family/Couples conflict   Biomedical     Housing   Financial      Legal  Occupational/Educational     Domestic violence   Other:     Recommendations and Observation Level:  Case Management-please provide patient with referrals for outpatient counseling services.    Thank you,      Legal Hold: N/A    Thank you,      Diana Arambula, Ph.D., [facility]  2/12/2024   Length of intervention: 60 minutes

## 2024-02-12 NOTE — ASSESSMENT & PLAN NOTE
Likely secondary to right-sided heart failure from pulmonary hypertension due to undiagnosed sleep apnea. Echo on this admission did demonstrate pulmonary hypertension.   -Continue Po Torsemide 40mg BID   -Continue Aldactone 100mg daily   -Cont daily weights, strict I/Os. On low-salt diet with 2L daily fluid restriction.   -RT/OT protocol; cont telemetry, continuous pulse ox. Wean off oxygen as tolerated.   -IS at bedside; deep breathing encouraged.  -Cont low-dose Aspirin, Atorvastatin 40mg daily for primary prevention

## 2024-02-12 NOTE — ASSESSMENT & PLAN NOTE
Due to acute on chronic right heart failure.  Suspect due to untreated MC/OHS.  Continue IV diuresis.

## 2024-02-12 NOTE — ASSESSMENT & PLAN NOTE
Likely contributing to patient's fluid retention.  ABG demonstrated pCO2 levels of 55 further substantiating likely obesity hypoventilation syndrome.  -Discussing with pulmonology regarding Bipap/CPAP, they are following, appreciate assistance

## 2024-02-12 NOTE — CARE PLAN
The patient is Stable - Low risk of patient condition declining or worsening    Shift Goals  Clinical Goals: CHF care, skin integrity, Wean O2  Patient Goals: Recover  Family Goals: EMERSON    Progress made toward(s) clinical / shift goals:    Problem: Mobility  Goal: Patient's capacity to carry out activities will improve  Description: Target End Date:  Prior to discharge or change in level of care    1.  Assess for barriers to mobility/activity  2.  Implement activity per interdisciplinary team recommendations  3.  Target activity level identified and patient/family/caregiver aware of goal  4.  Provide assistive devices  5.  Instruct patient/caregiver on proper use of assistive/adaptive devices  6.  Schedule activities and rest periods to decrease effects of fatigue  7.  Encourage mobilization to extent of ability  8.  Maintain proper body alignment  9.  Provide adequate pain management to allow progressive mobilization  10. Implement pace maker precautions as needed  Outcome: Progressing  Flowsheets (Taken 2/7/2024 1311 by Lisset Hawk, Student)  Mobility:   Encouraged mobilization per interdisciplinary team recommendations   Provided assistive devices   Monitored for signs of activity intolerance   Provided rest periods between activities  Note: Patient worked with PT and able to stand at bedside and take steps.

## 2024-02-12 NOTE — DISCHARGE PLANNING
@1000 [facility] sent out various SNF referrals to [facility] per DONNA Avalos request .

## 2024-02-12 NOTE — DISCHARGE PLANNING
@ 1200 [facility] in [facility] , reached out to [facility] to confirm Pt's weight . .was declined due to bariatric/weight .     @0130 NORBERTO received call from Dot in admissions at  [facility] regarding PT weight and insurance . She did say bed was available depending on insurance , Lisset will look into it and call back with answer today .

## 2024-02-12 NOTE — PROGRESS NOTES
Hospital Medicine Daily Progress Note    Date of Service  2/12/2024    Chief Complaint  Cole Jaramillo is a 39 y.o. male admitted 2/1/2024 with leg swelling and inability to walk.    Hospital Course  Cole Jaramillo is a 39-year-old male with morbid obesity (BMI 87.62) who presented on 2/1/2024 with 2-week history of difficulty with ambulation due to severe lower extremity swelling, associated with exertional dyspnea and orthopnea. Symptoms began after likely viral respiratory infection. He became bedbound and activated EMS after consultation with a mobile physician.      Initial exam notable for hypertension (SBP 160s), hypoxemia (requiring 2L NC), distended abdomen with erythema, and severe diffuse anasarca in setting of morbid obesity. Initial weight was 330 kg. Labs showed increased NT-proBNP (4729 mg/dL), mild troponemia, and mild microcytic anemia (12.1 g/dOL, MCV 77.9). D-dimer was also elevated.    He was admitted for acute on chronic heart failure with preserved ejection fraction, likely due to under-treated MC/OHS.     Lower extremity venous Duplex ultrasound (2/3/2024) was limited by body habitus but was negative for DVTs. TTE (2/3/2024) showed LVEF 55% with flattened septum in diastole, consistent with RV volume overload (likely due to hypoxic vasoconstriction from recent URI). No regional wall motion abnormalities were seen.     He was aggressively diuresed via IV Lasix and was started on Aldactone, leading to 12-kg (or 26.45-lb) weight loss. He also received 5-day course of Clindamycin for suspected abdominal wall cellulitis, leading to improvement in pannicular erythema.     He also underwent aggressive reconditioning and strength training with PT and OT, who recommend discharge home with home health. He will benefit from bariatric surgery evaluation given inability to lose weight (having weighed 800-lbs last year).     Of note, he was also hospitalized (8/27-9/7/2023) last year for acute  decompensated heart failure, at which time he underwent forced diuresis with plan for outpatient sleep study.         interval Problem Update  NAEO. Patient reported he is feeling a little bit better today. He thinks his breathing is doing better.     Patient's mother updated at bedside with the whole team.     I have discussed this patient's plan of care and discharge plan at IDT rounds today with Case Management, Nursing, Nursing leadership, and other members of the IDT team.    Consultants/Specialty  none    Code Status  Full Code    Disposition  The patient is not medically cleared for discharge to home or a post-acute facility.      I have placed the appropriate orders for post-discharge needs.    Review of Systems  Review of Systems   Constitutional:  Negative for chills and fever.   Respiratory:  Negative for cough and shortness of breath (improving).    Cardiovascular:  Positive for leg swelling. Negative for chest pain.   Gastrointestinal:  Positive for constipation. Negative for abdominal pain (mild post-prandial discomfort), blood in stool, melena and nausea.   Musculoskeletal:  Negative for joint pain.   Neurological:  Positive for weakness. Negative for headaches.      Physical Exam  Temp:  [36.4 °C (97.6 °F)-36.6 °C (97.9 °F)] 36.6 °C (97.9 °F)  Pulse:  [77-89] 77  Resp:  [22] 22  BP: (114-149)/(72-80) 136/72  SpO2:  [92 %-94 %] 93 %    Physical Exam  Constitutional:       General: He is not in acute distress.  HENT:      Head: Normocephalic.      Mouth/Throat:      Mouth: Mucous membranes are moist.   Eyes:      General: No scleral icterus.        Right eye: No discharge.         Left eye: No discharge.   Cardiovascular:      Rate and Rhythm: Normal rate and regular rhythm.      Pulses: Normal pulses.      Heart sounds: Normal heart sounds.   Pulmonary:      Effort: Pulmonary effort is normal.      Breath sounds: Normal breath sounds.   Abdominal:      General: There is distension.      Palpations:  There is no mass.      Tenderness: There is no abdominal tenderness. There is no guarding.   Musculoskeletal:      Right lower leg: Edema present.      Left lower leg: Edema present.   Neurological:      Mental Status: He is alert and oriented to person, place, and time.   Psychiatric:         Mood and Affect: Mood normal.          Fluids    Intake/Output Summary (Last 24 hours) at 2/12/2024 0905  Last data filed at 2/12/2024 0800  Gross per 24 hour   Intake 1460 ml   Output 6000 ml   Net -4540 ml         Laboratory  Recent Labs     02/11/24  0735   WBC 6.8   RBC 4.90   HEMOGLOBIN 11.1*   HEMATOCRIT 38.7*   MCV 79.0*   MCH 22.7*   MCHC 28.7*   RDW 59.9*   PLATELETCT 270   MPV 9.2         Recent Labs     02/10/24  0953 02/11/24  0230 02/12/24  0045   SODIUM 136 136 134*   POTASSIUM 3.9 3.8 4.1   CHLORIDE 91* 90* 89*   CO2 37* 36* 38*   GLUCOSE 119* 98 113*   BUN 15 15 17   CREATININE 0.91 0.95 0.96   CALCIUM 8.5 9.0 8.8                         Imaging  VY-EPTASAS-9 VIEW   Final Result      No evidence of bowel obstruction.   Possible constipation.                  EC-ECHOCARDIOGRAM COMPLETE W/ CONT   Final Result      US-EXTREMITY ARTERY LOWER BILAT   Final Result      US-EXTREMITY VENOUS LOWER BILAT   Final Result      DX-CHEST-PORTABLE (1 VIEW)   Final Result      Cardiomegaly.         Scheduled Medications   Medication Dose Frequency    furosemide  40 mg BID DIURETIC    senna-docusate  2 Tablet DAILY    bisacodyl EC  5 mg DAILY    enoxaparin (LOVENOX) injection  80 mg Q12HRS    spironolactone  100 mg Q DAY    polyethylene glycol/lytes  1 Packet TID    mupirocin   BID    [Held by provider] lisinopril  10 mg DAILY    omeprazole  20 mg BID    aspirin  81 mg DAILY    atorvastatin  40 mg Q EVENING      Assessment/Plan  * Acute respiratory failure with hypoxia (HCC)- (present on admission)  Assessment & Plan  Likely secondary to right-sided heart failure from pulmonary hypertension due to undiagnosed sleep apnea. Echo  on this admission did demonstrate pulmonary hypertension.   -Decreased dose of Lasix to 40mg BID given rise in bicarbonate (obtaining VBG to assess for the need of acetazolamide). Continue Aldactone 100mg daily  -Cont daily weights, strict I/Os. On low-salt diet with 2L daily fluid restriction.   -RT/OT protocol; cont telemetry, continuous pulse ox. Wean off oxygen as tolerated.   -IS at bedside; deep breathing encouraged.  -Cont low-dose Aspirin, Atorvastatin 40mg daily for primary prevention        Acute right-sided congestive heart failure (HCC)- (present on admission)  Assessment & Plan  Likely secondary to undiagnosed sleep apnea.  -Continue IV diuresis as above (see plan under acute hypoxic respiratory failure)    Anasarca- (present on admission)  Assessment & Plan  Due to acute on chronic right heart failure.  Suspect due to untreated MC/OHS.  Continue IV diuresis.    Obesity hypoventilation syndrome (HCC)- (present on admission)  Assessment & Plan  Likely contributing to patient's fluid retention  Patient will need outpatient sleep study.  Order has been placed.    Weakness- (present on admission)  Assessment & Plan  Weakness  PT/OT    At risk for obstructive sleep apnea- (present on admission)  Assessment & Plan  Outpt sleep study, patient is agreeable     Volume overload- (present on admission)  Assessment & Plan  Diuresis as able to tolerate    Iron deficiency anemia- (present on admission)  Assessment & Plan  Cause unknown. Denies known blood loss of history of melena.   - Will need GI referral on discharge for diagnostic colonscopy.   - Continue ferrous sulfate supplementation every other day    Hypertension- (present on admission)  Assessment & Plan  -Hold home lisinopril  -Continue Aldactone 100mg     Class 3 severe obesity due to excess calories with serious comorbidity and body mass index (BMI) greater than or equal to 70 in adult (HCC)- (present on admission)  Assessment & Plan  Body mass index is  93.93 kg/m².  Diet and lifestyle modification  -Bariatric surgery outpatient eval     Abdominal wall cellulitis- (present on admission)  Assessment & Plan  RESOLVED.   - s/p Clindamycin x5 days         VTE prophylaxis: Enoxaparin    I have performed a physical exam and reviewed and updated ROS and Plan today (2/12/2024). In review of yesterday's note (2/11/2024), there are no changes except as documented above.    I spent 36 minutes for chart review, meeting and examining the patient, documenting, ordering medications and tests, interpreting the results independently, and communicating with the other health professionals.

## 2024-02-12 NOTE — CARE PLAN
The patient is Stable - Low risk of patient condition declining or worsening    Shift Goals  Clinical Goals: daily weights, diurese, improve skin integrity, ;abs and O2 needs monitoring  Patient Goals: sleep  Family Goals: gadiel    Progress made toward(s) clinical / shift goals:     Problem: Skin Integrity  Goal: Skin integrity is maintained or improved  Outcome: Progressing  Note: Patient educated on POC.  Assessed skin integrity, appearance and/or temperature   Assessed risk factors for impaired skin integrity and/or pressures ulcers   Implemented precautions to protect skin integrity  Implemented pressure ulcer prevention protocol   Confirmed/Placed wound care order  Patient use of pressure relieving devices (Hover mat, LOIS, silicone NC with gray foam)  Q2 hour turns with pillows/wedges     Problem: Fall Risk  Goal: Patient will remain free from falls  Outcome: Progressing  Note: Fall Prevention Protocol  To be followed on all patients at risk for fall  Patient Name: Cole Jaramillo    Guidelines for patients at risk to fall       Environmental precautions in use:       treaded slipper socks are on patient       Bed is locked and in lowest position       Personal belongings, wastebasket, call bell, are in easy reach       Transferred patient toward stronger side       Report is given to CNA regarding patient's fall risk    The following are considered:     The side rail closest to bathroom is down     Bed rails:  [facility] Fall program emphasizes bed rail reduction.  Bed rails contribute to patient fall risk by creating barriers to patient transfer in and out of beds.  Use of bed rails must be assessed specifically to individual patient needs.  When possible, use alternative pillows and positioning devices to avail the use of bed rails.  (Remember:  Four (4) side rails are considered a restraint).    Guidelines for high fall risk patients    Fall risk guidelines as outlined above      Fall risk armband on  patient      Fall risk sign is placed by the door - High Fall Risk      Purewick in use    Consider the following:      Bed alarm on      Physical Therapy/Occupational Therapy consultation for strengthening and mobility, including assessment of home care  needs      Physician and / or pharmacy consultation for discussion of potential medication modification or discontinuation       Problem: Care Map:  Day 2 Optimal Outcome for the Heart Failure Patient  Goal: Day 2:  Optimal Care of the heart failure patient  Outcome: Progressing  Intervention: If patient is HFrEF, review for guideline medications (evidence based beta blocker (Toprol XL, carvedilol, bisprolol), ACEI/ARB/ARNI, Aldosterone receptor antagonist).  If not ordered request provider contraindication documentation.  Note: Patient educated on POC. Pt will remain NAEO.  Monitor vital signs (q8h/or as needed)  Pulse oximetry in use. Report abnormalities to the provider  Hemodynamic monitoring (BMP, Mg, P)  administered Lasix as ordered  Monitored intake and output q4h through the shift  Daily weights monitored  Peripheral pulses and capillary refill were assessed  Color and body temperature were assessed  Positioned patient for maximum circulation/cardiac output  Signs/symptoms of excessive bleeding were monitored  Assessed mental status, restlessness and changes in level of consciousness  Monitored temperature (report fever or hypothermia to provider immediately).

## 2024-02-12 NOTE — FACE TO FACE
Face to Face Supporting Documentation - Home Health    The encounter with this patient was in whole or in part the primary reason for home health admission.    Date of encounter:   Patient:                    MRN:                       YOB: 2025  Cole Jaramillo  [id number]  1985     Home health to see patient for:  Skilled Nursing care for assessment, interventions & education    Skilled need for:  New Onset Medical Diagnosis Right sided heart failure     Skilled nursing interventions to include:  Comment: Aid with daily activities    Homebound status evidenced by:  Needs the assistance of another person in order to leave the home. Leaving home requires a considerable and taxing effort. There is a normal inability to leave the home.    Community Physician to provide follow up care: Pcp Pt States None     Optional Interventions? No      I certify the face to face encounter for this home health care referral meets the CMS requirements and the encounter/clinical assessment with the patient was, in whole, or in part, for the medical condition(s) listed above, which is the primary reason for home health care. Based on my clinical findings: the service(s) are medically necessary, support the need for home health care, and the homebound criteria are met.  I certify that this patient has had a face to face encounter by myself.  Gomez Kaufman M.D. - NPI: [id number]

## 2024-02-13 LAB
ANION GAP SERPL CALC-SCNC: 8 MMOL/L (ref 7–16)
BUN SERPL-MCNC: 17 MG/DL (ref 8–22)
CALCIUM SERPL-MCNC: 9.1 MG/DL (ref 8.5–10.5)
CHLORIDE SERPL-SCNC: 90 MMOL/L (ref 96–112)
CO2 SERPL-SCNC: 37 MMOL/L (ref 20–33)
CREAT SERPL-MCNC: 0.95 MG/DL (ref 0.5–1.4)
FEV1 % PREDICTED: 34
FEV1/FVC % PREDICTED: 85
FEV1/FVC: 69.87 %
FEV1: 1.41 L
FVC % PREDICTED: 40
FVC (L): 2.02
GFR SERPLBLD CREATININE-BSD FMLA CKD-EPI: 104 ML/MIN/1.73 M 2
GLUCOSE SERPL-MCNC: 112 MG/DL (ref 65–99)
POTASSIUM SERPL-SCNC: 4.4 MMOL/L (ref 3.6–5.5)
SODIUM SERPL-SCNC: 135 MMOL/L (ref 135–145)

## 2024-02-13 PROCEDURE — 90837 PSYTX W PT 60 MINUTES: CPT | Performed by: SOCIAL WORKER

## 2024-02-13 PROCEDURE — 99222 1ST HOSP IP/OBS MODERATE 55: CPT | Mod: GC | Performed by: INTERNAL MEDICINE

## 2024-02-13 PROCEDURE — 99232 SBSQ HOSP IP/OBS MODERATE 35: CPT | Mod: GC | Performed by: INTERNAL MEDICINE

## 2024-02-13 PROCEDURE — 700102 HCHG RX REV CODE 250 W/ 637 OVERRIDE(OP)

## 2024-02-13 PROCEDURE — 94010 BREATHING CAPACITY TEST: CPT

## 2024-02-13 PROCEDURE — A9270 NON-COVERED ITEM OR SERVICE: HCPCS | Performed by: STUDENT IN AN ORGANIZED HEALTH CARE EDUCATION/TRAINING PROGRAM

## 2024-02-13 PROCEDURE — 700102 HCHG RX REV CODE 250 W/ 637 OVERRIDE(OP): Performed by: STUDENT IN AN ORGANIZED HEALTH CARE EDUCATION/TRAINING PROGRAM

## 2024-02-13 PROCEDURE — 700111 HCHG RX REV CODE 636 W/ 250 OVERRIDE (IP): Mod: JZ

## 2024-02-13 PROCEDURE — 80048 BASIC METABOLIC PNL TOTAL CA: CPT

## 2024-02-13 PROCEDURE — 700111 HCHG RX REV CODE 636 W/ 250 OVERRIDE (IP)

## 2024-02-13 PROCEDURE — 97602 WOUND(S) CARE NON-SELECTIVE: CPT

## 2024-02-13 PROCEDURE — A9270 NON-COVERED ITEM OR SERVICE: HCPCS

## 2024-02-13 PROCEDURE — 36415 COLL VENOUS BLD VENIPUNCTURE: CPT

## 2024-02-13 PROCEDURE — 770001 HCHG ROOM/CARE - MED/SURG/GYN PRIV*

## 2024-02-13 RX ORDER — TORSEMIDE 20 MG/1
80 TABLET ORAL
Status: DISCONTINUED | OUTPATIENT
Start: 2024-02-14 | End: 2024-02-14

## 2024-02-13 RX ADMIN — OMEPRAZOLE 20 MG: 20 CAPSULE, DELAYED RELEASE ORAL at 05:02

## 2024-02-13 RX ADMIN — POLYETHYLENE GLYCOL 3350 1 PACKET: 17 POWDER, FOR SOLUTION ORAL at 21:21

## 2024-02-13 RX ADMIN — SPIRONOLACTONE 100 MG: 25 TABLET ORAL at 05:01

## 2024-02-13 RX ADMIN — FUROSEMIDE 40 MG: 10 INJECTION, SOLUTION INTRAVENOUS at 05:02

## 2024-02-13 RX ADMIN — BISACODYL 5 MG: 5 TABLET, COATED ORAL at 05:02

## 2024-02-13 RX ADMIN — ENOXAPARIN SODIUM 80 MG: 100 INJECTION SUBCUTANEOUS at 18:00

## 2024-02-13 RX ADMIN — DOCUSATE SODIUM 50 MG AND SENNOSIDES 8.6 MG 2 TABLET: 8.6; 5 TABLET, FILM COATED ORAL at 05:01

## 2024-02-13 RX ADMIN — FUROSEMIDE 40 MG: 10 INJECTION, SOLUTION INTRAVENOUS at 16:33

## 2024-02-13 RX ADMIN — ACETAMINOPHEN 650 MG: 325 TABLET, FILM COATED ORAL at 19:53

## 2024-02-13 RX ADMIN — POLYETHYLENE GLYCOL 3350 1 PACKET: 17 POWDER, FOR SOLUTION ORAL at 08:55

## 2024-02-13 RX ADMIN — ASPIRIN 81 MG: 81 TABLET, COATED ORAL at 05:02

## 2024-02-13 RX ADMIN — ENOXAPARIN SODIUM 80 MG: 100 INJECTION SUBCUTANEOUS at 05:02

## 2024-02-13 RX ADMIN — OMEPRAZOLE 20 MG: 20 CAPSULE, DELAYED RELEASE ORAL at 16:33

## 2024-02-13 RX ADMIN — ATORVASTATIN CALCIUM 40 MG: 40 TABLET, FILM COATED ORAL at 16:33

## 2024-02-13 RX ADMIN — POLYETHYLENE GLYCOL 3350 1 PACKET: 17 POWDER, FOR SOLUTION ORAL at 16:33

## 2024-02-13 ASSESSMENT — ENCOUNTER SYMPTOMS
NAUSEA: 0
FEVER: 0
SHORTNESS OF BREATH: 0
HEADACHES: 0
COUGH: 0
BLOOD IN STOOL: 0
ABDOMINAL PAIN: 0
WEAKNESS: 1
CONSTITUTIONAL NEGATIVE: 1
CHILLS: 0
NEUROLOGICAL NEGATIVE: 1
MUSCULOSKELETAL NEGATIVE: 1
SHORTNESS OF BREATH: 1
CONSTIPATION: 0
EYES NEGATIVE: 1
GASTROINTESTINAL NEGATIVE: 1

## 2024-02-13 ASSESSMENT — FIBROSIS 4 INDEX: FIB4 SCORE: 0.42

## 2024-02-13 ASSESSMENT — COGNITIVE AND FUNCTIONAL STATUS - GENERAL
PERSONAL GROOMING: A LITTLE
MOBILITY SCORE: 12
MOVING FROM LYING ON BACK TO SITTING ON SIDE OF FLAT BED: A LOT
DAILY ACTIVITIY SCORE: 11
TOILETING: TOTAL
SUGGESTED CMS G CODE MODIFIER DAILY ACTIVITY: CL
DRESSING REGULAR UPPER BODY CLOTHING: TOTAL
CLIMB 3 TO 5 STEPS WITH RAILING: TOTAL
STANDING UP FROM CHAIR USING ARMS: A LOT
MOVING TO AND FROM BED TO CHAIR: A LOT
DRESSING REGULAR LOWER BODY CLOTHING: TOTAL
HELP NEEDED FOR BATHING: TOTAL
SUGGESTED CMS G CODE MODIFIER MOBILITY: CL
TURNING FROM BACK TO SIDE WHILE IN FLAT BAD: A LITTLE
WALKING IN HOSPITAL ROOM: A LOT

## 2024-02-13 ASSESSMENT — PATIENT HEALTH QUESTIONNAIRE - PHQ9
SUM OF ALL RESPONSES TO PHQ9 QUESTIONS 1 AND 2: 0
2. FEELING DOWN, DEPRESSED, IRRITABLE, OR HOPELESS: NOT AT ALL
1. LITTLE INTEREST OR PLEASURE IN DOING THINGS: NOT AT ALL

## 2024-02-13 ASSESSMENT — PAIN DESCRIPTION - PAIN TYPE: TYPE: ACUTE PAIN

## 2024-02-13 NOTE — THERAPY
Physical Therapy   Daily Treatment     Patient Name: Cole Jaramillo  Age:  39 y.o., Sex:  male  Medical Record #: [id number]  Today's Date: 2/12/2024     Precautions  Precautions: Fall Risk    Assessment  Pt seen for PT tx session with mobility detailed down below. Pt able to progress to Mod A for bed mobility, needing upper body support to come to EOB. Pt completed multiple bouts of standing and short distance ambulation in the room with a valente FWW. Pt able to initiate walking forward and backward, however he was limited by fatigue and needed frequent breaks. Will follow.     Plan    Treatment Plan Status: Modify Current Treatment Plan  Type of Treatment: Bed Mobility, Equipment, Gait Training, Neuro Re-Education / Balance, Self Care / Home Evaluation, Therapeutic Activities, Therapeutic Exercise  Treatment Frequency: 5 Times per Week  Treatment Duration: Until Therapy Goals Met    DC Equipment Recommendations: Wheelchair (34 inch bariatric wheelchair)  Discharge Recommendations: Other - (defer to pt for timing of comfort of dc; he will move more at home from his home bed and can complete home health PT)        Vitals   Pulse Oximetry 91 %   O2 (LPM) 1.5   O2 Delivery Device Silicone Nasal Cannula   Vitals Comments 1L initially, increased to 2L with OOB activity   Pain 0 - 10 Group   Therapist Pain Assessment   (no c/o pain)   Cognition    Cognition / Consciousness WDL   Balance   Sitting Balance (Static) Fair   Sitting Balance (Dynamic) Fair   Standing Balance (Static) Fair -   Standing Balance (Dynamic) Fair -   Weight Shift Sitting Fair   Weight Shift Standing Fair   Skilled Intervention Verbal Cuing   Comments valente FWW in standing, pt laying backwards or propped on BUE at EOB   Bed Mobility    Supine to Sit Moderate Assist   Sit to Supine Moderate Assist   Scooting Moderate Assist   Skilled Intervention Verbal Cuing;Tactile Cuing;Sequencing   Comments pt able to use trapeze and mobilize his legs to EOB, needs  assist for bringing his upper body vertical. increased assistance needed wtih repositioning back in bed due to air mattress   Gait Analysis   Gait Level Of Assist Contact Guard Assist   Assistive Device Front Wheel Walker   Distance (Feet) 6   # of Times Distance was Traveled 8  (4 bouts side to side, 4 bouts front to back)   Deviation Increased Base Of Support   Weight Bearing Status no restrictions   Skilled Intervention Verbal Cuing   Comments pt able to initiate stepping forward, decreased endurance with standing. pt able to gauge appropriately when he needs a break   Functional Mobility   Sit to Stand Contact Guard Assist   Mobility STS x5 total from EOB. pt needed bed elevated for stands and lowered when resting   Skilled Intervention Verbal Cuing;Tactile Cuing   How much difficulty does the patient currently have...   Turning over in bed (including adjusting bedclothes, sheets and blankets)? 1   Sitting down on and standing up from a chair with arms (e.g., wheelchair, bedside commode, etc.) 2   Moving from lying on back to sitting on the side of the bed? 1   How much help from another person does the patient currently need...   Moving to and from a bed to a chair (including a wheelchair)? 3   Need to walk in a hospital room? 3   Climbing 3-5 steps with a railing? 1   6 clicks Mobility Score 11   Activity Tolerance   Sitting Edge of Bed 35 min   Standing 8 min   Comments limited by fatigue   Short Term Goals    Short Term Goal # 1 pt will perform supine <> sit without bed features and SPV in 6 vistis   Goal Outcome # 1 Progressing slower than expected   Short Term Goal # 2 pt will perform sit <> stand and functional transfers with valente FWW and SPV to [facility] for home in 6 visits   Goal Outcome # 2 Progressing as expected   Short Term Goal # 3 pt will ambulate 25 ft with Valente FWW and SPV to access home environment in 6 visits   Goal Outcome # 3 Progressing slower than expected   Physical  Therapy Treatment Plan   Physical Therapy Treatment Plan Modify Current Treatment Plan   Treatment Frequency 5 Times per Week   Anticipated Discharge Equipment and Recommendations   DC Equipment Recommendations Wheelchair  (34 inch bariatric wheelchair)   Discharge Recommendations Other -  (defer to pt for timing of comfort of dc; he will move more at home from his home bed and can complete home health PT)   Interdisciplinary Plan of Care Collaboration   IDT Collaboration with  Nursing;Therapy Tech   Patient Position at End of Therapy In Bed;Call Light within Reach;Tray Table within Reach;Phone within Reach   Collaboration Comments RN updated   Session Information   Date / Session Number  2/12- 5 (1/5, 2/18)

## 2024-02-13 NOTE — DISCHARGE PLANNING
@ 0900 [facility] called Dot with admissions in [facility] to follow up in regards to PT . Did not get answer , left voice message with call back number .      [phone] Dot at [facility] returned call , said she could not take his insurance due to no rehab benefits included .

## 2024-02-13 NOTE — RESPIRATORY CARE
PULMONARY FUNCTION TEST by COPD CLINICAL EDUCATOR  2/13/2024 at 2:04 PM by Ada Joyce RRT     PFT performed by COPD educator. Bedside PFT will reside in results tab on EMR and sent to inpatient pulmonary to interpret.      PFT Results    Lab Results   Component Value Date    FEV1 (L) 1.41 02/13/2024    FEV1/FVC % 69.87 02/13/2024    FVC % Predicted 40 02/13/2024    FEV1 % Predicted 34 02/13/2024    FVC 2.02 02/13/2024    FEV1/FVC % Predicted 85 02/13/2024

## 2024-02-13 NOTE — CARE PLAN
The patient is Stable - Low risk of patient condition declining or worsening    Shift Goals  Clinical Goals: CHF care, skin integrity, Wean O2  Patient Goals: Recover  Family Goals: EMERSON    Progress made toward(s) clinical / shift goals:    Problem: Fall Risk  Goal: Patient will remain free from falls  Description: Target End Date:  Prior to discharge or change in level of care    Document interventions on the [facility] Fall Risk Assessment    1.  Assess for fall risk factors  2.  Implement fall precautions  Outcome: Progressing     Problem: Mobility  Goal: Patient's capacity to carry out activities will improve  Description: Target End Date:  Prior to discharge or change in level of care    1.  Assess for barriers to mobility/activity  2.  Implement activity per interdisciplinary team recommendations  3.  Target activity level identified and patient/family/caregiver aware of goal  4.  Provide assistive devices  5.  Instruct patient/caregiver on proper use of assistive/adaptive devices  6.  Schedule activities and rest periods to decrease effects of fatigue  7.  Encourage mobilization to extent of ability  8.  Maintain proper body alignment  9.  Provide adequate pain management to allow progressive mobilization  10. Implement pace maker precautions as needed  Outcome: Progressing  Note: Pt is progressing in mobility with PT/OT, though PT refuses to participate in activity during the night shift. Pt tolerates turning side to side well but occassionally refuses for comfort.

## 2024-02-13 NOTE — PROGRESS NOTES
Hospital Medicine Daily Progress Note    Date of Service  2/13/2024    Chief Complaint  Cole Jaramillo is a 39 y.o. male admitted 2/1/2024 with leg swelling and inability to walk.    Hospital Course  Cole Jaramillo is a 39-year-old male with morbid obesity (BMI 87.62) who presented on 2/1/2024 with 2-week history of difficulty with ambulation due to severe lower extremity swelling, associated with exertional dyspnea and orthopnea. Symptoms began after likely viral respiratory infection. He became bedbound and activated EMS after consultation with a mobile physician.      Initial exam notable for hypertension (SBP 160s), hypoxemia (requiring 2L NC), distended abdomen with erythema, and severe diffuse anasarca in setting of morbid obesity. Initial weight was 330 kg. Labs showed increased NT-proBNP (4729 mg/dL), mild troponemia, and mild microcytic anemia (12.1 g/dOL, MCV 77.9). D-dimer was also elevated.    He was admitted for acute on chronic heart failure with preserved ejection fraction, likely due to under-treated MC/OHS.     Lower extremity venous Duplex ultrasound (2/3/2024) was limited by body habitus but was negative for DVTs. TTE (2/3/2024) showed LVEF 55% with flattened septum in diastole, consistent with RV volume overload (likely due to hypoxic vasoconstriction from recent URI). No regional wall motion abnormalities were seen.     He was aggressively diuresed via IV Lasix and was started on Aldactone, leading to 12-kg (or 26.45-lb) weight loss. He also received 5-day course of Clindamycin for suspected abdominal wall cellulitis, leading to improvement in pannicular erythema.     He also underwent aggressive reconditioning and strength training with PT and OT, who recommend discharge home with home health. He will benefit from bariatric surgery evaluation given inability to lose weight (having weighed 800-lbs last year).     Of note, he was also hospitalized (8/27-9/7/2023) last year for acute  decompensated heart failure, at which time he underwent forced diuresis with plan for outpatient sleep study.         interval Problem Update  NAEO.  Patient reports his breathing feels better compared to yesterday.  He is also had a bowel movement in 2 consecutive days.  He feels like he is not at his baseline with respect to his strength after working with PT yesterday.  He does not think he will be able to walk to the bathroom in his home alone.  He wants to continue to work with him.    I have discussed this patient's plan of care and discharge plan at IDT rounds today with Case Management, Nursing, Nursing leadership, and other members of the IDT team.    Consultants/Specialty  none    Code Status  Full Code    Disposition  The patient is not medically cleared for discharge to home or a post-acute facility.      I have placed the appropriate orders for post-discharge needs.    Review of Systems  Review of Systems   Constitutional:  Negative for chills and fever.   Respiratory:  Negative for cough and shortness of breath (improving).    Cardiovascular:  Positive for leg swelling. Negative for chest pain.   Gastrointestinal:  Negative for abdominal pain (mild post-prandial discomfort), blood in stool, constipation, melena and nausea.   Musculoskeletal:  Negative for joint pain.   Neurological:  Positive for weakness. Negative for headaches.      Physical Exam  Temp:  [36.5 °C (97.7 °F)-36.7 °C (98.1 °F)] 36.5 °C (97.7 °F)  Pulse:  [78-82] 81  Resp:  [20-22] 20  BP: (135-145)/(72-81) 143/72  SpO2:  [91 %-92 %] 92 %    Physical Exam  Constitutional:       General: He is not in acute distress.  HENT:      Head: Normocephalic.      Mouth/Throat:      Mouth: Mucous membranes are moist.   Eyes:      General: No scleral icterus.        Right eye: No discharge.         Left eye: No discharge.   Cardiovascular:      Rate and Rhythm: Normal rate and regular rhythm.      Pulses: Normal pulses.      Heart sounds: Normal  heart sounds.   Pulmonary:      Effort: Pulmonary effort is normal.      Breath sounds: Normal breath sounds.   Abdominal:      General: There is distension.      Palpations: There is no mass.      Tenderness: There is no abdominal tenderness. There is no guarding.   Musculoskeletal:      Right lower leg: Edema present.      Left lower leg: Edema present.   Neurological:      Mental Status: He is alert and oriented to person, place, and time.   Psychiatric:         Mood and Affect: Mood normal.          Fluids    Intake/Output Summary (Last 24 hours) at 2/13/2024 0749  Last data filed at 2/13/2024 0600  Gross per 24 hour   Intake 1600 ml   Output 4100 ml   Net -2500 ml         Laboratory  Recent Labs     02/11/24  0735   WBC 6.8   RBC 4.90   HEMOGLOBIN 11.1*   HEMATOCRIT 38.7*   MCV 79.0*   MCH 22.7*   MCHC 28.7*   RDW 59.9*   PLATELETCT 270   MPV 9.2         Recent Labs     02/11/24  0230 02/12/24  0045 02/13/24  0049   SODIUM 136 134* 135   POTASSIUM 3.8 4.1 4.4   CHLORIDE 90* 89* 90*   CO2 36* 38* 37*   GLUCOSE 98 113* 112*   BUN 15 17 17   CREATININE 0.95 0.96 0.95   CALCIUM 9.0 8.8 9.1                         Imaging  [id number] VIEW   Final Result      No evidence of bowel obstruction.   Possible constipation.                  EC-ECHOCARDIOGRAM COMPLETE W/ CONT   Final Result      US-EXTREMITY ARTERY LOWER BILAT   Final Result      US-EXTREMITY VENOUS LOWER BILAT   Final Result      DX-CHEST-PORTABLE (1 VIEW)   Final Result      Cardiomegaly.         Scheduled Medications   Medication Dose Frequency    furosemide  40 mg BID DIURETIC    senna-docusate  2 Tablet DAILY    bisacodyl EC  5 mg DAILY    enoxaparin (LOVENOX) injection  80 mg Q12HRS    spironolactone  100 mg Q DAY    polyethylene glycol/lytes  1 Packet TID    [Held by provider] lisinopril  10 mg DAILY    omeprazole  20 mg BID    aspirin  81 mg DAILY    atorvastatin  40 mg Q EVENING      Assessment/Plan  * Acute respiratory failure with hypoxia (HCC)-  (present on admission)  Assessment & Plan  Likely secondary to right-sided heart failure from pulmonary hypertension due to undiagnosed sleep apnea. Echo on this admission did demonstrate pulmonary hypertension.   -Continue IV Lasix 40mg BID today and switch to PO Torsemide 80mg starting 2/14  -Continue Aldactone 100mg daily   -Cont daily weights, strict I/Os. On low-salt diet with 2L daily fluid restriction.   -RT/OT protocol; cont telemetry, continuous pulse ox. Wean off oxygen as tolerated.   -IS at bedside; deep breathing encouraged.  -Cont low-dose Aspirin, Atorvastatin 40mg daily for primary prevention        Acute right-sided congestive heart failure (HCC)- (present on admission)  Assessment & Plan  Likely secondary to undiagnosed sleep apnea.  -Continue IV diuresis as above (see plan under acute hypoxic respiratory failure)    Obesity hypoventilation syndrome (HCC)- (present on admission)  Assessment & Plan  Likely contributing to patient's fluid retention.  ABG demonstrated pCO2 levels of 55 further substantiating likely obesity hypoventilation syndrome.  -Discussing with pulmonology regarding Bipap/CPAP      Anasarca- (present on admission)  Assessment & Plan  Due to acute on chronic right heart failure.  Suspect due to untreated MC/OHS.  Continue IV diuresis.    Weakness- (present on admission)  Assessment & Plan  Weakness  PT/OT    At risk for obstructive sleep apnea- (present on admission)  Assessment & Plan  Outpt sleep study, patient is agreeable     Volume overload- (present on admission)  Assessment & Plan  Diuresis as able to tolerate    Iron deficiency anemia- (present on admission)  Assessment & Plan  Cause unknown. Denies known blood loss of history of melena.   - Will need GI referral on discharge for diagnostic colonscopy.   - Continue ferrous sulfate supplementation every other day    Hypertension- (present on admission)  Assessment & Plan  -Hold home lisinopril  -Continue Aldactone 100mg      Class 3 severe obesity due to excess calories with serious comorbidity and body mass index (BMI) greater than or equal to 70 in adult (HCC)- (present on admission)  Assessment & Plan  Body mass index is 93.93 kg/m².  Diet and lifestyle modification  -Bariatric surgery outpatient eval     Abdominal wall cellulitis- (present on admission)  Assessment & Plan  RESOLVED.   - s/p Clindamycin x5 days         VTE prophylaxis: Enoxaparin    I have performed a physical exam and reviewed and updated ROS and Plan today (2/13/2024). In review of yesterday's note (2/12/2024), there are no changes except as documented above.    I spent 36 minutes for chart review, meeting and examining the patient, documenting, ordering medications and tests, interpreting the results independently, and communicating with the other health professionals.

## 2024-02-13 NOTE — CONSULTS
Pulmonary Consultation Note    Patient ID:   Name:             Cole Jaramillo   YOB: 1985  Age:                 39 y.o.  male   MRN:               [id number]  Referring Provider: Dr. Morrissey                                                  History of Present Illness: Mr. Jaramillo is a 38 yo male obesity BMI 96 as admitted to the hospital for acute hypoxic respiratory failure secondary to HFpEF and obesity hypoventilation syndrome.  Patient made with new oxygen requirement 2 L nasal cannula.  Had been having increasing lower extremity swelling, exertional dyspnea and orthopnea over the past 2 weeks.  He was recently hospitalized for 10 days at the beginning of September for acute decompensated heart failure treated with forced diuresis and scheduled outpatient sleep study although he did not follow-up with this.    ABG performed 2/12 with pH 7.46 and elevated pCO2 56.5.  Pulmonary medicine consulted for assistance with management of obesity hypoventilation syndrome and acute on chronic hypoxic and hypercapnic respiratory failure.    Review of Systems: Review of Systems   Constitutional: Negative.    HENT: Negative.     Eyes: Negative.    Respiratory:  Positive for shortness of breath.    Cardiovascular:  Positive for leg swelling.   Gastrointestinal: Negative.    Genitourinary: Negative.    Musculoskeletal: Negative.    Skin: Negative.    Neurological: Negative.        Past Medical History:   Past Medical History:   Diagnosis Date    Morbid obesity (HCC)        Past Surgical History: No past surgical history on file.    Family History: family history is not on file.    Social History:  reports that he has never smoked. He has never used smokeless tobacco. He reports current alcohol use. He reports that he does not use drugs.    Objective:   Vitals/ General Appearance:   Weight/BMI: Body mass index is 96.24 kg/m².  /74   Pulse 79   Temp 36.8 °C (98.2 °F) (Temporal)   Resp 20   Ht 1.803 m (5'  "11\")   Wt (!) 313 kg (690 lb 0.6 oz)   SpO2 93%   Vitals:    02/13/24 0430 02/13/24 0513 02/13/24 0720 02/13/24 0854   BP: (!) 143/72   132/74   Pulse: 81  70 79   Resp: 20  18 20   Temp: 36.5 °C (97.7 °F)   36.8 °C (98.2 °F)   TempSrc: Temporal   Temporal   SpO2: 92%  92% 93%   Weight:  (!) 313 kg (690 lb 0.6 oz)     Height:         Oxygen Therapy:  Pulse Oximetry: 93 %, O2 (LPM): 1, O2 Delivery Device: Silicone Nasal Cannula    Physical Exam  Constitutional:       General: He is not in acute distress.     Appearance: He is obese.   Eyes:      General: No scleral icterus.     Conjunctiva/sclera: Conjunctivae normal.   Cardiovascular:      Rate and Rhythm: Normal rate and regular rhythm.      Pulses: Normal pulses.      Heart sounds: No murmur heard.     No gallop.   Pulmonary:      Effort: Pulmonary effort is normal. No respiratory distress.      Breath sounds: Normal breath sounds. No wheezing.   Abdominal:      General: There is no distension.      Tenderness: There is no abdominal tenderness.   Musculoskeletal:         General: Swelling present.      Comments: Bilateral lymphedema   Neurological:      General: No focal deficit present.      Mental Status: He is alert. Mental status is at baseline.      Cranial Nerves: No cranial nerve deficit.   Psychiatric:         Behavior: Behavior normal.         Labs:  Recent Labs     02/11/24  0735   WBC 6.8   RBC 4.90   HEMOGLOBIN 11.1*   HEMATOCRIT 38.7*   MCV 79.0*   MCH 22.7*   MCHC 28.7*   RDW 59.9*   PLATELETCT 270   MPV 9.2                 Recent Labs     02/11/24  0230 02/12/24  0045 02/13/24  0049   SODIUM 136 134* 135   POTASSIUM 3.8 4.1 4.4   CHLORIDE 90* 89* 90*   CO2 36* 38* 37*   GLUCOSE 98 113* 112*   BUN 15 17 17     Recent Labs     02/11/24  0230 02/12/24  0045 02/13/24  0049   SODIUM 136 134* 135   POTASSIUM 3.8 4.1 4.4   CHLORIDE 90* 89* 90*   CO2 36* 38* 37*   BUN 15 17 17   CREATININE 0.95 0.96 0.95   MAGNESIUM 1.8 2.0  --    PHOSPHORUS 3.5 4.2  --  "   CALCIUM 9.0 8.8 9.1     No results found for this or any previous visit.      Imaging:   [id number] VIEW   Final Result      No evidence of bowel obstruction.   Possible constipation.                  EC-ECHOCARDIOGRAM COMPLETE W/ CONT   Final Result      US-EXTREMITY ARTERY LOWER BILAT   Final Result      US-EXTREMITY VENOUS LOWER BILAT   Final Result      DX-CHEST-PORTABLE (1 VIEW)   Final Result      Cardiomegaly.          Hospital Medications:    Current Facility-Administered Medications:     [START ON 2/14/2024] torsemide (Demadex) tablet 80 mg, 80 mg, Oral, Q DAY, Gomez Kaufman M.D.    furosemide (Lasix) injection 40 mg, 40 mg, Intravenous, BID DIURETIC, Gomez Kaufman M.D., 40 mg at 02/13/24 0502    senna-docusate (Pericolace Or Senokot S) 8.6-50 MG per tablet 2 Tablet, 2 Tablet, Oral, DAILY, Frantz Barnard D.O., 2 Tablet at 02/13/24 0501    bisacodyl EC (Dulcolax) tablet 5 mg, 5 mg, Oral, DAILY, Frantz Barnard D.O., 5 mg at 02/13/24 0502    bisacodyl (Dulcolax) suppository 10 mg, 10 mg, Rectal, QDAY PRN, KELLIE Lizarraga.O.    enoxaparin (Lovenox) inj 80 mg, 80 mg, Subcutaneous, Q12HRS, Frantz Barnard D.O., 80 mg at 02/13/24 0502    spironolactone (Aldactone) tablet 100 mg, 100 mg, Oral, Q DAY, Maged Paul M.D., 100 mg at 02/13/24 0501    polyethylene glycol/lytes (Miralax) Packet 1 Packet, 1 Packet, Oral, TID, Maged Paul M.D., 1 Packet at 02/13/24 0855    acetaminophen (Tylenol) tablet 650 mg, 650 mg, Oral, Q6HRS PRN, Morales Conner M.D., 650 mg at 02/12/24 0455    ondansetron (Zofran) syringe/vial injection 4 mg, 4 mg, Intravenous, Q4HRS PRN, Morales Conner M.D.    ondansetron (Zofran ODT) dispertab 4 mg, 4 mg, Oral, Q4HRS PRN, Morales Conner M.D.    promethazine (Phenergan) tablet 12.5-25 mg, 12.5-25 mg, Oral, Q4HRS PRN, Morales Conner M.D.    promethazine (Phenergan) suppository 12.5-25 mg, 12.5-25 mg, Rectal, Q4HRS PRN, Morales Conner M.D.    prochlorperazine (Compazine) injection 5-10 mg, 5-10 mg,  Intravenous, Q4HRS PRN, Morales Conner M.D.    guaiFENesin dextromethorphan (Robitussin DM) 100-10 MG/5ML syrup 10 mL, 10 mL, Oral, Q6HRS PRN, Morales Conner M.D.    Respiratory Therapy Consult, , Nebulization, Continuous RT, Morales Conner M.D.    ipratropium-albuterol (DUONEB) nebulizer solution, 3 mL, Nebulization, Q2HRS PRN (RT), Morales Conner M.D.    [Held by provider] lisinopril (Prinivil) tablet 10 mg, 10 mg, Oral, DAILY, Morales Conner M.D.    omeprazole (PriLOSEC) capsule 20 mg, 20 mg, Oral, BID, Morales Conner M.D., 20 mg at 02/13/24 0502    aspirin EC tablet 81 mg, 81 mg, Oral, DAILY, Morales Conner M.D., 81 mg at 02/13/24 0502    atorvastatin (Lipitor) tablet 40 mg, 40 mg, Oral, Q EVENING, Morales Conner M.D., 40 mg at 02/12/24 1733    Current Outpatient Medications:  Medications Prior to Admission   Medication Sig Dispense Refill Last Dose    furosemide (LASIX) 40 MG Tab Take 40 mg by mouth 2 times a day.   2 weeks ago at unk    lisinopril (PRINIVIL) 10 MG Tab Take 10 mg by mouth every day.   couple days ago at ran out    clindamycin (CLEOCIN) 300 MG Cap Take 300 mg by mouth 3 times a day. 14 day course   2/1/2024 at am    predniSONE (DELTASONE) 10 MG Tab Take 10 mg by mouth every day. 5 days course   1/28/2024 at finished    Ibuprofen 200 MG Cap Take 400 mg by mouth every four hours as needed (mild pain).   2/1/2024 at am       Medication Allergy:  Allergies   Allergen Reactions    Other Misc Unspecified     Unknown antibiotic for a staph infection, blocked arteries, rash.        Assessment and Plan:  #Acute on chronic hypoxic and hypercapnic respiratory failure  #Obesity hypoventilation syndrome  Elevated pCO2 56.5 on ABG 2/1 2 with metabolic compensation bicarb 37.  Likely component of concomitant contraction alkalosis with twice daily diuresis.  Given body habitus, patient to benefit from noninvasive positive pressure ventilation.  Patient trialed CPAP although did not tolerate mask.  - Discussed case with  primary medicine team  - Recommend wound consult for chronic lymphedema to attempt to improve circulation  - Continue diuresis per primary  - Recommend continue to trend bicarbonate on daily chemistry panel  - Discussed with respiratory therapy, will pursue bedside PFT today  - Discussed with Zeinab from [facility], will be bring non-invasive PPV Trilogy with AVAPs to patient.  Sent paperwork requesting and discussed on phone.  - Messaged  to send this hospital H&P, progress note, recent labs including ABG to Zeinab at fax [phone] for equipment    Thank you the consult, pulmonary medicine will continue to follow.
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
## 2024-02-13 NOTE — DISCHARGE PLANNING
Case Management Discharge Planning    Admission Date: 2/1/2024  GMLOS: 3.9  ALOS: 12    6-Clicks ADL Score: 11  6-Clicks Mobility Score: 12  PT and/or OT Eval ordered: Yes  Post-acute Referrals Ordered: Yes  Post-acute Choice Obtained: No  Has referral(s) been sent to post-acute provider:  Yes      Anticipated Discharge Dispo: Discharge Disposition: D/T to SNF with Medicare cert in anticipation of skilled care (03)    DME Needed: Yes    DME Ordered: Yes    Action(s) Taken: LSW met with pt at bedside to discuss DC disposition. Pt currently under review with [facility] for consideration. Informed pt that if unaccepted at SNF, the alternative is to return home with HHC and DME. Pt reported he has not used HHC in the past and only has a walker at home. Pt provided HHC and DME choice which was faxed to [facility]. Pt reported he currently pays privately for a company that comes for an hour/day that assists with tasks such as laundry.    Addendum @7613  H&P, progress note, and ABG lab result faxed to Zeinab Hoskins with [email or web address] at fax #[phone] per pulm request.    Escalations Completed: None    Medically Clear: Yes    Next Steps: Care coordination will f/u with SNF vs HHC and DME referrals    Barriers to Discharge: Pending Placement    Is the patient up for discharge tomorrow: No

## 2024-02-13 NOTE — CARE PLAN
The patient is Stable - Low risk of patient condition declining or worsening    Shift Goals  Clinical Goals: PT, CHF education  Patient Goals: Improve  Family Goals: EMERSON    Progress made toward(s) clinical / shift goals:      Problem: Care Map:  Day 3 Optimal Outcome for the Heart Failure Patient  Goal: Day 3:  Optimal Care of the heart failure patient  Description: Target End Date:  end of day 3  Outcome: Progressing  Note: Reviewed heart failure education and importance of diet and daily weights.          Patient is not progressing towards the following goals:      Problem: Skin Integrity  Goal: Skin integrity is maintained or improved  Description: Target End Date:  Prior to discharge or change in level of care    Document interventions on Skin Risk/Luis flowsheet groups and corresponding LDA    1.  Assess and monitor skin integrity, appearance and/or temperature  2.  Assess risk factors for impaired skin integrity and/or pressures ulcers  3.  Implement precautions to protect skin integrity in collaboration with interdisciplinary team  4.  Implement pressure ulcer prevention protocol if at risk for skin breakdown  5.  Confirm wound care consult if at risk for skin breakdown  6.  Ensure patient use of pressure relieving devices  (Low air loss bed, waffle overlay, heel protectors, ROHO cushion, etc)  Outcome: Not Progressing  Note: Patient changed when dry however declining Q2 turns.

## 2024-02-14 LAB
ANION GAP SERPL CALC-SCNC: 7 MMOL/L (ref 7–16)
BUN SERPL-MCNC: 17 MG/DL (ref 8–22)
CALCIUM SERPL-MCNC: 8.9 MG/DL (ref 8.5–10.5)
CHLORIDE SERPL-SCNC: 91 MMOL/L (ref 96–112)
CO2 SERPL-SCNC: 37 MMOL/L (ref 20–33)
CREAT SERPL-MCNC: 0.98 MG/DL (ref 0.5–1.4)
GFR SERPLBLD CREATININE-BSD FMLA CKD-EPI: 100 ML/MIN/1.73 M 2
GLUCOSE SERPL-MCNC: 104 MG/DL (ref 65–99)
LMWH PPP CHRO-ACNC: 0.2 U/ML
POTASSIUM SERPL-SCNC: 4.3 MMOL/L (ref 3.6–5.5)
SODIUM SERPL-SCNC: 135 MMOL/L (ref 135–145)

## 2024-02-14 PROCEDURE — 700102 HCHG RX REV CODE 250 W/ 637 OVERRIDE(OP)

## 2024-02-14 PROCEDURE — 99232 SBSQ HOSP IP/OBS MODERATE 35: CPT | Mod: GC | Performed by: INTERNAL MEDICINE

## 2024-02-14 PROCEDURE — 85520 HEPARIN ASSAY: CPT

## 2024-02-14 PROCEDURE — 700111 HCHG RX REV CODE 636 W/ 250 OVERRIDE (IP)

## 2024-02-14 PROCEDURE — A9270 NON-COVERED ITEM OR SERVICE: HCPCS

## 2024-02-14 PROCEDURE — 97530 THERAPEUTIC ACTIVITIES: CPT

## 2024-02-14 PROCEDURE — 80048 BASIC METABOLIC PNL TOTAL CA: CPT

## 2024-02-14 PROCEDURE — A9270 NON-COVERED ITEM OR SERVICE: HCPCS | Performed by: STUDENT IN AN ORGANIZED HEALTH CARE EDUCATION/TRAINING PROGRAM

## 2024-02-14 PROCEDURE — 36415 COLL VENOUS BLD VENIPUNCTURE: CPT

## 2024-02-14 PROCEDURE — 770001 HCHG ROOM/CARE - MED/SURG/GYN PRIV*

## 2024-02-14 PROCEDURE — 94660 CPAP INITIATION&MGMT: CPT

## 2024-02-14 PROCEDURE — 700102 HCHG RX REV CODE 250 W/ 637 OVERRIDE(OP): Performed by: STUDENT IN AN ORGANIZED HEALTH CARE EDUCATION/TRAINING PROGRAM

## 2024-02-14 PROCEDURE — 97535 SELF CARE MNGMENT TRAINING: CPT

## 2024-02-14 RX ORDER — TORSEMIDE 20 MG/1
40 TABLET ORAL 2 TIMES DAILY
Status: DISCONTINUED | OUTPATIENT
Start: 2024-02-14 | End: 2024-02-14

## 2024-02-14 RX ORDER — TORSEMIDE 20 MG/1
40 TABLET ORAL 2 TIMES DAILY
Status: DISCONTINUED | OUTPATIENT
Start: 2024-02-15 | End: 2024-02-16 | Stop reason: HOSPADM

## 2024-02-14 RX ADMIN — ATORVASTATIN CALCIUM 40 MG: 40 TABLET, FILM COATED ORAL at 16:21

## 2024-02-14 RX ADMIN — ENOXAPARIN SODIUM 80 MG: 100 INJECTION SUBCUTANEOUS at 16:21

## 2024-02-14 RX ADMIN — BISACODYL 5 MG: 5 TABLET, COATED ORAL at 05:39

## 2024-02-14 RX ADMIN — TORSEMIDE 80 MG: 20 TABLET ORAL at 05:38

## 2024-02-14 RX ADMIN — POLYETHYLENE GLYCOL 3350 1 PACKET: 17 POWDER, FOR SOLUTION ORAL at 21:42

## 2024-02-14 RX ADMIN — POLYETHYLENE GLYCOL 3350 1 PACKET: 17 POWDER, FOR SOLUTION ORAL at 10:09

## 2024-02-14 RX ADMIN — DOCUSATE SODIUM 50 MG AND SENNOSIDES 8.6 MG 2 TABLET: 8.6; 5 TABLET, FILM COATED ORAL at 05:39

## 2024-02-14 RX ADMIN — SPIRONOLACTONE 100 MG: 25 TABLET ORAL at 05:39

## 2024-02-14 RX ADMIN — OMEPRAZOLE 20 MG: 20 CAPSULE, DELAYED RELEASE ORAL at 16:21

## 2024-02-14 RX ADMIN — ASPIRIN 81 MG: 81 TABLET, COATED ORAL at 05:39

## 2024-02-14 RX ADMIN — ENOXAPARIN SODIUM 80 MG: 100 INJECTION SUBCUTANEOUS at 05:38

## 2024-02-14 RX ADMIN — OMEPRAZOLE 20 MG: 20 CAPSULE, DELAYED RELEASE ORAL at 05:39

## 2024-02-14 ASSESSMENT — ENCOUNTER SYMPTOMS
CONSTIPATION: 0
EYES NEGATIVE: 1
FEVER: 0
COUGH: 0
NEUROLOGICAL NEGATIVE: 1
NAUSEA: 0
ABDOMINAL PAIN: 0
GASTROINTESTINAL NEGATIVE: 1
CARDIOVASCULAR NEGATIVE: 1
MUSCULOSKELETAL NEGATIVE: 1
BLOOD IN STOOL: 0
SHORTNESS OF BREATH: 0
CHILLS: 0
SHORTNESS OF BREATH: 1
HEADACHES: 0
CONSTITUTIONAL NEGATIVE: 1
PSYCHIATRIC NEGATIVE: 1
WEAKNESS: 1

## 2024-02-14 ASSESSMENT — COGNITIVE AND FUNCTIONAL STATUS - GENERAL
DRESSING REGULAR UPPER BODY CLOTHING: A LITTLE
WALKING IN HOSPITAL ROOM: A LITTLE
MOVING FROM LYING ON BACK TO SITTING ON SIDE OF FLAT BED: A LOT
TURNING FROM BACK TO SIDE WHILE IN FLAT BAD: UNABLE
CLIMB 3 TO 5 STEPS WITH RAILING: TOTAL
SUGGESTED CMS G CODE MODIFIER DAILY ACTIVITY: CK
PERSONAL GROOMING: A LITTLE
HELP NEEDED FOR BATHING: A LOT
MOVING TO AND FROM BED TO CHAIR: UNABLE
DAILY ACTIVITIY SCORE: 16
TOILETING: A LOT
STANDING UP FROM CHAIR USING ARMS: A LITTLE
MOBILITY SCORE: 11
SUGGESTED CMS G CODE MODIFIER MOBILITY: CL
DRESSING REGULAR LOWER BODY CLOTHING: A LOT

## 2024-02-14 ASSESSMENT — FIBROSIS 4 INDEX: FIB4 SCORE: 0.42

## 2024-02-14 ASSESSMENT — GAIT ASSESSMENTS
GAIT LEVEL OF ASSIST: CONTACT GUARD ASSIST
ASSISTIVE DEVICE: FRONT WHEEL WALKER
DEVIATION: BRADYKINETIC;INCREASED BASE OF SUPPORT
DISTANCE (FEET): 15

## 2024-02-14 ASSESSMENT — PATIENT HEALTH QUESTIONNAIRE - PHQ9
SUM OF ALL RESPONSES TO PHQ9 QUESTIONS 1 AND 2: 0
1. LITTLE INTEREST OR PLEASURE IN DOING THINGS: NOT AT ALL
2. FEELING DOWN, DEPRESSED, IRRITABLE, OR HOPELESS: NOT AT ALL

## 2024-02-14 NOTE — PROGRESS NOTES
Critical Care/Pulmonary Consultation    Date of Service: 2/14/2024    Date of Admission:  2/1/2024 11:55 AM    Consulting Physician: Ron Morrissey M.D.    Chief Complaint:  Leg Swelling (Pt c/o increased leg swelling up to thighs x 1 month, making him unable to weight bear due to extent of swelling. Pt also c/o some mild shortness of breath when moving, sats 88% on room air in ED.)      Patient ID:   Mr. Jaramillo is a 40 yo male obesity BMI 96 as admitted to the hospital for acute hypoxic respiratory failure secondary to HFpEF and obesity hypoventilation syndrome.  Patient made with new oxygen requirement 2 L nasal cannula.  Had been having increasing lower extremity swelling, exertional dyspnea and orthopnea over the past 2 weeks.  He was recently hospitalized for 10 days at the beginning of September for acute decompensated heart failure treated with forced diuresis and scheduled outpatient sleep study although he did not follow-up with this.     ABG performed 2/12 with pH 7.46 and elevated pCO2 56.5.  Pulmonary medicine consulted for assistance with management of obesity hypoventilation syndrome and acute on chronic hypoxic and hypercapnic respiratory failure.    Interval Update:  2/13: Discussed with [facility] supply evaluated patient at bedside regarding noninvasive ventilation. Trial of different masks. PFTs completed at bedside.  2/14: Patient overall doing well, pending NIV equipment.      Review of Systems   Constitutional: Negative.    HENT: Negative.     Eyes: Negative.    Respiratory:  Positive for shortness of breath.    Cardiovascular: Negative.    Gastrointestinal: Negative.    Genitourinary: Negative.    Musculoskeletal: Negative.    Skin: Negative.    Neurological: Negative.    Endo/Heme/Allergies: Negative.    Psychiatric/Behavioral: Negative.         Home Medications       Reviewed by Murali Santa (Pharmacy Tech) on 02/01/24 at 1514  Med List Status: Complete     Medication Last Dose Status    clindamycin (CLEOCIN) 300 MG Cap 2/1/2024 Active   furosemide (LASIX) 40 MG Tab 2 weeks ago Active   Ibuprofen 200 MG Cap 2/1/2024 Active   lisinopril (PRINIVIL) 10 MG Tab couple days ago Active   predniSONE (DELTASONE) 10 MG Tab 1/28/2024 Active                    Social History     Tobacco Use    Smoking status: Never    Smokeless tobacco: Never   Substance Use Topics    Alcohol use: Yes     Comment: rarely    Drug use: Never        Past Medical History:   Diagnosis Date    Morbid obesity (HCC)        No past surgical history on file.    Allergies: Other misc    No family history on file.    Temp:  [36.5 °C (97.7 °F)-37.1 °C (98.8 °F)] 36.5 °C (97.7 °F)  Pulse:  [78-86] 79  Resp:  [16-20] 20  BP: (139-166)/(76-86) 139/76  SpO2:  [90 %-92 %] 92 %    Physical Examination  Physical Exam  Constitutional:       General: He is not in acute distress.     Appearance: He is obese.   Eyes:      General: No scleral icterus.  Cardiovascular:      Rate and Rhythm: Normal rate and regular rhythm.      Pulses: Normal pulses.      Heart sounds: No murmur heard.  Pulmonary:      Effort: Pulmonary effort is normal. No respiratory distress.   Abdominal:      General: There is no distension.      Tenderness: There is no abdominal tenderness.   Musculoskeletal:      Right lower leg: Edema present.      Left lower leg: Edema present.   Neurological:      General: No focal deficit present.      Mental Status: He is alert and oriented to person, place, and time. Mental status is at baseline.      Cranial Nerves: No cranial nerve deficit.   Psychiatric:         Behavior: Behavior normal.           Intake/Output Summary (Last 24 hours) at 2/14/2024 1244  Last data filed at 2/14/2024 1100  Gross per 24 hour   Intake 1360 ml   Output 4100 ml   Net -2740 ml           Recent Labs     02/12/24  0045 02/13/24  0049 02/14/24  0041   SODIUM 134* 135 135   POTASSIUM 4.1 4.4 4.3   CHLORIDE 89* 90* 91*   CO2 38* 37* 37*   BUN 17 17 17   CREATININE  0.96 0.95 0.98   MAGNESIUM 2.0  --   --    PHOSPHORUS 4.2  --   --    CALCIUM 8.8 9.1 8.9     Recent Labs     02/12/24  0045 02/13/24  0049 02/14/24  0041   GLUCOSE 113* 112* 104*     Recent Labs     02/12/24  1048 02/12/24  1103   [id number]  --  7.46   [id number]  --  56.5*   [id number]  --  53.9*   [id number]  --  86.8*   ARTHCO3  --  39*   [id number] 1l  1.0* 1l  1.0*   ARTBE  --  13*     [id number] VIEW   Final Result      No evidence of bowel obstruction.   Possible constipation.                  EC-ECHOCARDIOGRAM COMPLETE W/ CONT   Final Result      US-EXTREMITY ARTERY LOWER BILAT   Final Result      US-EXTREMITY VENOUS LOWER BILAT   Final Result      DX-CHEST-PORTABLE (1 VIEW)   Final Result      Cardiomegaly.          Patient Active Problem List   Diagnosis    Abdominal wall cellulitis    Acute respiratory failure with hypoxia ([facility])    Class 3 severe obesity due to excess calories with serious comorbidity and body mass index (BMI) greater than or equal to 70 in adult ([facility])    Hypertension    Shortness of breath    Iron deficiency anemia    Volume overload    Acute on chronic heart failure with preserved ejection fraction (HFpEF) ([facility])    Bilateral leg edema    At risk for obstructive sleep apnea    Elevated troponin    Weakness    Obesity hypoventilation syndrome (HCC)    Acute right-sided congestive heart failure ([facility])    Anasarca    Cardiorenal syndrome       Assessment and Plan:  #Acute on chronic hypoxic and hypercapnic respiratory failure  #Obesity hypoventilation syndrome  Elevated pCO2 56.5 on ABG 2/1 2 with metabolic compensation bicarb 37.  Likely component of concomitant contraction alkalosis with twice daily diuresis.  Patient requires non invasive ventilation due to CO2 retention secondary to obesity hypoventilation syndrome. Patient requires AVAPS-AE mode, Bipap not appropriate due to severity of restrictive lung disease from obesity. PFTs with FEV1 34% predicted and FVC 40% predicted,  significant for restrictive lung deficit. FEV1/FVC ~70%.   - Pending equipment delivery for non invasive ventilation  - Discussed case with primary medicine team  - Recommend wound consult for chronic lymphedema to attempt to improve circulation  - Continue diuresis per primary  - Recommend continue to trend bicarbonate on daily chemistry panel  - Bedside PFT identifying restrictive deficit

## 2024-02-14 NOTE — DISCHARGE PLANNING
Case Management Discharge Planning    Admission Date: 2/1/2024  GMLOS: 3.9  ALOS: 13    6-Clicks ADL Score: 11  6-Clicks Mobility Score: 12  PT and/or OT Eval ordered: Yes  Post-acute Referrals Ordered: Yes  Post-acute Choice Obtained: Yes  Has referral(s) been sent to post-acute provider:  Yes      Anticipated Discharge Dispo: Discharge Disposition: D/T to home under HHA care in anticipation of covered skilled care (06)    DME Needed: Yes    DME Ordered: Yes    Action(s) Taken: LSW met with pt at bedside to provide update on anticipated DME delivery tomorrow and anticipated DC home with DME and [facility]. Pt reported he prefers the DME be delivered to the hospital so he ensures he has everything before leaving. Pt expressed concern about DC tomorrow as he does not feel he is able to manage on his own at home at this time without additional PT/OT first.    Updated progress note faxed to Zeinab with [facility] per her request and left  ([phone]).    Escalations Completed: None    Medically Clear: No    Next Steps: Care coordination will f/u with DME delivery    Barriers to Discharge: Medical clearance and DME    Is the patient up for discharge tomorrow: Yes    Is transport arranged for discharge disposition: No

## 2024-02-14 NOTE — PROGRESS NOTES
Hospital Medicine Daily Progress Note    Date of Service  2/14/2024    Chief Complaint  Cole Jaramillo is a 39 y.o. male admitted 2/1/2024 with leg swelling and inability to walk.    Hospital Course  Cole Jaramillo is a 39-year-old male with morbid obesity (BMI 87.62) who presented on 2/1/2024 with 2-week history of difficulty with ambulation due to severe lower extremity swelling, associated with exertional dyspnea and orthopnea. Symptoms began after likely viral respiratory infection. He became bedbound and activated EMS after consultation with a mobile physician.      Initial exam notable for hypertension (SBP 160s), hypoxemia (requiring 2L NC), distended abdomen with erythema, and severe diffuse anasarca in setting of morbid obesity. Initial weight was 330 kg. Labs showed increased NT-proBNP (4729 mg/dL), mild troponemia, and mild microcytic anemia (12.1 g/dOL, MCV 77.9). D-dimer was also elevated.    He was admitted for acute on chronic heart failure with preserved ejection fraction, likely due to under-treated MC/OHS.     Lower extremity venous Duplex ultrasound (2/3/2024) was limited by body habitus but was negative for DVTs. TTE (2/3/2024) showed LVEF 55% with flattened septum in diastole, consistent with RV volume overload (likely due to hypoxic vasoconstriction from recent URI). No regional wall motion abnormalities were seen.     He was aggressively diuresed via IV Lasix and was started on Aldactone, leading to 12-kg (or 26.45-lb) weight loss. He also received 5-day course of Clindamycin for suspected abdominal wall cellulitis, leading to improvement in pannicular erythema.     He also underwent aggressive reconditioning and strength training with PT and OT, who recommend discharge home with home health. He will benefit from bariatric surgery evaluation given inability to lose weight (having weighed 800-lbs last year).     Of note, he was also hospitalized (8/27-9/7/2023) last year for acute  decompensated heart failure, at which time he underwent forced diuresis with plan for outpatient sleep study.         interval Problem Update  NAEO. Patient reported he is doing better overall. Has no acute complaints.    -Discussed plan of care with mother at bedside     I have discussed this patient's plan of care and discharge plan at IDT rounds today with Case Management, Nursing, Nursing leadership, and other members of the IDT team.    Consultants/Specialty  none    Code Status  Full Code    Disposition  The patient is not medically cleared for discharge to home or a post-acute facility.      I have placed the appropriate orders for post-discharge needs.    Review of Systems  Review of Systems   Constitutional:  Negative for chills and fever.   Respiratory:  Negative for cough and shortness of breath (improving).    Cardiovascular:  Positive for leg swelling. Negative for chest pain.   Gastrointestinal:  Negative for abdominal pain (mild post-prandial discomfort), blood in stool, constipation, melena and nausea.   Musculoskeletal:  Negative for joint pain.   Neurological:  Positive for weakness. Negative for headaches.      Physical Exam  Temp:  [36.5 °C (97.7 °F)-37.1 °C (98.8 °F)] 36.5 °C (97.7 °F)  Pulse:  [78-86] 79  Resp:  [16-20] 20  BP: (139-166)/(76-86) 139/76  SpO2:  [90 %-92 %] 92 %    Physical Exam  Constitutional:       General: He is not in acute distress.  HENT:      Head: Normocephalic.      Mouth/Throat:      Mouth: Mucous membranes are moist.   Eyes:      General: No scleral icterus.        Right eye: No discharge.         Left eye: No discharge.   Cardiovascular:      Rate and Rhythm: Normal rate and regular rhythm.      Pulses: Normal pulses.      Heart sounds: Normal heart sounds.   Pulmonary:      Effort: Pulmonary effort is normal.      Breath sounds: Normal breath sounds.   Abdominal:      General: There is distension.      Palpations: There is no mass.      Tenderness: There is no  abdominal tenderness. There is no guarding.   Musculoskeletal:      Right lower leg: Edema present.      Left lower leg: Edema present.   Neurological:      Mental Status: He is alert and oriented to person, place, and time.   Psychiatric:         Mood and Affect: Mood normal.          Fluids    Intake/Output Summary (Last 24 hours) at 2/14/2024 1246  Last data filed at 2/14/2024 1100  Gross per 24 hour   Intake 1360 ml   Output 4100 ml   Net -2740 ml       Laboratory        Recent Labs     02/12/24  0045 02/13/24  0049 02/14/24  0041   SODIUM 134* 135 135   POTASSIUM 4.1 4.4 4.3   CHLORIDE 89* 90* 91*   CO2 38* 37* 37*   GLUCOSE 113* 112* 104*   BUN 17 17 17   CREATININE 0.96 0.95 0.98   CALCIUM 8.8 9.1 8.9                       Imaging  [id number] VIEW   Final Result      No evidence of bowel obstruction.   Possible constipation.                  EC-ECHOCARDIOGRAM COMPLETE W/ CONT   Final Result      US-EXTREMITY ARTERY LOWER BILAT   Final Result      US-EXTREMITY VENOUS LOWER BILAT   Final Result      DX-CHEST-PORTABLE (1 VIEW)   Final Result      Cardiomegaly.         Scheduled Medications   Medication Dose Frequency    [START ON 2/15/2024] torsemide  40 mg BID    senna-docusate  2 Tablet DAILY    bisacodyl EC  5 mg DAILY    enoxaparin (LOVENOX) injection  80 mg Q12HRS    spironolactone  100 mg Q DAY    polyethylene glycol/lytes  1 Packet TID    lisinopril  10 mg DAILY    omeprazole  20 mg BID    aspirin  81 mg DAILY    atorvastatin  40 mg Q EVENING      Assessment/Plan  * Acute respiratory failure with hypoxia (HCC)- (present on admission)  Assessment & Plan  Likely secondary to right-sided heart failure from pulmonary hypertension due to undiagnosed sleep apnea. Echo on this admission did demonstrate pulmonary hypertension.   -Continue Po Torsemide 40mg BID   -Continue Aldactone 100mg daily   -Cont daily weights, strict I/Os. On low-salt diet with 2L daily fluid restriction.   -RT/OT protocol; cont  telemetry, continuous pulse ox. Wean off oxygen as tolerated.   -IS at bedside; deep breathing encouraged.  -Cont low-dose Aspirin, Atorvastatin 40mg daily for primary prevention        Acute right-sided congestive heart failure (HCC)- (present on admission)  Assessment & Plan  Likely secondary to undiagnosed sleep apnea/obesity hypoventilation syndrome.  -Continue IV diuresis as above (see plan under acute hypoxic respiratory failure)    Obesity hypoventilation syndrome (HCC)- (present on admission)  Assessment & Plan  Likely contributing to patient's fluid retention.  ABG demonstrated pCO2 levels of 55 further substantiating likely obesity hypoventilation syndrome.  -Discussing with pulmonology regarding Bipap/CPAP, they are following, appreciate assistance       Anasarca- (present on admission)  Assessment & Plan  Due to acute on chronic right heart failure.  Suspect due to untreated MC/OHS.  Continue IV diuresis.    Weakness- (present on admission)  Assessment & Plan  Weakness  PT/OT    At risk for obstructive sleep apnea- (present on admission)  Assessment & Plan  Outpt sleep study, patient is agreeable     Volume overload- (present on admission)  Assessment & Plan  Diuresis as able to tolerate    Iron deficiency anemia- (present on admission)  Assessment & Plan  Cause unknown. Denies known blood loss of history of melena.   - Will need GI referral on discharge for diagnostic colonscopy.   - Continue ferrous sulfate supplementation every other day    Hypertension- (present on admission)  Assessment & Plan  -Continue home lisinopril  -Continue Aldactone 100mg     Class 3 severe obesity due to excess calories with serious comorbidity and body mass index (BMI) greater than or equal to 70 in adult (HCC)- (present on admission)  Assessment & Plan  Body mass index is 93.93 kg/m².  Diet and lifestyle modification  -Bariatric surgery outpatient eval     Abdominal wall cellulitis- (present on admission)  Assessment &  Plan  RESOLVED.   - s/p Clindamycin x5 days         VTE prophylaxis: Enoxaparin    I have performed a physical exam and reviewed and updated ROS and Plan today (2/14/2024). In review of yesterday's note (2/13/2024), there are no changes except as documented above.    I spent 36 minutes for chart review, meeting and examining the patient, documenting, ordering medications and tests, interpreting the results independently, and communicating with the other health professionals.

## 2024-02-14 NOTE — WOUND TEAM
[facility]  Inpatient Services  Wound and Skin Care Follow-up    Admission Date: 2/1/2024     Last order of IP CONSULT TO WOUND CARE was found on 2/13/2024 from Hospital Encounter on 2/1/2024     HPI, PMH, SH: Reviewed    No past surgical history on file.  Social History     Tobacco Use    Smoking status: Never    Smokeless tobacco: Never   Substance Use Topics    Alcohol use: Yes     Comment: rarely     Chief Complaint   Patient presents with    Leg Swelling     Pt c/o increased leg swelling up to thighs x 1 month, making him unable to weight bear due to extent of swelling. Pt also c/o some mild shortness of breath when moving, sats 88% on room air in ED.     Diagnosis: Volume overload [E87.70]    Unit where seen by Wound Team: S140/00     WOUND FOLLOW UP RELATED TO:  BLE       WOUND TEAM PLAN OF CARE - Frequency of Follow-up:   Nursing to follow dressing orders written for wound care. Contact wound team if area fails to progress, deteriorates or with any questions/concerns if something comes up before next scheduled follow up (See below as to whether wound is following and frequency of wound follow up)  Dressing changes by wound team:                   Not following, consult as needed  - BLE are intact    WOUND HISTORY:     Patient's bilateral legs are intact, no pitting edema.  Per patient the goal is for him to ambulate and [facility] placed wound consult to possibly assist with decrease in size.          WOUND ASSESSMENT/LDA  Wound 02/13/24 Other (comment) Leg Lower Bilateral (Active)   Date First Assessed/Time First Assessed: 02/13/24 1650   Present on Original Admission: Yes  Hand Hygiene Completed: Yes  Primary Wound Type: Other (comment)  Location: Leg  Wound Orientation: Lower  Laterality: Bilateral      Assessments 2/13/2024  4:00 PM   Wound Image      Site Assessment Dry;Pink;Red   Periwound Assessment Pink;Dry   Margins Undefined edges;Attached edges   Closure Secondary intention   Drainage  Amount None   Treatments Cleansed;Nonselective debridement;Site care;Offloading   Offloading/DME Heel float boot   Wound Cleansing Soap and Water   Periwound Protectant Skin Moisturizer   Dressing Status Clean;Dry;Intact   Dressing Changed New   Dressing Cleansing/Solutions Not Applicable   Dressing Options Tubigrip   Dressing Change/Treatment Frequency Every 48 hrs, and As Needed   NEXT Dressing Change/Treatment Date 02/15/24   NEXT Weekly Photo (Inpatient Only) 24   Wound Team Following Not following        Vascular:    NOMAN:   NOMAN Results, Last 30 Days US-EXTREMITY ARTERY LOWER BILAT    Result Date: 2024  Narrative Lower Extremity  Arterial Duplex Report  Vascular Laboratory  CONCLUSIONS  1. Limited study due to patient's body habitus and immobility. Many of the  arteries were not visualized.  2. Allowing for the limitations, no hemodynamically significant stenosis  detected.  3. Triphasic flow at the distal posterior tibial and anterior tibial  arteries bilaterally.  [facility] POST  Exam Date:     2024 12:54  Room #:     Inpatient  Priority:     Routine  Ht (in):             Wt (lb):  Ordering Physician:        KONSTANTIN WATKINS  Referring Physician:       [id number] Dobbs KEVIN,  Sonographer:               Delfin Michael                             RVT  Study Type:                Complete Bilateral  Technical Quality:         Adequate  Age:    39    Gender:     M  MRN:    [id number]  :    1985      BSA:  Indications:     Ulcer of lower extremity  CPT Codes:       29715  ICD Codes:       707.1  History:         Non-healing wound.  Limitations:     Body habitus and immobility.                RIGHT  Waveform        Peak Systolic Velocity (cm/s)                  Prox    Prox-Mid  Mid    Mid-Dist  Distal  Triphasic                         54                       CFA                                                             PFA                                                              SFA  Triphasic                         95                       POP  Triphasic                                          52      AT  Triphasic                                          58      PT                                                             FILIPE                LEFT  Waveform        Peak Systolic Velocity (cm/s)                  Prox    Prox-Mid  Mid    Mid-Dist  Distal  Triphasic                         41                       CFA                                                             PFA  Triphasic       31                41                       SFA  Triphasic                         29                       POP  Triphasic                                          58      AT  Triphasic                                          68      PT                                                             FILIPE  FINDINGS  Very limited exam due to limitations listed above.  Right-  The profunda femoral, femoral, peroneal, proximal posterior tibial and  proximal anterior tibial arteries are not visualized due to limitations  listed above.  Triphasic flow is demonstrated at the distal posterior tibial and anterior  tibial arteries.  Left-  The profunda femoral, peroneal, distal femoral, proximal anterior tibial  and proximal peroneal arteries are not visualized due to limitations listed  above.  Triphasic flow is demonstrated at the distal posterior tibial and anterior  tibial arteries.    Leonel Robin MD  (Electronically Signed)  Final Date:      02 February 2024                   15:19      Lab Values:    Lab Results   Component Value Date/Time    WBC 6.8 02/11/2024 07:35 AM    RBC 4.90 02/11/2024 07:35 AM    HEMOGLOBIN 11.1 (L) 02/11/2024 07:35 AM    HEMATOCRIT 38.7 (L) 02/11/2024 07:35 AM    CREACTPROT 3.59 (H) 02/01/2024 08:44 PM    SEDRATEWES 2 02/01/2024 08:44 PM    HBA1C 5.7 (H) 02/01/2024 02:01 PM         Culture Results show:  No results found for this or any previous  visit (from the past 720 hour(s)).    Pain Level/Medicated:  Patient denies pain       INTERVENTIONS BY WOUND TEAM:  Chart and images reviewed. Discussed with bedside RN. All areas of concern (based on picture review, LDA review and discussion with bedside RN) have been thoroughly assessed. Documentation of areas based on significant findings. This RN in to assess patient. Performed standard wound care which includes appropriate positioning, dressing removal and non-selective debridement. Pictures and measurements obtained weekly if/when required.    Wound:  BLE  Cleansed/Non-selectively Debrided with:  Moist warm washcloth  Shelia wound: Cleansed with Moist warm washcloth, Prepped with moisturizing lotion  Primary Dressing:  Tubi  G    Advanced Wound Care Discharge Planning  Number of Clinicians necessary to complete wound care: 1  Is patient requiring IV pain medications for dressing changes:  No   Length of time for dressing change 50 min. (This does not include chart review, pre-medication time, set up, clean up or time spent charting.)    Interdisciplinary consultation: Patient, Bedside RN , N/A.  Pressure injury and staging reviewed with N/A.    EVALUATION / RATIONALE FOR TREATMENT:     Date:  02/13/24  Wound Status:  Initial evaluation    BLE are intact applied Tubi  G to provide some compression.  Both legs are intact with hyperkeratosis.  New orders placed for Q48 hours re-application of lotion and tubi          Goals: Steady decrease in wound area and depth weekly.    NURSING PLAN OF CARE ORDERS:  Dressing changes: See Dressing Care orders  Skin care: See Skin Care orders  RN Prevention Protocol    NUTRITION RECOMMENDATIONS   Wound Team Recommendations:  N/A     DIET ORDERS (From admission to next 24h)       Start     Ordered    02/01/24 1502  Diet Order Diet: 2 Gram Sodium  ALL MEALS        Question:  Diet:  Answer:  2 Gram Sodium    02/01/24 1501                    PREVENTATIVE INTERVENTIONS:    Q shift Luis - performed per nursing policy  Q shift pressure point assessments - performed per nursing policy    Surface/Positioning  Bariatric LOIS - Currently in Place    Respiratory  Silicone O2 tubing - Currently in Place  Gray Foam Ear protectors - Currently in Place    Anticipated discharge plans:  TBD        Vac Discharge Needs:  Vac Discharge plan is purely a recommendation from wound team and not a requirement for discharge unless otherwise stated by physician.  Not Applicable Pt not on a wound vac

## 2024-02-14 NOTE — CARE PLAN
The patient is Stable - Low risk of patient condition declining or worsening    Shift Goals  Clinical Goals: rest, pain control, monitor o2 sats  Patient Goals: rest  Family Goals: EMERSON    Progress made toward(s) clinical / shift goals:    Problem: Skin Integrity  Goal: Skin integrity is maintained or improved  Outcome: Progressing  Note: Patients skin remains unchanged. Patient on q2 turns but does not always feel comfortable on the sides and sometimes wants to lay supine.      Problem: Fall Risk  Goal: Patient will remain free from falls  Outcome: Progressing  Note: Patient remains free from falls.  Call light and personal belongings within reach.  Patient uses call light appropriately.      Problem: Pain - Standard  Goal: Alleviation of pain or a reduction in pain to the patient’s comfort goal  Outcome: Progressing  Note: Patient reports lower back pain that is relieved with prn Tylenol.

## 2024-02-14 NOTE — DISCHARGE PLANNING
@ 1000  popke to Melanie at [facility] regarding PT referral , she will prioritize it and call DPA back at number provided . [phone] faxed manually to [facility] .       @[phone] Spoke to Melanie at [facility] to follow up regarding PT referral faxed and emailed. Melanie stated [facility] can drop off DME equipment tomorrow 02/15/2024 before noon .   Did communicate with DONNA Avalos as well.

## 2024-02-15 LAB
ANION GAP SERPL CALC-SCNC: 11 MMOL/L (ref 7–16)
BUN SERPL-MCNC: 17 MG/DL (ref 8–22)
CALCIUM SERPL-MCNC: 9 MG/DL (ref 8.5–10.5)
CHLORIDE SERPL-SCNC: 91 MMOL/L (ref 96–112)
CO2 SERPL-SCNC: 33 MMOL/L (ref 20–33)
CREAT SERPL-MCNC: 1.03 MG/DL (ref 0.5–1.4)
GFR SERPLBLD CREATININE-BSD FMLA CKD-EPI: 94 ML/MIN/1.73 M 2
GLUCOSE SERPL-MCNC: 98 MG/DL (ref 65–99)
POTASSIUM SERPL-SCNC: 4.4 MMOL/L (ref 3.6–5.5)
SODIUM SERPL-SCNC: 135 MMOL/L (ref 135–145)

## 2024-02-15 PROCEDURE — 36415 COLL VENOUS BLD VENIPUNCTURE: CPT

## 2024-02-15 PROCEDURE — 700102 HCHG RX REV CODE 250 W/ 637 OVERRIDE(OP): Performed by: STUDENT IN AN ORGANIZED HEALTH CARE EDUCATION/TRAINING PROGRAM

## 2024-02-15 PROCEDURE — 700102 HCHG RX REV CODE 250 W/ 637 OVERRIDE(OP)

## 2024-02-15 PROCEDURE — 700111 HCHG RX REV CODE 636 W/ 250 OVERRIDE (IP)

## 2024-02-15 PROCEDURE — 80048 BASIC METABOLIC PNL TOTAL CA: CPT

## 2024-02-15 PROCEDURE — A9270 NON-COVERED ITEM OR SERVICE: HCPCS

## 2024-02-15 PROCEDURE — 99232 SBSQ HOSP IP/OBS MODERATE 35: CPT | Mod: GC | Performed by: INTERNAL MEDICINE

## 2024-02-15 PROCEDURE — 770001 HCHG ROOM/CARE - MED/SURG/GYN PRIV*

## 2024-02-15 PROCEDURE — 97530 THERAPEUTIC ACTIVITIES: CPT

## 2024-02-15 PROCEDURE — A9270 NON-COVERED ITEM OR SERVICE: HCPCS | Performed by: STUDENT IN AN ORGANIZED HEALTH CARE EDUCATION/TRAINING PROGRAM

## 2024-02-15 RX ADMIN — POLYETHYLENE GLYCOL 3350 1 PACKET: 17 POWDER, FOR SOLUTION ORAL at 18:32

## 2024-02-15 RX ADMIN — DOCUSATE SODIUM 50 MG AND SENNOSIDES 8.6 MG 2 TABLET: 8.6; 5 TABLET, FILM COATED ORAL at 05:21

## 2024-02-15 RX ADMIN — POLYETHYLENE GLYCOL 3350 1 PACKET: 17 POWDER, FOR SOLUTION ORAL at 21:36

## 2024-02-15 RX ADMIN — ENOXAPARIN SODIUM 80 MG: 100 INJECTION SUBCUTANEOUS at 05:20

## 2024-02-15 RX ADMIN — LISINOPRIL 10 MG: 10 TABLET ORAL at 05:21

## 2024-02-15 RX ADMIN — ENOXAPARIN SODIUM 80 MG: 100 INJECTION SUBCUTANEOUS at 18:34

## 2024-02-15 RX ADMIN — OMEPRAZOLE 20 MG: 20 CAPSULE, DELAYED RELEASE ORAL at 05:20

## 2024-02-15 RX ADMIN — ASPIRIN 81 MG: 81 TABLET, COATED ORAL at 05:21

## 2024-02-15 RX ADMIN — OMEPRAZOLE 20 MG: 20 CAPSULE, DELAYED RELEASE ORAL at 18:30

## 2024-02-15 RX ADMIN — TORSEMIDE 40 MG: 20 TABLET ORAL at 18:31

## 2024-02-15 RX ADMIN — ATORVASTATIN CALCIUM 40 MG: 40 TABLET, FILM COATED ORAL at 18:30

## 2024-02-15 RX ADMIN — BISACODYL 5 MG: 5 TABLET, COATED ORAL at 05:21

## 2024-02-15 RX ADMIN — TORSEMIDE 40 MG: 20 TABLET ORAL at 05:20

## 2024-02-15 RX ADMIN — SPIRONOLACTONE 100 MG: 25 TABLET ORAL at 05:21

## 2024-02-15 RX ADMIN — POLYETHYLENE GLYCOL 3350 1 PACKET: 17 POWDER, FOR SOLUTION ORAL at 10:03

## 2024-02-15 ASSESSMENT — GAIT ASSESSMENTS
GAIT LEVEL OF ASSIST: STANDBY ASSIST
DEVIATION: INCREASED BASE OF SUPPORT;BRADYKINETIC
DISTANCE (FEET): 50
ASSISTIVE DEVICE: FRONT WHEEL WALKER

## 2024-02-15 ASSESSMENT — COGNITIVE AND FUNCTIONAL STATUS - GENERAL
MOVING FROM LYING ON BACK TO SITTING ON SIDE OF FLAT BED: A LOT
SUGGESTED CMS G CODE MODIFIER MOBILITY: CL
STANDING UP FROM CHAIR USING ARMS: A LITTLE
CLIMB 3 TO 5 STEPS WITH RAILING: TOTAL
MOVING TO AND FROM BED TO CHAIR: A LOT
WALKING IN HOSPITAL ROOM: A LITTLE
MOBILITY SCORE: 13
TURNING FROM BACK TO SIDE WHILE IN FLAT BAD: A LOT

## 2024-02-15 ASSESSMENT — ENCOUNTER SYMPTOMS
FEVER: 0
SHORTNESS OF BREATH: 0
ORTHOPNEA: 0
BLURRED VISION: 0
SPUTUM PRODUCTION: 0
ABDOMINAL PAIN: 0
CHILLS: 0
COUGH: 0

## 2024-02-15 ASSESSMENT — PAIN DESCRIPTION - PAIN TYPE: TYPE: ACUTE PAIN

## 2024-02-15 ASSESSMENT — FIBROSIS 4 INDEX: FIB4 SCORE: 0.42

## 2024-02-15 NOTE — CARE PLAN
The patient is Stable - Low risk of patient condition declining or worsening    Shift Goals  Clinical Goals: Q2 turns, maintain skin integrity  Patient Goals: Rest  Family Goals: EMERSON    Progress made toward(s) clinical / shift goals:    Problem: Skin Integrity  Goal: Skin integrity is maintained or improved  Outcome: Progressing  Note: Patients skin integrity remains unchanged.  Patient more agreeable to q2 turns during shift.  Patient on bariatric low air loss.  Patient has wound care/prevention in place.      Problem: Fall Risk  Goal: Patient will remain free from falls  Outcome: Progressing  Note:   Patient remains free from falls.  Call light and personal belongings within reach.  Patient uses call light appropriately for assistance.

## 2024-02-15 NOTE — THERAPY
Physical Therapy   Daily Treatment     Patient Name: Cole Jaramillo  Age:  39 y.o., Sex:  male  Medical Record #: [id number]  Today's Date: 2/14/2024     Precautions  Precautions: Fall Risk    Assessment  Pt seen for PT tx session with mobility detailed down below. Pt is gradually progressing with standing activities and can now walk longer distances in the room and completed standing tasks with OT. Pt's confidence is increasing and he was able to stand up from a lower bed height today. Anticipate that pt will continue to progress and eventually be able to DC home with HHPT. Pt states he needs to be able to walk as far as the other side of the granados comfortably. Will follow.     Plan    Treatment Plan Status: Continue Current Treatment Plan  Type of Treatment: Bed Mobility, Equipment, Gait Training, Neuro Re-Education / Balance, Self Care / Home Evaluation, Therapeutic Activities, Therapeutic Exercise  Treatment Frequency: 5 Times per Week  Treatment Duration: Until Therapy Goals Met    DC Equipment Recommendations: Wheelchair (34 inch bariatric wheelchair)  Discharge Recommendations: Other - (defer to pt for timing of comfort of dc; he will move more at home from his home bed and can complete home health PT)        Vitals   Pulse Oximetry 91 %   O2 (LPM) 1   O2 Delivery Device Silicone Nasal Cannula   Vitals Comments increased to 1.5L for OOB mobility   Pain 0 - 10 Group   Therapist Pain Assessment   (no c/o pain)   Cognition    Cognition / Consciousness WDL   Balance   Sitting Balance (Static) Fair   Sitting Balance (Dynamic) Fair   Standing Balance (Static) Fair -   Standing Balance (Dynamic) Fair -   Weight Shift Sitting Fair   Weight Shift Standing Fair   Skilled Intervention Verbal Cuing   Comments valente FWW in standing, pt laying backwards or propped on BUE at EOB   Bed Mobility    Supine to Sit Moderate Assist   Sit to Supine Moderate Assist   Scooting Moderate Assist   Skilled Intervention Verbal Cuing;Tactile  Cuing;Sequencing   Comments pt able to use trapeze and mobilize his legs to EOB, needs assist for bringing his upper body vertical. increased assistance needed wtih repositioning back in bed due to air mattress   Gait Analysis   Gait Level Of Assist Contact Guard Assist   Assistive Device Front Wheel Walker   Distance (Feet) 15   # of Times Distance was Traveled 4   Deviation Bradykinetic;Increased Base Of Support   # of Stairs Climbed 0   Weight Bearing Status no restrictions   Skilled Intervention Verbal Cuing   Functional Mobility   Sit to Stand Standby Assist   Mobility STS x5 total from elevated bed   Skilled Intervention Verbal Cuing;Tactile Cuing   Comments slow gait, pt able to stand for ~1-2 minutes and complete tasks at sink with OT   How much difficulty does the patient currently have...   Turning over in bed (including adjusting bedclothes, sheets and blankets)? 1   Sitting down on and standing up from a chair with arms (e.g., wheelchair, bedside commode, etc.) 2   Moving from lying on back to sitting on the side of the bed? 1   How much help from another person does the patient currently need...   Moving to and from a bed to a chair (including a wheelchair)? 3   Need to walk in a hospital room? 3   Climbing 3-5 steps with a railing? 1   6 clicks Mobility Score 11   Activity Tolerance   Sitting Edge of Bed 30 min   Standing 10 min   Short Term Goals    Short Term Goal # 1 pt will perform supine <> sit without bed features and SPV in 6 vistis   Goal Outcome # 1 Progressing slower than expected   Short Term Goal # 2 pt will perform sit <> stand and functional transfers with valente FWW and [facility] for [facility] in 6 visits   Goal Outcome # 2 Progressing as expected   Short Term Goal # 3 pt will ambulate 25 ft with Valente FWW and SPV to access home environment in 6 visits   Goal Outcome # 3 Progressing as expected   Physical Therapy Treatment Plan   Physical Therapy Treatment Plan  Continue Current Treatment Plan   Anticipated Discharge Equipment and Recommendations   DC Equipment Recommendations Wheelchair  (34 inch bariatric wheelchair)   Discharge Recommendations Other -  (defer to pt for timing of comfort of dc; he will move more at home from his home bed and can complete home health PT)   Interdisciplinary Plan of Care Collaboration   IDT Collaboration with  Nursing;Occupational Therapist   Patient Position at End of Therapy In Bed;Call Light within Reach;Tray Table within Reach;Phone within Reach   Collaboration Comments RN updated   Session Information   Date / Session Number  2/14- 6 (2/5, 2/18)

## 2024-02-15 NOTE — PROGRESS NOTES
Hospital Medicine Daily Progress Note    Date of Service  2/15/2024    Chief Complaint  Cole Jaramillo is a 39 y.o. male admitted 2/1/2024 with leg swelling and inability to walk.    Hospital Course  Cole Jaramillo is a 39-year-old male with morbid obesity (BMI 87.62) who presented on 2/1/2024 with 2-week history of difficulty with ambulation due to severe lower extremity swelling, associated with exertional dyspnea and orthopnea. Symptoms began after likely viral respiratory infection. He became bedbound and activated EMS after consultation with a mobile physician.      Initial exam notable for hypertension (SBP 160s), hypoxemia (requiring 2L NC), distended abdomen with erythema, and severe diffuse anasarca in setting of morbid obesity. Initial weight was 330 kg. Labs showed increased NT-proBNP (4729 mg/dL), mild troponemia, and mild microcytic anemia (12.1 g/dOL, MCV 77.9). D-dimer was also elevated.    He was admitted for acute on chronic heart failure with preserved ejection fraction, likely due to under-treated MC/OHS.     Lower extremity venous Duplex ultrasound (2/3/2024) was limited by body habitus but was negative for DVTs. TTE (2/3/2024) showed LVEF 55% with flattened septum in diastole, consistent with RV volume overload (likely due to hypoxic vasoconstriction from recent URI). No regional wall motion abnormalities were seen.     He was aggressively diuresed via IV Lasix and was started on Aldactone, leading to 12-kg (or 26.45-lb) weight loss. He also received 5-day course of Clindamycin for suspected abdominal wall cellulitis, leading to improvement in pannicular erythema.     He also underwent aggressive reconditioning and strength training with PT and OT, who recommend discharge home with home health. He will benefit from bariatric surgery evaluation given inability to lose weight (having weighed 800-lbs last year).     Of note, he was also hospitalized (8/27-9/7/2023) last year for acute  decompensated heart failure, at which time he underwent forced diuresis with plan for outpatient sleep study.         interval Problem Update  NAEO. Patient reported he continues to feel better but stills feel he is not ready to go home because he does not feel stable on his feet. He is awaiting NIV supplies.     -updated mother on plan     I have discussed this patient's plan of care and discharge plan at IDT rounds today with Case Management, Nursing, Nursing leadership, and other members of the IDT team.    Consultants/Specialty  none    Code Status  Full Code    Disposition  Medically stable pending dme supplies     Review of Systems   Review of Systems   Constitutional:  Negative for chills and fever.   Eyes:  Negative for blurred vision.   Respiratory:  Negative for cough, sputum production and shortness of breath.    Cardiovascular:  Positive for leg swelling. Negative for chest pain and orthopnea.   Gastrointestinal:  Negative for abdominal pain.      Physical Exam  Temp:  [36.5 °C (97.7 °F)-36.8 °C (98.2 °F)] 36.6 °C (97.9 °F)  Pulse:  [75-89] 82  Resp:  [15-22] 15  BP: (138-150)/(71-86) 138/79  SpO2:  [90 %-95 %] 93 %    Physical Exam  Constitutional:       General: He is not in acute distress.  HENT:      Head: Normocephalic.      Mouth/Throat:      Mouth: Mucous membranes are moist.   Eyes:      General: No scleral icterus.        Right eye: No discharge.         Left eye: No discharge.   Cardiovascular:      Rate and Rhythm: Normal rate and regular rhythm.      Pulses: Normal pulses.      Heart sounds: Normal heart sounds.   Pulmonary:      Effort: Pulmonary effort is normal.      Breath sounds: Normal breath sounds.   Abdominal:      General: There is distension.      Palpations: There is no mass.      Tenderness: There is no abdominal tenderness. There is no guarding.   Musculoskeletal:      Right lower leg: Edema present.      Left lower leg: Edema present.   Neurological:      Mental Status: He is  alert and oriented to person, place, and time.   Psychiatric:         Mood and Affect: Mood normal.          Fluids    Intake/Output Summary (Last 24 hours) at 2/15/2024 0931  Last data filed at 2/15/2024 0400  Gross per 24 hour   Intake 720 ml   Output 800 ml   Net -80 ml       Laboratory        Recent Labs     02/13/24  0049 02/14/24  0041 02/15/24  0520   SODIUM 135 135 135   POTASSIUM 4.4 4.3 4.4   CHLORIDE 90* 91* 91*   CO2 37* 37* 33   GLUCOSE 112* 104* 98   BUN 17 17 17   CREATININE 0.95 0.98 1.03   CALCIUM 9.1 8.9 9.0                       Imaging  [id number] VIEW   Final Result      No evidence of bowel obstruction.   Possible constipation.                  EC-ECHOCARDIOGRAM COMPLETE W/ CONT   Final Result      US-EXTREMITY ARTERY LOWER BILAT   Final Result      US-EXTREMITY VENOUS LOWER BILAT   Final Result      DX-CHEST-PORTABLE (1 VIEW)   Final Result      Cardiomegaly.         Scheduled Medications   Medication Dose Frequency    torsemide  40 mg BID    senna-docusate  2 Tablet DAILY    bisacodyl EC  5 mg DAILY    enoxaparin (LOVENOX) injection  80 mg Q12HRS    spironolactone  100 mg Q DAY    polyethylene glycol/lytes  1 Packet TID    lisinopril  10 mg DAILY    omeprazole  20 mg BID    aspirin  81 mg DAILY    atorvastatin  40 mg Q EVENING      Assessment/Plan  * Acute respiratory failure with hypoxia (HCC)- (present on admission)  Assessment & Plan  Likely secondary to right-sided heart failure from pulmonary hypertension due to undiagnosed sleep apnea. Echo on this admission did demonstrate pulmonary hypertension.   -Continue Po Torsemide 40mg BID   -Continue Aldactone 100mg daily   -Cont daily weights, strict I/Os. On low-salt diet with 2L daily fluid restriction.   -RT/OT protocol; cont telemetry, continuous pulse ox. Wean off oxygen as tolerated.   -IS at bedside; deep breathing encouraged.  -Cont low-dose Aspirin, Atorvastatin 40mg daily for primary prevention        Acute right-sided congestive  heart failure (HCC)- (present on admission)  Assessment & Plan  Likely secondary to undiagnosed sleep apnea/obesity hypoventilation syndrome.  -Continue IV diuresis as above (see plan under acute hypoxic respiratory failure)    Obesity hypoventilation syndrome (HCC)- (present on admission)  Assessment & Plan  Likely contributing to patient's fluid retention.  ABG demonstrated pCO2 levels of 55 further substantiating likely obesity hypoventilation syndrome.  -Discussing with pulmonology regarding Bipap/CPAP, they are following, appreciate assistance       Anasarca- (present on admission)  Assessment & Plan  Due to acute on chronic right heart failure.  Suspect due to untreated MC/OHS.  Continue IV diuresis.    Weakness- (present on admission)  Assessment & Plan  Weakness  PT/OT    At risk for obstructive sleep apnea- (present on admission)  Assessment & Plan  Outpt sleep study, patient is agreeable     Volume overload- (present on admission)  Assessment & Plan  Diuresis as able to tolerate    Iron deficiency anemia- (present on admission)  Assessment & Plan  Cause unknown. Denies known blood loss of history of melena.   - Will need GI referral on discharge for diagnostic colonscopy.   - Continue ferrous sulfate supplementation every other day    Hypertension- (present on admission)  Assessment & Plan  -Continue home lisinopril  -Continue Aldactone 100mg     Class 3 severe obesity due to excess calories with serious comorbidity and body mass index (BMI) greater than or equal to 70 in adult (HCC)- (present on admission)  Assessment & Plan  Body mass index is 93.93 kg/m².  Diet and lifestyle modification  -Bariatric surgery outpatient eval     Abdominal wall cellulitis- (present on admission)  Assessment & Plan  RESOLVED.   - s/p Clindamycin x5 days         VTE prophylaxis: Enoxaparin    I have performed a physical exam and reviewed and updated ROS and Plan today (2/15/2024). In review of yesterday's note (2/14/2024),  there are no changes except as documented above.    I spent 36 minutes for chart review, meeting and examining the patient, documenting, ordering medications and tests, interpreting the results independently, and communicating with the other health professionals.

## 2024-02-15 NOTE — CARE PLAN
The patient is Stable - Low risk of patient condition declining or worsening    Shift Goals  Clinical Goals: wean O2, q2 turns, maintain skin integrity, I/Os.  Patient Goals: work with therapy, gain confidence with ambulation  Family Goals: EMERSON    Progress made toward(s) clinical / shift goals:  No new skin breakdown this shift. Frequent checks for skin dryness. Barrier cream used on groin/buttocks. Folds cleaned, dried, powder applied and wicking material placed.     Patient is not progressing towards the following goals: Needed in increase supplemental O2 to 1L NC to maintain sats >90%.       Problem: Impaired Gas Exchange  Goal: Patient will demonstrate improved ventilation and adequate oxygenation and participate in treatment regimen within the level of ability/situation.  Description: Target End Date:  Prior to discharge or change in level of care    1.   Assess/monitor rate/rhythm/depth of effort of respirations  2.   Assess oxygenation as ordered  3.   Administer/titrate oxygen as ordered  4.   Position patient for maximum ventilatory efficiency  5.   Turn, cough, and deep breathe  6.   Vital signs, pulse oximetry  7.   Assess color and body temperature  8.   Assess and monitor breath sounds  9.   Encourage deep-slow or pursed-lip breathing exercises  10. Monitor changes in the level of consciousness and mental status  11. Encourage expectoration of sputum; airway suctioning  12. Elevate the head of the bed and position patient for maximum ventilatory efficiency  13. Provide a calm, quiet environment  14. Limit patient's activity during the acute phase and have patient resume activity gradually and increase as individually tolerated  15. Evaluate sleep patterns and limit stimulants such as caffeine  16. Collaborate with RT to administer medications/treatments as ordered  Outcome: Progressing     Problem: Skin Integrity  Goal: Skin integrity is maintained or improved  Description: Target End Date:  Prior to  discharge or change in level of care    Document interventions on Skin Risk/Luis flowsheet groups and corresponding LDA    1.  Assess and monitor skin integrity, appearance and/or temperature  2.  Assess risk factors for impaired skin integrity and/or pressures ulcers  3.  Implement precautions to protect skin integrity in collaboration with interdisciplinary team  4.  Implement pressure ulcer prevention protocol if at risk for skin breakdown  5.  Confirm wound care consult if at risk for skin breakdown  6.  Ensure patient use of pressure relieving devices  (Low air loss bed, waffle overlay, heel protectors, ROHO cushion, etc)  Outcome: Progressing

## 2024-02-15 NOTE — THERAPY
Occupational Therapy  Daily Treatment     Patient Name: Cole Jaramillo  Age:  39 y.o., Sex:  male  Medical Record #: [id number]  Today's Date: 2/14/2024     Precautions  Precautions: Fall Risk    Assessment    Pt seen for OT treatment. Pt required mod A for bed mobility, CGA for functional mobility, CGA to stand and wash hands/face/brush hair at sink with rest breaks between, and max A for LB dressing. Pt current functional performance limited by impaired activity tolerance, impaired balance, and generalized weakness. Pt will benefit from skilled OT while admitted to acute care.     Plan    Treatment Plan Status: Continue Current Treatment Plan  Type of Treatment: Self Care / Activities of Daily Living, Adaptive Equipment, Neuro Re-Education / Balance, Therapeutic Exercises, Therapeutic Activity  Treatment Frequency: 3 Times per Week  Treatment Duration: Until Therapy Goals Met    DC Equipment Recommendations: Unable to determine at this time  Discharge Recommendations: Recommend post-acute placement for additional occupational therapy services prior to discharge home     Objective     02/14/24 1538   Vitals   Vitals Comments increased O2 to 1.5 L for mobility due to desat   Pain   Pain Scales 0 to 10 Scale    Pain 0 - 10 Group   Therapist Pain Assessment Post Activity Pain Same as Prior to Activity;Nurse Notified  (not rated,agreeable to session)   Cognition    Cognition / Consciousness WDL   Level of Consciousness Alert   Comments very pleasant, cooperative, motivated   Other Treatments   Other Treatments Provided Discussed pt goals. Pt is taking a tiered approach with his first goal to be able to walk far enough to go to the bathroom and later to be able to go outside again. Pt reported that the walker does not fit into the bathroom well.   Balance   Sitting Balance (Static) Fair   Sitting Balance (Dynamic) Fair   Standing Balance (Static) Fair -   Standing Balance (Dynamic) Fair -   Weight Shift Sitting Fair    Weight Shift Standing Fair   Skilled Intervention Verbal Cuing   Comments w/ bariatric FWW, required B UE for maintaining balance while EOB, able to  one hand at a time for standing hairbrushing at the sink briefly   Bed Mobility    Supine to Sit Moderate Assist   Sit to Supine Moderate Assist   Scooting Moderate Assist   Skilled Intervention Verbal Cuing;Tactile Cuing;Sequencing   Comments w/ trapeze, assist for UB to EOB and LB on way back to supine   Activities of Daily Living   Grooming Contact Guard Assist;Standing  (washed hands/face at sink, brushed hair at sink)   Lower Body Dressing Maximal Assist  (don socks)   Toileting   (maximal assist for hygiene in standing, urine/stool, attempted bed pan use while seated EOB)   Skilled Intervention Verbal Cuing;Tactile Cuing;Compensatory Strategies   Functional Mobility   Sit to Stand Contact Guard Assist   Bed, Chair, Wheelchair Transfer Contact Guard Assist   Transfer Method Stand Step   Mobility EOB>stand for bed pan placement>EOB>stand for toilet hygiene>EOB for rest>sink>EOB for rest>sink>supine   Skilled Intervention Verbal Cuing   Comments w/ FWW   Visual Perception   Visual Perception  Not Tested   Activity Tolerance   Sitting in Chair NT   Sitting Edge of Bed >30 min   Standing ~10 min   Comments limited by weakness/fatigue   Patient / Family Goals   Patient / Family Goal #1 to get stronger   Goal #1 Outcome Progressing as expected   Short Term Goals   Short Term Goal # 1 Pt will perform ADL transfer w/ supv   Goal Outcome # 1 Progressing as expected   Short Term Goal # 2 Pt will perform standing g/h w/ supv  (modified due to session this date)   Goal Outcome # 2 Progressing as expected   Short Term Goal # 3 Pt will perform UB dressing w/ supv while seated EOB   Goal Outcome # 3 Goal not met   Short Term Goal # 4 Pt will perform LB dressing w/ supv and AE PRN   Goal Outcome # 4 Progressing slower than expected   Short Term Goal # 5 Pt will walk to  bariatric BSC placed in bathroom with supv   Education Group   Education Provided Role of Occupational Therapist;Activities of Daily Living;Pathology of bedrest   Role of Occupational Therapist Patient Response Patient;Acceptance;Explanation;Verbal Demonstration   ADL Patient Response Patient;Acceptance;Demonstration;Explanation;Verbal Demonstration;Action Demonstration   Pathology of Bedrest Patient Response Patient;Acceptance;Explanation;Verbal Demonstration   Occupational Therapy Treatment Plan    O.T. Treatment Plan Continue Current Treatment Plan   Treatment Interventions Self Care / Activities of Daily Living;Adaptive Equipment;Neuro Re-Education / Balance;Therapeutic Exercises;Therapeutic Activity   Treatment Frequency 3 Times per Week   Duration Until Therapy Goals Met

## 2024-02-15 NOTE — DISCHARGE PLANNING
[facility] called @0930 and confirmed they do not have DME requested for PT . Denied     Followed up with all DME facility's as well as some called [facility] directly to notify PT exceeds weight and they cannot provide equipment , [facility] does not take PT insurance and equipment not available . Notified DONNA Avalos , she suggested to call choice form providers on list .[facility] did .. No DME providers on list can provide due to Weight exceeding or insurance not covering .     @ 0259 called [facility] regarding PT , they do not provide eqipment .

## 2024-02-15 NOTE — DISCHARGE PLANNING
THAO received VM from [facility] reporting they do not have bariatric WC and bedside commode in stock and it will take minimum 1-2 weeks before they could get it.    Addendum @1451  Per [facility] f/u, no DME companies have bariatric equipment in stock. THAO made phone call to [facility] and spoke with Akira. Akira reported he was still following up with this and has already put in a request for the appropriate DME for pt. Akira stated it does typically take a week for equipment to be delivered since they don't have any in stock.

## 2024-02-15 NOTE — CARE PLAN
The patient is Stable - Low risk of patient condition declining or worsening    Shift Goals  Clinical Goals: Skin integrity, pain control stable  VS  Patient Goals: Rest  Family Goals: EMERSON    Progress made toward(s) clinical / shift goals:    Problem: Mobility  Goal: Patient's capacity to carry out activities will improve  Description: Target End Date:  Prior to discharge or change in level of care    1.  Assess for barriers to mobility/activity  2.  Implement activity per interdisciplinary team recommendations  3.  Target activity level identified and patient/family/caregiver aware of goal  4.  Provide assistive devices  5.  Instruct patient/caregiver on proper use of assistive/adaptive devices  6.  Schedule activities and rest periods to decrease effects of fatigue  7.  Encourage mobilization to extent of ability  8.  Maintain proper body alignment  9.  Provide adequate pain management to allow progressive mobilization  10. Implement pace maker precautions as needed  Outcome: Progressing  Flowsheets (Taken 2/7/2024 1311 by Lisset Hawk, Student)  Mobility:   Encouraged mobilization per interdisciplinary team recommendations   Provided assistive devices   Monitored for signs of activity intolerance   Provided rest periods between activities  Note: Patient worked with PT and able to walk to sink and back.        Patient is not progressing towards the following goals:      Problem: Skin Integrity  Goal: Skin integrity is maintained or improved  Description: Target End Date:  Prior to discharge or change in level of care    Document interventions on Skin Risk/Luis flowsheet groups and corresponding LDA    1.  Assess and monitor skin integrity, appearance and/or temperature  2.  Assess risk factors for impaired skin integrity and/or pressures ulcers  3.  Implement precautions to protect skin integrity in collaboration with interdisciplinary team  4.  Implement pressure ulcer prevention protocol if at risk for  skin breakdown  5.  Confirm wound care consult if at risk for skin breakdown  6.  Ensure patient use of pressure relieving devices  (Low air loss bed, waffle overlay, heel protectors, ROHO cushion, etc)  Outcome: Not Progressing  Note: Patient refusing turns, skin moist and at risk for break down as patient frequently incontinent.

## 2024-02-16 ENCOUNTER — APPOINTMENT (OUTPATIENT)
Dept: CARDIOLOGY | Facility: MEDICAL CENTER | Age: 39
End: 2024-02-16
Attending: INTERNAL MEDICINE
Payer: COMMERCIAL

## 2024-02-16 ENCOUNTER — PHARMACY VISIT (OUTPATIENT)
Dept: PHARMACY | Facility: MEDICAL CENTER | Age: 39
End: 2024-02-16
Payer: COMMERCIAL

## 2024-02-16 VITALS
OXYGEN SATURATION: 93 % | BODY MASS INDEX: 44.1 KG/M2 | HEIGHT: 71 IN | HEART RATE: 75 BPM | DIASTOLIC BLOOD PRESSURE: 67 MMHG | RESPIRATION RATE: 20 BRPM | SYSTOLIC BLOOD PRESSURE: 123 MMHG | WEIGHT: 315 LBS | TEMPERATURE: 97.5 F

## 2024-02-16 LAB
ANION GAP SERPL CALC-SCNC: 9 MMOL/L (ref 7–16)
BUN SERPL-MCNC: 18 MG/DL (ref 8–22)
CALCIUM SERPL-MCNC: 9 MG/DL (ref 8.5–10.5)
CHLORIDE SERPL-SCNC: 90 MMOL/L (ref 96–112)
CO2 SERPL-SCNC: 35 MMOL/L (ref 20–33)
CREAT SERPL-MCNC: 1 MG/DL (ref 0.5–1.4)
GFR SERPLBLD CREATININE-BSD FMLA CKD-EPI: 98 ML/MIN/1.73 M 2
GLUCOSE SERPL-MCNC: 99 MG/DL (ref 65–99)
POTASSIUM SERPL-SCNC: 4.2 MMOL/L (ref 3.6–5.5)
SODIUM SERPL-SCNC: 134 MMOL/L (ref 135–145)

## 2024-02-16 PROCEDURE — 94660 CPAP INITIATION&MGMT: CPT

## 2024-02-16 PROCEDURE — 700111 HCHG RX REV CODE 636 W/ 250 OVERRIDE (IP)

## 2024-02-16 PROCEDURE — 97530 THERAPEUTIC ACTIVITIES: CPT

## 2024-02-16 PROCEDURE — 99232 SBSQ HOSP IP/OBS MODERATE 35: CPT | Mod: GC | Performed by: INTERNAL MEDICINE

## 2024-02-16 PROCEDURE — 94002 VENT MGMT INPAT INIT DAY: CPT

## 2024-02-16 PROCEDURE — 700102 HCHG RX REV CODE 250 W/ 637 OVERRIDE(OP): Performed by: STUDENT IN AN ORGANIZED HEALTH CARE EDUCATION/TRAINING PROGRAM

## 2024-02-16 PROCEDURE — A9270 NON-COVERED ITEM OR SERVICE: HCPCS | Performed by: STUDENT IN AN ORGANIZED HEALTH CARE EDUCATION/TRAINING PROGRAM

## 2024-02-16 PROCEDURE — 700102 HCHG RX REV CODE 250 W/ 637 OVERRIDE(OP)

## 2024-02-16 PROCEDURE — A9270 NON-COVERED ITEM OR SERVICE: HCPCS

## 2024-02-16 PROCEDURE — 99239 HOSP IP/OBS DSCHRG MGMT >30: CPT | Mod: GC | Performed by: INTERNAL MEDICINE

## 2024-02-16 PROCEDURE — 80048 BASIC METABOLIC PNL TOTAL CA: CPT

## 2024-02-16 PROCEDURE — RXMED WILLOW AMBULATORY MEDICATION CHARGE

## 2024-02-16 PROCEDURE — 36415 COLL VENOUS BLD VENIPUNCTURE: CPT

## 2024-02-16 RX ORDER — FERROUS SULFATE 325(65) MG
325 TABLET ORAL
Status: DISCONTINUED | OUTPATIENT
Start: 2024-02-16 | End: 2024-02-16 | Stop reason: HOSPADM

## 2024-02-16 RX ORDER — LISINOPRIL 10 MG/1
10 TABLET ORAL DAILY
Qty: 30 TABLET | Refills: 0 | Status: SHIPPED | OUTPATIENT
Start: 2024-02-17 | End: 2024-03-01 | Stop reason: SDUPTHER

## 2024-02-16 RX ORDER — FERROUS SULFATE 325(65) MG
325 TABLET ORAL
Qty: 30 TABLET | Refills: 0 | Status: SHIPPED | OUTPATIENT
Start: 2024-02-16

## 2024-02-16 RX ORDER — ATORVASTATIN CALCIUM 40 MG/1
40 TABLET, FILM COATED ORAL EVERY EVENING
Qty: 30 TABLET | Refills: 0 | Status: SHIPPED | OUTPATIENT
Start: 2024-02-16 | End: 2024-03-01 | Stop reason: SDUPTHER

## 2024-02-16 RX ORDER — TORSEMIDE 20 MG/1
40 TABLET ORAL DAILY
Qty: 60 TABLET | Refills: 0 | Status: SHIPPED | OUTPATIENT
Start: 2024-02-16 | End: 2024-03-01 | Stop reason: SDUPTHER

## 2024-02-16 RX ORDER — ASPIRIN 81 MG/1
81 TABLET ORAL DAILY
Qty: 100 TABLET | Refills: 0 | Status: SHIPPED | OUTPATIENT
Start: 2024-02-17 | End: 2024-03-27 | Stop reason: SDUPTHER

## 2024-02-16 RX ORDER — TORSEMIDE 20 MG/1
40 TABLET ORAL 2 TIMES DAILY
Qty: 120 TABLET | Refills: 3 | Status: SHIPPED | OUTPATIENT
Start: 2024-02-16 | End: 2024-02-16

## 2024-02-16 RX ORDER — SPIRONOLACTONE 100 MG/1
100 TABLET, FILM COATED ORAL DAILY
Qty: 30 TABLET | Refills: 3 | Status: SHIPPED | OUTPATIENT
Start: 2024-02-17 | End: 2024-03-01 | Stop reason: SDUPTHER

## 2024-02-16 RX ADMIN — DOCUSATE SODIUM 50 MG AND SENNOSIDES 8.6 MG 2 TABLET: 8.6; 5 TABLET, FILM COATED ORAL at 05:03

## 2024-02-16 RX ADMIN — LISINOPRIL 10 MG: 10 TABLET ORAL at 05:03

## 2024-02-16 RX ADMIN — OMEPRAZOLE 20 MG: 20 CAPSULE, DELAYED RELEASE ORAL at 05:03

## 2024-02-16 RX ADMIN — BISACODYL 5 MG: 5 TABLET, COATED ORAL at 05:04

## 2024-02-16 RX ADMIN — TORSEMIDE 40 MG: 20 TABLET ORAL at 05:05

## 2024-02-16 RX ADMIN — ASPIRIN 81 MG: 81 TABLET, COATED ORAL at 05:03

## 2024-02-16 RX ADMIN — SPIRONOLACTONE 100 MG: 25 TABLET ORAL at 05:04

## 2024-02-16 RX ADMIN — ENOXAPARIN SODIUM 80 MG: 100 INJECTION SUBCUTANEOUS at 05:28

## 2024-02-16 RX ADMIN — FERROUS SULFATE TAB 325 MG (65 MG ELEMENTAL FE) 325 MG: 325 (65 FE) TAB at 10:14

## 2024-02-16 ASSESSMENT — ENCOUNTER SYMPTOMS
CONSTITUTIONAL NEGATIVE: 1
GASTROINTESTINAL NEGATIVE: 1
MUSCULOSKELETAL NEGATIVE: 1
ORTHOPNEA: 0
ABDOMINAL PAIN: 0
PALPITATIONS: 0
CONSTIPATION: 1
SHORTNESS OF BREATH: 0
HEADACHES: 1
CHILLS: 0
TINGLING: 0
SHORTNESS OF BREATH: 1
PSYCHIATRIC NEGATIVE: 1
FEVER: 0
EYES NEGATIVE: 1
WHEEZING: 0
NAUSEA: 0
VOMITING: 0
FOCAL WEAKNESS: 0
NEUROLOGICAL NEGATIVE: 1
CARDIOVASCULAR NEGATIVE: 1

## 2024-02-16 ASSESSMENT — COGNITIVE AND FUNCTIONAL STATUS - GENERAL
WALKING IN HOSPITAL ROOM: A LITTLE
TURNING FROM BACK TO SIDE WHILE IN FLAT BAD: A LOT
MOBILITY SCORE: 13
STANDING UP FROM CHAIR USING ARMS: A LITTLE
MOVING FROM LYING ON BACK TO SITTING ON SIDE OF FLAT BED: A LOT
SUGGESTED CMS G CODE MODIFIER MOBILITY: CL
CLIMB 3 TO 5 STEPS WITH RAILING: TOTAL
MOVING TO AND FROM BED TO CHAIR: A LOT

## 2024-02-16 ASSESSMENT — GAIT ASSESSMENTS
GAIT LEVEL OF ASSIST: STANDBY ASSIST
ASSISTIVE DEVICE: FRONT WHEEL WALKER
DEVIATION: INCREASED BASE OF SUPPORT;BRADYKINETIC
DISTANCE (FEET): 50

## 2024-02-16 ASSESSMENT — PULMONARY FUNCTION TESTS: EPAP_CMH2O: 5

## 2024-02-16 NOTE — THERAPY
"Physical Therapy   Daily Treatment     Patient Name: Cole Jaramillo  Age:  39 y.o., Sex:  male  Medical Record #: [id number]  Today's Date: 2/15/2024     Precautions  Precautions: Fall Risk    Assessment  Pt seen for PT tx session with mobility detailed down below. Pt continues to progress and is now able to ambulate 50 ft into the granados for limited household distances. Discussed DC planning with pt who states he is almost fully confident in his ability to negotiate his home and already has a bariatric FWW at home. Pt should be able to safely DC home with support from his mother and HHPT. Pt is pending a bariatric WC and commode for home, however they are not a barrier for DC. Will follow.     Plan    Treatment Plan Status: Continue Current Treatment Plan  Type of Treatment: Bed Mobility, Equipment, Gait Training, Neuro Re-Education / Balance, Self Care / Home Evaluation, Therapeutic Activities, Therapeutic Exercise  Treatment Frequency: 5 Times per Week  Treatment Duration: Until Therapy Goals Met    DC Equipment Recommendations: Wheelchair (34\" bariatric WC)  Discharge Recommendations: Recommend home health for continued physical therapy services        Vitals   Pulse Oximetry 91 %   O2 (LPM) 1   O2 Delivery Device Silicone Nasal Cannula   Vitals Comments increased to 2L for standing activity   Pain 0 - 10 Group   Therapist Pain Assessment   (no c/o pain)   Cognition    Cognition / Consciousness WDL   Balance   Sitting Balance (Static) Fair   Sitting Balance (Dynamic) Fair   Standing Balance (Static) Fair   Standing Balance (Dynamic) Fair -   Weight Shift Sitting Fair   Weight Shift Standing Fair   Skilled Intervention Verbal Cuing   Comments valente FWW in standing, pt laying backwards or propped on BUE at EOB   Bed Mobility    Supine to Sit Standby Assist   Sit to Supine Moderate Assist   Scooting Standby Assist   Skilled Intervention Verbal Cuing   Gait Analysis   Gait Level Of Assist Standby Assist   Assistive " "Device Front Wheel Walker   Distance (Feet) 50   # of Times Distance was Traveled 4   Deviation Increased Base Of Support;Bradykinetic   # of Stairs Climbed 0   Weight Bearing Status no restrictions   Skilled Intervention Verbal Cuing   Functional Mobility   Sit to Stand Standby Assist   Bed, Chair, Wheelchair Transfer Standby Assist   Mobility FWW   Skilled Intervention Verbal Cuing;Tactile Cuing   How much difficulty does the patient currently have...   Turning over in bed (including adjusting bedclothes, sheets and blankets)? 2   Sitting down on and standing up from a chair with arms (e.g., wheelchair, bedside commode, etc.) 2   Moving from lying on back to sitting on the side of the bed? 2   How much help from another person does the patient currently need...   Moving to and from a bed to a chair (including a wheelchair)? 3   Need to walk in a hospital room? 3   Climbing 3-5 steps with a railing? 1   6 clicks Mobility Score 13   Short Term Goals    Short Term Goal # 1 pt will perform supine <> sit without bed features and SPV in 6 vistis   Goal Outcome # 1 Progressing as expected   Short Term Goal # 2 pt will perform sit <> stand and functional transfers with valente FWW and SPV to [facility] mobility independence for home in 6 visits   Goal Outcome # 2 Progressing as expected   Short Term Goal # 3 pt will ambulate 25 ft with Valente FWW and SPV to access home environment in 6 visits   Goal Outcome # 3 Progressing as expected   Physical Therapy Treatment Plan   Physical Therapy Treatment Plan Continue Current Treatment Plan   Anticipated Discharge Equipment and Recommendations   DC Equipment Recommendations Wheelchair  (34\" bariatric WC)   Discharge Recommendations Recommend home health for continued physical therapy services   Interdisciplinary Plan of Care Collaboration   IDT Collaboration with  Nursing;Therapy Tech   Patient Position at End of Therapy In Bed;Call Light within Reach;Tray Table within Reach;Phone within " Reach   Collaboration Comments RN updated   Session Information   Date / Session Number  2/15- 7 (3/5, 2/18)

## 2024-02-16 NOTE — DISCHARGE PLANNING
DC Transport Scheduled    Transport Company Scheduled:  [employer]  Spoke with Zeina at [facility] to schedule transport.    Scheduled Date: 2/16/2024  Scheduled Time: 1500    Destination: Home at [address]  [id number] [place] [id number]    Notified care team of scheduled transport via Vo[employer]te.     If there are any changes needed to the DC transportation scheduled, please contact [facility] at ext. [phone] between the hours of 2528-9198 Mon-Fri. If outside those hours, contact the ED Case Manager at ext. [phone].

## 2024-02-16 NOTE — FACE TO FACE
"Face to Face Note  -  Durable Medical Equipment    Gomez Kaufman M.D. - NPI: [id number]  I certify that this patient is under my care and that they had a durable medical equipment(DME)face to face encounter by myself that meets the physician DME face-to-face encounter requirements with this patient on:    Date of encounter:   Patient:                    MRN:                       YOB: 2025  Cole Jaramillo  [id number]  1985     The encounter with the patient was in whole, or in part, for the following medical condition, which is the primary reason for durable medical equipment:  Other - Obesity hypoventilation syndrome     I certify that, based on my findings, the following durable medical equipment is medically necessary:    Oxygen   HOME O2 Saturation Measurements:(Values must be present for Home Oxygen orders)  Room air sat at rest: 88  Room air sat with amb: 79  With liters of O2: 1, O2 sat at rest with O2: 91  With Liters of O2: 3, O2 sat with amb with O2 : 91  Is the patient mobile?: Yes  If patient feels more short of breath, they can go up to 6 liters per minute and contact healthcare provider.    Supporting Symptoms: The patient requires supplemental oxygen, as the following interventions have been tried with limited or no improvement: \"Ambulation with oximetry.    My Clinical findings support the need for the above equipment due to:  Hypoxia  "

## 2024-02-16 NOTE — DISCHARGE PLANNING
"Case Management Discharge Planning    Admission Date: 2/1/2024  GMLOS: 3.9  ALOS: 15    6-Clicks ADL Score: 16  6-Clicks Mobility Score: 13  PT and/or OT Eval ordered: Yes  Post-acute Referrals Ordered: Yes  Post-acute Choice Obtained: Yes  Has referral(s) been sent to post-acute provider:  Yes      Anticipated Discharge Dispo: Discharge Disposition: D/T to home under HHA care in anticipation of covered skilled care (06)    DME Needed: Yes    DME Ordered: Yes    Action(s) Taken: LSW met with pt at bedside to provide update. Pt is being discharged today and [facility] is process DME order for bariatric equipment which will take about a week to be delivered. Equipment will be delivered to pt's home. Pt requested medical transportation at 1500 and confirmed someone will be at the home to let him in at this time. There are no steps to enter the home. Pt inquired about 24/7 O2 as he is still currently on O2.    Spoke with RN to provide update on requested transport time and possible O2 needs. Transportation request sent to ride line.    Updated by RN, O2 eval was completed and pt does need O2. Pending home O2 numbers and DME orders.    Addendum @0897  DME O2 orders sent to [facility] via [employer] and manually faxed as well. Information for [facility] given to pt at bedside to f/u with regarding WC delivery.    Addendum @2923  LSW made phone call to [facility] and spoke with Ronda who confirmed they received the referral. Informed Ronda of transportation scheduled at 1500 and need for O2 to be delivered prior to this. Ronda stated they will process the order and she will make a note of need by time.    Addendum @1989  LSW notified pt wants O2 to be supplied by [facility], since they're already going to be following him for non invasive vent. Spoke with Zeinab with [facility] who can deliver O2 before 1500. Zeinab also requested MD place note to include \"heated humidification\" for NIV. Orders faxed to [facility]. NEGROW made phone call to " Sofía to cancel request.    Addendum @8446  Updated note from pulmonary faxed to [facility].    Escalations Completed: None    Medically Clear: Yes    Next Steps: No CM needs identified at this time    Barriers to Discharge: Oxygen Delivery    Is the patient up for discharge tomorrow: No-anticipated to DC home today

## 2024-02-16 NOTE — DISCHARGE INSTRUCTIONS
Discharge Instructions    Discharged to home by medical transportation with escort. Discharged via ambulance, hospital escort: Yes.  Special equipment needed: Oxygen and Walker    Be sure to schedule a follow-up appointment with your primary care doctor or any specialists as instructed.     Discharge Plan:   Diet Plan: Discussed  Activity Level: Discussed  Confirmed Follow up Appointment: Patient to Call and Schedule Appointment  Confirmed Symptoms Management: Discussed  Medication Reconciliation Updated: Yes  Influenza Vaccine Indication: Patient Refuses  Influenza Vaccine Given - only chart on this line when given: Influenza Vaccine Given (See MAR)    I understand that a diet low in cholesterol, fat, and sodium is recommended for good health. Unless I have been given specific instructions below for another diet, I accept this instruction as my diet prescription.   Other diet:     Special Instructions: None    -Is this patient being discharged with medication to prevent blood clots?  Yes, Aspirin Aspirin Tablets  What is this medication?  ASPIRIN (AS pir in) lowers the risk of heart attack, stroke, or blood clot. It may also be used to treat mild to moderate pain, inflammation, or arthritis. It belongs to a group of medications called NSAIDs.  This medicine may be used for other purposes; ask your health care provider or pharmacist if you have questions.  COMMON BRAND NAME(S): Aspir-Low, Aspir-Mary, Aspirtab, Hi Advanced Aspirin, Hi Aspirin, Hi Aspirin Extra Strength, Hi Aspirin Plus, Hi Extra Strength, Hi Extra Strength Plus, Hi Genuine Aspirin, Hi Womens Aspirin, Bufferin, Bufferin Extra Strength, Bufferin Low Dose  What should I tell my care team before I take this medication?  They need to know if you have any of these conditions:  Anemia  Asthma  Bleeding problems  Diabetes  Gout  History of stomach ulcers or bleeding  If you often drink alcohol  Kidney disease  Liver disease  Low level  of vitamin K  Lupus  Smoke tobacco  An unusual or allergic reaction to aspirin, tartrazine dye, other medications, dyes, or preservatives  Pregnant or trying to get pregnant  Breast-feeding  How should I use this medication?  Take this medication by mouth with a glass of water. Follow the directions on the package or prescription label. You can take this medication with or without food. If it upsets your stomach, take it with food. Do not take it more often than directed.  Talk to your care team about the use of this medication in children. While this medication may be prescribed for children as young as 12 years of age for selected conditions, precautions do apply. Children and teenagers should not use this medication to treat chicken pox or flu symptoms unless directed by a care team.  Patients over 65 years old may have a stronger reaction and need a smaller dose.  Overdosage: If you think you have taken too much of this medicine contact a poison control center or emergency room at once.  NOTE: This medicine is only for you. Do not share this medicine with others.  What if I miss a dose?  If you are taking this medication on a regular schedule and miss a dose, take it as soon as you can. If it is almost time for your next dose, take only that dose. Do not take double or extra doses.  What may interact with this medication?  Do not take this medication with any of the following:  Cidofovir  Ketorolac  Probenecid  This medication may also interact with the following:  Alcohol  Alendronate  Bismuth subsalicylate  Flavocoxid  Herbal supplements like feverfew, garlic, marisol, ginkgo biloba, horse chestnut  Medications for diabetes or glaucoma like acetazolamide, methazolamide  Medications for gout  Medications that prevent or treat blood clots like apixaban, clopidogrel, enoxaparin, heparin, rivaroxaban, warfarin  Other aspirin and aspirin-like medications  NSAIDs, medications for pain and inflammation, like ibuprofen  or naproxen  Pemetrexed  Sulfinpyrazone  Varicella live vaccine  This list may not describe all possible interactions. Give your health care provider a list of all the medicines, herbs, non-prescription drugs, or dietary supplements you use. Also tell them if you smoke, drink alcohol, or use illegal drugs. Some items may interact with your medicine.  What should I watch for while using this medication?  If you are treating yourself for pain, tell your doctor or health care provider if the pain lasts more than 10 days, if it gets worse, or if there is a new or different kind of pain. Tell your doctor if you see redness or swelling. Also, check with your doctor if you have a fever that lasts for more than 3 days. Only take this medication to prevent heart attacks or blood clotting if prescribed by your doctor or health care provider.  Do not take other medications that contain aspirin, ibuprofen, or naproxen with this medication. Side effects such as stomach upset, nausea, or ulcers may be more likely to occur. Many non-prescription medications contain aspirin, ibuprofen, or naproxen. Always read labels carefully.  This medication can cause serious ulcers and bleeding in the stomach. It can happen with no warning. Smoking, drinking alcohol, older age, and poor health can also increase risks. Call your health care provider right away if you have stomach pain or blood in your vomit or stool.  Alcohol may interfere with the effect of this medication. Avoid alcoholic drinks.  This medication may cause serious skin reactions. They can happen weeks to months after starting the medication. Contact your health care provider right away if you notice fevers or flu-like symptoms with a rash. The rash may be red or purple and then turn into blisters or peeling of the skin. Or, you might notice a red rash with swelling of the face, lips or lymph nodes in your neck or under your arms.  Talk to your health care provider if you are  pregnant before taking this medication. Taking this medication between weeks 20 and 30 of pregnancy may harm your unborn baby. Your health care provider will monitor you closely if you need to take it. After 30 weeks of pregnancy, do not take this medication.  Be careful brushing or flossing your teeth or using a toothpick because you may get an infection or bleed more easily. If you have any dental work done, tell your dentist you are receiving this medication.  This medication may make it more difficult to get pregnant. Talk to your health care provider if you are concerned about your fertility.  What side effects may I notice from receiving this medication?  Side effects that you should report to your care team as soon as possible:  Allergic reactions--skin rash, itching, hives, swelling of the face, lips, tongue, or throat  Bleeding--bloody or black, tar-like stools, vomiting blood or brown material that looks like coffee grounds, red or dark brown urine, red or purple spots on skin, unusual bruising or bleeding  Hearing loss, ringing in ears  Kidney injury--decrease in the amount of urine, swelling of the ankles, hands, or feet  Liver injury--right upper belly pain, loss of appetite, nausea, light-colored stool, dark yellow or brown urine, yellowing of the skin or eyes, unusual weakness, fatigue  Rash, fever, and swollen lymph nodes  Side effects that usually do not require medical attention (report to your care team if they continue or are bothersome):  Headache  Loss of appetite  Nausea  Upset stomach  This list may not describe all possible side effects. Call your doctor for medical advice about side effects. You may report side effects to FDA at 7-875-FDA-7412.  Where should I keep my medication?  Keep out of the reach of children and pets.  Store at room temperature between 15 and 30 degrees C (59 and 86 degrees F). Protect from heat and moisture. Get rid of any unused medication after the expiration  date.  Do not use this medication if it has a strong vinegar smell.  To get rid of medications that are no longer needed or have :  Take the medication to a medication take-back program. Check with your pharmacy or law enforcement to find a location.  If you cannot return the medication, check the label or package insert to see if the medication should be thrown out in the garbage or flushed down the toilet. If you are not sure, ask your care team. If it is safe to put it in the trash, empty the medication out of the container. Mix the medication with cat litter, dirt, coffee grounds, or other unwanted substance. Seal the mixture in a bag or container. Put it in the trash.  NOTE: This sheet is a summary. It may not cover all possible information. If you have questions about this medicine, talk to your doctor, pharmacist, or health care provider.  ©  Elsevier/Gold Standard (2021-10-29 00:00:00)    Is patient discharged on Warfarin / Coumadin?   No

## 2024-02-16 NOTE — CARE PLAN
The patient is Stable - Low risk of patient condition declining or worsening    Shift Goals  Clinical Goals: Q2 turns, monitor VS, protect skin  Patient Goals: comfort and go home soon  Family Goals: gadiel    Progress made toward(s) clinical / shift goals:        Problem: Knowledge Deficit - Standard  Goal: Patient and family/care givers will demonstrate understanding of plan of care, disease process/condition, diagnostic tests and medications  Note: Assess knowledge level of disease process and answer all questions, explain tests and procedures, go over plan of care often     Problem: Skin Integrity  Goal: Skin integrity is maintained or improved  Note: Assess and monitor skin integrity, utilize skin protection, hourly rounding, turn Q2     Problem: Pain - Standard  Goal: Alleviation of pain or a reduction in pain to the patient’s comfort goal  Note: Give pain management medications as ordered, assess pain often and regularly, hourly rounding        Patient is not progressing towards the following goals:

## 2024-02-16 NOTE — THERAPY
"Physical Therapy   Daily Treatment     Patient Name: Cole Jaramillo  Age:  39 y.o., Sex:  male  Medical Record #: [id number]  Today's Date: 2/16/2024     Precautions  Precautions: Fall Risk    Assessment  Pt seen for PT tx session with mobility detailed down below. Pt continues to be at SBA for standing activity and can now comfortably walk as far as he needs to at home. Pt has been communicating with his mother and is aware of planned DC this afternoon. Pt needs 2-3L NC when ambulating to maintain SpO2 in the 90's. Reviewed DME needs with pt who states he has everything he needs at home, however he will greatly benefit from the bariatric WC when it can get delivered. Continue to recommend HHPT, will follow.     Plan    Treatment Plan Status: Continue Current Treatment Plan  Type of Treatment: Bed Mobility, Equipment, Gait Training, Neuro Re-Education / Balance, Self Care / Home Evaluation, Therapeutic Activities, Therapeutic Exercise  Treatment Frequency: 5 Times per Week  Treatment Duration: Until Therapy Goals Met    DC Equipment Recommendations: Wheelchair (34\" bariatric WC)  Discharge Recommendations: Recommend home health for continued physical therapy services        Vitals   O2 (LPM) 1   O2 Delivery Device Silicone Nasal Cannula   Vitals Comments increased to 2L for ambulation   Pain 0 - 10 Group   Therapist Pain Assessment   (no c/o pain)   Cognition    Cognition / Consciousness WDL   Balance   Sitting Balance (Static) Fair +   Sitting Balance (Dynamic) Fair   Standing Balance (Static) Fair   Standing Balance (Dynamic) Fair -   Weight Shift Sitting Fair   Weight Shift Standing Fair   Skilled Intervention Verbal Cuing   Comments valente FWW in standing, pt laying backwards or propped on BUE at EOB   Bed Mobility    Supine to Sit Standby Assist   Sit to Supine Moderate Assist   Scooting Standby Assist   Skilled Intervention Verbal Cuing   Comments LE assist getting back into bed   Gait Analysis   Gait Level Of " "Assist Standby Assist   Assistive Device Front Wheel Walker   Distance (Feet) 50   # of Times Distance was Traveled 1   Deviation Increased Base Of Support;Bradykinetic   Weight Bearing Status no restrictions   Skilled Intervention Verbal Cuing   Functional Mobility   Sit to Stand Standby Assist   Bed, Chair, Wheelchair Transfer Standby Assist   Mobility FWW   Skilled Intervention Verbal Cuing   How much difficulty does the patient currently have...   Turning over in bed (including adjusting bedclothes, sheets and blankets)? 2   Sitting down on and standing up from a chair with arms (e.g., wheelchair, bedside commode, etc.) 2   Moving from lying on back to sitting on the side of the bed? 2   How much help from another person does the patient currently need...   Moving to and from a bed to a chair (including a wheelchair)? 3   Need to walk in a hospital room? 3   Climbing 3-5 steps with a railing? 1   6 clicks Mobility Score 13   Short Term Goals    Short Term Goal # 1 pt will perform supine <> sit without bed features and SPV in 6 vistis   Goal Outcome # 1 Progressing as expected   Short Term Goal # 2 pt will perform sit <> stand and functional transfers with valente FWW and SPV to [facility] [place] [place] mobility independence for home in 6 visits   Goal Outcome # 2 Progressing as expected   Short Term Goal # 3 pt will ambulate 25 ft with Valente FWW and SPV to access home environment in 6 visits   Goal Outcome # 3 Progressing as expected   Physical Therapy Treatment Plan   Physical Therapy Treatment Plan Continue Current Treatment Plan   Anticipated Discharge Equipment and Recommendations   DC Equipment Recommendations Wheelchair  (34\" bariatric WC)   Discharge Recommendations Recommend home health for continued physical therapy services   Interdisciplinary Plan of Care Collaboration   IDT Collaboration with  Nursing;Therapy Tech   Patient Position at End of Therapy In Bed;Call Light within Reach;Tray Table within Reach;Phone within " Reach   Collaboration Comments RN updated   Session Information   Date / Session Number  2/16- 8 (4/5, 2/18)

## 2024-02-16 NOTE — PROGRESS NOTES
Critical Care/Pulmonary Consultation    Date of Service: 2/14/2024    Date of Admission:  2/1/2024 11:55 AM    Consulting Physician: Ron Morrissey M.D.    Chief Complaint:  Leg Swelling (Pt c/o increased leg swelling up to thighs x 1 month, making him unable to weight bear due to extent of swelling. Pt also c/o some mild shortness of breath when moving, sats 88% on room air in ED.)      Patient ID:   Mr. Jaramillo is a 40 yo male obesity BMI 96 as admitted to the hospital for acute hypoxic respiratory failure secondary to HFpEF and obesity hypoventilation syndrome.  Patient made with new oxygen requirement 2 L nasal cannula.  Had been having increasing lower extremity swelling, exertional dyspnea and orthopnea over the past 2 weeks.  He was recently hospitalized for 10 days at the beginning of September for acute decompensated heart failure treated with forced diuresis and scheduled outpatient sleep study although he did not follow-up with this.     ABG performed 2/12 with pH 7.46 and elevated pCO2 56.5.  Pulmonary medicine consulted for assistance with management of obesity hypoventilation syndrome and acute on chronic hypoxic and hypercapnic respiratory failure.    Interval Update:  2/13: Discussed with [facility] supply evaluated patient at bedside regarding noninvasive ventilation. Trial of different masks. PFTs completed at bedside.  2/14: Patient overall doing well, pending NIV equipment.  2/16: Patient received NIV yesterday, able to tolerate overnight.      Review of Systems   Constitutional: Negative.    HENT: Negative.     Eyes: Negative.    Respiratory:  Positive for shortness of breath.    Cardiovascular: Negative.    Gastrointestinal: Negative.    Genitourinary: Negative.    Musculoskeletal: Negative.    Skin: Negative.    Neurological: Negative.    Endo/Heme/Allergies: Negative.    Psychiatric/Behavioral: Negative.         Home Medications       Reviewed by Murali Santa (Pharmacy Tech) on 02/01/24  at 1514  Med List Status: Complete     Medication Last Dose Status   clindamycin (CLEOCIN) 300 MG Cap 2/1/2024 Active   furosemide (LASIX) 40 MG Tab 2 weeks ago Active   Ibuprofen 200 MG Cap 2/1/2024 Active   lisinopril (PRINIVIL) 10 MG Tab couple days ago Active   predniSONE (DELTASONE) 10 MG Tab 1/28/2024 Active                    Social History     Tobacco Use    Smoking status: Never    Smokeless tobacco: Never   Substance Use Topics    Alcohol use: Yes     Comment: rarely    Drug use: Never        Past Medical History:   Diagnosis Date    Morbid obesity (HCC)        No past surgical history on file.    Allergies: Other misc    No family history on file.    Temp:  [36.4 °C (97.5 °F)-36.8 °C (98.2 °F)] 36.4 °C (97.5 °F)  Pulse:  [75-86] 75  Resp:  [16-20] 20  BP: (123-155)/(61-89) 123/67  SpO2:  [91 %-93 %] 93 %    Physical Examination  Physical Exam  Constitutional:       General: He is not in acute distress.     Appearance: He is obese.   Eyes:      General: No scleral icterus.  Cardiovascular:      Rate and Rhythm: Normal rate and regular rhythm.      Heart sounds: Normal heart sounds. No murmur heard.  Pulmonary:      Effort: Pulmonary effort is normal. No respiratory distress.   Abdominal:      General: There is no distension.      Tenderness: There is no abdominal tenderness.   Musculoskeletal:      Right lower leg: Edema present.      Left lower leg: Edema present.   Neurological:      General: No focal deficit present.      Mental Status: He is alert and oriented to person, place, and time. Mental status is at baseline.      Cranial Nerves: No cranial nerve deficit.   Psychiatric:         Behavior: Behavior normal.           Intake/Output Summary (Last 24 hours) at 2/14/2024 1244  Last data filed at 2/14/2024 1100  Gross per 24 hour   Intake 1360 ml   Output 4100 ml   Net -2740 ml           Recent Labs     02/14/24  0041 02/15/24  0520 02/16/24  0051   SODIUM 135 135 134*   POTASSIUM 4.3 4.4 4.2    CHLORIDE 91* 91* 90*   CO2 37* 33 35*   BUN 17 17 18   CREATININE 0.98 1.03 1.00   CALCIUM 8.9 9.0 9.0     Recent Labs     02/14/24  0041 02/15/24  0520 02/16/24  0051   GLUCOSE 104* 98 99           [id number] VIEW   Final Result      No evidence of bowel obstruction.   Possible constipation.                  EC-ECHOCARDIOGRAM COMPLETE W/ CONT   Final Result      US-EXTREMITY ARTERY LOWER BILAT   Final Result      US-EXTREMITY VENOUS LOWER BILAT   Final Result      DX-CHEST-PORTABLE (1 VIEW)   Final Result      Cardiomegaly.          Patient Active Problem List   Diagnosis    Abdominal wall cellulitis    Acute respiratory failure with hypoxia ([facility])    Class 3 severe obesity due to excess calories with serious comorbidity and body mass index (BMI) greater than or equal to 70 in adult ([facility])    Hypertension    Shortness of breath    Iron deficiency anemia    Volume overload    Acute on chronic heart failure with preserved ejection fraction (HFpEF) ([facility])    Bilateral leg edema    At risk for obstructive sleep apnea    Elevated troponin    Weakness    Obesity hypoventilation syndrome ([facility])    Acute right-sided congestive heart failure ([facility])    Anasarca    Cardiorenal syndrome       Assessment and Plan:  #Acute on chronic hypoxic and hypercapnic respiratory failure  #Obesity hypoventilation syndrome  Elevated pCO2 56.5 on ABG 2/12 with metabolic compensation bicarb 37.  Likely component of concomitant contraction alkalosis.  Patient requires non invasive ventilation due to CO2 retention secondary to obesity hypoventilation syndrome. Patient requires AVAPS-AE mode with heated humidification, Bipap not appropriate due to severity of restrictive lung disease from obesity.  Does require bleeding oxygen at 3 L for oxygen supplementation.  PFTs with FEV1 34% predicted and FVC 40% predicted, significant for restrictive lung deficit. FEV1/FVC ~70%.   - Received non-invasive ventilation, functioning well  - Bedside PFT  identifying restrictive deficit

## 2024-02-16 NOTE — PROGRESS NOTES
[facility] Pt sent with [facility] and his belongings sent with mother. Pt IV removed. AVS signed. Oxygen delivered. No complications.

## 2024-02-16 NOTE — PROGRESS NOTES
This note is for learning purposes only and not for billing. Note written by Nadiya Dumas, Medical Student    Daily Progress Note:   Note Author: Nadiya Dumas, Student  Date: 2/16/2024    Date of Admission: 2/1/2024  Attending: Dr. Morrissey  Medical Student: Nadiya Dumas, MS3    Reason for interval visit  (Principal Problem)   Acute respiratory failure with hypoxia (HCC)    Chief Complaint:   Cole Jaramillo is a 39 y.o. male with a PMH of morbid obesity who presented on 2/1/2024 d/t lower extremity swelling and weakness, inability to walk, and SOB.     Hospital Course:   Cole Jaramillo is a 39-year-old male with morbid obesity (BMI 87.62), iron deficiency anemia, who presented on 2/1/2024 with 2-week history of difficulty with ambulation due to severe lower extremity swelling, associated with exertional dyspnea and orthopnea. Symptoms began after likely viral respiratory infection. He became bedbound and activated EMS after consultation with a mobile physician.       Initial exam notable for hypertension (SBP 160s), hypoxemia (requiring 2L NC), distended abdomen with erythema, and severe diffuse anasarca in setting of morbid obesity. Initial weight was 330 kg. Labs showed increased NT-proBNP (4729 mg/dL), mild troponemia, and mild microcytic anemia (12.1 g/dOL, MCV 77.9). D-dimer was also elevated.      He was admitted for acute on chronic heart failure with preserved ejection fraction, likely due to under-treated MC/OHS.      Lower extremity venous Duplex ultrasound (2/3/2024) was limited by body habitus but was negative for DVTs. TTE (2/3/2024) showed LVEF 55% with flattened septum in diastole, consistent with RV volume overload (likely due to hypoxic vasoconstriction from recent URI). No regional wall motion abnormalities were seen.      He was aggressively diuresed via IV Lasix and was started on Aldactone, leading to 12-kg (or 26.45-lb) weight loss. He also received 5-day course of Clindamycin for suspected  abdominal wall cellulitis, leading to improvement in pannicular erythema.      He also underwent aggressive reconditioning and strength training with PT and OT, who recommend discharge home with home health. He will benefit from bariatric surgery evaluation given inability to lose weight (having weighed 800-lbs last year).      Of note, he was also hospitalized (8/27-9/7/2023) last year for acute decompensated heart failure, at which time he underwent forced diuresis with plan for outpatient sleep study.      He is being referred to GI for colonoscopy to look for polyps given history of LINDSEY. He was also trailed on a CPAP which he did not tolerate two days ago.     Interval History:  Patient weight today was recorded at 293 kg, decreased from 298 kg yesterday. He continues to void (>4L yesterday) on PO toresemide.     Per pulmonary recs pt has met with Zeinab from [facility] and will received his AVAP to trial last night. He reports that it is much more tolerable than the CPAP he attempted to use a few nights before.    Today he does feel a bit tired which he attributes to frequent awakenings through the night. He continues to work with PT/OT to improve conditioning, strength, and ambulation and he successfully met his distance goal yesterday. He would like to postpone discharge after doing 1 or 2 more sessions with PT/OT for his own confidence. He reports passing 2 bowel movements yesterday and feeling less constipated, but discharge with continued fecal burden also makes him hesitant about discharge today.    Consultants/Specialty:   Received inpatient psychotherapy   PT/OT consulted  Pulmonary consulted  Wound care consulted    Review of Systems:   Review of Systems   Constitutional:  Negative for chills and fever.   HENT: Negative.     Eyes: Negative.    Respiratory:  Negative for shortness of breath and wheezing.    Cardiovascular:  Negative for chest pain, palpitations, orthopnea and leg swelling.    Gastrointestinal:  Positive for constipation. Negative for abdominal pain (feeling less constipated, but still a bit backed up), nausea and vomiting.   Genitourinary:  Negative for dysuria.   Neurological:  Positive for headaches. Negative for tingling and focal weakness. Dizziness: early morning tension headache (he attributes to too much O2 administration last night).      Objective Data:   Physical Exam:   Vitals:   Temp:  [36.4 °C (97.5 °F)-36.8 °C (98.2 °F)] 36.6 °C (97.9 °F)  Pulse:  [79-86] 79  Resp:  [15-20] 20  BP: (134-155)/(61-89) 134/61  SpO2:  [91 %-93 %] 92 %    Intake/Output Summary (Last 24 hours) at 2/16/2024 0655  Last data filed at 2/16/2024 0458  Gross per 24 hour   Intake 350 ml   Output 4400 ml   Net -4050 ml       Physical Exam  Vitals reviewed.   Constitutional:       Appearance: He is obese.   HENT:      Head: Normocephalic and atraumatic.      Right Ear: External ear normal.      Left Ear: External ear normal.      Nose: Nose normal.      Mouth/Throat:      Mouth: Mucous membranes are moist.      Pharynx: Oropharynx is clear.   Eyes:      Extraocular Movements: Extraocular movements intact.      Conjunctiva/sclera: Conjunctivae normal.      Pupils: Pupils are equal, round, and reactive to light.   Cardiovascular:      Rate and Rhythm: Normal rate and regular rhythm.      Pulses: Normal pulses.      Heart sounds: Normal heart sounds. No murmur heard.     No friction rub. No gallop.   Pulmonary:      Effort: Pulmonary effort is normal. No respiratory distress.      Breath sounds: No wheezing.   Abdominal:      General: Bowel sounds are normal. There is no distension.      Palpations: Abdomen is soft. There is mass (RLQ).      Tenderness: There is no abdominal tenderness. There is no guarding.   Musculoskeletal:      Cervical back: Normal range of motion.   Feet:      Comments: Bilateral LE thickened lymphedema  Skin:     General: Skin is warm and dry.      Capillary Refill: Capillary refill  takes less than 2 seconds.   Neurological:      General: No focal deficit present.      Mental Status: He is alert and oriented to person, place, and time. Mental status is at baseline.      Cranial Nerves: No cranial nerve deficit.   Psychiatric:         Mood and Affect: Mood normal.         Behavior: Behavior normal.         Thought Content: Thought content normal.       Lab Results:    Recent Labs     02/14/24  0041 02/15/24  0520 02/16/24  0051   SODIUM 135 135 134*   POTASSIUM 4.3 4.4 4.2   CHLORIDE 91* 91* 90*   CO2 37* 33 35*   BUN 17 17 18   CREATININE 0.98 1.03 1.00   CALCIUM 8.9 9.0 9.0     Estimated GFR/CRCL = Estimated Creatinine Clearance: 225 mL/min (by C-G formula based on SCr of 1 mg/dL).  Recent Labs     02/14/24  0041 02/15/24  0520 02/16/24  0051   GLUCOSE 104* 98 99     Microbiology Results:  Results       Procedure Component Value Units Date/Time    URINALYSIS [[id number]]  (Abnormal) Collected: 02/09/24 1220    Order Status: Completed Specimen: Urine, Clean Catch Updated: 02/09/24 1246     Color Yellow     Character Clear     Specific Gravity 1.009     Ph 8.5     Glucose Negative mg/dL      Ketones Negative mg/dL      Protein Negative mg/dL      Bilirubin Negative     Urobilinogen, Urine 1.0     Nitrite Negative     Leukocyte Esterase Trace     Occult Blood Moderate     Micro Urine Req Microscopic    Narrative:      Collected By: [id number] GREEN ABDIEL N            Imaging Results:  [id number] VIEW   Final Result      No evidence of bowel obstruction.   Possible constipation.                  EC-ECHOCARDIOGRAM COMPLETE W/ CONT   Final Result      US-EXTREMITY ARTERY LOWER BILAT   Final Result      US-EXTREMITY VENOUS LOWER BILAT   Final Result      DX-CHEST-PORTABLE (1 VIEW)   Final Result      Cardiomegaly.          EKG  Results for orders placed or performed during the hospital encounter of 02/01/24   EKG   Result Value Ref Range    Report       [facility] Emergency  Dept[facility]    Test Date:  2024  Pt Name:    EVERARDO POST               Department: ER  MRN:        [id number]                      Room:       BL 18  Gender:     Male                         Technician: [id number]  :        1985                   Requested By:ER TRIAGE PROTOCOL  Order #:    [id number]                    Reading MD: REINALDO KAPOOR MD    Measurements  Intervals                                Axis  Rate:       83                           P:          57  TX:         177                          QRS:        121  QRSD:       109                          T:          5  QT:         397  QTc:        467    Interpretive Statements  Sinus rhythm  Low voltage, precordial leads  Probable right ventricular hypertrophy  Compared to ECG 2023 16:34:05  Right-axis deviation no longer present  Prolonged QT interval no longer present  Electronically Signed On 2024 12:38:50 PST by REINALDO KAPOOR MD     EKG: In the AM   Result Value Ref Range    Report       [facility]    Test Date:  2024  Pt Name:    EVERARDO POST               Department: 171  MRN:        [id number]                      Room:       T737  Gender:     Male                         Technician: ANGELICA  :        1985                   Requested By:KONSTANTIN WATKINS  Order #:    [id number]                    Reading MD: Geronimo Pollard MD    Measurements  Intervals                                Axis  Rate:       75                           P:          76  TX:         172                          QRS:        104  QRSD:       116                          T:          -12  QT:         416  QTc:        465    Interpretive Statements  Sinus rhythm  Incomplete right bundle branch block  Low voltage, precordial leads  Compared to ECG 2024 12:20:12  Incomplete right bundle-branch block now present  Electronically Signed On 2024 13:43:41 PST by Geronimo Pollard MD         Current Medications    Current  Facility-Administered Medications:     torsemide (Demadex) tablet 40 mg, 40 mg, Oral, BID, Gomez Kaufman M.D., 40 mg at 02/16/24 0505    senna-docusate (Pericolace Or Senokot S) 8.6-50 MG per tablet 2 Tablet, 2 Tablet, Oral, DAILY, ZEHRA LizarragaOBarry, 2 Tablet at 02/16/24 0503    bisacodyl EC (Dulcolax) tablet 5 mg, 5 mg, Oral, DAILY, ZEHRA LizarragaOBarry, 5 mg at 02/16/24 0504    bisacodyl (Dulcolax) suppository 10 mg, 10 mg, Rectal, QDAY PRN, Frantz Barnard D.O.    enoxaparin (Lovenox) inj 80 mg, 80 mg, Subcutaneous, Q12HRS, ZEHRA LizarragaOBarry, 80 mg at 02/16/24 0528    spironolactone (Aldactone) tablet 100 mg, 100 mg, Oral, Q DAY, Maged Paul M.D., 100 mg at 02/16/24 0504    polyethylene glycol/lytes (Miralax) Packet 1 Packet, 1 Packet, Oral, TID, Maged Paul M.D., 1 Packet at 02/15/24 2136    acetaminophen (Tylenol) tablet 650 mg, 650 mg, Oral, Q6HRS PRN, Morales Conner M.D., 650 mg at 02/13/24 1953    ondansetron (Zofran) syringe/vial injection 4 mg, 4 mg, Intravenous, Q4HRS PRN, Morales Conner M.D.    ondansetron (Zofran ODT) dispertab 4 mg, 4 mg, Oral, Q4HRS PRN, Morales Conner M.D.    promethazine (Phenergan) tablet 12.5-25 mg, 12.5-25 mg, Oral, Q4HRS PRN, Morales Conner M.D.    promethazine (Phenergan) suppository 12.5-25 mg, 12.5-25 mg, Rectal, Q4HRS PRN, Morales Conner M.D.    prochlorperazine (Compazine) injection 5-10 mg, 5-10 mg, Intravenous, Q4HRS PRN, Morales Conner M.D.    guaiFENesin dextromethorphan (Robitussin DM) 100-10 MG/5ML syrup 10 mL, 10 mL, Oral, Q6HRS PRN, Moralse Conner M.D.    Respiratory Therapy Consult, , Nebulization, Continuous RT, Morales Conner M.D.    ipratropium-albuterol (DUONEB) nebulizer solution, 3 mL, Nebulization, Q2HRS PRN (RT), Morales Conner M.D.    lisinopril (Prinivil) tablet 10 mg, 10 mg, Oral, DAILY, Gomez Kaufman M.D., 10 mg at 02/16/24 0503    omeprazole (PriLOSEC) capsule 20 mg, 20 mg, Oral, BID, Morales Conner M.D., 20 mg at 02/16/24 0503    aspirin EC tablet 81 mg, 81 mg,  Oral, DAILY, Morales Conner M.D., 81 mg at 02/16/24 0503    atorvastatin (Lipitor) tablet 40 mg, 40 mg, Oral, Q EVENING, Morales Conner M.D., 40 mg at 02/15/24 1830    Assessment & Plan:   * Acute respiratory failure with hypoxia (HCC)- (present on admission)  Assessment & Plan  His Initial exam was notable for hypertension ([id number]), hypoxemia (requiring 2L NC), distended abdomen with erythema, and severe diffuse anasarca in setting of morbid obesity. This is likely secondary to right-sided heart failure from pulmonary hypertension due to undiagnosed sleep apnea. Echocardiogram on this admission did demonstrate pulmonary hypertension. Currently satting between 90%-93% on 1 L NC.     Plan   -Change Torsemide 80mg QD to 40mg BID   -Continue Aldactone 100mg daily   -Continue daily weights, strict I/Os.   -Continue low-salt diet with 2L daily fluid restriction.   -RT/OT protocol   -Continue telemetry to monitor for any new arrythmias as a result of aggressive diuresis.   -Continuous pulse ox and wean off supplemental O2 as tolerated.   -IS at bedside; deep breathing encouraged.   -Continue low-dose Aspirin, Atorvastatin 40mg daily for primary prevention   -Received non-invasive positive pressure ventilation equipment (BiPAP) and tolerated overnight use well.   - Stable for discharge    Obesity hypoventilation syndrome (HCC)- (present on admission)  Assessment & Plan  Elevated pCO2 56.5 on ABG drawn on 2/12 with a metabolic compensation bicarbonate of 37. This substantiates a likely obesity hypoventilation syndrome. Pt trailed a CPAP but didn't tolerate it. Pulmonary consult reported that he may benefit from noninvasive positive pressure ventilation. 2/13 PFT order suggested restrictive lung disease, which is consistent with OHS.     Plan   -BiPAP trial tolerated well overnight  -Stable for discharge    Anasarca- (present on admission)  Assessment & Plan  Due to acute on chronic right heart failure. Suspected to be due to  untreated MC/OHS.     Plan   -Continue PO diuresis with Lasix 40mg daily at home  -Continue compression therapy with home health  -Stable for discharge    Acute right-sided congestive heart failure (HCC)- (present on admission)  Assessment & Plan  Likely secondary to undiagnosed OHS/MC     Plan   -Continue IV diuresis and trend BNP as outlined under acute hypoxic respiratory failure     Weakness- (present on admission)  Assessment & Plan  Weakness    Plan  Referral for home PT/OT    Volume overload- (present on admission)  Assessment & Plan  RESOLVED  Plan   Continued diuresis as tolerated    Iron deficiency anemia- (present on admission)  Assessment & Plan  Cause unknown. Denies known blood loss of history of melena.     Plan   - Will need GI referral on discharge for diagnostic colonscopy.   - Continue ferrous sulfate supplementation every other day at home     Hypertension- (present on admission)  Assessment & Plan  Plan   -Continue home lisinopril     Class 3 severe obesity due to excess calories with serious comorbidity and body mass index (BMI) greater than or equal to 70 in adult (HCC)- (present on admission)  Assessment & Plan  Body mass index is 90.09 kg/m².     Plan   -Diet and lifestyle modification   -Bariatric surgery outpatient referral made    Abdominal wall cellulitis- (present on admission)  Assessment & Plan  RESOLVED.   - s/p Clindamycin x5 days    DVT/PE PPX: Lovenox 80mg Q12

## 2024-02-16 NOTE — DISCHARGE SUMMARY
[facility] Internal Medicine Discharge Summary    Attending: Ron Morrissey M.d.  Senior Resident: Dr. Abad  Intern:  Dr. Kaufman   Contact Number: [phone]    CHIEF COMPLAINT ON ADMISSION  Chief Complaint   Patient presents with    Leg Swelling     Pt c/o increased leg swelling up to thighs x 1 month, making him unable to weight bear due to extent of swelling. Pt also c/o some mild shortness of breath when moving, sats 88% on room air in ED.       Reason for Admission  Severe lower-extremity edema     Admission Date  2/1/2024    CODE STATUS  Full Code    HPI & HOSPITAL COURSE  Cole Jaramillo is a 39-year-old male with morbid obesity (BMI 87.62) who presented to the ED on 2/1/2024 with progressive 2 weeks of difficulty with ambulation due to significant lower extremity edema associated with exertional dyspnea and orthopnea.  Patient was hypoxic on presentation requiring 2 to 3 L of oxygen for adequate saturation.  Labs demonstrated a BNP 4729.  Patient was initiated on aggressive IV diuresis with Lasix in addition to Aldactone.  Echo demonstrated an LVEF of 55% with a flattened septum in diastole consistent with right ventricular volume overload likely due to pulmonary hypertension.  An ABG was done which demonstrated significant pCO2 retention (55.5) substantiating obesity ventilation syndrome.  Pulmonology was consulted who recommended initiating inpatient noninvasive ventilator support as well as a bedside PFT which confirmed restrictive lung disease.  Patient maintained good urine output while on IV diuresis and had significant improvement in his volume status.  He was eventually transitioned to p.o. torsemide 40 mg BID from the IV Lasix and continued to maintain good urine output.  An AVAPS was delivered to bedside for the patient and he was able to tolerate it without any issues.  Patient's initial presentation was likely due to right-sided heart failure from restrictive lung disease leading to pulmonary  hypertension.  Physical therapy and Occupational Therapy evaluated the patient and recommended home health services.  His hospital course was complicated by abdominal wall cellulitis for which he was treated with 5 days of oral clindamycin.  He was also noted to have microcytic anemia with low ferritin and iron levels consistent with iron deficiency anemia and was started on iron supplementation every 48 hours.  He denied having any blood loss.  On 2/16/2024, patient was stable to discharge home with home health.  An outpatient GI referral was placed given the unexplained iron deficiency anemia as well as a bariatric surgery referral for discussion about weight loss surgery. Patient required 3L on ambulation with oxymetry which was set up for the patient prior to discharge. Patient will be discharged on Aldactone 100mg daily in addition to torsemide 40mg daily. He was advised to increased the frequency of torsemide to BID if he noticed increased weight gain. Patient reported he had help at home prior to admission and will have help at home at discharge as well. He was agreeable for discharge.       Therefore, he is discharged in fair and stable condition to home with organized home healthcare and close outpatient follow-up.    The patient met 2-midnight criteria for an inpatient stay at the time of discharge.    Discharge Date  2/16/24    Physical Exam on Day of Discharge  Physical Exam  Constitutional:       General: He is not in acute distress.  HENT:      Head: Normocephalic.      Mouth/Throat:      Mouth: Mucous membranes are moist.   Eyes:      General: No scleral icterus.        Right eye: No discharge.         Left eye: No discharge.   Cardiovascular:      Rate and Rhythm: Normal rate and regular rhythm.      Pulses: Normal pulses.      Heart sounds: Normal heart sounds.   Pulmonary:      Effort: Pulmonary effort is normal.      Breath sounds: Normal breath sounds.   Abdominal:      General: There is no  distension.      Tenderness: There is no abdominal tenderness. There is no guarding or rebound.   Musculoskeletal:         General: No tenderness.      Comments: Trace bilateral edema much improved from admission    Skin:     General: Skin is warm and dry.   Neurological:      Mental Status: He is alert and oriented to person, place, and time. Mental status is at baseline.   Psychiatric:         Mood and Affect: Mood normal.         FOLLOW UP ITEMS POST DISCHARGE  -Follow up with GI regarding unexplained iron deficiency anemia   -Follow up with bariatric surgery regarding weight-loss surgery  -Continue using AVAPS machine compliantly     DISCHARGE DIAGNOSES  Principal Problem:    Acute respiratory failure with hypoxia (HCC) (POA: Yes)  Active Problems:    Obesity hypoventilation syndrome (HCC) (POA: Yes)    Acute right-sided congestive heart failure (HCC) (POA: Yes)    Abdominal wall cellulitis (POA: Yes)    Class 3 severe obesity due to excess calories with serious comorbidity and body mass index (BMI) greater than or equal to 70 in adult (HCC) (POA: Yes)    Hypertension (POA: Yes)    Iron deficiency anemia (POA: Yes)    Volume overload (POA: Yes)    Acute on chronic heart failure with preserved ejection fraction (HFpEF) (HCC) (POA: Yes)    Bilateral leg edema (POA: Unknown)    At risk for obstructive sleep apnea (POA: Yes)    Elevated troponin (POA: Yes)    Weakness (POA: Yes)    Anasarca (POA: Yes)    Cardiorenal syndrome (POA: Unknown)      Overview: Baseline Cr 0.5-0.6, currently 0.9-1.0. Likely due to cardiorenal       syndrome.      - Improving with forced diuresis. Will cont.   Resolved Problems:    * No resolved hospital problems. *      FOLLOW UP  Future Appointments   Date Time Provider Department Center   2/22/2024  3:00 PM PENELOPE MartinesRICHARIOT [facility] [place]   3/1/2024  1:40 PM PENELOPE Pagan None     No follow-up provider specified.    MEDICATIONS ON DISCHARGE     Medication List         START taking these medications        Instructions   aspirin 81 MG EC tablet  Start taking on: February 17, 2024   Take 1 Tablet by mouth every day.  Dose: 81 mg     atorvastatin 40 MG Tabs  Commonly known as: Lipitor   Take 1 Tablet by mouth every evening.  Dose: 40 mg     ferrous sulfate 325 (65 Fe) MG tablet   Take 1 Tablet by mouth every 48 hours.  Dose: 325 mg     spironolactone 100 MG Tabs  Start taking on: February 17, 2024  Commonly known as: Aldactone   Take 1 Tablet by mouth every day.  Dose: 100 mg     torsemide 20 MG Tabs  Commonly known as: Demadex   Take 2 Tablets by mouth every day for 30 days.  Dose: 40 mg            CONTINUE taking these medications        Instructions   lisinopril 10 MG Tabs  Start taking on: February 17, 2024  Commonly known as: Prinivil   Take 1 Tablet by mouth every day.  Dose: 10 mg            STOP taking these medications      clindamycin 300 MG Caps  Commonly known as: Cleocin     furosemide 40 MG Tabs  Commonly known as: Lasix     Ibuprofen 200 MG Caps     predniSONE 10 MG Tabs  Commonly known as: Deltasone              Allergies  Allergies   Allergen Reactions    Other Misc Unspecified     Unknown antibiotic for a staph infection, blocked arteries, rash.        DIET  Orders Placed This Encounter   Procedures    Diet Order Diet: 2 Gram Sodium     Standing Status:   Standing     Number of Occurrences:   1     Order Specific Question:   Diet:     Answer:   2 Gram Sodium [7]       ACTIVITY  As tolerated.  Weight bearing as tolerated    CONSULTATIONS  Pulmonology     PROCEDURES  None     LABORATORY  Lab Results   Component Value Date    SODIUM 134 (L) 02/16/2024    POTASSIUM 4.2 02/16/2024    CHLORIDE 90 (L) 02/16/2024    CO2 35 (H) 02/16/2024    GLUCOSE 99 02/16/2024    BUN 18 02/16/2024    CREATININE 1.00 02/16/2024        Lab Results   Component Value Date    WBC 6.8 02/11/2024    HEMOGLOBIN 11.1 (L) 02/11/2024    HEMATOCRIT 38.7 (L) 02/11/2024    PLATELETCT 270  02/11/2024        Total time of the discharge process exceeds 42 minutes.

## 2024-02-22 ENCOUNTER — APPOINTMENT (OUTPATIENT)
Dept: INTERNAL MEDICINE | Facility: OTHER | Age: 39
End: 2024-02-22
Payer: COMMERCIAL

## 2024-02-22 ENCOUNTER — APPOINTMENT (OUTPATIENT)
Dept: RADIOLOGY | Facility: MEDICAL CENTER | Age: 39
DRG: 697 | End: 2024-02-22
Attending: HOSPITALIST
Payer: COMMERCIAL

## 2024-02-22 ENCOUNTER — HOSPITAL ENCOUNTER (INPATIENT)
Facility: MEDICAL CENTER | Age: 39
LOS: 4 days | DRG: 697 | End: 2024-02-26
Attending: EMERGENCY MEDICINE | Admitting: HOSPITALIST
Payer: COMMERCIAL

## 2024-02-22 DIAGNOSIS — N32.0 BLADDER OBSTRUCTION: ICD-10-CM

## 2024-02-22 DIAGNOSIS — R33.8 ACUTE URINARY RETENTION: ICD-10-CM

## 2024-02-22 DIAGNOSIS — N39.0 COMPLICATED UTI (URINARY TRACT INFECTION): ICD-10-CM

## 2024-02-22 DIAGNOSIS — E66.01 MORBID OBESITY (HCC): ICD-10-CM

## 2024-02-22 DIAGNOSIS — J96.01 ACUTE RESPIRATORY FAILURE WITH HYPOXIA (HCC): ICD-10-CM

## 2024-02-22 DIAGNOSIS — N30.01 ACUTE CYSTITIS WITH HEMATURIA: ICD-10-CM

## 2024-02-22 PROBLEM — R19.00 ABDOMINAL MASS: Status: ACTIVE | Noted: 2024-02-22

## 2024-02-22 LAB
ALBUMIN SERPL BCP-MCNC: 4.2 G/DL (ref 3.2–4.9)
ALBUMIN/GLOB SERPL: 1.1 G/DL
ALP SERPL-CCNC: 103 U/L (ref 30–99)
ALT SERPL-CCNC: 17 U/L (ref 2–50)
ANION GAP SERPL CALC-SCNC: 14 MMOL/L (ref 7–16)
APPEARANCE UR: ABNORMAL
AST SERPL-CCNC: 19 U/L (ref 12–45)
BACTERIA #/AREA URNS HPF: ABNORMAL /HPF
BASOPHILS # BLD AUTO: 0.4 % (ref 0–1.8)
BASOPHILS # BLD: 0.04 K/UL (ref 0–0.12)
BILIRUB SERPL-MCNC: 1.6 MG/DL (ref 0.1–1.5)
BILIRUB UR QL STRIP.AUTO: NEGATIVE
BUN SERPL-MCNC: 18 MG/DL (ref 8–22)
CALCIUM ALBUM COR SERPL-MCNC: 9.3 MG/DL (ref 8.5–10.5)
CALCIUM SERPL-MCNC: 9.5 MG/DL (ref 8.5–10.5)
CHLORIDE SERPL-SCNC: 95 MMOL/L (ref 96–112)
CO2 SERPL-SCNC: 26 MMOL/L (ref 20–33)
COLOR UR: YELLOW
COMMENT 1642: NORMAL
CREAT SERPL-MCNC: 1.15 MG/DL (ref 0.5–1.4)
EOSINOPHIL # BLD AUTO: 0.08 K/UL (ref 0–0.51)
EOSINOPHIL NFR BLD: 0.8 % (ref 0–6.9)
EPI CELLS #/AREA URNS HPF: ABNORMAL /HPF
ERYTHROCYTE [DISTWIDTH] IN BLOOD BY AUTOMATED COUNT: 66.5 FL (ref 35.9–50)
GFR SERPLBLD CREATININE-BSD FMLA CKD-EPI: 82 ML/MIN/1.73 M 2
GLOBULIN SER CALC-MCNC: 3.8 G/DL (ref 1.9–3.5)
GLUCOSE SERPL-MCNC: 107 MG/DL (ref 65–99)
GLUCOSE UR STRIP.AUTO-MCNC: NEGATIVE MG/DL
HCT VFR BLD AUTO: 42 % (ref 42–52)
HGB BLD-MCNC: 12.7 G/DL (ref 14–18)
HYALINE CASTS #/AREA URNS LPF: ABNORMAL /LPF
IMM GRANULOCYTES # BLD AUTO: 0.04 K/UL (ref 0–0.11)
IMM GRANULOCYTES NFR BLD AUTO: 0.4 % (ref 0–0.9)
KETONES UR STRIP.AUTO-MCNC: ABNORMAL MG/DL
LACTATE SERPL-SCNC: 0.8 MMOL/L (ref 0.5–2)
LEUKOCYTE ESTERASE UR QL STRIP.AUTO: ABNORMAL
LIPASE SERPL-CCNC: 12 U/L (ref 11–82)
LYMPHOCYTES # BLD AUTO: 0.81 K/UL (ref 1–4.8)
LYMPHOCYTES NFR BLD: 7.8 % (ref 22–41)
MCH RBC QN AUTO: 23.6 PG (ref 27–33)
MCHC RBC AUTO-ENTMCNC: 30.2 G/DL (ref 32.3–36.5)
MCV RBC AUTO: 78.1 FL (ref 81.4–97.8)
MICRO URNS: ABNORMAL
MICROCYTES BLD QL SMEAR: ABNORMAL
MONOCYTES # BLD AUTO: 0.78 K/UL (ref 0–0.85)
MONOCYTES NFR BLD AUTO: 7.5 % (ref 0–13.4)
MORPHOLOGY BLD-IMP: NORMAL
NEUTROPHILS # BLD AUTO: 8.66 K/UL (ref 1.82–7.42)
NEUTROPHILS NFR BLD: 83.1 % (ref 44–72)
NITRITE UR QL STRIP.AUTO: POSITIVE
NRBC # BLD AUTO: 0 K/UL
NRBC BLD-RTO: 0 /100 WBC (ref 0–0.2)
NT-PROBNP SERPL IA-MCNC: 1445 PG/ML (ref 0–125)
OVALOCYTES BLD QL SMEAR: NORMAL
PH UR STRIP.AUTO: 6 [PH] (ref 5–8)
PLATELET # BLD AUTO: 418 K/UL (ref 164–446)
PLATELET BLD QL SMEAR: NORMAL
PMV BLD AUTO: 9.1 FL (ref 9–12.9)
POIKILOCYTOSIS BLD QL SMEAR: NORMAL
POTASSIUM SERPL-SCNC: 4.2 MMOL/L (ref 3.6–5.5)
PROT SERPL-MCNC: 8 G/DL (ref 6–8.2)
PROT UR QL STRIP: 100 MG/DL
RBC # BLD AUTO: 5.38 M/UL (ref 4.7–6.1)
RBC # URNS HPF: ABNORMAL /HPF
RBC BLD AUTO: PRESENT
RBC UR QL AUTO: ABNORMAL
SODIUM SERPL-SCNC: 135 MMOL/L (ref 135–145)
SP GR UR STRIP.AUTO: 1.01
UROBILINOGEN UR STRIP.AUTO-MCNC: 0.2 MG/DL
WBC # BLD AUTO: 10.4 K/UL (ref 4.8–10.8)
WBC #/AREA URNS HPF: ABNORMAL /HPF

## 2024-02-22 PROCEDURE — 83880 ASSAY OF NATRIURETIC PEPTIDE: CPT

## 2024-02-22 PROCEDURE — 700102 HCHG RX REV CODE 250 W/ 637 OVERRIDE(OP): Performed by: HOSPITALIST

## 2024-02-22 PROCEDURE — 96374 THER/PROPH/DIAG INJ IV PUSH: CPT

## 2024-02-22 PROCEDURE — 51798 US URINE CAPACITY MEASURE: CPT

## 2024-02-22 PROCEDURE — 302140 GU CART

## 2024-02-22 PROCEDURE — 87086 URINE CULTURE/COLONY COUNT: CPT

## 2024-02-22 PROCEDURE — 99222 1ST HOSP IP/OBS MODERATE 55: CPT | Performed by: HOSPITALIST

## 2024-02-22 PROCEDURE — 36415 COLL VENOUS BLD VENIPUNCTURE: CPT

## 2024-02-22 PROCEDURE — 0T7D8ZZ DILATION OF URETHRA, VIA NATURAL OR ARTIFICIAL OPENING ENDOSCOPIC: ICD-10-PCS | Performed by: UROLOGY

## 2024-02-22 PROCEDURE — 700101 HCHG RX REV CODE 250: Performed by: HOSPITALIST

## 2024-02-22 PROCEDURE — 80053 COMPREHEN METABOLIC PANEL: CPT

## 2024-02-22 PROCEDURE — 51702 INSERT TEMP BLADDER CATH: CPT

## 2024-02-22 PROCEDURE — 87186 SC STD MICRODIL/AGAR DIL: CPT

## 2024-02-22 PROCEDURE — 700111 HCHG RX REV CODE 636 W/ 250 OVERRIDE (IP): Performed by: HOSPITALIST

## 2024-02-22 PROCEDURE — A9270 NON-COVERED ITEM OR SERVICE: HCPCS | Performed by: HOSPITALIST

## 2024-02-22 PROCEDURE — 303105 HCHG CATHETER EXTRA

## 2024-02-22 PROCEDURE — 81001 URINALYSIS AUTO W/SCOPE: CPT

## 2024-02-22 PROCEDURE — 770001 HCHG ROOM/CARE - MED/SURG/GYN PRIV*

## 2024-02-22 PROCEDURE — 87077 CULTURE AEROBIC IDENTIFY: CPT

## 2024-02-22 PROCEDURE — 700105 HCHG RX REV CODE 258: Performed by: EMERGENCY MEDICINE

## 2024-02-22 PROCEDURE — 83690 ASSAY OF LIPASE: CPT

## 2024-02-22 PROCEDURE — 99285 EMERGENCY DEPT VISIT HI MDM: CPT

## 2024-02-22 PROCEDURE — 700111 HCHG RX REV CODE 636 W/ 250 OVERRIDE (IP): Performed by: EMERGENCY MEDICINE

## 2024-02-22 PROCEDURE — 85025 COMPLETE CBC W/AUTO DIFF WBC: CPT

## 2024-02-22 PROCEDURE — 83605 ASSAY OF LACTIC ACID: CPT

## 2024-02-22 RX ORDER — PROCHLORPERAZINE EDISYLATE 5 MG/ML
5-10 INJECTION INTRAMUSCULAR; INTRAVENOUS EVERY 4 HOURS PRN
Status: DISCONTINUED | OUTPATIENT
Start: 2024-02-22 | End: 2024-02-26 | Stop reason: HOSPADM

## 2024-02-22 RX ORDER — OXYCODONE HYDROCHLORIDE 5 MG/1
5 TABLET ORAL
Status: DISCONTINUED | OUTPATIENT
Start: 2024-02-22 | End: 2024-02-26 | Stop reason: HOSPADM

## 2024-02-22 RX ORDER — ASPIRIN 81 MG/1
81 TABLET ORAL DAILY
Status: DISCONTINUED | OUTPATIENT
Start: 2024-02-22 | End: 2024-02-26 | Stop reason: HOSPADM

## 2024-02-22 RX ORDER — FERROUS SULFATE 325(65) MG
325 TABLET ORAL
Status: DISCONTINUED | OUTPATIENT
Start: 2024-02-23 | End: 2024-02-26 | Stop reason: HOSPADM

## 2024-02-22 RX ORDER — OXYCODONE HYDROCHLORIDE 10 MG/1
10 TABLET ORAL
Status: DISCONTINUED | OUTPATIENT
Start: 2024-02-22 | End: 2024-02-26 | Stop reason: HOSPADM

## 2024-02-22 RX ORDER — ONDANSETRON 2 MG/ML
4 INJECTION INTRAMUSCULAR; INTRAVENOUS EVERY 4 HOURS PRN
Status: DISCONTINUED | OUTPATIENT
Start: 2024-02-22 | End: 2024-02-26 | Stop reason: HOSPADM

## 2024-02-22 RX ORDER — MORPHINE SULFATE 4 MG/ML
4 INJECTION INTRAVENOUS
Status: DISCONTINUED | OUTPATIENT
Start: 2024-02-22 | End: 2024-02-26 | Stop reason: HOSPADM

## 2024-02-22 RX ORDER — LISINOPRIL 10 MG/1
10 TABLET ORAL DAILY
Status: DISCONTINUED | OUTPATIENT
Start: 2024-02-22 | End: 2024-02-26 | Stop reason: HOSPADM

## 2024-02-22 RX ORDER — ENOXAPARIN SODIUM 100 MG/ML
60 INJECTION SUBCUTANEOUS 2 TIMES DAILY
Status: DISCONTINUED | OUTPATIENT
Start: 2024-02-22 | End: 2024-02-26 | Stop reason: HOSPADM

## 2024-02-22 RX ORDER — ONDANSETRON 4 MG/1
4 TABLET, ORALLY DISINTEGRATING ORAL EVERY 4 HOURS PRN
Status: DISCONTINUED | OUTPATIENT
Start: 2024-02-22 | End: 2024-02-26 | Stop reason: HOSPADM

## 2024-02-22 RX ORDER — POLYETHYLENE GLYCOL 3350 17 G/17G
1 POWDER, FOR SOLUTION ORAL
Status: DISCONTINUED | OUTPATIENT
Start: 2024-02-22 | End: 2024-02-26 | Stop reason: HOSPADM

## 2024-02-22 RX ORDER — ATORVASTATIN CALCIUM 40 MG/1
40 TABLET, FILM COATED ORAL EVERY EVENING
Status: DISCONTINUED | OUTPATIENT
Start: 2024-02-22 | End: 2024-02-26 | Stop reason: HOSPADM

## 2024-02-22 RX ORDER — AMOXICILLIN 250 MG
2 CAPSULE ORAL EVERY EVENING
Status: DISCONTINUED | OUTPATIENT
Start: 2024-02-22 | End: 2024-02-26 | Stop reason: HOSPADM

## 2024-02-22 RX ORDER — PROMETHAZINE HYDROCHLORIDE 25 MG/1
12.5-25 TABLET ORAL EVERY 4 HOURS PRN
Status: DISCONTINUED | OUTPATIENT
Start: 2024-02-22 | End: 2024-02-26 | Stop reason: HOSPADM

## 2024-02-22 RX ORDER — TORSEMIDE 20 MG/1
40 TABLET ORAL DAILY
Status: DISCONTINUED | OUTPATIENT
Start: 2024-02-22 | End: 2024-02-26 | Stop reason: HOSPADM

## 2024-02-22 RX ORDER — ACETAMINOPHEN 325 MG/1
650 TABLET ORAL EVERY 6 HOURS PRN
Status: DISCONTINUED | OUTPATIENT
Start: 2024-02-22 | End: 2024-02-26 | Stop reason: HOSPADM

## 2024-02-22 RX ORDER — SPIRONOLACTONE 100 MG/1
100 TABLET, FILM COATED ORAL DAILY
Status: DISCONTINUED | OUTPATIENT
Start: 2024-02-22 | End: 2024-02-26 | Stop reason: HOSPADM

## 2024-02-22 RX ORDER — PROMETHAZINE HYDROCHLORIDE 25 MG/1
12.5-25 SUPPOSITORY RECTAL EVERY 4 HOURS PRN
Status: DISCONTINUED | OUTPATIENT
Start: 2024-02-22 | End: 2024-02-26 | Stop reason: HOSPADM

## 2024-02-22 RX ADMIN — LISINOPRIL 10 MG: 10 TABLET ORAL at 13:23

## 2024-02-22 RX ADMIN — WATER 2 G: 100 INJECTION, SOLUTION INTRAVENOUS at 21:28

## 2024-02-22 RX ADMIN — CEFAZOLIN 2 G: 2 INJECTION, POWDER, FOR SOLUTION INTRAMUSCULAR; INTRAVENOUS at 12:20

## 2024-02-22 RX ADMIN — SPIRONOLACTONE 100 MG: 100 TABLET ORAL at 13:23

## 2024-02-22 RX ADMIN — ATORVASTATIN CALCIUM 40 MG: 40 TABLET, FILM COATED ORAL at 17:05

## 2024-02-22 RX ADMIN — ASPIRIN 81 MG: 81 TABLET, COATED ORAL at 13:23

## 2024-02-22 RX ADMIN — ENOXAPARIN SODIUM 60 MG: 100 INJECTION SUBCUTANEOUS at 17:05

## 2024-02-22 RX ADMIN — TORSEMIDE 40 MG: 20 TABLET ORAL at 14:29

## 2024-02-22 ASSESSMENT — ENCOUNTER SYMPTOMS
BACK PAIN: 1
PALPITATIONS: 0
FEVER: 0
DIARRHEA: 0
SORE THROAT: 0
HEADACHES: 0
LOSS OF CONSCIOUSNESS: 0
DOUBLE VISION: 0
CHILLS: 0
COUGH: 0
VOMITING: 0
DIZZINESS: 0
NAUSEA: 0
SHORTNESS OF BREATH: 0
ABDOMINAL PAIN: 0
BLURRED VISION: 0

## 2024-02-22 ASSESSMENT — PAIN DESCRIPTION - PAIN TYPE
TYPE: ACUTE PAIN

## 2024-02-22 ASSESSMENT — PAIN DESCRIPTION - DESCRIPTORS: DESCRIPTORS: ACHING;BURNING

## 2024-02-22 ASSESSMENT — FIBROSIS 4 INDEX: FIB4 SCORE: 0.42

## 2024-02-22 NOTE — ASSESSMENT & PLAN NOTE
Urine culture growing ESBL Klebsiella.  Isolation ordered  I consulted infectious disease  Switched to meropenem  Plan for 7 days IV antibiotic, ertapenem on discharge  Midline placed  Case management working on discharge plans

## 2024-02-22 NOTE — ED NOTES
cart ordered, pt to be seen by urology.  Cart ordered in [facility] and notified the [employer] specialists.

## 2024-02-22 NOTE — ASSESSMENT & PLAN NOTE
Urology placed Lerner catheter via cystoscopy, remain in place until outpatient follow-up  He has significant postobstructive diuresis with high urine output that has improved.  Continue to monitor output  Oxybutynin for possible spasm pain.  Added daily Flomax

## 2024-02-22 NOTE — ED NOTES
Pharmacy Medication Reconciliation      ~Medication reconciliation updated and complete per patient at bedside & [facility]   ~Allergies have been verified   ~No oral ABX within the last 30 days  ~Patient home pharmacy :  [facility]      ~Anticoagulants (rivaroxaban, apixaban, edoxaban, dabigatran, warfarin, enoxaparin) taken in the last 14 days? No

## 2024-02-22 NOTE — ED NOTES
Spoke with [facility] charge requesting a bariatric bed, report given to Anna Marie HERNANDEZ. Notified room is waiting on cleaning

## 2024-02-22 NOTE — ED PROVIDER NOTES
"ED Provider Note    CHIEF COMPLAINT  Chief Complaint   Patient presents with    Urinary Retention     Pt BIB by ems with a complain of inability to urinate since 2000 2/21/24. On arrival had visible incontinence of bowel and bladder. Pt also complains of back pain and lower abdominal pain which he thinks is related to urinary retention.    Abdominal Pain       EXTERNAL RECORDS REVIEWED  Reviewed hospitalization records from last week including laboratory studies ultrasound echocardiogram.    HPI/ROS  LIMITATION TO HISTORY   None  OUTSIDE HISTORIAN(S):  EMS provided additional history    Cole Jaramillo is a 39 y.o. male who presents for evaluation of lower abdominal discomfort distention and inability to urinate.  The patient has a history of morbid obesity.  He was recently hospitalized at this facility and just got out a few days ago.  He was diagnosed with pulmonary hypertension volume overload and some heart failure.  He was placed on aggressive diuretic therapy in the hospital and was feeling better and discharged.  He reports no high fevers or chills no focal numbness tingling weakness to the arms legs or face.  No report of any fevers chills hematemesis or hematochezia    PAST MEDICAL HISTORY   has a past medical history of Morbid obesity (HCC).    SURGICAL HISTORY  patient denies any surgical history    FAMILY HISTORY  No family history on file.    SOCIAL HISTORY  Social History     Tobacco Use    Smoking status: Never    Smokeless tobacco: Never   Substance and Sexual Activity    Alcohol use: Yes     Comment: rarely    Drug use: Never    Sexual activity: Not on file       CURRENT MEDICATIONS  Home Medications    **Home medications have not yet been reviewed for this encounter**         ALLERGIES  No Known Allergies    PHYSICAL EXAM  VITAL SIGNS: BP (!) 147/83   Pulse 96   Resp (!) 22   Ht 1.803 m (5' 11\")   Wt (!) 295 kg (650 lb)   SpO2 97%   BMI 90.66 kg/m²    Pulse ox interpretation: Hypoxic on " room air  Constitutional: Alert and oriented x 3, patient appears ill  HEENT: Atraumatic normocephalic, pupils are equal round reactive to light extraocular movements are intact. The nares is clear, external ears are normal, mouth shows moist mucous membranes normal dentition for age  Neck: Supple, no JVD no tracheal deviation  Cardiovascular: Regular rate and rhythm no murmur rub or gallop 2+ pulses peripherally x4  Thorax & Lungs: No respiratory distress, no wheezes rales or rhonchi, No chest tenderness.   GI: Soft nontender diffuse suprapubic discomfort  Skin: Warm dry no acute rash or lesion  Musculoskeletal: Moving all extremities with full range and 5 of 5 strength no acute  deformity  Neurologic: Cranial nerves III through XII are grossly intact no sensory deficit no cerebellar dysfunction   Psychiatric: Appropriate affect for situation at this time          DIAGNOSTIC STUDIES / PROCEDURES    LABS  Results for orders placed or performed during the hospital encounter of 02/22/24   CBC WITH DIFFERENTIAL   Result Value Ref Range    WBC 10.4 4.8 - 10.8 K/uL    RBC 5.38 4.70 - 6.10 M/uL    Hemoglobin 12.7 (L) 14.0 - 18.0 g/dL    Hematocrit 42.0 42.0 - 52.0 %    MCV 78.1 (L) 81.4 - 97.8 fL    MCH 23.6 (L) 27.0 - 33.0 pg    MCHC 30.2 (L) 32.3 - 36.5 g/dL    RDW 66.5 (H) 35.9 - 50.0 fL    Platelet Count 418 164 - 446 K/uL    MPV 9.1 9.0 - 12.9 fL    Neutrophils-Polys 83.10 (H) 44.00 - 72.00 %    Lymphocytes 7.80 (L) 22.00 - 41.00 %    Monocytes 7.50 0.00 - 13.40 %    Eosinophils 0.80 0.00 - 6.90 %    Basophils 0.40 0.00 - 1.80 %    Immature Granulocytes 0.40 0.00 - 0.90 %    Nucleated RBC 0.00 0.00 - 0.20 /100 WBC    Neutrophils (Absolute) 8.66 (H) 1.82 - 7.42 K/uL    Lymphs (Absolute) 0.81 (L) 1.00 - 4.80 K/uL    Monos (Absolute) 0.78 0.00 - 0.85 K/uL    Eos (Absolute) 0.08 0.00 - 0.51 K/uL    Baso (Absolute) 0.04 0.00 - 0.12 K/uL    Immature Granulocytes (abs) 0.04 0.00 - 0.11 K/uL    NRBC (Absolute) 0.00 K/uL     Microcytosis 1+    COMP METABOLIC PANEL   Result Value Ref Range    Sodium 135 135 - 145 mmol/L    Potassium 4.2 3.6 - 5.5 mmol/L    Chloride 95 (L) 96 - 112 mmol/L    Co2 26 20 - 33 mmol/L    Anion Gap 14.0 7.0 - 16.0    Glucose 107 (H) 65 - 99 mg/dL    Bun 18 8 - 22 mg/dL    Creatinine 1.15 0.50 - 1.40 mg/dL    Calcium 9.5 8.5 - 10.5 mg/dL    Correct Calcium 9.3 8.5 - 10.5 mg/dL    AST(SGOT) 19 12 - 45 U/L    ALT(SGPT) 17 2 - 50 U/L    Alkaline Phosphatase 103 (H) 30 - 99 U/L    Total Bilirubin 1.6 (H) 0.1 - 1.5 mg/dL    Albumin 4.2 3.2 - 4.9 g/dL    Total Protein 8.0 6.0 - 8.2 g/dL    Globulin 3.8 (H) 1.9 - 3.5 g/dL    A-G Ratio 1.1 g/dL   LIPASE   Result Value Ref Range    Lipase 12 11 - 82 U/L   URINALYSIS    Specimen: Urine   Result Value Ref Range    Color Yellow     Character Cloudy (A)     Specific Gravity 1.015 <1.035    Ph 6.0 5.0 - 8.0    Glucose Negative Negative mg/dL    Ketones Trace (A) Negative mg/dL    Protein 100 (A) Negative mg/dL    Bilirubin Negative Negative    Urobilinogen, Urine 0.2 Negative    Nitrite Positive (A) Negative    Leukocyte Esterase Large (A) Negative    Occult Blood Large (A) Negative    Micro Urine Req Microscopic    proBrain Natriuretic Peptide, NT   Result Value Ref Range    NT-proBNP 1445 (H) 0 - 125 pg/mL   ESTIMATED GFR   Result Value Ref Range    GFR (CKD-EPI) 82 >60 mL/min/1.73 m 2   PLATELET ESTIMATE   Result Value Ref Range    Plt Estimation Normal    MORPHOLOGY   Result Value Ref Range    RBC Morphology Present     Poikilocytosis 1+     Ovalocytes 1+    PERIPHERAL SMEAR REVIEW   Result Value Ref Range    Peripheral Smear Review see below    DIFFERENTIAL COMMENT   Result Value Ref Range    Comments-Diff see below    URINE MICROSCOPIC (W/UA)   Result Value Ref Range    -150 (A) /hpf    RBC 5-10 (A) /hpf    Bacteria Many (A) None /hpf    Epithelial Cells Few /hpf    Hyaline Cast 0-2 /lpf        RADIOLOGY  Bladder scan reveals greater than 800 cc of retained  "urine  COURSE & MEDICAL DECISION MAKING    ED Observation Status? No; Patient does not meet criteria for ED Observation.     INITIAL ASSESSMENT, COURSE AND PLAN  Care Narrative:     This is a chronically ill morbidly obese 39-year-old gentleman who presents here with lower abdominal discomfort and inability to urinate.  He was able to pass some \"squirts\" of urine but his bladder scan revealed greater than 800 cc of urine.  Are most seasoned nursing staff attempted to pass a Lerner catheter but were unsuccessful.  Emergent consultation with urology was obtained with Dr. Schmidt who was gracious enough to come down and perform a fiberoptic scope catheterization around the stricture.  He was successful and over 1000 cc of cloudy urine drained.  The patient does have significant comorbidities that would portend a poor outcome therefore I have recommended admission to the hospital for treatment of a complicated urinary tract infection.  Fortunately his kidney function is preserved.  Consultation with the hospitalist was obtained      ADDITIONAL PROBLEM LIST    DISPOSITION AND DISCUSSIONS  I have discussed management of the patient with the following physicians and RUBIA's: Reviewed case with urologist Dr. Schmidt as well as admitting hospitalist reviewed case    Discussion of management with other [facility] or appropriate source(s): Reviewed case with clinical pharmacist    Escalation of care considered, and ultimately not performed: Consider CT scan of the abdomen and pelvis but body habitus precludes imaging    Barriers to care at this time, including but not limited to: None    Decision tools and prescription drugs considered including, but not limited to: None    FINAL DIAGNOSIS  1. Acute urinary retention        2. Complicated UTI (urinary tract infection)        3. Morbid obesity (HCC)                 Electronically signed by: Con Simeon M.D., 2/22/2024 8:07 AM      "

## 2024-02-22 NOTE — ASSESSMENT & PLAN NOTE
Based on exam would appear to be an umbilical hernia.  He was unable to fit in the CT, will have to follow-up outpatient

## 2024-02-22 NOTE — H&P
"Hospital Medicine History & Physical Note    Date of Service  2/22/2024    Primary Care Physician  Pcp Pt States None    Consultants  urology    [facility]ist [facility]: [facility]    Code Status  Full Code    Chief Complaint  Chief Complaint   Patient presents with    Urinary Retention     Pt BIB by ems with a complain of inability to urinate since 2000 2/21/24. On arrival had visible incontinence of bowel and bladder. Pt also complains of back pain and lower abdominal pain which he thinks is related to urinary retention.    Abdominal Pain       History of Presenting Illness  Cole Jaramillo is a 39 y.o. male who presented 2/22/2024 with with previous medical history that includes BMI of 90, hypertension.      As for his [facility] site.  This was not particularly painful, and is otherwise feeling \"fine\".  Patient presents for evaluation to the emergency room after developing inability to pee last night.  Reports that he needed to pee, he went to the bathroom but was unable to void at all.  This got worse through the night until he can tolerate the pain any longer he came to the emergency.  Here there were attempts made by the staff to place Lerner however reported they were unsuccessful, and eventually neurology was consulted and placed 1 via cystoscope.  There was immediate return of approximately 800 mL of cloudy foul smelling urine, UA indicates infection.  He reports that about 3 days ago, he noticed a bump developed    The emergency room was negative.      I discussed the plan of care with patient.    Review of Systems  Review of Systems   Constitutional:  Negative for chills and fever.   HENT:  Negative for nosebleeds and sore throat.    Eyes:  Negative for blurred vision and double vision.   Respiratory:  Negative for cough and shortness of breath.    Cardiovascular:  Negative for chest pain, palpitations and leg swelling.   Gastrointestinal:  Negative for abdominal pain, diarrhea, nausea and vomiting. "   Genitourinary:  Positive for frequency. Negative for dysuria and urgency.   Musculoskeletal:  Positive for back pain.   Skin:  Negative for rash.   Neurological:  Negative for dizziness, loss of consciousness and headaches.       Past Medical History   has a past medical history of Morbid obesity (HCC).    Surgical History   has no past surgical history on file.     Family History  family history is not on file.   Family history reviewed with patient. There is no family history that is pertinent to the chief complaint.     Social History   reports that he has never smoked. He has never used smokeless tobacco. He reports current alcohol use. He reports that he does not use drugs.    Allergies  No Known Allergies    Medications  Prior to Admission Medications   Prescriptions Last Dose Informant Patient Reported? Taking?   aspirin 81 MG EC tablet   No No   Sig: Take 1 Tablet by mouth every day.   atorvastatin (LIPITOR) 40 MG Tab   No No   Sig: Take 1 Tablet by mouth every evening.   ferrous sulfate 325 (65 Fe) MG tablet   No No   Sig: Take 1 Tablet by mouth every 48 hours.   lisinopril (PRINIVIL) 10 MG Tab   No No   Sig: Take 1 Tablet by mouth every day.   spironolactone (ALDACTONE) 100 MG Tab   No No   Sig: Take 1 Tablet by mouth every day.   torsemide (DEMADEX) 20 MG Tab   No No   Sig: Take 2 Tablets by mouth every day for 30 days.      Facility-Administered Medications: None       Physical Exam  Temp:  [36.6 °C (97.9 °F)] 36.6 °C (97.9 °F)  Pulse:  [] 100  Resp:  [22-52] 25  BP: (141-165)/() 165/102  SpO2:  [90 %-97 %] 92 %  Blood Pressure: (!) 165/102   Temperature: 36.6 °C (97.9 °F)   Pulse: 100   Respiration: (!) 25   Pulse Oximetry: 92 %       Physical Exam  Constitutional:       General: He is not in acute distress.     Appearance: He is well-developed. He is obese. He is not diaphoretic.   HENT:      Head: Normocephalic and atraumatic.   Eyes:      Conjunctiva/sclera: Conjunctivae normal.   Neck:  "     Vascular: No JVD.   Cardiovascular:      Rate and Rhythm: Normal rate.      Heart sounds: No murmur heard.     No gallop.   Pulmonary:      Effort: Pulmonary effort is normal. No respiratory distress.      Breath sounds: No stridor. No wheezing or rales.   Abdominal:      Palpations: Abdomen is soft.      Tenderness: There is no abdominal tenderness. There is no guarding or rebound.      Comments: Patient's body habitus complicates getting an operative exam.  He has a palpable firm mass just below the umbilicus on the right.  It is nontender to palpation.   Skin:     General: Skin is warm and dry.      Findings: No rash.   Neurological:      Mental Status: He is oriented to person, place, and time.   Psychiatric:         Thought Content: Thought content normal.         Laboratory:  Recent Labs     02/22/24  0840   WBC 10.4   RBC 5.38   HEMOGLOBIN 12.7*   HEMATOCRIT 42.0   MCV 78.1*   MCH 23.6*   MCHC 30.2*   RDW 66.5*   PLATELETCT 418   MPV 9.1     Recent Labs     02/22/24  0840   SODIUM 135   POTASSIUM 4.2   CHLORIDE 95*   CO2 26   GLUCOSE 107*   BUN 18   CREATININE 1.15   CALCIUM 9.5     Recent Labs     02/22/24  0840   ALTSGPT 17   ASTSGOT 19   ALKPHOSPHAT 103*   TBILIRUBIN 1.6*   LIPASE 12   GLUCOSE 107*         Recent Labs     02/22/24  0840   NTPROBNP 1445*         No results for input(s): \"TROPONINT\" in the last 72 hours.    Imaging:  No orders to display           Assessment/Plan:  Justification for Admission Status  I anticipate this patient will require at least two midnights for appropriate medical management, necessitating inpatient admission because urine retention    Patient will need a Med/Surg bed on MEDICAL service .  The need is secondary to obstruction.    * Bladder obstruction- (present on admission)  Assessment & Plan  Patient reports that he has a \"low flow\" but had never had urine retention.  Lerner placed by urology via cystoscopy.  Lerner to remain in place until urology says " otherwise.  Check CT A/p    Abdominal mass  Assessment & Plan  Based on exam would appear to be an umbilical hernia however will check CT A/P    Acute cystitis with hematuria  Assessment & Plan  Urine sent for culture  Ancef  Ordered to remain in place until urology recommends otherwise.      Hypertension- (present on admission)  Assessment & Plan  Cont lisinopril and spironolactone    Class 3 severe obesity due to excess calories with serious comorbidity and body mass index (BMI) greater than or equal to 70 in adult (HCC)- (present on admission)  Assessment & Plan  Given the patient's BMI greater than 90, referral for bariatric surgery        VTE prophylaxis: enoxaparin ppx

## 2024-02-22 NOTE — ASSESSMENT & PLAN NOTE
Given the patient's BMI greater than 90  I discussed weight loss with the patient today.  Patient has been working on his nutrition for the past year and has been able to lose weight with that though he has not had much guidance.  He is working on getting a PCP that can come out to his home.  He is interested in discussing with the nutritionist, I placed a consult.  We also discussed bariatric surgery and oral medication as options as he works on his diet, exercise, mental health which he wants to focus on for now.

## 2024-02-22 NOTE — ED NOTES
Lerner catheter placed via dilation procedure with urologist. 1000 ml UO in 16 [place] catheter.

## 2024-02-23 LAB
ANION GAP SERPL CALC-SCNC: 12 MMOL/L (ref 7–16)
BUN SERPL-MCNC: 16 MG/DL (ref 8–22)
CALCIUM SERPL-MCNC: 8.8 MG/DL (ref 8.5–10.5)
CHLORIDE SERPL-SCNC: 96 MMOL/L (ref 96–112)
CO2 SERPL-SCNC: 27 MMOL/L (ref 20–33)
CREAT SERPL-MCNC: 1.06 MG/DL (ref 0.5–1.4)
GFR SERPLBLD CREATININE-BSD FMLA CKD-EPI: 91 ML/MIN/1.73 M 2
GLUCOSE SERPL-MCNC: 108 MG/DL (ref 65–99)
POTASSIUM SERPL-SCNC: 4.9 MMOL/L (ref 3.6–5.5)
SODIUM SERPL-SCNC: 135 MMOL/L (ref 135–145)

## 2024-02-23 PROCEDURE — 700101 HCHG RX REV CODE 250: Performed by: HOSPITALIST

## 2024-02-23 PROCEDURE — 700111 HCHG RX REV CODE 636 W/ 250 OVERRIDE (IP): Performed by: HOSPITALIST

## 2024-02-23 PROCEDURE — 80048 BASIC METABOLIC PNL TOTAL CA: CPT

## 2024-02-23 PROCEDURE — 97530 THERAPEUTIC ACTIVITIES: CPT

## 2024-02-23 PROCEDURE — 700102 HCHG RX REV CODE 250 W/ 637 OVERRIDE(OP): Performed by: HOSPITALIST

## 2024-02-23 PROCEDURE — 770001 HCHG ROOM/CARE - MED/SURG/GYN PRIV*

## 2024-02-23 PROCEDURE — 99232 SBSQ HOSP IP/OBS MODERATE 35: CPT | Performed by: INTERNAL MEDICINE

## 2024-02-23 PROCEDURE — 97535 SELF CARE MNGMENT TRAINING: CPT

## 2024-02-23 PROCEDURE — 97162 PT EVAL MOD COMPLEX 30 MIN: CPT

## 2024-02-23 PROCEDURE — 97166 OT EVAL MOD COMPLEX 45 MIN: CPT

## 2024-02-23 PROCEDURE — 36415 COLL VENOUS BLD VENIPUNCTURE: CPT

## 2024-02-23 PROCEDURE — A9270 NON-COVERED ITEM OR SERVICE: HCPCS | Performed by: HOSPITALIST

## 2024-02-23 RX ADMIN — ACETAMINOPHEN 650 MG: 325 TABLET, FILM COATED ORAL at 22:48

## 2024-02-23 RX ADMIN — WATER 2 G: 100 INJECTION, SOLUTION INTRAVENOUS at 13:46

## 2024-02-23 RX ADMIN — FERROUS SULFATE TAB 325 MG (65 MG ELEMENTAL FE) 325 MG: 325 (65 FE) TAB at 05:53

## 2024-02-23 RX ADMIN — ENOXAPARIN SODIUM 60 MG: 100 INJECTION SUBCUTANEOUS at 05:53

## 2024-02-23 RX ADMIN — ENOXAPARIN SODIUM 60 MG: 100 INJECTION SUBCUTANEOUS at 16:27

## 2024-02-23 RX ADMIN — WATER 2 G: 100 INJECTION, SOLUTION INTRAVENOUS at 05:52

## 2024-02-23 RX ADMIN — ATORVASTATIN CALCIUM 40 MG: 40 TABLET, FILM COATED ORAL at 16:27

## 2024-02-23 RX ADMIN — WATER 2 G: 100 INJECTION, SOLUTION INTRAVENOUS at 22:37

## 2024-02-23 RX ADMIN — ASPIRIN 81 MG: 81 TABLET, COATED ORAL at 05:53

## 2024-02-23 ASSESSMENT — GAIT ASSESSMENTS
ASSISTIVE DEVICE: FRONT WHEEL WALKER
GAIT LEVEL OF ASSIST: STANDBY ASSIST
ASSISTIVE DEVICE: FRONT WHEEL WALKER
DEVIATION: INCREASED BASE OF SUPPORT;ANTALGIC
GAIT LEVEL OF ASSIST: STANDBY ASSIST
DISTANCE (FEET): 5
DISTANCE (FEET): 75
DEVIATION: INCREASED BASE OF SUPPORT;ANTALGIC

## 2024-02-23 ASSESSMENT — COGNITIVE AND FUNCTIONAL STATUS - GENERAL
MOVING FROM LYING ON BACK TO SITTING ON SIDE OF FLAT BED: A LOT
SUGGESTED CMS G CODE MODIFIER MOBILITY: CL
DRESSING REGULAR LOWER BODY CLOTHING: A LOT
WALKING IN HOSPITAL ROOM: A LITTLE
SUGGESTED CMS G CODE MODIFIER MOBILITY: CL
MOBILITY SCORE: 13
DRESSING REGULAR UPPER BODY CLOTHING: A LITTLE
TOILETING: A LOT
SUGGESTED CMS G CODE MODIFIER DAILY ACTIVITY: CK
MOVING TO AND FROM BED TO CHAIR: A LOT
HELP NEEDED FOR BATHING: A LOT
DAILY ACTIVITIY SCORE: 16
STANDING UP FROM CHAIR USING ARMS: A LOT
STANDING UP FROM CHAIR USING ARMS: A LITTLE
HELP NEEDED FOR BATHING: A LOT
SUGGESTED CMS G CODE MODIFIER DAILY ACTIVITY: CK
MOVING FROM LYING ON BACK TO SITTING ON SIDE OF FLAT BED: A LOT
WALKING IN HOSPITAL ROOM: A LITTLE
DRESSING REGULAR LOWER BODY CLOTHING: A LOT
CLIMB 3 TO 5 STEPS WITH RAILING: A LOT
MOVING FROM LYING ON BACK TO SITTING ON SIDE OF FLAT BED: A LOT
CLIMB 3 TO 5 STEPS WITH RAILING: A LOT
TURNING FROM BACK TO SIDE WHILE IN FLAT BAD: UNABLE
MOVING TO AND FROM BED TO CHAIR: A LOT
MOBILITY SCORE: 13
PERSONAL GROOMING: A LITTLE
WALKING IN HOSPITAL ROOM: A LITTLE
DAILY ACTIVITIY SCORE: 16
MOVING TO AND FROM BED TO CHAIR: A LOT
TURNING FROM BACK TO SIDE WHILE IN FLAT BAD: A LOT
PERSONAL GROOMING: A LITTLE
MOBILITY SCORE: 13
STANDING UP FROM CHAIR USING ARMS: A LITTLE
DRESSING REGULAR UPPER BODY CLOTHING: A LITTLE
TURNING FROM BACK TO SIDE WHILE IN FLAT BAD: UNABLE
TOILETING: A LOT
SUGGESTED CMS G CODE MODIFIER MOBILITY: CL
CLIMB 3 TO 5 STEPS WITH RAILING: A LOT

## 2024-02-23 ASSESSMENT — LIFESTYLE VARIABLES
DOES PATIENT WANT TO STOP DRINKING: NO
ON A TYPICAL DAY WHEN YOU DRINK ALCOHOL HOW MANY DRINKS DO YOU HAVE: 0
TOTAL SCORE: 0
TOTAL SCORE: 0
HOW MANY TIMES IN THE PAST YEAR HAVE YOU HAD 5 OR MORE DRINKS IN A DAY: 0
EVER FELT BAD OR GUILTY ABOUT YOUR DRINKING: NO
AVERAGE NUMBER OF DAYS PER WEEK YOU HAVE A DRINK CONTAINING ALCOHOL: 0
CONSUMPTION TOTAL: NEGATIVE
ALCOHOL_USE: NO
EVER HAD A DRINK FIRST THING IN THE MORNING TO STEADY YOUR NERVES TO GET RID OF A HANGOVER: NO
HAVE YOU EVER FELT YOU SHOULD CUT DOWN ON YOUR DRINKING: NO
TOTAL SCORE: 0
HAVE PEOPLE ANNOYED YOU BY CRITICIZING YOUR DRINKING: NO

## 2024-02-23 ASSESSMENT — PAIN DESCRIPTION - PAIN TYPE
TYPE: ACUTE PAIN

## 2024-02-23 ASSESSMENT — ENCOUNTER SYMPTOMS
SHORTNESS OF BREATH: 0
WEAKNESS: 0

## 2024-02-23 ASSESSMENT — ACTIVITIES OF DAILY LIVING (ADL): TOILETING: REQUIRES ASSIST

## 2024-02-23 ASSESSMENT — FIBROSIS 4 INDEX: FIB4 SCORE: 0.44

## 2024-02-23 NOTE — CONSULTS
Presents with difficulty voiding. PVR 800cc. Staff unable to place george. Denies prior urologic issues    Exam:  Penis is buried but normal otherwise.    A/P  Urinary retention. Plan for cystoscopy, catheterization

## 2024-02-23 NOTE — PROGRESS NOTES
4 Eyes Skin Assessment Completed by DAVID Olsen and DAVID Alvarez     Head WDL  Ears: R ear pink  Nose WDL  Mouth WDL  Neck WDL  Breast/Chest: scattered bruising and excoriation   Shoulder Blades WDL   Spine  WDL  (R) Arm/Elbow/Hand WDL  (L) Arm/Elbow/Hand WDL  Abdomen: LLQ scar, pink  Groin: mid pannus pink  Scrotum/Coccyx/Buttocks WDL  Bilateral inner thighs: pink/red skin breakdown  (R) Leg: flaky, dusky, edema, hard  (L) Leg: flaky, dusky, edema, hard  (R) Heel/Foot/Toe: flaky  (L) Heel/Foot/Toe: flaky              Devices In Places: nasal cannula,         Interventions In Place: bariatric low air loss mattress, interdry        Pictures Uploaded Into Epic n/a  Wound Consult Placed n/a  RN Wound Prevention Protocol Ordered n/a

## 2024-02-23 NOTE — THERAPY
"Occupational Therapy   Initial Evaluation     Patient Name: Cole Jaramillo  Age:  39 y.o., Sex:  male  Medical Record #: [id number]  Today's Date: 2/23/2024     Precautions  Precautions: Fall Risk  Comments: Bariatric, george    Assessment  Patient is 39 y.o. male who presents to acute due to lower abdominal discomfort, distention and inability to urinate. Pt required max A for LB dressing, SBA for functional mobility and standing grooming. Pt demo'd impaired balance, functional mobility, and activity tolerance. Will continue to follow while in house.     Plan    Occupational Therapy Initial Treatment Plan   Treatment Interventions: (P) Self Care / Activities of Daily Living, Neuro Re-Education / Balance, Therapeutic Exercises, Therapeutic Activity, Adaptive Equipment  Treatment Frequency: (P) 2 Times per Week  Duration: (P) Until Therapy Goals Met    DC Equipment Recommendations: (P) None  Discharge Recommendations: (P) Recommend home health for continued occupational therapy services     Subjective    \"I want to move!\"     Objective      Initial Contact Note    Initial Contact Note Order Received and Verified, Occupational Therapy Evaluation in Progress with Full Report to Follow.   Prior Living Situation   Prior Services Intermittent Physical Support for ADL Per Family;[facility] Home Aide Services   Housing / Facility 1 Story Apartment / Condo   Bathroom Set up Bathtub / Shower Combination  (bench will not fit due to bathroom design)   Equipment Owned Front-Wheel Walker   Lives with - Patient's Self Care Capacity Alone and Able to Care For Self   Comments Family assists w/ grocery shopping, lives with roommate who is unable to assist   Prior Level of ADL Function   Self Feeding Independent   Grooming / Hygiene Independent   Bathing Requires Assist   Dressing Requires Assist   Toileting Requires Assist   Comments Pt initially independent, had only been home for a week from previous admit   Prior Level of IADL " Function   Medication Management Independent   Laundry Requires Assist   Shopping Requires Assist   Prior Level Of Mobility Independent With Device in Home   Occupation (Pre-Hospital Vocational)   ()   Precautions   Precautions Fall Risk   Vitals   O2 (LPM) 3   Pain 0 - 10 Group   Therapist Pain Assessment Post Activity Pain Same as Prior to Activity;Nurse Notified  (No c/o pain during session)   Cognition    Cognition / Consciousness WDL   Level of Consciousness Alert   Comments pleasent and cooperative, internalizes ed   Active ROM Upper Body   Active ROM Upper Body  WDL   Strength Upper Body   Upper Body Strength  WDL   Upper Body Muscle Tone   Upper Body Muscle Tone  WDL   Balance Assessment   Sitting Balance (Static) Fair   Sitting Balance (Dynamic) Fair   Standing Balance (Static) Fair   Standing Balance (Dynamic) Fair -   Weight Shift Sitting Good   Weight Shift Standing Fair   Comments w/ valente FWW   Bed Mobility    Supine to Sit Standby Assist   Sit to Supine Moderate Assist   Scooting Standby Assist   Comments Required assist for legs to return to bed, due to valente bed   ADL Assessment   Grooming Standby Assist;Standing   Upper Body Dressing Supervision   Lower Body Dressing Maximal Assist  (socks)   Toileting   (george)   How much help from another person does the patient currently need...   Putting on and taking off regular lower body clothing? 2   Bathing (including washing, rinsing, and drying)? 2   Toileting, which includes using a toilet, bedpan, or urinal? 2   Putting on and taking off regular upper body clothing? 3   Taking care of personal grooming such as brushing teeth? 3   Eating meals? 4   6 Clicks Daily Activity Score 16   Functional Mobility   Sit to Stand Standby Assist   Mobility within room w/ FWW   Activity Tolerance   Sitting in Chair NT due to lack of chair   Sitting Edge of Bed 10 min   Standing 10 min   Patient / Family Goals   Patient / Family Goal #1 To maintain his strength  and get answers as to why he is here   Short Term Goals   Short Term Goal # 1 Pt will perform ADL transfer w/ supv   Short Term Goal # 2 Pt will demo LB dressing w/ SPV   Education Group   Role of Occupational Therapist Patient Response Patient;Acceptance;Explanation;Verbal Demonstration   Pathology of Bedrest Patient Response Patient;Acceptance;Explanation;Verbal Demonstration   Occupational Therapy Initial Treatment Plan    Treatment Interventions Self Care / Activities of Daily Living;Neuro Re-Education / Balance;Therapeutic Exercises;Therapeutic Activity;Adaptive Equipment   Treatment Frequency 2 Times per Week   Duration Until Therapy Goals Met   Problem List   Problem List Decreased Active Daily Living Skills;Decreased Homemaking Skills;Safety Awareness Deficits / Cognition;Impaired Posture / Trunk Alignment;Impaired Postural Control / Balance   Anticipated Discharge Equipment and Recommendations   DC Equipment Recommendations None   Discharge Recommendations Recommend home health for continued occupational therapy services   Interdisciplinary Plan of Care Collaboration   IDT Collaboration with  Nurse Practitioner;Physical Therapist   Patient Position at End of Therapy In Bed;Call Light within Reach;Tray Table within Reach;Phone within Reach   Collaboration Comments report given   Session Information   Date / Session Number  2/23, 1 (1/2, 2/29)

## 2024-02-23 NOTE — PROGRESS NOTES
Received report from previous shift RN  Assessment complete.  A&O x 4. Patient calls appropriately.  Patient ambulates with x1 assist with FWW.   Patient has 0/10 pain. Pain managed with prescribed medications.  Denies N&V. Tolerating diet.  Skin per flowsheets.  + void via george, + flatus, - BM.  Patient denies SOB.  SCD's on.  Patient pleasant and cooperative with plan of care.  Review plan with of care with patient. Call light and personal belongings with in reach. Hourly rounding in place. All needs met at this time.

## 2024-02-23 NOTE — CARE PLAN
The patient is Stable - Low risk of patient condition declining or worsening    Shift Goals  Clinical Goals: Monitor george  Patient Goals: Monitor george    Progress made toward(s) clinical / shift goals:  George monitored, output recorded. Repositioning of tube frequently to ensure flow of urine    Patient is not progressing towards the following goals:

## 2024-02-23 NOTE — PROGRESS NOTES
Bedside report received from margarito RN, assumed care at 1900.  Assessment complete.  A&O x 4. Patient calls appropriately.  Patient ambulates with standby assist.   Patient complained of 2/10 pain, repositioned george.  Denies N&V. Tolerating regular diet.  + void via george catheter, + flatus, - BM.  Patient denies SOB. On 2L nasal cannula.  Patient calm, pleasant, and cooperative.  Review plan with of care with patient. Call light and personal belongings within reach. Hourly rounding in place. All needs met at this time.

## 2024-02-23 NOTE — OP REPORT
OPERATIVE NOTE:      Date: 2/22/2024    Patient ID:   Name:             Cole Jaramillo   YOB: 1985  Age:                 39 y.o.  male   MRN:               [id number]                                                                         PRE-OP DIAGNOSIS: urinary retention        POST-OP DIAGNOSIS: bulbar urethral stricture           PROCEDURE: cystoscopy, dilation of urethral stricture, complex catheterization            SURGEON:Davis Schmidt     ASSIST: none            ANESTHESIA: local            ESTIMATED BLOOD LOSS: none            DRAINS: 16F george                  SPECIMENS: none               COMPLICATIONS: none                   CONDITION stable            TECHNIQUE: Penis prepped and draped. Flexible cystoscopy performed. Pinpoint bulbar urethral stricture found. Wire passed through. Curt followers dilated stricture from 10F to 20F in size. Dilated without difficulty. 16F [place] tip passed over wire    Instructions:  Leave george for 2 weeks for dilation to heal. Longer if needed medically. Plan for outpatient follow up with urology.

## 2024-02-23 NOTE — DISCHARGE PLANNING
Case Management Discharge Planning    Admission Date: 2/22/2024  GMLOS: 2.7  ALOS: 1    6-Clicks ADL Score: 16  6-Clicks Mobility Score: 13    Anticipated Discharge Dispo: Discharge Disposition: D/T to home under [facility]HA care in anticipation of covered skilled care (06)    DME Needed: bariatric wheelchair requested, on service with [employer] for O2    Action(s) Taken: Patient Conference and choice obtained for O2.     Escalations Completed: None    Medically Clear: No    Barriers to Discharge: Medical clearance     Is the patient up for discharge tomorrow: No

## 2024-02-23 NOTE — DIETARY
NUTRITION SERVICES: BMI - Pt with BMI >40 (=Body mass index is 82.85 kg/m².), Class III obesity. Weight loss counseling not appropriate in acute care setting. RECOMMEND - If appropriate at DC please refer to outpatient nutrition services for weight management.

## 2024-02-23 NOTE — DISCHARGE PLANNING
Case Management Discharge Planning    Admission Date: 2/22/2024  GMLOS: 2.7  ALOS: 1    6-Clicks ADL Score: 16  6-Clicks Mobility Score: 13  PT and/or OT Eval ordered: Yes  Post-acute Referrals Ordered: Yes  Post-acute Choice Obtained: Yes  Has referral(s) been sent to post-acute provider:  Yes      Anticipated Discharge Dispo: Discharge Disposition: D/T to home under HHA care in anticipation of covered skilled care (06)    DME Needed: Yes     Action(s) Taken: Updated Provider/Nurse on Discharge Plan    Pt was discussed in IDT rounds today with Dr Dean.    Pt is on service with [employer],  referral was resent to [facility] for resumption of service.   Pt is on service with [employer] for home oxygen , on baseline 2 Liters at home.    Per DONNA Aquino Pt is requesting a wheelchair.    Per previous CM notes/ chart review  Pt lives with roommate in a 3 story apartment on the ground level apartment with no steps to main entrance.  Pt has  mother for support.  Pt has Dr. Qamar Terry for PCP  Pt needs assistance with ADLs prior to hospitalization.  Pt sometimes uses walker.    Per previous CM notes:  Pt has jody ccepted by [facility] and os service with  [employer] for DME home oxygen.    Per previous  notes ,Pt has been accepted by [facility] for a bariatric wheelchair and the plan  is it will be delivered to Pt's address but it was ordered.   It states to be delivered within  a week on the notes on the week of Feb 16.  Will ask [facility] for update.    This RN CM spoke with Zeina, Pt's Sister who states that she lives 1 hour away from Pt's address and her mother lives 30 mins away form Pt's home.    Per Zeina, they cannot provide transport to Pt as he will not fit in their personal vehicles.    Per Zeina Pt has a work from home job and pays for his medical insurance.   Pt has [facility] Managed Care.    Per Zeina Pt does not qualify for Medicaid as he has a job and earns more than the income limit to  qualify for Medicaid.    Explained that Pt has no MTM benefit to avail transport benefit.    Per Zeina Pt has to call emergency ambulance when he needs to come to the hospital.    Will also reach out to Pt and will continue to assist with discharge as needed.       Pt has been re-accepted by [facility].    Per RN Mildred ,Pt remains on 2 liters  Oxygen.   Per Pt he has tanks at home.  Jo THORNTON informed [facility] that Pt might discharge this weekend.     Jo asked [facility] about the order for wheelchair.  [facility] declined.     Requested Dr Dean to place  an order and face to face for a bariatric wheelchair.  Requested PT for a pelvic to pelvic measurement.   DME choice faxed to [facility].    Pt is pending clearance with Urology also. Per rounds Pt has a george cath.    Pelvic to pelvic measurement is 36 inches per PT.   Jose to send to [facility] D[facility] choice pending DME order and face to face.   DME choice filed in media tab          Escalations Completed: None    Medically Clear: No    Next Steps:   CM to continue to assist Pt with discharge as needed    Barriers to Discharge:   Medical clearance  Transport to home  Pending DME wheelchair    Is the patient up for discharge tomorrow: No

## 2024-02-23 NOTE — PROGRESS NOTES
Hospital Medicine Daily Progress Note    Date of Service  2/23/2024    Chief Complaint  Cole Jaramillo is a 39 y.o. male admitted 2/22/2024 with urinary difficulty    Hospital Course  38 yo man with obesity and HTN who presented with difficulty urinating and abdominal pain.  Urology had to be consulted to place a Lerner catheter under cystoscopy and recommends leaving catheter in place with outpatient follow-up.  UA was also concerning for UTI and started on Ancef.    Interval Problem Update  Urine output 6950 cc.  Hold spironolactone and torsemide given high volume post obstruction diuresis.  Urine culture pending  He is on 3 L of oxygen.  He denies shortness of breath.  He says he uses 1 to 2 L intermittently at home.    I have discussed this patient's plan of care and discharge plan at IDT rounds today with Case Management, Nursing, Nursing leadership, and other members of the IDT team.    Consultants/Specialty  urology    Code Status  Full Code    Disposition  The patient is not medically cleared for discharge to home or a post-acute facility.  Anticipate discharge to: home with organized home healthcare and close outpatient follow-up    I have placed the appropriate orders for post-discharge needs.    Review of Systems  Review of Systems   Constitutional:  Negative for malaise/fatigue.   Respiratory:  Negative for shortness of breath.    Cardiovascular:  Negative for chest pain.   Genitourinary:  Negative for dysuria.   Neurological:  Negative for weakness.        Physical Exam  Temp:  [36.1 °C (97 °F)-37.1 °C (98.8 °F)] 36.3 °C (97.3 °F)  Pulse:  [] 96  Resp:  [18-26] 18  BP: ()/() 91/54  SpO2:  [90 %-95 %] 93 %    Physical Exam  Vitals and nursing note reviewed.   Constitutional:       Appearance: He is obese. He is not ill-appearing or diaphoretic.   HENT:      Head: Normocephalic.      Mouth/Throat:      Mouth: Mucous membranes are moist.   Eyes:      General:         Right eye: No  discharge.         Left eye: No discharge.   Cardiovascular:      Rate and Rhythm: Normal rate and regular rhythm.   Pulmonary:      Effort: Pulmonary effort is normal. No respiratory distress.      Breath sounds: No wheezing or rales.   Abdominal:      Palpations: Abdomen is soft.      Tenderness: There is no abdominal tenderness.   Musculoskeletal:      Cervical back: Neck supple.      Right lower leg: Edema present.      Left lower leg: Edema present.   Skin:     General: Skin is warm and dry.   Neurological:      Mental Status: He is alert and oriented to person, place, and time.         Fluids    Intake/Output Summary (Last 24 hours) at 2/23/2024 1229  Last data filed at 2/23/2024 1127  Gross per 24 hour   Intake 1060 ml   Output 7050 ml   Net -5990 ml       Laboratory  Recent Labs     02/22/24  0840   WBC 10.4   RBC 5.38   HEMOGLOBIN 12.7*   HEMATOCRIT 42.0   MCV 78.1*   MCH 23.6*   MCHC 30.2*   RDW 66.5*   PLATELETCT 418   MPV 9.1     Recent Labs     02/22/24  0840 02/23/24  0040   SODIUM 135 135   POTASSIUM 4.2 4.9   CHLORIDE 95* 96   CO2 26 27   GLUCOSE 107* 108*   BUN 18 16   CREATININE 1.15 1.06   CALCIUM 9.5 8.8                   Imaging  CT-ABDOMEN-PELVIS W/O    (Results Pending)        Assessment/Plan  * Bladder obstruction- (present on admission)  Assessment & Plan  Urology placed Lerner catheter via cystoscopy, remain in place until outpatient follow-up  He has significant postobstructive diuresis with high urine output.  Hold diuretics for now, encourage oral intake to avoid dehydration    Abdominal mass  Assessment & Plan  Based on exam would appear to be an umbilical hernia.  He was unable to fit in the CT, will have to follow-up outpatient    Acute cystitis with hematuria  Assessment & Plan  Urine culture pending.  Continue Ancef for now      Hypertension- (present on admission)  Assessment & Plan  Cont lisinopril    Class 3 severe obesity due to excess calories with serious comorbidity and body  mass index (BMI) greater than or equal to 70 in adult (HCC)- (present on admission)  Assessment & Plan  Given the patient's BMI greater than 90, referral for bariatric surgery         VTE prophylaxis:    enoxaparin ppx      I have performed a physical exam and reviewed and updated ROS and Plan today (2/23/2024). In review of yesterday's note (2/22/2024), there are no changes except as documented above.

## 2024-02-23 NOTE — DISCHARGE PLANNING
1155  Received Choice Form at: 1141 am  Agency/Facility Name: [facility]  Sent Referral per Choice Form at: 1155 am    DPA received faxed choice form(s). DPA placed choice in Pt Media file.     1350  DPA received faxed choice form(s). DPA filed choice in Pt Media file. Awaiting MD orders.     1410  Agency/Facility Name: [facility]  Spoke To: Vikas  Outcome: DPA inquired about Pt begin on service with [facility] for Oxygen [phone]. Per Vikas Pt has been on service with [facility] for oxygen. Per Vikas to have DPA send new orders and Face to Face to fax number [phone].  RN CM notified.    1500  DPA went to bedside to ask if Pt had oxygen tanks at home provided by [facility]. Per Pt states he has oxygen tanks at home already. Per Pt reached out to [facility] for wheelchair       1508  Agency/Facility Name: [facility]  Spoke To: Mabel  Outcome: Per Mabel unable to get Bariatric order or wheelchair ATT.  RN DONNA notified.     1514  Agency/Facility Name: [facility]  Spoke To: Lisset  Outcome: DPA inquired about having Bariatric wheelchair. Per Lisset unable to accept Pt orders due to not having measurements.

## 2024-02-23 NOTE — THERAPY
"Physical Therapy   Initial Evaluation     Patient Name: Cole Jaramillo  Age:  39 y.o., Sex:  male  Medical Record #: [id number]  Today's Date: 2/23/2024     Precautions  Precautions: Fall Risk  Comments: Bariatric, george    Assessment    Cole Jaramillo is a 39 y.o. male who presents for evaluation of lower abdominal discomfort distention and inability to urinate.  The patient has a history of morbid obesity.  He was recently hospitalized at this facility and just got out a few days ago. Pt presents to therapy with difficulty in bed mobility and functional mobility 2/2 body habitus. Pt was able to complete most functional mobility CGA/SBA but demos early fatigue and SOB. In ambulation pt demos increased lateral sway and wide CANDY, but was able to walk 75 ft in one bout. Pt takes increased time to complete supine->sit, and is ModA for BLE assistance in sit->supine. Recommend discharge home with home health for further therapy needs and increased family support. Will follow in-house.     Plan    Physical Therapy Initial Treatment Plan   Treatment Plan : Bed Mobility, Equipment, Gait Training, Neuro Re-Education / Balance, Self Care / Home Evaluation, Stair Training, Therapeutic Exercise  Treatment Frequency: 4 Times per Week  Duration: Until Therapy Goals Met    DC Equipment Recommendations: None  Discharge Recommendations: Recommend home health for continued physical therapy services       Subjective    \"Are you guys coming to get me to walk?\"     Objective       02/23/24 0945   Precautions   Precautions Fall Risk   Comments Bariatric, george   Vitals   Pulse Oximetry 93 %   O2 (LPM) 3   O2 Delivery Device Nasal Cannula   Vitals Comments O2 after ambulation w/ 3L   Pain 0 - 10 Group   Pain Rating Scale (NPRS) 0   Therapist Pain Assessment Post Activity Pain Same as Prior to Activity;0   Prior Living Situation   Prior Services Intermittent Physical Support for ADL Per Family;[facility]   Housing / " Facility 1 Story Apartment / Condo   Steps Into Home 0   Steps In Home 0   Equipment Owned Front-Wheel Walker   Lives with - Patient's Self Care Capacity Alone and Able to Care For Self   Comments Pt lives with roommate who is unable to assist, family assists with groceries and has been helping more since prvious DC.   Prior Level of Functional Mobility   Bed Mobility Independent   Transfer Status Independent   Ambulation Independent   Ambulation Distance limited household   Assistive Devices Used Front-Wheel Walker   Stairs Required Assist   Comments ambulated 50 ft SBA before previous DC   History of Falls   History of Falls No   Cognition    Cognition / Consciousness WDL   Level of Consciousness Alert   Comments Pleasant and cooperative, pt very motivated for mobilization   Active ROM Upper Body   Active ROM Upper Body  WDL   Strength Upper Body   Upper Body Strength  WDL   Sensation Upper Body   Upper Extremity Sensation  WDL   Active ROM Lower Body    Active ROM Lower Body  X   Comments limited by body habitus, functional for sitting EOB, ambulation   Strength Lower Body   Lower Body Strength  WDL   Sensation Lower Body   Lower Extremity Sensation   WDL   Other Treatments   Other Treatments Provided Discussed goals and concerns for home, standing balance for self-grooming   Balance Assessment   Sitting Balance (Static) Fair   Sitting Balance (Dynamic) Fair -   Standing Balance (Static) Fair   Standing Balance (Dynamic) Fair -   Weight Shift Sitting Fair   Weight Shift Standing Fair   Comments bariatric FWW   Bed Mobility    Supine to Sit Standby Assist   Sit to Supine Moderate Assist   Scooting Standby Assist   Comments Requires assistance for each leg to get into bed   Gait Analysis   Gait Level Of Assist Standby Assist   Assistive Device Front Wheel Walker   Distance (Feet) 75   # of Times Distance was Traveled 1   Deviation Increased Base Of Support;Antalgic   Comments Pt demos increased lateral sway, wide  CANDY, and increased exertion with ambulation   Functional Mobility   Sit to Stand Standby Assist   Mobility Supine->EOB->hallway->supine   Comments Pt tolerates standing at sink for OT tasks 2 min.   How much difficulty does the patient currently have...   Turning over in bed (including adjusting bedclothes, sheets and blankets)? 1   Sitting down on and standing up from a chair with arms (e.g., wheelchair, bedside commode, etc.) 2   Moving from lying on back to sitting on the side of the bed? 2   How much help from another person does the patient currently need...   Moving to and from a bed to a chair (including a wheelchair)? 3   Need to walk in a hospital room? 3   Climbing 3-5 steps with a railing? 2   6 clicks Mobility Score 13   Activity Tolerance   Sitting Edge of Bed 10 min   Standing 7 min   Edema / Skin Assessment   Edema / Skin  WDL   Short Term Goals    Short Term Goal # 1 pt will perform supine <> sit without bed features and SPV in 6 vistis to demo improved bed navigation   Short Term Goal # 2 pt will perform sit <> stand and functional transfers with valente FWW and SPV to improve mobility independence for home in 6 visits   Short Term Goal # 3 pt will ambulate 200 ft with Valente FWW and SPV to access home environment in 6 visits   Education Group   Education Provided Role of Physical Therapist;Gait Training;Use of Assistive Device   Role of Physical Therapist Patient Response Patient;Eager;Explanation;Verbal Demonstration   Gait Training Patient Response Patient;Eager;Explanation;Action Demonstration   Use of Assistive Device Patient Response Patient;Eager;Explanation;Action Demonstration   Physical Therapy Initial Treatment Plan    Treatment Plan  Bed Mobility;Equipment;Gait Training;Neuro Re-Education / Balance;Self Care / Home Evaluation;Stair Training;Therapeutic Exercise   Treatment Frequency 4 Times per Week   Duration Until Therapy Goals Met   Problem List    Problems Impaired Bed Mobility;Impaired  Transfers;Impaired Ambulation;Functional ROM Deficit;Functional Strength Deficit;Impaired Balance;Impaired Coordination;Decreased Activity Tolerance   Anticipated Discharge Equipment and Recommendations   DC Equipment Recommendations None   Discharge Recommendations Recommend home health for continued physical therapy services   Interdisciplinary Plan of Care Collaboration   IDT Collaboration with  Nursing;Occupational Therapist   Patient Position at End of Therapy In Bed;Bed Alarm On;Call Light within Reach;Tray Table within Reach   Collaboration Comments DC recs, mobility status   Session Information   Date / Session Number  2/23- 1(1/4, 2/29)     Carlos Johnson, SPT

## 2024-02-24 ENCOUNTER — APPOINTMENT (OUTPATIENT)
Dept: RADIOLOGY | Facility: MEDICAL CENTER | Age: 39
DRG: 697 | End: 2024-02-24
Attending: INTERNAL MEDICINE
Payer: COMMERCIAL

## 2024-02-24 LAB
ALBUMIN SERPL BCP-MCNC: 3 G/DL (ref 3.2–4.9)
BACTERIA UR CULT: ABNORMAL
BACTERIA UR CULT: ABNORMAL
BUN SERPL-MCNC: 20 MG/DL (ref 8–22)
CALCIUM ALBUM COR SERPL-MCNC: 9.1 MG/DL (ref 8.5–10.5)
CALCIUM SERPL-MCNC: 8.3 MG/DL (ref 8.5–10.5)
CHLORIDE SERPL-SCNC: 97 MMOL/L (ref 96–112)
CO2 SERPL-SCNC: 30 MMOL/L (ref 20–33)
CREAT SERPL-MCNC: 1.07 MG/DL (ref 0.5–1.4)
GFR SERPLBLD CREATININE-BSD FMLA CKD-EPI: 90 ML/MIN/1.73 M 2
GLUCOSE SERPL-MCNC: 99 MG/DL (ref 65–99)
LMWH PPP CHRO-ACNC: 0.2 U/ML
MAGNESIUM SERPL-MCNC: 2 MG/DL (ref 1.5–2.5)
PHOSPHATE SERPL-MCNC: 4.7 MG/DL (ref 2.5–4.5)
POTASSIUM SERPL-SCNC: 4.4 MMOL/L (ref 3.6–5.5)
SIGNIFICANT IND 70042: ABNORMAL
SITE SITE: ABNORMAL
SODIUM SERPL-SCNC: 136 MMOL/L (ref 135–145)
SOURCE SOURCE: ABNORMAL

## 2024-02-24 PROCEDURE — 700111 HCHG RX REV CODE 636 W/ 250 OVERRIDE (IP): Performed by: INTERNAL MEDICINE

## 2024-02-24 PROCEDURE — 99233 SBSQ HOSP IP/OBS HIGH 50: CPT | Performed by: INTERNAL MEDICINE

## 2024-02-24 PROCEDURE — 85520 HEPARIN ASSAY: CPT

## 2024-02-24 PROCEDURE — 700105 HCHG RX REV CODE 258: Performed by: INTERNAL MEDICINE

## 2024-02-24 PROCEDURE — A9270 NON-COVERED ITEM OR SERVICE: HCPCS | Performed by: HOSPITALIST

## 2024-02-24 PROCEDURE — 99223 1ST HOSP IP/OBS HIGH 75: CPT | Performed by: INTERNAL MEDICINE

## 2024-02-24 PROCEDURE — 83735 ASSAY OF MAGNESIUM: CPT

## 2024-02-24 PROCEDURE — 700102 HCHG RX REV CODE 250 W/ 637 OVERRIDE(OP): Performed by: INTERNAL MEDICINE

## 2024-02-24 PROCEDURE — 05HC33Z INSERTION OF INFUSION DEVICE INTO LEFT BASILIC VEIN, PERCUTANEOUS APPROACH: ICD-10-PCS | Performed by: INTERNAL MEDICINE

## 2024-02-24 PROCEDURE — 36415 COLL VENOUS BLD VENIPUNCTURE: CPT

## 2024-02-24 PROCEDURE — 700102 HCHG RX REV CODE 250 W/ 637 OVERRIDE(OP): Performed by: HOSPITALIST

## 2024-02-24 PROCEDURE — 770001 HCHG ROOM/CARE - MED/SURG/GYN PRIV*

## 2024-02-24 PROCEDURE — 700111 HCHG RX REV CODE 636 W/ 250 OVERRIDE (IP): Performed by: HOSPITALIST

## 2024-02-24 PROCEDURE — A9270 NON-COVERED ITEM OR SERVICE: HCPCS | Performed by: INTERNAL MEDICINE

## 2024-02-24 PROCEDURE — 76937 US GUIDE VASCULAR ACCESS: CPT

## 2024-02-24 PROCEDURE — 80069 RENAL FUNCTION PANEL: CPT

## 2024-02-24 RX ORDER — TAMSULOSIN HYDROCHLORIDE 0.4 MG/1
0.4 CAPSULE ORAL
Status: DISCONTINUED | OUTPATIENT
Start: 2024-02-25 | End: 2024-02-26 | Stop reason: HOSPADM

## 2024-02-24 RX ORDER — OXYBUTYNIN CHLORIDE 5 MG/1
5 TABLET ORAL 3 TIMES DAILY
Status: DISCONTINUED | OUTPATIENT
Start: 2024-02-24 | End: 2024-02-26 | Stop reason: HOSPADM

## 2024-02-24 RX ADMIN — MEROPENEM 500 MG: 500 INJECTION, POWDER, FOR SOLUTION INTRAVENOUS at 17:48

## 2024-02-24 RX ADMIN — MEROPENEM 500 MG: 500 INJECTION, POWDER, FOR SOLUTION INTRAVENOUS at 23:56

## 2024-02-24 RX ADMIN — OXYBUTYNIN CHLORIDE 5 MG: 5 TABLET ORAL at 17:48

## 2024-02-24 RX ADMIN — LISINOPRIL 10 MG: 10 TABLET ORAL at 04:44

## 2024-02-24 RX ADMIN — OXYBUTYNIN CHLORIDE 5 MG: 5 TABLET ORAL at 12:10

## 2024-02-24 RX ADMIN — ENOXAPARIN SODIUM 60 MG: 100 INJECTION SUBCUTANEOUS at 17:48

## 2024-02-24 RX ADMIN — WATER 2 G: 100 INJECTION, SOLUTION INTRAVENOUS at 04:44

## 2024-02-24 RX ADMIN — ENOXAPARIN SODIUM 60 MG: 100 INJECTION SUBCUTANEOUS at 04:45

## 2024-02-24 RX ADMIN — ATORVASTATIN CALCIUM 40 MG: 40 TABLET, FILM COATED ORAL at 17:48

## 2024-02-24 RX ADMIN — MEROPENEM 500 MG: 500 INJECTION, POWDER, FOR SOLUTION INTRAVENOUS at 12:10

## 2024-02-24 RX ADMIN — ASPIRIN 81 MG: 81 TABLET, COATED ORAL at 04:44

## 2024-02-24 ASSESSMENT — ENCOUNTER SYMPTOMS
WEAKNESS: 0
NAUSEA: 0
ABDOMINAL PAIN: 0
SHORTNESS OF BREATH: 0

## 2024-02-24 ASSESSMENT — PAIN DESCRIPTION - PAIN TYPE
TYPE: ACUTE PAIN

## 2024-02-24 NOTE — DISCHARGE INSTRUCTIONS
Discharge Instructions per Sherif Dean M.D.    DIAGNOSIS: You were treated for bladder obstruction with placement of a Lerner catheter and urinary tract infection.  Please follow-up with the urologist Dr. Schmidt in his office for further management of the Lerner catheter.  For the urine infection, you will continue on IV or ertapenem until 3/1/2024.  You can continue on Flomax which will help relax your urinary sphincter muscle, if you have sensation of spasm you can take the medication oxybutynin up to 3 times a day.

## 2024-02-24 NOTE — DISCHARGE PLANNING
SW arranged for [facility] at 3pm. Approval for Approved Services for [facility] received at our contracted rate (5034.83). SW faxed to [facility] and confirmed time. Wheelchair referral will be sent but will need to be follow up on outpatient basis. [facility] accepted. Updated order sent to [facility] but no new tanks needed,as pt has supply at home.     Update: SW received update that pt needing 7 day course of IV abx for ESBL. SW cancelled [facility] transport. HCM will attempt to get home infusions arranged Monday as infusion companies closed today. Awaiting midline placement. MICHAEL met with pt and obtained choice for 1) [facility] and 2) [facility].

## 2024-02-24 NOTE — PROCEDURES
Vascular Access Team    Date of Insertion: 2/24/24  Arm Circumference: 50  Internal length: 18  External Length: 0  Vein Occupancy %: 21  Reason for Midline: antibiotic therapy  Labs: on 2/22/24 WBC 10.4 , , on 2/24/24 BUN 20, Cr 1.07, GFR 90 , INR na    Orders confirmed, vessel patency confirmed with ultrasound. Risks and benefits of procedure explained to patient and education regarding line associated bloodstream infections provided. Questions answered.     Power Midline placed in LUE per licensed provider order with ultrasound guidance. 4  Fr, single lumen Power Midline placed in basilic vein after 1 attempt(s). 2 mL of 1% lidocaine injected intradermally, 21 gauge microintroducer needle was visualized entering the vein and modified Seldinger technique used. 18 cm catheter inserted with good blood return. Secured at 0 cm marker. Internal positioning stylet removed and verified to be intact. Each lumen flushed without resistance with 10 mL 0.9% normal saline. Midline secured with Biopatch and Tegaderm.     Midline placement is confirmed by nurse using ultrasound and ability to flush and draw blood. Midline is appropriate for use at this time.  Patient tolerated procedure well, without complications.  No X-ray is needed for placement confirmation.  Patient condition relayed to unit RN or ordering physician via this post procedure note in the EMR.     Ultrasound images uploaded to PACS and viewable in the EMR - yes  Ultrasound imaged printed and placed in paper chart - no     BARD Power Midline ref # [id number], Lot # [id number], Expiration Date 04/30/2025

## 2024-02-24 NOTE — CONSULTS
"INFECTIOUS DISEASES INPATIENT CONSULT NOTE     Date of Service:2/24/24    Consult Requested By: Sherif Dean M.D.    Reason for Consultation: ESBL UTI    History of Present Illness:   Cole Jaramillo is a 39 y.o. male admitted 2/22/2024 due to inability to urinate for over a day  From admit \" Patient presents for evaluation to the emergency room after developing inability to pee last night.  Reports that he needed to pee, he went to the bathroom but was unable to void at all.  This got worse through the night until he can tolerate the pain any longer he came to the emergency.  Here there were attempts made by the staff to place Lerner however reported they were unsuccessful\"  Urology consulted-placed Lerner- approximately 800 mL of cloudy foul smelling urine, UA +bacteria, WBC culture ESBL Kleb  Initially on Ancef then Zosyn  Afebrile. No leukocytosis  Infectious Diseases consulted for antibiotic recommendation and management      Review Of Systems:  No fever  Pain resolved with Lerner  All other systems are reviewed and are negative     PMH:   Past Medical History:   Diagnosis Date    Morbid obesity (HCC)          PSH:  No past surgical history on file.    FAMILY HX:  No ESBL infections    SOCIAL HX:  Social History     Socioeconomic History    Marital status: Single     Spouse name: Not on file    Number of children: Not on file    Years of education: Not on file    Highest education level: Not on file   Occupational History    Not on file   Tobacco Use    Smoking status: Never    Smokeless tobacco: Never   Substance and Sexual Activity    Alcohol use: Yes     Comment: rarely    Drug use: Never    Sexual activity: Not on file   Other Topics Concern    Not on file   Social History Narrative    Not on file     Social Determinants of Health     Financial Resource Strain: Not on file   Food Insecurity: Not on file   Transportation Needs: Not on file   Physical Activity: Not on file   Stress: Not on file   Social " Connections: Not on file   Intimate Partner Violence: Not on file   Housing Stability: Not on file     Social History     Tobacco Use   Smoking Status Never   Smokeless Tobacco Never     Social History     Substance and Sexual Activity   Alcohol Use Yes    Comment: rarely       Allergies/Intolerances:  No Known Allergies      Other Current Medications:    Current Facility-Administered Medications:     meropenem (Merrem) 500 mg in  mL IV-MBP, 500 mg, Intravenous, Q6HRS, Radha Russell M.D.    oxybutynin (Ditropan) tablet 5 mg, 5 mg, Oral, TID, Sherif Dean M.D.    torsemide (Demadex) tablet 40 mg, 40 mg, Oral, DAILY, Sherif Dean M.D., 40 mg at 02/22/24 1429    spironolactone (Aldactone) tablet 100 mg, 100 mg, Oral, DAILY, Sherif Dean M.D., 100 mg at 02/22/24 1323    lisinopril (Prinivil) tablet 10 mg, 10 mg, Oral, DAILY, Alberto Alaniz D.O., 10 mg at 02/24/24 0444    ferrous sulfate tablet 325 mg, 325 mg, Oral, Q48HRS, Alberto Alaniz D.O., 325 mg at 02/23/24 0553    atorvastatin (Lipitor) tablet 40 mg, 40 mg, Oral, Q EVENING, Alberto Alaniz, D.O., 40 mg at 02/23/24 1627    aspirin EC tablet 81 mg, 81 mg, Oral, DAILY, Alberto Alaniz, D.O., 81 mg at 02/24/24 0444    enoxaparin (Lovenox) inj 60 mg, 60 mg, Subcutaneous, BID, Alberto Alaniz, D.O., 60 mg at 02/24/24 0445    acetaminophen (Tylenol) tablet 650 mg, 650 mg, Oral, Q6HRS PRN, Alberto Alaniz, D.O., 650 mg at 02/23/24 2338    Notify provider if pain remains uncontrolled, , , CONTINUOUS **AND** Use the Numeric Rating Scale (NRS), Goldman-Baker Faces (WBF), or FLACC on regular floors and Critical-Care Pain Observation Tool (CPOT) on ICUs/Trauma to assess pain, , , CONTINUOUS **AND** Pulse Ox, , , CONTINUOUS **AND** Pharmacy Consult Request ...Pain Management Review 1 Each, 1 Each, Other, PHARMACY TO DOSE **AND** If patient difficult to arouse and/or has respiratory depression (respiratory rate of 10 or less),  "stop any opiates that are currently infusing and call a Rapid Response., , , CONTINUOUS, Alberto Alaniz D.O.    oxyCODONE immediate-release (Roxicodone) tablet 5 mg, 5 mg, Oral, Q3HRS PRN **OR** oxyCODONE immediate release (Roxicodone) tablet 10 mg, 10 mg, Oral, Q3HRS PRN **OR** morphine 4 MG/ML injection 4 mg, 4 mg, Intravenous, Q3HRS PRN, Alberto Alaniz D.O.    senna-docusate (Pericolace Or Senokot S) 8.6-50 MG per tablet 2 Tablet, 2 Tablet, Oral, Q EVENING **AND** polyethylene glycol/lytes (Miralax) Packet 1 Packet, 1 Packet, Oral, QDAY PRN, Alberto Alaniz D.O.    ondansetron (Zofran) syringe/vial injection 4 mg, 4 mg, Intravenous, Q4HRS PRN, Alberto Alaniz D.O.    ondansetron (Zofran ODT) dispertab 4 mg, 4 mg, Oral, Q4HRS PRN, Alberto Alaniz D.O.    promethazine (Phenergan) tablet 12.5-25 mg, 12.5-25 mg, Oral, Q4HRS PRN, Alberto Alaniz D.O.    promethazine (Phenergan) suppository 12.5-25 mg, 12.5-25 mg, Rectal, Q4HRS PRN, Alberto Alaniz D.O.    prochlorperazine (Compazine) injection 5-10 mg, 5-10 mg, Intravenous, Q4HRS PRN, Alberto Alaniz D.O.      Most Recent Vital Signs:  /57   Pulse 72   Temp 36.4 °C (97.5 °F) (Temporal)   Resp 16   Ht 1.803 m (5' 11\")   Wt (!) 269 kg (594 lb)   SpO2 96%   BMI 82.85 kg/m²   Temp  Av.6 °C (97.8 °F)  Min: 36.1 °C (97 °F)  Max: 37.1 °C (98.8 °F)    Physical Exam:  General: well-appearing, well nourished no acute distress BMI 83  HEENT: NCAT, PERRLA, sclera anicteric, PERRL, EOMI,  no oral lesions Dentition fair  Neck: supple, no lymphadenopathy  Chest: CTAB, unlabored.decreased breath sounds  Cardiac: RRR  Lerner  Abdomen: + bowel sounds, soft, non-tender, non-distended  Extremities: No cyanosis, clubbing. Extensive chronic edema with lichenification BLE,   Neuro: Alert and oriented times 3, Speech fluent CN intact SPRAGUE  Psych: Mood normal Affect normal    Pertinent Lab Results:  Recent Labs "     02/22/24  0840   WBC 10.4      Recent Labs     02/22/24  0840   HEMOGLOBIN 12.7*   HEMATOCRIT 42.0   MCV 78.1*   MCH 23.6*   PLATELETCT 418         Recent Labs     02/22/24  0840 02/23/24  0040 02/24/24  0528   SODIUM 135 135 136   POTASSIUM 4.2 4.9 4.4   CHLORIDE 95* 96 97   CO2 26 27 30   CREATININE 1.15 1.06 1.07        Recent Labs     02/22/24  0840 02/24/24  0528   ALBUMIN 4.2 3.0*        Pertinent Micro:  Results       Procedure Component Value Units Date/Time    URINE CULTURE (ADD ON) [[id number]]  (Abnormal)  (Susceptibility) Collected: 02/22/24 1115    Order Status: Completed Specimen: Urine, Nash Cath Updated: 02/24/24 0917     Significant Indicator POS     Source UR     Site URINE, NASH CATH     Culture Result -      Klebsiella pneumoniae ESBL  >100,000 cfu/mL  Extended Spectrum Beta-lactamase (ESBL) isolated.  ESBL's may be clinically resistant to therapy with  Penicillins,Cephalosporins or Aztreonam despite  apparent in vitro susceptibility to some of these agents.  The patient requires contact isolation.  Please contact pharmacy or an Infectious Disease Specialist  if you have any questions about appropriate therapy.      Narrative:      Indication for culture:->Evaluation for sepsis without a  clear source of infection  Indication for culture:->Evaluation for sepsis without a    Susceptibility       Klebsiella pneumoniae esbl (1)       Antibiotic Interpretation Microscan   Method Status    Ampicillin Resistant >16 mcg/mL YURI Final    Amoxicillin/CA Intermediate 16/8 mcg/mL YURI Final    Ceftriaxone Resistant >32 mcg/mL YURI Final    Cefazolin Resistant >16 mcg/mL YURI Final     Breakpoints when Cefazolin is used for therapy of infections  other than uncomplicated UTIs due to Enterobacterales are as  follows:  YURI and Interpretation:  <=2 S  4 I  >=8 R         Ciprofloxacin Resistant >2 mcg/mL YURI Final    Cefepime Resistant >16 mcg/mL YURI Final    Cefuroxime Resistant >16 mcg/mL YURI Final     "Nitrofurantoin Resistant >64 mcg/mL YURI Final    Ampicillin/sulbactam Resistant >16/8 mcg/mL YURI Final    Gentamicin Resistant >8 mcg/mL YURI Final    Levofloxacin Resistant 2 mcg/mL YURI Final    Minocycline Intermediate 8 mcg/mL YURI Final    Pip/Tazobactam Sensitive 16 mcg/mL YURI Final    Trimeth/Sulfa Sensitive <=0.5/9.5 mcg/mL YURI Final    Tigecycline Sensitive <=2 mcg/mL YURI Final    Tobramycin Resistant >8 mcg/mL YURI Final                       URINALYSIS [[id number]]  (Abnormal) Collected: 02/22/24 1115    Order Status: Completed Specimen: Urine Updated: 02/22/24 1144     Color Yellow     Character Cloudy     Specific Gravity 1.015     Ph 6.0     Glucose Negative mg/dL      Ketones Trace mg/dL      Protein 100 mg/dL      Bilirubin Negative     Urobilinogen, Urine 0.2     Nitrite Positive     Leukocyte Esterase Large     Occult Blood Large     Micro Urine Req Microscopic    URINALYSIS [[id number]]     Order Status: Canceled Specimen: Urine           No results found for: \"BLOODCULTU\", \"BLDCULT\", \"BCHOLD\"     Studies:    IMPRESSION:   BMI 82.8  Urinary retention causing UTI  Lerner in place  UTI due to ESBL Kleb        PLAN:   DC Zosyn as inferior to meropenem for treatment ESBL-producing bacteria  Anticipate 7 days course  Can use once daily ertapenem 1 gram IV daily  Given weight of 600 pounds concern for nephrotoxicity with treatment dosing of Bactrim  Stop date 3/1/24    Midline-yes    Plan of care discussed with MAYA Dean M.D.. Will continue to follow    Radha Russell M.D.    "

## 2024-02-24 NOTE — PROGRESS NOTES
Hospital Medicine Daily Progress Note    Date of Service  2/24/2024    Chief Complaint  Cole Jaramillo is a 39 y.o. male admitted 2/22/2024 with urinary difficulty    Hospital Course  38 yo man with obesity and HTN who presented with difficulty urinating and abdominal pain.  Urology had to be consulted to place a Lerner catheter under cystoscopy and recommends leaving catheter in place with outpatient follow-up.  UA was also concerning for UTI and started on Ancef.  Urine culture grew Klebsiella ESBL.  ID was consulted.  He was switched to meropenem and recommends 7 days of ertapenem on discharge.     Interval Problem Update  2/23 - Urine output 6950 cc.  Hold spironolactone and torsemide given high volume post obstruction diuresis.  Urine culture pending  He is on 3 L of oxygen.  He denies shortness of breath.  He says he uses 1 to 2 L intermittently at home.    2/24 - UOP 3000cc last 24h. Resume home diuretics.  He denies abdominal pain or dysuria but feels penis pain related to the Lerner catheter.  Will trial oxybutynin.  Urine culture grew ESBL Klebsiella.  I consulted Dr. Russell with infectious disease.    I have discussed this patient's plan of care and discharge plan at IDT rounds today with Case Management, Nursing, Nursing leadership, and other members of the IDT team.    Consultants/Specialty  urology, ID    Code Status  Full Code    Disposition  The patient is not medically cleared for discharge to home or a post-acute facility.  Anticipate discharge to: home with organized home healthcare and close outpatient follow-up    I have placed the appropriate orders for post-discharge needs.    Review of Systems  Review of Systems   Constitutional:  Negative for malaise/fatigue.   Respiratory:  Negative for shortness of breath.    Cardiovascular:  Negative for chest pain.   Gastrointestinal:  Negative for abdominal pain and nausea.   Genitourinary:  Negative for dysuria.        Penile discomfort   Neurological:   Negative for weakness.        Physical Exam  Temp:  [36.4 °C (97.5 °F)-36.7 °C (98.1 °F)] 36.4 °C (97.5 °F)  Pulse:  [72-88] 72  Resp:  [16] 16  BP: (103-125)/(56-63) 105/57  SpO2:  [91 %-97 %] 96 %    Physical Exam  Vitals and nursing note reviewed.   Constitutional:       Appearance: He is obese. He is not ill-appearing or diaphoretic.   HENT:      Head: Normocephalic.      Mouth/Throat:      Mouth: Mucous membranes are moist.   Eyes:      General:         Right eye: No discharge.         Left eye: No discharge.   Cardiovascular:      Rate and Rhythm: Normal rate and regular rhythm.   Pulmonary:      Effort: Pulmonary effort is normal. No respiratory distress.      Breath sounds: No wheezing or rales.   Abdominal:      Palpations: Abdomen is soft.      Tenderness: There is no abdominal tenderness.   Musculoskeletal:      Cervical back: Neck supple.      Right lower leg: Edema present.      Left lower leg: Edema present.   Skin:     General: Skin is warm and dry.   Neurological:      Mental Status: He is alert and oriented to person, place, and time.         Fluids    Intake/Output Summary (Last 24 hours) at 2/24/2024 1249  Last data filed at 2/24/2024 1201  Gross per 24 hour   Intake 240 ml   Output 2550 ml   Net -2310 ml       Laboratory  Recent Labs     02/22/24  0840   WBC 10.4   RBC 5.38   HEMOGLOBIN 12.7*   HEMATOCRIT 42.0   MCV 78.1*   MCH 23.6*   MCHC 30.2*   RDW 66.5*   PLATELETCT 418   MPV 9.1     Recent Labs     02/22/24  0840 02/23/24  0040 02/24/24  0528   SODIUM 135 135 136   POTASSIUM 4.2 4.9 4.4   CHLORIDE 95* 96 97   CO2 26 27 30   GLUCOSE 107* 108* 99   BUN 18 16 20   CREATININE 1.15 1.06 1.07   CALCIUM 9.5 8.8 8.3*                   Imaging  IR-MIDLINE CATHETER INSERTION WO GUIDANCE > AGE 3    (Results Pending)        Assessment/Plan  * Bladder obstruction- (present on admission)  Assessment & Plan  Urology placed Lerner catheter via cystoscopy, remain in place until outpatient follow-up  He has  significant postobstructive diuresis with high urine output that has improved.  Continue to monitor output  Oxybutynin for possible spasm pain.  Add daily Flomax    Acute cystitis with hematuria  Assessment & Plan  Urine culture growing ESBL Klebsiella.  Isolation ordered  I consulted infectious disease  Switched to meropenem  Plan for 7 days IV antibiotic, ertapenem on discharge  I ordered for midline and home health      Abdominal mass  Assessment & Plan  Based on exam would appear to be an umbilical hernia.  He was unable to fit in the CT, will have to follow-up outpatient    Hypertension- (present on admission)  Assessment & Plan  Cont lisinopril, spironolactone, torsemide  BP low range, monitor    Class 3 severe obesity due to excess calories with serious comorbidity and body mass index (BMI) greater than or equal to 70 in adult (HCC)- (present on admission)  Assessment & Plan  Given the patient's BMI greater than 90, referral for bariatric surgery recommended         VTE prophylaxis:    enoxaparin ppx      I have performed a physical exam and reviewed and updated ROS and Plan today (2/24/2024). In review of yesterday's note (2/23/2024), there are no changes except as documented above.

## 2024-02-24 NOTE — CARE PLAN
The patient is Stable - Low risk of patient condition declining or worsening    Shift Goals  Clinical Goals: monitor george output;mobility  Patient Goals: mobility;rest;    Progress made toward(s) clinical / shift goals:    Problem: Pain - Standard  Goal: Alleviation of pain or a reduction in pain to the patient’s comfort goal  Description: Target End Date:  Prior to discharge or change in level of care    Document on Vitals flowsheet    1.  Document pain using the appropriate pain scale per order or unit policy  2.  Educate and implement non-pharmacologic comfort measures (i.e. relaxation, distraction, massage, cold/heat therapy, etc.)  3.  Pain management medications as ordered  4.  Reassess pain after pain med administration per policy  5.  If opiods administered assess patient's response to pain medication is appropriate per POSS sedation scale  6.  Follow pain management plan developed in collaboration with patient and interdisciplinary team (including palliative care or pain specialists if applicable)  Outcome: Progressing     Problem: Knowledge Deficit - Standard  Goal: Patient and family/care givers will demonstrate understanding of plan of care, disease process/condition, diagnostic tests and medications  Description: Target End Date:  1-3 days or as soon as patient condition allows    Document in Patient Education    1.  Patient and family/caregiver oriented to unit, equipment, visitation policy and means for communicating concern  2.  Complete/review Learning Assessment  3.  Assess knowledge level of disease process/condition, treatment plan, diagnostic tests and medications  4.  Explain disease process/condition, treatment plan, diagnostic tests and medications  Outcome: Progressing     Problem: Skin Integrity  Goal: Skin integrity is maintained or improved  Description: Target End Date:  Prior to discharge or change in level of care    Document interventions on Skin Risk/Luis flowsheet groups and  corresponding LDA    1.  Assess and monitor skin integrity, appearance and/or temperature  2.  Assess risk factors for impaired skin integrity and/or pressures ulcers  3.  Implement precautions to protect skin integrity in collaboration with interdisciplinary team  4.  Implement pressure ulcer prevention protocol if at risk for skin breakdown  5.  Confirm wound care consult if at risk for skin breakdown  6.  Ensure patient use of pressure relieving devices  (Low air loss bed, waffle overlay, heel protectors, ROHO cushion, etc)  Outcome: Progressing     Problem: Fall Risk  Goal: Patient will remain free from falls  Description: Target End Date:  Prior to discharge or change in level of care    Document interventions on the [facility] Fall Risk Assessment    1.  Assess for fall risk factors  2.  Implement fall precautions  Outcome: Progressing       Patient is not progressing towards the following goals:

## 2024-02-24 NOTE — THERAPY
"Physical Therapy   Initial Evaluation     Patient Name: Cole Jaramillo  Age:  39 y.o., Sex:  male  Medical Record #: [id number]  Today's Date: 2/23/2024     Precautions  Precautions: Fall Risk  Comments: bariatric, george    Assessment    Pt seen for follow-up PT session to assess hip measurements for customized wheelchair. Pt transferred w/ stand step to 34-inch wheelchair for measurement- fit for 36-inch. Pt completed all mobility with CGA/SBA and was left upright in chair post session. 34-inch wheelchair was left in room for pt to have place to sit while in house. Continue to follow while in-house.    Plan    Physical Therapy Initial Treatment Plan   Treatment Plan : Bed Mobility, Equipment, Gait Training, Neuro Re-Education / Balance, Self Care / Home Evaluation, Stair Training, Therapeutic Exercise  Treatment Frequency: 4 Times per Week  Duration: Until Therapy Goals Met    DC Equipment Recommendations: Wheelchair  Discharge Recommendations: Recommend home health for continued physical therapy services       Subjective    \"I can sit up for a while.\"     Objective       02/23/24 1600   Precautions   Precautions Fall Risk   Comments bariatric, george   Pain 0 - 10 Group   Pain Rating Scale (NPRS) 0   Cognition    Cognition / Consciousness WDL   Level of Consciousness Alert   Active ROM Lower Body    Active ROM Lower Body  X   Comments limited by body habitus, functional for sitting EOB, ambulation   Strength Lower Body   Lower Body Strength  WDL   Comments able to move legs across mattress when given time, lacks endurance with standing   Sensation Lower Body   Lower Extremity Sensation   WDL   Other Treatments   Other Treatments Provided Hip-hip measurement for wheelchair. 36-inch   Balance   Sitting Balance (Static) Fair   Sitting Balance (Dynamic) Fair -   Standing Balance (Static) Fair   Standing Balance (Dynamic) Fair -   Weight Shift Sitting Fair   Weight Shift Standing Fair   Skilled Intervention Verbal " Cuing;Compensatory Strategies;Sequencing   Comments valente FWW   Bed Mobility    Supine to Sit Standby Assist   Scooting Standby Assist   Skilled Intervention Compensatory Strategies;Verbal Cuing;Sequencing   Gait Analysis   Gait Level Of Assist Standby Assist   Assistive Device Front Wheel Walker   Distance (Feet) 5   # of Times Distance was Traveled 1   Deviation Increased Base Of Support;Antalgic   Skilled Intervention Verbal Cuing;Sequencing;Compensatory Strategies   Comments Steps to chair   Functional Mobility   Sit to Stand Standby Assist   Bed, Chair, Wheelchair Transfer Standby Assist   Transfer Method Stand Step   Mobility supine->EOB->chair   Skilled Intervention Verbal Cuing;Compensatory Strategies;Sequencing   How much difficulty does the patient currently have...   Turning over in bed (including adjusting bedclothes, sheets and blankets)? 1   Sitting down on and standing up from a chair with arms (e.g., wheelchair, bedside commode, etc.) 2   Moving from lying on back to sitting on the side of the bed? 2   How much help from another person does the patient currently need...   Moving to and from a bed to a chair (including a wheelchair)? 3   Need to walk in a hospital room? 3   Climbing 3-5 steps with a railing? 2   6 clicks Mobility Score 13   Activity Tolerance   Sitting in Chair 5 min. Up post session   Sitting Edge of Bed 5 min   Standing 1 min   Short Term Goals    Short Term Goal # 1 pt will perform supine <> sit without bed features and SPV in 6 vistis to demo improved bed navigation   Goal Outcome # 1 Progressing as expected   Short Term Goal # 2 pt will perform sit <> stand and functional transfers with valente FWW and SPV to improve mobility independence for home in 6 visits   Goal Outcome # 2 Progressing as expected   Short Term Goal # 3 pt will ambulate 200 ft with Valente FWW and SPV to access home environment in 6 visits   Goal Outcome # 3 Progressing as expected   Education Group   Education  Provided Role of Physical Therapist;Gait Training;Use of Assistive Device   Role of Physical Therapist Patient Response Patient;Eager;Explanation;Verbal Demonstration   Gait Training Patient Response Patient;Eager;Explanation;Action Demonstration   Use of Assistive Device Patient Response Patient;Eager;Explanation;Action Demonstration   Physical Therapy Treatment Plan   Physical Therapy Treatment Plan Continue Current Treatment Plan   Anticipated Discharge Equipment and Recommendations   DC Equipment Recommendations Wheelchair   Discharge Recommendations Recommend home health for continued physical therapy services   Interdisciplinary Plan of Care Collaboration   IDT Collaboration with  Nursing;Occupational Therapist   Patient Position at End of Therapy Seated;Call Light within Reach;Tray Table within Reach   Collaboration Comments DC recs, mobility status   Session Information   Date / Session Number  2/23- 2(2/4, 2/29)     Carlos Johnson, SPT

## 2024-02-24 NOTE — DISCHARGE PLANNING
Referral sent to [facility] [place] as per request.  Spoke with Luke at [facility] they will not due wheelchairs on weekends,. Call placed to [place] with [facility] [place] left message for return call.

## 2024-02-24 NOTE — FACE TO FACE
"Face to Face Note  -  Durable Medical Equipment    Sherif Dean M.D. - NPI: [id number]  I certify that this patient is under my care and that they had a durable medical equipment(DME)face to face encounter by myself that meets the physician DME face-to-face encounter requirements with this patient on:    Date of encounter:   Patient:                    MRN:                       YOB: 2025  Cole Jaramillo  [id number]  1985     The encounter with the patient was in whole, or in part, for the following medical condition, which is the primary reason for durable medical equipment:  Other - obesity hypoventilation    I certify that, based on my findings, the following durable medical equipment is medically necessary:    Oxygen   HOME O2 Saturation Measurements:(Values must be present for Home Oxygen orders)  Room air sat at rest: 89  Room air sat with amb: 75  With liters of O2: 3, O2 sat at rest with O2: 95  With Liters of O2: 3, O2 sat with amb with O2 : 100  Is the patient mobile?: Yes  If patient feels more short of breath, they can go up to 6 liters per minute and contact healthcare provider.    Supporting Symptoms: The patient requires supplemental oxygen, as the following interventions have been tried with limited or no improvement: \"Ambulation with oximetry and \"Incentive spirometry.    My Clinical findings support the need for the above equipment due to:  Hypoxia  "

## 2024-02-24 NOTE — CARE PLAN
The patient is Stable - Low risk of patient condition declining or worsening    Shift Goals: Lerner, Safety  Clinical Goals: Lerner output, safety  Patient Goals: Rest    Progress made toward(s) clinical / shift goals:  Pt resting comfortably in bed, pain well controlled with current medication.  Pt calls appropriately for assistance. Pt ambulatory by self to toilet.  This RN discussed upcoming procedure and post procedure plan of care with Pt, who verbalized understanding.

## 2024-02-25 LAB
ALBUMIN SERPL BCP-MCNC: 3 G/DL (ref 3.2–4.9)
BUN SERPL-MCNC: 22 MG/DL (ref 8–22)
CALCIUM ALBUM COR SERPL-MCNC: 8.9 MG/DL (ref 8.5–10.5)
CALCIUM SERPL-MCNC: 8.1 MG/DL (ref 8.5–10.5)
CHLORIDE SERPL-SCNC: 97 MMOL/L (ref 96–112)
CO2 SERPL-SCNC: 29 MMOL/L (ref 20–33)
CREAT SERPL-MCNC: 0.78 MG/DL (ref 0.5–1.4)
GFR SERPLBLD CREATININE-BSD FMLA CKD-EPI: 116 ML/MIN/1.73 M 2
GLUCOSE SERPL-MCNC: 102 MG/DL (ref 65–99)
PHOSPHATE SERPL-MCNC: 4 MG/DL (ref 2.5–4.5)
POTASSIUM SERPL-SCNC: 4 MMOL/L (ref 3.6–5.5)
SODIUM SERPL-SCNC: 135 MMOL/L (ref 135–145)

## 2024-02-25 PROCEDURE — 700105 HCHG RX REV CODE 258: Performed by: INTERNAL MEDICINE

## 2024-02-25 PROCEDURE — 99232 SBSQ HOSP IP/OBS MODERATE 35: CPT | Performed by: INTERNAL MEDICINE

## 2024-02-25 PROCEDURE — 36415 COLL VENOUS BLD VENIPUNCTURE: CPT

## 2024-02-25 PROCEDURE — A9270 NON-COVERED ITEM OR SERVICE: HCPCS | Performed by: HOSPITALIST

## 2024-02-25 PROCEDURE — 700102 HCHG RX REV CODE 250 W/ 637 OVERRIDE(OP): Performed by: INTERNAL MEDICINE

## 2024-02-25 PROCEDURE — 700102 HCHG RX REV CODE 250 W/ 637 OVERRIDE(OP): Performed by: HOSPITALIST

## 2024-02-25 PROCEDURE — 80069 RENAL FUNCTION PANEL: CPT

## 2024-02-25 PROCEDURE — A9270 NON-COVERED ITEM OR SERVICE: HCPCS | Performed by: INTERNAL MEDICINE

## 2024-02-25 PROCEDURE — 700111 HCHG RX REV CODE 636 W/ 250 OVERRIDE (IP): Performed by: HOSPITALIST

## 2024-02-25 PROCEDURE — 770001 HCHG ROOM/CARE - MED/SURG/GYN PRIV*

## 2024-02-25 PROCEDURE — 700111 HCHG RX REV CODE 636 W/ 250 OVERRIDE (IP): Performed by: INTERNAL MEDICINE

## 2024-02-25 RX ADMIN — ENOXAPARIN SODIUM 60 MG: 100 INJECTION SUBCUTANEOUS at 17:54

## 2024-02-25 RX ADMIN — MEROPENEM 500 MG: 500 INJECTION, POWDER, FOR SOLUTION INTRAVENOUS at 17:59

## 2024-02-25 RX ADMIN — MEROPENEM 500 MG: 500 INJECTION, POWDER, FOR SOLUTION INTRAVENOUS at 05:00

## 2024-02-25 RX ADMIN — MEROPENEM 500 MG: 500 INJECTION, POWDER, FOR SOLUTION INTRAVENOUS at 12:34

## 2024-02-25 RX ADMIN — OXYBUTYNIN CHLORIDE 5 MG: 5 TABLET ORAL at 17:54

## 2024-02-25 RX ADMIN — SPIRONOLACTONE 100 MG: 100 TABLET ORAL at 04:59

## 2024-02-25 RX ADMIN — LISINOPRIL 10 MG: 10 TABLET ORAL at 04:59

## 2024-02-25 RX ADMIN — OXYBUTYNIN CHLORIDE 5 MG: 5 TABLET ORAL at 12:31

## 2024-02-25 RX ADMIN — ASPIRIN 81 MG: 81 TABLET, COATED ORAL at 04:59

## 2024-02-25 RX ADMIN — TORSEMIDE 40 MG: 20 TABLET ORAL at 05:03

## 2024-02-25 RX ADMIN — OXYBUTYNIN CHLORIDE 5 MG: 5 TABLET ORAL at 04:59

## 2024-02-25 RX ADMIN — ATORVASTATIN CALCIUM 40 MG: 40 TABLET, FILM COATED ORAL at 17:54

## 2024-02-25 RX ADMIN — TAMSULOSIN HYDROCHLORIDE 0.4 MG: 0.4 CAPSULE ORAL at 08:59

## 2024-02-25 RX ADMIN — FERROUS SULFATE TAB 325 MG (65 MG ELEMENTAL FE) 325 MG: 325 (65 FE) TAB at 04:59

## 2024-02-25 RX ADMIN — ENOXAPARIN SODIUM 60 MG: 100 INJECTION SUBCUTANEOUS at 04:59

## 2024-02-25 ASSESSMENT — ENCOUNTER SYMPTOMS
NAUSEA: 0
FLANK PAIN: 0
FEVER: 0
SHORTNESS OF BREATH: 0
WEAKNESS: 0
ABDOMINAL PAIN: 0
VOMITING: 0

## 2024-02-25 ASSESSMENT — PAIN DESCRIPTION - PAIN TYPE
TYPE: ACUTE PAIN

## 2024-02-25 NOTE — CARE PLAN
Problem: Pain - Standard  Goal: Alleviation of pain or a reduction in pain to the patient’s comfort goal  Outcome: Progressing     Problem: Knowledge Deficit - Standard  Goal: Patient and family/care givers will demonstrate understanding of plan of care, disease process/condition, diagnostic tests and medications  Outcome: Progressing     Problem: Skin Integrity  Goal: Skin integrity is maintained or improved  Outcome: Progressing   The patient is Stable - Low risk of patient condition declining or worsening    Shift Goals  Clinical Goals: (P) mobility, bowel movement  Patient Goals: (P) rest, healing, comfort    Progress made toward(s) clinical / shift goals:  Patient pending mobility, educated patient on plan of care this shift, patient verbalized understanding    Patient is not progressing towards the following goals:

## 2024-02-25 NOTE — PROGRESS NOTES
Hospital Medicine Daily Progress Note    Date of Service  2/25/2024    Chief Complaint  Cole Jaramillo is a 39 y.o. male admitted 2/22/2024 with urinary difficulty    Hospital Course  38 yo man with obesity and HTN who presented with difficulty urinating and abdominal pain.  Urology had to be consulted to place a Lerner catheter under cystoscopy and recommends leaving catheter in place with outpatient follow-up.  UA was also concerning for UTI and started on Ancef.  Urine culture grew Klebsiella ESBL.  ID was consulted.  He was switched to meropenem and recommends 7 days of ertapenem on discharge.     Interval Problem Update  2/23 - Urine output 6950 cc.  Hold spironolactone and torsemide given high volume post obstruction diuresis.  Urine culture pending  He is on 3 L of oxygen.  He denies shortness of breath.  He says he uses 1 to 2 L intermittently at home.    2/24 - UOP 3000cc last 24h. Resume home diuretics.  He denies abdominal pain or dysuria but feels penis pain related to the Lerner catheter.  Will trial oxybutynin.  Urine culture grew ESBL Klebsiella.  I consulted Dr. Russell with infectious disease.    2/25 -stable on 3 L O2.  UOP 2300 cc.  Continued on meropenem.  He has no new symptoms.  Midline in place.  He still has mild discomfort from the Lerner catheter    I have discussed this patient's plan of care and discharge plan at IDT rounds today with Case Management, Nursing, Nursing leadership, and other members of the IDT team.    Consultants/Specialty  urology, ID    Code Status  Full Code    Disposition  The patient is not medically cleared for discharge to home or a post-acute facility.  Anticipate discharge to: home with organized home healthcare and close outpatient follow-up    I have placed the appropriate orders for post-discharge needs.    Review of Systems  Review of Systems   Constitutional:  Negative for malaise/fatigue.   Respiratory:  Negative for shortness of breath.    Cardiovascular:   Negative for chest pain.   Gastrointestinal:  Negative for abdominal pain.   Genitourinary:  Negative for dysuria.        Penile discomfort   Neurological:  Negative for weakness.        Physical Exam  Temp:  [36.3 °C (97.3 °F)-36.7 °C (98.1 °F)] 36.5 °C (97.7 °F)  Pulse:  [62-83] 62  Resp:  [17-18] 18  BP: ()/(48-61) 104/59  SpO2:  [94 %-98 %] 97 %    Physical Exam  Vitals and nursing note reviewed.   Constitutional:       Appearance: He is obese. He is not ill-appearing or diaphoretic.   HENT:      Head: Normocephalic.      Mouth/Throat:      Mouth: Mucous membranes are moist.   Eyes:      General:         Right eye: No discharge.         Left eye: No discharge.   Cardiovascular:      Rate and Rhythm: Normal rate and regular rhythm.   Pulmonary:      Effort: Pulmonary effort is normal. No respiratory distress.      Breath sounds: No wheezing or rales.   Abdominal:      Palpations: Abdomen is soft.      Tenderness: There is no abdominal tenderness.   Musculoskeletal:      Cervical back: Neck supple.      Right lower leg: Edema present.      Left lower leg: Edema present.   Skin:     General: Skin is warm and dry.   Neurological:      Mental Status: He is alert and oriented to person, place, and time.         Fluids    Intake/Output Summary (Last 24 hours) at 2/25/2024 1315  Last data filed at 2/25/2024 1101  Gross per 24 hour   Intake 1280 ml   Output 4050 ml   Net -2770 ml       Laboratory        Recent Labs     02/23/24  0040 02/24/24  0528 02/25/24  0300   SODIUM 135 136 135   POTASSIUM 4.9 4.4 4.0   CHLORIDE 96 97 97   CO2 27 30 29   GLUCOSE 108* 99 102*   BUN 16 20 22   CREATININE 1.06 1.07 0.78   CALCIUM 8.8 8.3* 8.1*                   Imaging  IR-MIDLINE CATHETER INSERTION WO GUIDANCE > AGE 3   Final Result                  Ultrasound-guided midline placement performed by qualified nursing staff    as above.               Assessment/Plan  * Bladder obstruction- (present on admission)  Assessment &  Plan  Urology placed Lerner catheter via cystoscopy, remain in place until outpatient follow-up  He has significant postobstructive diuresis with high urine output that has improved.  Continue to monitor output  Oxybutynin for possible spasm pain.  Added daily Flomax    Acute cystitis with hematuria  Assessment & Plan  Urine culture growing ESBL Klebsiella.  Isolation ordered  I consulted infectious disease  Switched to meropenem  Plan for 7 days IV antibiotic, ertapenem on discharge  Midline placed  Case management working on discharge plans      Abdominal mass  Assessment & Plan  Based on exam would appear to be an umbilical hernia.  He was unable to fit in the CT, will have to follow-up outpatient    Hypertension- (present on admission)  Assessment & Plan  Cont lisinopril, spironolactone, torsemide  BP low range, monitor    Class 3 severe obesity due to excess calories with serious comorbidity and body mass index (BMI) greater than or equal to 70 in adult (HCC)- (present on admission)  Assessment & Plan  Given the patient's BMI greater than 90  I discussed weight loss with the patient today.  Patient has been working on his nutrition for the past year and has been able to lose weight with that though he has not had much guidance.  He is working on getting a PCP that can come out to his home.  He is interested in discussing with the nutritionist, I placed a consult.  We also discussed bariatric surgery and oral medication as options as he works on his diet, exercise, mental health which he wants to focus on for now.         VTE prophylaxis:    enoxaparin ppx      I have performed a physical exam and reviewed and updated ROS and Plan today (2/25/2024). In review of yesterday's note (2/24/2024), there are no changes except as documented above.

## 2024-02-25 NOTE — CARE PLAN
The patient is Stable - Low risk of patient condition declining or worsening    Shift Goals  Clinical Goals: george output;mobility  Patient Goals: rest    Progress made toward(s) clinical / shift goals:    Problem: Pain - Standard  Goal: Alleviation of pain or a reduction in pain to the patient’s comfort goal  Description: Target End Date:  Prior to discharge or change in level of care    Document on Vitals flowsheet    1.  Document pain using the appropriate pain scale per order or unit policy  2.  Educate and implement non-pharmacologic comfort measures (i.e. relaxation, distraction, massage, cold/heat therapy, etc.)  3.  Pain management medications as ordered  4.  Reassess pain after pain med administration per policy  5.  If opiods administered assess patient's response to pain medication is appropriate per POSS sedation scale  6.  Follow pain management plan developed in collaboration with patient and interdisciplinary team (including palliative care or pain specialists if applicable)  Outcome: Progressing     Problem: Knowledge Deficit - Standard  Goal: Patient and family/care givers will demonstrate understanding of plan of care, disease process/condition, diagnostic tests and medications  Description: Target End Date:  1-3 days or as soon as patient condition allows    Document in Patient Education    1.  Patient and family/caregiver oriented to unit, equipment, visitation policy and means for communicating concern  2.  Complete/review Learning Assessment  3.  Assess knowledge level of disease process/condition, treatment plan, diagnostic tests and medications  4.  Explain disease process/condition, treatment plan, diagnostic tests and medications  Outcome: Progressing     Problem: Skin Integrity  Goal: Skin integrity is maintained or improved  Description: Target End Date:  Prior to discharge or change in level of care    Document interventions on Skin Risk/Luis flowsheet groups and corresponding  LDA    1.  Assess and monitor skin integrity, appearance and/or temperature  2.  Assess risk factors for impaired skin integrity and/or pressures ulcers  3.  Implement precautions to protect skin integrity in collaboration with interdisciplinary team  4.  Implement pressure ulcer prevention protocol if at risk for skin breakdown  5.  Confirm wound care consult if at risk for skin breakdown  6.  Ensure patient use of pressure relieving devices  (Low air loss bed, waffle overlay, heel protectors, ROHO cushion, etc)  Outcome: Progressing     Problem: Fall Risk  Goal: Patient will remain free from falls  Description: Target End Date:  Prior to discharge or change in level of care    Document interventions on the [facility] Fall Risk Assessment    1.  Assess for fall risk factors  2.  Implement fall precautions  Outcome: Progressing       Patient is not progressing towards the following goals:

## 2024-02-25 NOTE — PROGRESS NOTES
Bedside report received.  Assessment complete.  A&O x 4. Patient calls appropriately.  Patient ambulates with standby assist with a front wheel walker.  Patient reports 0/10 pain.   Denies N&V. Tolerating regular diet.  Lerner catheter in place, - flatus, - BM.  Patient denies SOB.    Review plan with of care with patient. Call light and personal belongings within reach. Hourly rounding in place. All needs met at this time.

## 2024-02-25 NOTE — DISCHARGE PLANNING
Received choice form @: [id number]  Agency/Facility name: [facility]  Sent referral per choice form @: [id number]

## 2024-02-25 NOTE — PROGRESS NOTES
4 Eyes Skin Assessment Completed by DAVID Lynne and DAVID Irene     Head WDL  Ears: R ear pink  Nose WDL  Mouth WDL  Neck WDL  Breast/Chest: scattered bruising and excoriation   Shoulder Blades WDL   Spine  WDL  (R) Arm/Elbow/Hand WDL  (L) Arm/Elbow/Hand WDL  Abdomen: LLQ scar, pink  Groin: Bernard, Redness to Pannus, Interdry Applied   Scrotum/Coccyx/Buttocks WDL  Bilateral inner thighs: Pink/ Red Skin, Interdry Applied   (R) Leg: flaky, dusky, edema, hard  (L) Leg: flaky, dusky, edema, hard  (R) Heel/Foot/Toe: flaky  (L) Heel/Foot/Toe: flaky              Devices In Places: nasal cannula, Pulse Ox, Lerner         Interventions In Place: bariatric low air loss mattress, interdry        Pictures Uploaded Into Epic n/a  Wound Consult Placed n/a  RN Wound Prevention Protocol Ordered n/a

## 2024-02-25 NOTE — PROGRESS NOTES
Infectious Disease Progress Note    Author: Radha Russell M.D. Date & Time of service: 2024  11:32 AM    Chief Complaint:  ESBL UTI    Interval History:  39 y.o. male admitted 2024 due to inability to urinate for over a day   AF tolerating abx had midline placed  Labs Reviewed and Medications Reviewed.    Review of Systems:  Review of Systems   Constitutional:  Negative for fever.   Gastrointestinal:  Negative for abdominal pain, nausea and vomiting.   Genitourinary:  Negative for flank pain and hematuria.   All other systems reviewed and are negative.      Hemodynamics:  Temp (24hrs), Av.5 °C (97.7 °F), Min:36.3 °C (97.3 °F), Max:36.7 °C (98.1 °F)  Temperature: 36.5 °C (97.7 °F)  Pulse  Av.1  Min: 62  Max: 120   Blood Pressure: 104/59       Physical Exam:  Physical Exam  Vitals and nursing note reviewed.   Constitutional:       General: He is not in acute distress.     Appearance: He is not ill-appearing, toxic-appearing or diaphoretic.   Eyes:      General: No scleral icterus.  Cardiovascular:      Rate and Rhythm: Normal rate.   Pulmonary:      Effort: Pulmonary effort is normal. No respiratory distress.   Abdominal:      General: There is no distension.      Palpations: Abdomen is soft.   Genitourinary:     Comments: george  Musculoskeletal:      Right lower leg: Edema present.      Left lower leg: Edema present.   Skin:     Findings: Lesion present.   Neurological:      General: No focal deficit present.      Mental Status: He is alert and oriented to person, place, and time.         Meds:    Current Facility-Administered Medications:     meropenem    oxybutynin    tamsulosin    torsemide    spironolactone    lisinopril    ferrous sulfate    atorvastatin    aspirin    enoxaparin (LOVENOX) injection    acetaminophen    Notify provider if pain remains uncontrolled **AND** Use the Numeric Rating Scale (NRS), Goldman-Baker Faces (WBF), or FLACC on regular floors and Critical-Care Pain  "Observation Tool (CPOT) on ICUs/Trauma to assess pain **AND** Pulse Ox **AND** Pharmacy Consult Request **AND** If patient difficult to arouse and/or has respiratory depression (respiratory rate of 10 or less), stop any opiates that are currently infusing and call a Rapid Response.    oxyCODONE immediate-release **OR** oxyCODONE immediate-release **OR** morphine injection    senna-docusate **AND** polyethylene glycol/lytes    ondansetron    ondansetron    promethazine    promethazine    prochlorperazine    Labs:  No results for input(s): \"WBC\", \"RBC\", \"HEMOGLOBIN\", \"HEMATOCRIT\", \"MCV\", \"MCH\", \"RDW\", \"PLATELETCT\", \"MPV\", \"NEUTSPOLYS\", \"LYMPHOCYTES\", \"MONOCYTES\", \"EOSINOPHILS\", \"BASOPHILS\", \"RBCMORPHOLO\" in the last 72 hours.  Recent Labs     02/23/24  0040 02/24/24  0528 02/25/24  0300   SODIUM 135 136 135   POTASSIUM 4.9 4.4 4.0   CHLORIDE 96 97 97   CO2 27 30 29   GLUCOSE 108* 99 102*   BUN 16 20 22     Recent Labs     02/23/24  0040 02/24/24  0528 02/25/24  0300   ALBUMIN  --  3.0* 3.0*   CREATININE 1.06 1.07 0.78       Imaging:  IR-MIDLINE CATHETER INSERTION WO GUIDANCE > AGE 3    Result Date: 2/24/2024  HISTORY/REASON FOR EXAM:  Midline Placement   TECHNIQUE/EXAM DESCRIPTION AND NUMBER OF VIEWS: Midline insertion with ultrasound guidance.  FINDINGS: Midline insertion with Ultrasound Guidance was performed by qualified nursing staff without the assistance of a Radiologist. Midline positioning as measured by RN or as appropriate length of catheter selected.              Ultrasound-guided midline placement performed by qualified nursing staff as above.     [id number] VIEW    Result Date: 2/11/2024 2/11/2024 10:45 AM HISTORY/REASON FOR EXAM:  Abdominal Pain; Supraumbilic abdominal mass felt on palpation, reports post-prandial abdominal pain in that area. TECHNIQUE/EXAM DESCRIPTION AND NUMBER OF VIEWS:  1 views of the abdomen. COMPARISON: None FINDINGS: There is no evidence of bowel obstruction. There is no " "evidence of free intraperitoneal air. No abnormal calcifications are seen. Volume of stool in the large bowel is mildly prominent.     No evidence of bowel obstruction. Possible constipation.     EC-ECHOCARDIOGRAM COMPLETE W/ CONT    Result Date: 2/3/2024  Transthoracic Echo Report Echocardiography Laboratory CONCLUSIONS Prior echocardiogram 2023, pulmonary hypertension is now seen. The left ventricular ejection fraction is visually estimated to be 55%. Grossly normal regional wall motion. Flattened septum in diastole (\"D\"- shaped left ventricle) consistent with RV volume overload. No evidence of valvular abnormality based on Doppler evaluation. EVERARDO POST Exam Date:         2024                    11:37 Exam Location:     Inpatient Priority:          Routine Ordering Physician:        KONSTANTIN WATKINS Referring Physician: Sonographer:               Kenia Landeros RDCS Age:    39     Gender:    M MRN:    [id number] :    1985 BSA:    3.53   Ht (in):    71     Wt (lb):    672 Exam Type:     Complete, Contrast Indications:     Congestive Heart Failure ICD Codes:       428 CPT Codes:       73570,  BP:   162    /   83     HR:   75 Technical Quality:       Technically difficult study                          incomplete information is                          obtained MEASUREMENTS  (Male / Female) Normal Values 2D ECHO LV Diastolic Diameter PLAX        5.1 cm                4.2 - 5.9 / 3.9 - 5.3 cm LV Systolic Diameter PLAX         3.4 cm                2.1 - 4.0 cm IVS Diastolic Thickness           0.95 cm               LVPW Diastolic Thickness          0.9 cm                LVOT Diameter                     2.3 cm                Estimated LV Ejection Fraction    55 %                  LV Ejection Fraction MOD BP       63.4 %                >= 55  % LV Ejection Fraction MOD 4C       55.3 %                LV Ejection Fraction MOD 2C       71.9 %                LV Ejection Fraction 4C AL        55.1 " "%                LV Ejection Fraction 2C AL        73.1 %                LA Volume Index                   14.4 cm3/m2           16 - 28 cm3/m2 DOPPLER AV Peak Velocity                  0.99 m/s              AV Peak Gradient                  3.9 mmHg              AV Mean Gradient                  2 mmHg                LVOT Peak Velocity                0.65 m/s              AV Area Cont Eq vti               3 cm2                 Mitral E Point Velocity           0.65 m/s              Mitral E to A Ratio               1.3                   MV Pressure Half Time             64 ms                 MV Area PHT                       3.4 cm2               MV Deceleration Time              220 ms                TV Peak E Velocity                0.63 m/s              LV E' Septal Velocity             6.9 cm/s              Mitral E to LV E' Septal Ratio    9.5                   * Indicates values subject to auto-interpretation LV EF:  55    % FINDINGS Left Ventricle Normal left ventricular chamber size. Normal left ventricular wall thickness. A reliable ejection fraction estimate difficult be made due to the technical limitations of this study. The left ventricular ejection fraction is visually estimated to be 55%. Grossly normal regional wall motion. Flattened septum in diastole (\"D\"-shaped left ventricle) consistent with RV volume overload. Diastolic function is difficult to assess. 3 mL of contrast was used to enhance visualization of the endocardial border. Existing IV was used at the right upper arm. Right Ventricle The right ventricle is not well visualized. Right Atrium The right atrium is not well visualized. Dilated inferior vena cava without inspiratory collapse. Left Atrium The left atrium is normal in size. Left atrial volume index is 15 mL/sq m. Mitral Valve Structurally normal mitral valve without significant stenosis or regurgitation. Aortic Valve Structurally normal aortic valve without significant stenosis or " regurgitation. Tricuspid Valve The tricuspid valve is not well visualized. No tricuspid stenosis. Trace tricuspid regurgitation. Unable to estimate right ventricular systolic pressure due to an inadequate tricuspid regurgitant jet. Pulmonic Valve The pulmonic valve is not well visualized. No pulmonic stenosis. Trace pulmonic insufficiency. Pericardium No pericardial effusion. Aorta Normal aortic root for body surface area. The ascending aorta diameter is 3.7 cm. Marco PABLO To (Electronically Signed) Final Date:     2024                 20:48    US-EXTREMITY ARTERY LOWER BILAT    Result Date: 2024  Lower Extremity  Arterial Duplex Report  Vascular Laboratory  CONCLUSIONS  1. Limited study due to patient's body habitus and immobility. Many of the  arteries were not visualized.  2. Allowing for the limitations, no hemodynamically significant stenosis  detected.  3. Triphasic flow at the distal posterior tibial and anterior tibial  arteries bilaterally.  [facility]ARDO POST  Exam Date:     2024 12:54  Room #:     Inpatient  Priority:     Routine  Ht (in):             Wt (lb):  Ordering Physician:        KONSTANTIN WATKINS  Referring Physician:       [id number] Dobbs KEVIN,  Sonographer:               Delfin Michael                             RVLEIGH  Study Type:                Complete Bilateral  Technical Quality:         Adequate  Age:    39    Gender:     M  MRN:    [id number]  :    1985      BSA:  Indications:     Ulcer of lower extremity  CPT Codes:       02599  ICD Codes:       707.1  History:         Non-healing wound.  Limitations:     Body habitus and immobility.                RIGHT  Waveform        Peak Systolic Velocity (cm/s)                  Prox    Prox-Mid  Mid    Mid-Dist  Distal  Triphasic                         54                       CFA                                                             PFA                                                              SFA  Triphasic                         95                       POP  Triphasic                                          52      AT  Triphasic                                          58      PT                                                             FILIPE                LEFT  Waveform        Peak Systolic Velocity (cm/s)                  Prox    Prox-Mid  Mid    Mid-Dist  Distal  Triphasic                         41                       CFA                                                             PFA  Triphasic       31                41                       SFA  Triphasic                         29                       POP  Triphasic                                          58      AT  Triphasic                                          68      PT                                                             FILIPE  FINDINGS  Very limited exam due to limitations listed above.  Right-  The profunda femoral, femoral, peroneal, proximal posterior tibial and  proximal anterior tibial arteries are not visualized due to limitations  listed above.  Triphasic flow is demonstrated at the distal posterior tibial and anterior  tibial arteries.  Left-  The profunda femoral, peroneal, distal femoral, proximal anterior tibial  and proximal peroneal arteries are not visualized due to limitations listed  above.  Triphasic flow is demonstrated at the distal posterior tibial and anterior  tibial arteries.    Leonel Robin MD  (Electronically Signed)  Final Date:      02 February 2024                   15:19    US-EXTREMITY VENOUS LOWER BILAT    Result Date: 2/1/2024   Vascular Laboratory  CONCLUSIONS  The entire exam is very limited due to body habitus and inability of the  patient to move.  Most of the lower extremity vessels were not evaluated and the exam is very  limited. Only calf vessels were imaged.  Right lower extremity.  The common femoral, femoral-saphenous junction, and distal femoral veins  were  not visualized.  Unable to assess the popliteal, and tibio-peroneal veins for flow response.  No deep venous thrombosis seen within the calf. More proximal vessles not  evaluated.  Popliteal cyst is again seen.  Left lower extremity.  The common femoral, femoral-saphenous junction, profunda, mid femoral,  distal femoral, popliteal, and peroneal veins were not visualized.  Limited flow response to left posterior tibial vein.  No deep venous thrombosis seen within the calf. More proximal vessles not  evaluated.  [facility]ARD[facility] POST  Exam Date:     2024 13:14  Room #:  Priority:     Stat  Ht (in):             Wt (lb):  Ordering Physician:        REINALDO KAPOOR  Referring Physician:       [id number] Clay  Sonographer:               Talat Hernandez RDMS, RVT  Study Type:                Complete Bilateral  Technical Quality:         Adequate  Age:    39    Gender:     M  MRN:    [id number]  :    1985      BSA:  Indications:     Pain in Limb  CPT Codes:       62415  ICD Codes:       729.5  History:         Edema of the lower extremities. Prior study 23.  Limitations:     Body habitus, edema, inability to move.  PROCEDURES:  Bilateral lower extremity venous duplex imaging.  The following venous structures were evaluated: common femoral, deep  femoral, proximal great saphenous, femoral, popliteal, peroneal, and  posterior tibial veins.  Serial compression, color, and spectral Doppler flow evaluations were  performed.  FINDINGS:  Right lower extremity.  The common femoral, femoral-saphenous junction, and distal femoral veins  were not visualized.  Unable to assess the popliteal, and tibio-peroneal veins for flow response.  All other veins demonstrate complete color filling and compressibility with  normal venous flow dynamics including spontaneous flow and respiratory  phasicity.  No deep venous thrombosis.  Previously seen right popliteal cyst seen measuring 3.7 cm, different in   size due to sonographer technique.  Left lower extremity.  The common femoral, femoral-saphenous junction, profunda, mid femoral,  distal femoral, popliteal, and peroneal veins were not visualized.  Limited flow response to left posterior tibial vein.  No deep venous thrombosis.  Brent Marcelo  (Electronically Signed)  Final Date:      01 February 2024                   15:04    DX-CHEST-PORTABLE (1 VIEW)    Result Date: 2/1/2024 2/1/2024 12:39 PM HISTORY/REASON FOR EXAM: Chest pain TECHNIQUE/EXAM DESCRIPTION AND NUMBER OF VIEWS: Single portable view of the chest. COMPARISON: None FINDINGS: There is no evidence of focal consolidation or evidence of pulmonary edema. There is no pleural effusion. The heart is enlarged.     Cardiomegaly.      Micro:  Results       Procedure Component Value Units Date/Time    URINE CULTURE (ADD ON) [[id number]]  (Abnormal)  (Susceptibility) Collected: 02/22/24 1115    Order Status: Completed Specimen: Urine, Nash Cath Updated: 02/24/24 0917     Significant Indicator POS     Source UR     Site URINE, NASH CATH     Culture Result -      Klebsiella pneumoniae ESBL  >100,000 cfu/mL  Extended Spectrum Beta-lactamase (ESBL) isolated.  ESBL's may be clinically resistant to therapy with  Penicillins,Cephalosporins or Aztreonam despite  apparent in vitro susceptibility to some of these agents.  The patient requires contact isolation.  Please contact pharmacy or an Infectious Disease Specialist  if you have any questions about appropriate therapy.      Narrative:      Indication for culture:->Evaluation for sepsis without a  clear source of infection  Indication for culture:->Evaluation for sepsis without a    Susceptibility       Klebsiella pneumoniae esbl (1)       Antibiotic Interpretation Microscan   Method Status    Ampicillin Resistant >16 mcg/mL YURI Final    Amoxicillin/CA Intermediate 16/8 mcg/mL YURI Final    Ceftriaxone Resistant >32 mcg/mL YURI Final    Cefazolin Resistant >16 mcg/mL  YURI Final     Breakpoints when Cefazolin is used for therapy of infections  other than uncomplicated UTIs due to Enterobacterales are as  follows:  YURI and Interpretation:  <=2 S  4 I  >=8 R         Ciprofloxacin Resistant >2 mcg/mL YURI Final    Cefepime Resistant >16 mcg/mL YURI Final    Cefuroxime Resistant >16 mcg/mL YURI Final    Nitrofurantoin Resistant >64 mcg/mL YURI Final    Ampicillin/sulbactam Resistant >16/8 mcg/mL YURI Final    Gentamicin Resistant >8 mcg/mL YURI Final    Levofloxacin Resistant 2 mcg/mL YURI Final    Minocycline Intermediate 8 mcg/mL YURI Final    Pip/Tazobactam Sensitive 16 mcg/mL YURI Final    Trimeth/Sulfa Sensitive <=0.5/9.5 mcg/mL YURI Final    Tigecycline Sensitive <=2 mcg/mL YURI Final    Tobramycin Resistant >8 mcg/mL YURI Final                       URINALYSIS [[id number]]  (Abnormal) Collected: 02/22/24 1115    Order Status: Completed Specimen: Urine Updated: 02/22/24 1144     Color Yellow     Character Cloudy     Specific Gravity 1.015     Ph 6.0     Glucose Negative mg/dL      Ketones Trace mg/dL      Protein 100 mg/dL      Bilirubin Negative     Urobilinogen, Urine 0.2     Nitrite Positive     Leukocyte Esterase Large     Occult Blood Large     Micro Urine Req Microscopic    URINALYSIS [[id number]]     Order Status: Canceled Specimen: Urine             Assessment:  Active Hospital Problems    Diagnosis     *Bladder obstruction [N32.0]     Acute cystitis with hematuria [N30.01]     Abdominal mass [R19.00]     Class 3 severe obesity due to excess calories with serious comorbidity and body mass index (BMI) greater than or equal to 70 in adult ([facility]) [E66.01, Z68.45]     Hypertension [I10]      MPRESSION:   BMI 82.8  Urinary retention causing UTI  Lerner in place  UTI due to ESBL Kleb           PLAN:   DC'd Zosyn as inferior to meropenem for treatment ESBL-producing bacteria  Anticipate 7 days course  Can use once daily ertapenem 1 gram IV daily-orders faxed to CM today  Given weight of 600  camilo concern for nephrotoxicity with treatment dosing of Bactrim  Stop date 3/1/24     Midline-yes  FU ID clinic 1 week after discharge  OK to see Roseline DA SILVA     Will sign off

## 2024-02-25 NOTE — PROGRESS NOTES
Assumed care of patient at 1845. Bedside report received from Mildred HERNANDEZ. Assessment complete.  AA&Ox4. Denies CP/SOB.  Reporting 1/10 pain. Declined intervention at this time.  Educated patient regarding pharmacologic and non pharmacologic modalities for pain management.  Skin per flowsheets  Tolerating Reg diet. Denies N/V.  + void. Last BM PTA  Pt ambulates with SBA and FWW.  All needs met at this time. Call light within reach. Pt calls appropriately. Bed low and locked, non skid socks in place. Hourly rounding in place.

## 2024-02-25 NOTE — CARE PLAN
The patient is Stable - Low risk of patient condition declining or worsening    Shift Goals: Safety, Urine output  Clinical Goals: Lerner, Safety  Patient Goals: Rest    Progress made toward(s) clinical / shift goals:  Pt resting comfortably in bed, pain well controlled with current medication.  Pt turns self in bed.  Pt calls appropriately for assistance.

## 2024-02-26 ENCOUNTER — PHARMACY VISIT (OUTPATIENT)
Dept: PHARMACY | Facility: MEDICAL CENTER | Age: 39
End: 2024-02-26
Payer: COMMERCIAL

## 2024-02-26 VITALS
TEMPERATURE: 97.7 F | BODY MASS INDEX: 44.1 KG/M2 | HEART RATE: 65 BPM | SYSTOLIC BLOOD PRESSURE: 128 MMHG | RESPIRATION RATE: 18 BRPM | OXYGEN SATURATION: 96 % | WEIGHT: 315 LBS | DIASTOLIC BLOOD PRESSURE: 64 MMHG | HEIGHT: 71 IN

## 2024-02-26 PROCEDURE — 700105 HCHG RX REV CODE 258: Performed by: INTERNAL MEDICINE

## 2024-02-26 PROCEDURE — 700111 HCHG RX REV CODE 636 W/ 250 OVERRIDE (IP): Performed by: INTERNAL MEDICINE

## 2024-02-26 PROCEDURE — 700102 HCHG RX REV CODE 250 W/ 637 OVERRIDE(OP): Performed by: INTERNAL MEDICINE

## 2024-02-26 PROCEDURE — A9270 NON-COVERED ITEM OR SERVICE: HCPCS | Performed by: INTERNAL MEDICINE

## 2024-02-26 PROCEDURE — 700111 HCHG RX REV CODE 636 W/ 250 OVERRIDE (IP): Performed by: HOSPITALIST

## 2024-02-26 PROCEDURE — 99239 HOSP IP/OBS DSCHRG MGMT >30: CPT | Performed by: INTERNAL MEDICINE

## 2024-02-26 PROCEDURE — A9270 NON-COVERED ITEM OR SERVICE: HCPCS | Performed by: HOSPITALIST

## 2024-02-26 PROCEDURE — 700102 HCHG RX REV CODE 250 W/ 637 OVERRIDE(OP): Performed by: HOSPITALIST

## 2024-02-26 PROCEDURE — RXMED WILLOW AMBULATORY MEDICATION CHARGE: Performed by: INTERNAL MEDICINE

## 2024-02-26 RX ORDER — OXYBUTYNIN CHLORIDE 5 MG/1
5 TABLET ORAL 3 TIMES DAILY PRN
Qty: 30 TABLET | Refills: 0 | Status: SHIPPED | OUTPATIENT
Start: 2024-02-26

## 2024-02-26 RX ORDER — TAMSULOSIN HYDROCHLORIDE 0.4 MG/1
0.4 CAPSULE ORAL
Qty: 30 CAPSULE | Refills: 1 | Status: SHIPPED | OUTPATIENT
Start: 2024-02-26

## 2024-02-26 RX ADMIN — ASPIRIN 81 MG: 81 TABLET, COATED ORAL at 04:53

## 2024-02-26 RX ADMIN — MEROPENEM 500 MG: 500 INJECTION, POWDER, FOR SOLUTION INTRAVENOUS at 04:51

## 2024-02-26 RX ADMIN — ERTAPENEM 1000 MG: 1 INJECTION INTRAMUSCULAR; INTRAVENOUS at 11:44

## 2024-02-26 RX ADMIN — LISINOPRIL 10 MG: 10 TABLET ORAL at 04:53

## 2024-02-26 RX ADMIN — OXYBUTYNIN CHLORIDE 5 MG: 5 TABLET ORAL at 11:39

## 2024-02-26 RX ADMIN — MEROPENEM 500 MG: 500 INJECTION, POWDER, FOR SOLUTION INTRAVENOUS at 00:25

## 2024-02-26 RX ADMIN — ENOXAPARIN SODIUM 60 MG: 100 INJECTION SUBCUTANEOUS at 16:39

## 2024-02-26 RX ADMIN — TORSEMIDE 40 MG: 20 TABLET ORAL at 04:52

## 2024-02-26 RX ADMIN — ENOXAPARIN SODIUM 60 MG: 100 INJECTION SUBCUTANEOUS at 04:53

## 2024-02-26 RX ADMIN — OXYBUTYNIN CHLORIDE 5 MG: 5 TABLET ORAL at 04:53

## 2024-02-26 RX ADMIN — SPIRONOLACTONE 100 MG: 100 TABLET ORAL at 04:53

## 2024-02-26 RX ADMIN — OXYBUTYNIN CHLORIDE 5 MG: 5 TABLET ORAL at 16:39

## 2024-02-26 RX ADMIN — ATORVASTATIN CALCIUM 40 MG: 40 TABLET, FILM COATED ORAL at 16:39

## 2024-02-26 RX ADMIN — TAMSULOSIN HYDROCHLORIDE 0.4 MG: 0.4 CAPSULE ORAL at 07:53

## 2024-02-26 ASSESSMENT — PAIN DESCRIPTION - PAIN TYPE
TYPE: ACUTE PAIN

## 2024-02-26 NOTE — DIETARY
Nutrition Services: Weight Loss Education Consult   Day 4 of admit.  Cole Jaramillo is a 39 y.o. male with admitting DX of Bladder obstruction     Pt requested to speak with RD to discuss nutrition for weight loss. RD met with pt at bedside today. Pt with good nutrition base knowledge. Discussed general healthy eating, the plate method, adequate protein with each meal, and limiting sugar. RD encouraged pt to consume whole foods when possible, as well as consuming adequate calories. RD provided education handouts to pt. Pt demonstrated appropriate readiness and adequate evidence of learning. RD able to answer all questions to patient's satisfaction.     No other education needs identified at this time. Consider referral to outpatient nutrition services for continuation of education as indicated or per pt preferences.     Please re-consult RD as indicated.

## 2024-02-26 NOTE — CARE PLAN
Problem: Pain - Standard  Goal: Alleviation of pain or a reduction in pain to the patient’s comfort goal  Outcome: Progressing     Problem: Knowledge Deficit - Standard  Goal: Patient and family/care givers will demonstrate understanding of plan of care, disease process/condition, diagnostic tests and medications  Outcome: Progressing     Problem: Fall Risk  Goal: Patient will remain free from falls  Outcome: Progressing   The patient is Stable - Low risk of patient condition declining or worsening    Shift Goals  Clinical Goals: bowel movement, discharge, george catheter and midline education  Patient Goals: discharge, comfort, george catheter education    Progress made toward(s) clinical / shift goals:  George, Midline Education Provided by RN treatment team, Infusion Education provided by [facility] RN, Patient and family verbalized understanding of plan of care this shift, Patient free from falls     Patient is not progressing towards the following goals:

## 2024-02-26 NOTE — DISCHARGE PLANNING
Case Management Discharge Planning    Admission Date: 2/22/2024  GMLOS: 2.7  ALOS: 4    6-Clicks ADL Score: 16  6-Clicks Mobility Score: 13  PT and/or OT Eval ordered: Yes  Post-acute Referrals Ordered: Yes  Post-acute Choice Obtained: Yes  Has referral(s) been sent to post-acute provider:  Yes      Anticipated Discharge Dispo: Discharge Disposition: D/T to home under [facility] care in anticipation of covered skilled care (06)    DME Needed: No    Action(s) Taken: Updated Provider/Nurse on Discharge Plan    Pt was discussed in IDT rounds today with Dr Dean.     Pt is on service with [facility]. Pt has a Midline.    Pt has been accepted by [facility] and plan is Bailey to come and see Pt today around 10:30 am for education.    Pt is so service with [facility] for home oxygen.   Requested Jo THORNTON to inform [facility] that Pt is on 3 liters now. Baseline was 2 liters.  Pt is pending with [facility] for a bariatric wheelchair. Plan is delivery at home.    Per Chart Approved Service  was obtained by Erica RODRIGUEZ for [facility] transport. Will set up if needed    Informed Dr Dean of this update.  09:40 am : Per Bailey of [facility] Pt's Insurance is OON with them.   Referral was sent to [facility].     This RN DONNA spoke with Gracie at [facility]. She will review referral and insurance and call Pt if he has a co pay.  Gracie to call  this RN DONNA for outcome of referral.     Home Infusion order faxed to [facility] .    Both [facility] and [facility] are OON with Pt's Insurance.  [facility] - $974 Pt s OOP cost    [facility] $635 Pt s OOP cost.    This RN DONNA spoke with Pt and he prefers [facility] as it is cheaper to go with [facility].  Informed Pt that Bailey will come and provide education today.     Per DAVID Lynne home infusion education was complete with [facility].     Per Bailey  [facility]  to deliver  Pt's supplies to Pt's home tonight.    Will set up [facility] transport.    Verbal approval from Keith Supervisor  obtained for [facility] for [facility].    Liaison at [facility] informed that Pt is discharging today.    [facility] is aware that Pt is on 3 liters oxygen.  [facility] declined for bariatric wheelchair.  Will try [facility] for oxygen.    [facility] declined for wheelchair too. Trying [facility] again.     This RN CM sent [facility] request for Pt to home at 17:00 to Dayan    Per Casandra send PCS form , trying Insurance to pay for transport.    Pt informed that [facility] accepted him for   a bariatric wheelchair but it could take 3 weeks and it will be delivered to his home address. Pt informed.    Per Saran [facility] is all set at 17:00 transport time     [facility] arrived with a regular Van and gurney .   Explained that we requested  a bariatric gurney and a bigger Van.  Provided  number of [facility] to  DAVID Lynne. DONNA Maldonado of ED  to be on back up assist at ext [phone]. Endorsed to Joel THOMPSON        Escalations Completed: None    Medically Clear: Yes    Next Steps:   CM to continue to assist Pt with discharge as needed    Barriers to Discharge:   None    Is the patient up for discharge tomorrow: No

## 2024-02-26 NOTE — CARE PLAN
The patient is Stable - Low risk of patient condition declining or worsening    Shift Goals: Bowel Fnx, Safety  Clinical Goals: Bowel Fnx, Safety  Patient Goals: Rest, BM    Progress made toward(s) clinical / shift goals:  Pt resting comfortably in bed, pain well controlled with current medication.  Pt turns self in bed with prompting.  Pt calls appropriately for assistance.  This RN discussed plan of care and barriers to discharge with Pt, who verbalized understanding.

## 2024-02-26 NOTE — PROGRESS NOTES
Assumed care of patient at 1845. Bedside report received from Maged HERNANDEZ. Assessment complete.  AA&Ox4. Denies CP/SOB.  Reporting 1/10 pain. Declined intervention at this time.  Educated patient regarding pharmacologic and non pharmacologic modalities for pain management.  Skin per flowsheets  Tolerating Regular diet. Denies N/V.  + Lerner output. Last BM PTA  Pt ambulates with SBA and FWW.  All needs met at this time. Call light within reach. Pt calls appropriately. Bed low and locked, non skid socks in place. Hourly rounding in place.

## 2024-02-26 NOTE — DISCHARGE SUMMARY
Discharge Summary    CHIEF COMPLAINT ON ADMISSION  Chief Complaint   Patient presents with    Urinary Retention     Pt BIB by ems with a complain of inability to urinate since 2000 2/21/24. On arrival had visible incontinence of bowel and bladder. Pt also complains of back pain and lower abdominal pain which he thinks is related to urinary retention.    Abdominal Pain       Reason for Admission  EMS     Admission Date  2/22/2024    CODE STATUS  Full Code    HPI & HOSPITAL COURSE  38 yo man with obesity and HTN who presented with difficulty urinating and abdominal pain. Urology had to be consulted to place a Lerner catheter under cystoscopy and recommends leaving catheter in place with outpatient follow-up. UA was also concerning for UTI and started on Ancef. Urine culture grew Klebsiella ESBL. ID was consulted. He was switched to meropenem and recommends 7 days of ertapenem on discharge, end date 3/1/2024.  Midline was placed and he was set up with home infusion.  He was also ordered for wheelchair.  Weight management was discussed and he was working on establishing with PCP.    Therefore, he is discharged in good and stable condition to home with organized home healthcare and close outpatient follow-up.    The patient met 2-midnight criteria for an inpatient stay at the time of discharge.    Discharge Date  2/26/14    FOLLOW UP ITEMS POST DISCHARGE  Follow-up with urology for Lerner cath management  Ertapenem until 3/1/2024 via midline, follow-up with ID  Continue discussion of weight loss management with PCP    DISCHARGE DIAGNOSES  Principal Problem:    Bladder obstruction (POA: Yes)  Active Problems:    Acute cystitis with hematuria (POA: Unknown)    Class 3 severe obesity due to excess calories with serious comorbidity and body mass index (BMI) greater than or equal to 70 in adult (HCC) (POA: Yes)    Hypertension (POA: Yes)    Abdominal mass (POA: Unknown)  Resolved Problems:    * No resolved hospital problems.  *      FOLLOW UP  Future Appointments   Date Time Provider Department Center   3/1/2024  1:40 PM Bowen Espinosa D.O. [facility] None   3/6/2024  2:20 PM Liliana Washington D.O. [facility] Kamila Schmidt M.D.  [address]  [place] [address]  [phone]    Schedule an appointment as soon as possible for a visit in 2 week(s)      CHELSEA Colón  [address]  [address]  [place] [address]  [phone]    Follow up        MEDICATIONS ON DISCHARGE     Medication List        START taking these medications        Instructions   oxybutynin 5 MG Tabs  Commonly known as: Ditropan   Take 1 Tablet by mouth 3 times a day as needed (bladder spasms).  Dose: 5 mg     tamsulosin 0.4 MG capsule  Commonly known as: Flomax   Take 1 Capsule by mouth 1/2 hour after breakfast.  Dose: 0.4 mg            CONTINUE taking these medications        Instructions   Aspirin Low Dose 81 MG EC tablet  Generic drug: aspirin   Take 1 Tablet by mouth every day.  Dose: 81 mg     atorvastatin 40 MG Tabs  Commonly known as: Lipitor   Take 1 Tablet by mouth every evening.  Dose: 40 mg     FeroSul 325 (65 Fe) MG tablet  Generic drug: ferrous sulfate   Take 1 Tablet by mouth every 48 hours.  Dose: 325 mg     lisinopril 10 MG Tabs  Commonly known as: Prinivil   Take 1 Tablet by mouth every day.  Dose: 10 mg     spironolactone 100 MG Tabs  Commonly known as: Aldactone   Take 1 Tablet by mouth every day.  Dose: 100 mg     torsemide 20 MG Tabs  Commonly known as: Demadex   Take 2 Tablets by mouth every day for 30 days.  Dose: 40 mg              Allergies  No Known Allergies    DIET  Orders Placed This Encounter   Procedures    Diet Order Diet: Regular     Standing Status:   Standing     Number of Occurrences:   1     Order Specific Question:   Diet:     Answer:   Regular [1]       ACTIVITY  As tolerated.      CONSULTATIONS  Urology, ID    PROCEDURES     Date: 2/22/2024     Patient ID:   Name:             Cole Jaramillo   Date of birth:    1985  Age:                 39 y.o.  male     MRN:               [id number]                                                                          PRE-OP DIAGNOSIS: urinary retention         POST-OP DIAGNOSIS: bulbar urethral stricture           PROCEDURE: cystoscopy, dilation of urethral stricture, complex catheterization            SURGEON:Davis Schmidt    LABORATORY  Lab Results   Component Value Date    SODIUM 135 02/25/2024    POTASSIUM 4.0 02/25/2024    CHLORIDE 97 02/25/2024    CO2 29 02/25/2024    GLUCOSE 102 (H) 02/25/2024    BUN 22 02/25/2024    CREATININE 0.78 02/25/2024        Lab Results   Component Value Date    WBC 10.4 02/22/2024    HEMOGLOBIN 12.7 (L) 02/22/2024    HEMATOCRIT 42.0 02/22/2024    PLATELETCT 418 02/22/2024        Total time of the discharge process exceeds 35 minutes.

## 2024-02-26 NOTE — DISCHARGE PLANNING
0918  DPA manually faxed updated order for DME for Pt to [facility] [place] [phone]    1136  Agency/Facility Name: [facility] [place]  Spoke To: Answering Service  Outcome: Per Answering Service to try the [facility] to see if they can service Pt. Per Answer Service is unable to answer at this time whether or not they can order it or how long it will take to get to Pt.  RN CM notified.    1132  Agency/Facility Name: [facility]  Spoke To: Tavares  Outcome: DPA inquired about wheelchair. Per Tavares able to order wheelchiar for Pt based on recommendations on Notes and order. Per Tavares to have DPA fax over order to [phone].  RN DONNA notified via Teams.    1140  DPA manually faxed over F2F and referral for Pt wheelchair to [facility] [phone]    1341  Agency/Facility Name: [facility]  Spoke To: Eithel  Outcome: DPA inquired about wheelchair. Per Oksana unable to accommodate order for Pt ATT to defer to a different company.  RN CM notified.    1352  Per RN CM request  Agency/Facility Name: [facility]  Sent Referral at: 1352 pm    1415  Agency/Facility Name: [facility]  Spoke To: Christy  Outcome: Per Christy unable to accommodate Pt order ATT.  RN CM notified.    1416  Agency/Facility Name: [facility] [place]  Spoke To: Solitario  Outcome: DPA inquired about wheelchair begin ordered. Per Aiden zhu order wheelchair may take up 2-3 weeks before it can be delivered. Per Solitario to have DPA refax order over to [facility].   RN CM notified.    1420  DPA manually faxed referral to [facility] [phone]

## 2024-02-26 NOTE — DISCHARGE PLANNING
DC Transport Scheduled    Transport Company Scheduled:  [employer]  Spoke with Ty at [facility] to schedule transport.    Scheduled Date: 2/26/2024  Scheduled Time: 1700    Destination: Home   Destination address: [address] [place] [address] [address] [place] [address]    Notified care team of scheduled transport via Voalte.     If there are any changes needed to the DC transportation scheduled, please contact [facility] at ext. [phone] between the hours of 3835-4775 Mon-Fri. If outside those hours, contact the ED Case Manager at ext. [phone].

## 2024-02-26 NOTE — PROGRESS NOTES
Bedside report received.  Assessment complete.  A&O x 4. Patient calls appropriately.  Patient ambulates with standby assist with a front wheel walker.  Patient reports 0/10 pain.   Denies N&V. Tolerating regular diet.  George catheter in place, + flatus, - BM.  Patient denies SOB.  SCD's refused.    Patient education provided regarding george catheter care and considerations for midline catheter. Patient verbalized understanding.     Review plan with of care with patient. Call light and personal belongings within reach. Hourly rounding in place. All needs met at this time.

## 2024-02-27 NOTE — PROGRESS NOTES
Discharging Patient home per physician order.  Discharged with [facility].  Demonstrated understanding of discharge instructions, follow up appointments, home medications, prescriptions, and nursing care instructions for Lerner.  Ambulating with assistance via FWW, voiding with Lerner Catheter in place, pain well controlled, tolerating oral medications, oxygen saturation greater than 90% , tolerating diet. Educational handouts given and discussed.  Verbalized understanding of discharge instructions and educational handouts.  Stated several reasons why to return to ED or seek medical attention. All questions answered.  Belongings and dressing supplies with patient at time of discharge.

## 2024-03-01 ENCOUNTER — TELEMEDICINE (OUTPATIENT)
Dept: INFECTIOUS DISEASES | Facility: MEDICAL CENTER | Age: 39
End: 2024-03-01
Attending: NURSE PRACTITIONER
Payer: COMMERCIAL

## 2024-03-01 ENCOUNTER — TELEMEDICINE (OUTPATIENT)
Dept: CARDIOLOGY | Facility: MEDICAL CENTER | Age: 39
End: 2024-03-01
Attending: PHYSICIAN ASSISTANT
Payer: COMMERCIAL

## 2024-03-01 ENCOUNTER — HOSPITAL ENCOUNTER (OUTPATIENT)
Facility: MEDICAL CENTER | Age: 39
End: 2024-03-01
Attending: INTERNAL MEDICINE
Payer: COMMERCIAL

## 2024-03-01 ENCOUNTER — TELEPHONE (OUTPATIENT)
Dept: CARDIOLOGY | Facility: MEDICAL CENTER | Age: 39
End: 2024-03-01

## 2024-03-01 ENCOUNTER — APPOINTMENT (OUTPATIENT)
Dept: CARDIOLOGY | Facility: MEDICAL CENTER | Age: 39
End: 2024-03-01
Attending: INTERNAL MEDICINE
Payer: COMMERCIAL

## 2024-03-01 VITALS — HEART RATE: 90 BPM | OXYGEN SATURATION: 96 % | SYSTOLIC BLOOD PRESSURE: 119 MMHG | DIASTOLIC BLOOD PRESSURE: 79 MMHG

## 2024-03-01 VITALS — BODY MASS INDEX: 77.41 KG/M2 | WEIGHT: 315 LBS

## 2024-03-01 DIAGNOSIS — N39.0 URINARY TRACT INFECTION DUE TO ESBL KLEBSIELLA: ICD-10-CM

## 2024-03-01 DIAGNOSIS — B96.89 URINARY TRACT INFECTION DUE TO ESBL KLEBSIELLA: ICD-10-CM

## 2024-03-01 DIAGNOSIS — I50.32 CHRONIC HEART FAILURE WITH PRESERVED EJECTION FRACTION (HCC): ICD-10-CM

## 2024-03-01 DIAGNOSIS — Z91.89 AT RISK FOR OBSTRUCTIVE SLEEP APNEA: ICD-10-CM

## 2024-03-01 DIAGNOSIS — I10 ESSENTIAL HYPERTENSION, BENIGN: ICD-10-CM

## 2024-03-01 DIAGNOSIS — N30.00 ACUTE CYSTITIS WITHOUT HEMATURIA: ICD-10-CM

## 2024-03-01 DIAGNOSIS — I45.10 RIGHT BUNDLE BRANCH BLOCK: ICD-10-CM

## 2024-03-01 DIAGNOSIS — R73.03 PREDIABETES: ICD-10-CM

## 2024-03-01 DIAGNOSIS — E66.01 OBESITY, MORBID, BMI 50 OR HIGHER (HCC): ICD-10-CM

## 2024-03-01 DIAGNOSIS — E78.5 HYPERLIPIDEMIA, UNSPECIFIED HYPERLIPIDEMIA TYPE: ICD-10-CM

## 2024-03-01 DIAGNOSIS — I27.20 PULMONARY HYPERTENSION (HCC): ICD-10-CM

## 2024-03-01 LAB
ALBUMIN SERPL BCP-MCNC: 4.1 G/DL (ref 3.2–4.9)
ALBUMIN/GLOB SERPL: 1.1 G/DL
ALP SERPL-CCNC: 99 U/L (ref 30–99)
ALT SERPL-CCNC: 14 U/L (ref 2–50)
ANION GAP SERPL CALC-SCNC: 13 MMOL/L (ref 7–16)
AST SERPL-CCNC: 28 U/L (ref 12–45)
BASOPHILS # BLD AUTO: 0.6 % (ref 0–1.8)
BASOPHILS # BLD: 0.04 K/UL (ref 0–0.12)
BILIRUB SERPL-MCNC: 1.3 MG/DL (ref 0.1–1.5)
BUN SERPL-MCNC: 27 MG/DL (ref 8–22)
CALCIUM ALBUM COR SERPL-MCNC: 9.3 MG/DL (ref 8.5–10.5)
CALCIUM SERPL-MCNC: 9.4 MG/DL (ref 8.5–10.5)
CHLORIDE SERPL-SCNC: 93 MMOL/L (ref 96–112)
CO2 SERPL-SCNC: 29 MMOL/L (ref 20–33)
CREAT SERPL-MCNC: 1.04 MG/DL (ref 0.5–1.4)
EOSINOPHIL # BLD AUTO: 0.27 K/UL (ref 0–0.51)
EOSINOPHIL NFR BLD: 4.1 % (ref 0–6.9)
ERYTHROCYTE [DISTWIDTH] IN BLOOD BY AUTOMATED COUNT: 63.6 FL (ref 35.9–50)
GFR SERPLBLD CREATININE-BSD FMLA CKD-EPI: 93 ML/MIN/1.73 M 2
GLOBULIN SER CALC-MCNC: 3.6 G/DL (ref 1.9–3.5)
GLUCOSE SERPL-MCNC: 84 MG/DL (ref 65–99)
HCT VFR BLD AUTO: 43.6 % (ref 42–52)
HGB BLD-MCNC: 13.2 G/DL (ref 14–18)
IMM GRANULOCYTES # BLD AUTO: 0.02 K/UL (ref 0–0.11)
IMM GRANULOCYTES NFR BLD AUTO: 0.3 % (ref 0–0.9)
LYMPHOCYTES # BLD AUTO: 2.25 K/UL (ref 1–4.8)
LYMPHOCYTES NFR BLD: 34.1 % (ref 22–41)
MCH RBC QN AUTO: 23.7 PG (ref 27–33)
MCHC RBC AUTO-ENTMCNC: 30.3 G/DL (ref 32.3–36.5)
MCV RBC AUTO: 78.3 FL (ref 81.4–97.8)
MONOCYTES # BLD AUTO: 0.61 K/UL (ref 0–0.85)
MONOCYTES NFR BLD AUTO: 9.3 % (ref 0–13.4)
NEUTROPHILS # BLD AUTO: 3.4 K/UL (ref 1.82–7.42)
NEUTROPHILS NFR BLD: 51.6 % (ref 44–72)
NRBC # BLD AUTO: 0 K/UL
NRBC BLD-RTO: 0 /100 WBC (ref 0–0.2)
PLATELET # BLD AUTO: 453 K/UL (ref 164–446)
PMV BLD AUTO: 9.6 FL (ref 9–12.9)
POTASSIUM SERPL-SCNC: 4.6 MMOL/L (ref 3.6–5.5)
PROT SERPL-MCNC: 7.7 G/DL (ref 6–8.2)
RBC # BLD AUTO: 5.57 M/UL (ref 4.7–6.1)
SODIUM SERPL-SCNC: 135 MMOL/L (ref 135–145)
WBC # BLD AUTO: 6.6 K/UL (ref 4.8–10.8)

## 2024-03-01 PROCEDURE — 99212 OFFICE O/P EST SF 10 MIN: CPT | Mod: 95 | Performed by: NURSE PRACTITIONER

## 2024-03-01 PROCEDURE — 80053 COMPREHEN METABOLIC PANEL: CPT

## 2024-03-01 PROCEDURE — 85025 COMPLETE CBC W/AUTO DIFF WBC: CPT

## 2024-03-01 PROCEDURE — 99214 OFFICE O/P EST MOD 30 MIN: CPT | Performed by: PHYSICIAN ASSISTANT

## 2024-03-01 RX ORDER — ATORVASTATIN CALCIUM 40 MG/1
40 TABLET, FILM COATED ORAL EVERY EVENING
Qty: 90 TABLET | Refills: 3 | Status: SHIPPED | OUTPATIENT
Start: 2024-03-01

## 2024-03-01 RX ORDER — TORSEMIDE 20 MG/1
40 TABLET ORAL DAILY
Qty: 180 TABLET | Refills: 3 | Status: SHIPPED | OUTPATIENT
Start: 2024-03-01

## 2024-03-01 RX ORDER — LISINOPRIL 10 MG/1
10 TABLET ORAL DAILY
Qty: 90 TABLET | Refills: 3 | Status: SHIPPED | OUTPATIENT
Start: 2024-03-01

## 2024-03-01 RX ORDER — SPIRONOLACTONE 100 MG/1
100 TABLET, FILM COATED ORAL DAILY
Qty: 90 TABLET | Refills: 3 | Status: SHIPPED | OUTPATIENT
Start: 2024-03-01

## 2024-03-01 ASSESSMENT — ENCOUNTER SYMPTOMS
SHORTNESS OF BREATH: 1
BRUISES/BLEEDS EASILY: 0
BLURRED VISION: 0
DIZZINESS: 1
NAUSEA: 0
PND: 0
LOSS OF CONSCIOUSNESS: 0
CONSTIPATION: 0
DIARRHEA: 0
BRUISES/BLEEDS EASILY: 0
WEAKNESS: 0
WHEEZING: 0
HEADACHES: 0
CONSTITUTIONAL NEGATIVE: 1
HEADACHES: 0
MYALGIAS: 0
COUGH: 0
ABDOMINAL PAIN: 0
FEVER: 0
BLURRED VISION: 0
PSYCHIATRIC NEGATIVE: 1
CLAUDICATION: 0
EYES NEGATIVE: 1
SHORTNESS OF BREATH: 0
NAUSEA: 0
PALPITATIONS: 0
COUGH: 0
CHILLS: 0
BACK PAIN: 1
NERVOUS/ANXIOUS: 0
VOMITING: 0
DOUBLE VISION: 0
FOCAL WEAKNESS: 0
SPUTUM PRODUCTION: 0
VOMITING: 0
PALPITATIONS: 0
DIZZINESS: 0
WEIGHT LOSS: 0
GASTROINTESTINAL NEGATIVE: 1
ORTHOPNEA: 0
DOUBLE VISION: 0
MYALGIAS: 0
FLANK PAIN: 0
FEVER: 0
CHILLS: 0
ABDOMINAL PAIN: 0

## 2024-03-01 ASSESSMENT — FIBROSIS 4 INDEX: FIB4 SCORE: 0.44

## 2024-03-01 NOTE — PROGRESS NOTES
Chief Complaint   Patient presents with    Follow-Up     Dx: Acute on chronic heart failure with preserved ejection fraction (HFpEF) (HCC)           Subjective     This evaluation was conducted via Zoom using secure and encrypted videoconferencing technology. The patient was in their home in the [facility].    The patient's identity was confirmed and verbal consent was obtained for this virtual visit.     Cole Jaramillo is a 39 y.o. male with a history of obesity and hypertension who presents today as a heart failure new patient.     2/1/2024 he presented to the emergency department with 2 weeks of difficulty with ambulation due to significant lower extremity edema, shortness of breath on exertion and orthopnea.  An echocardiogram was performed which demonstrated an LVEF of 55% and a flattened septum in diastole consistent with right ventricular volume overload due to pulmonary hypertension.  He was found to be hypoxic secondary to obesity related hypoventilation and restrictive airway disease.  He was started on noninvasive ventilator support with good response.  He was also diuresed with IV Lasix.  His hospital course was complicated by abdominal wall cellulitis and he was treated with antibiotics.  He was transition to oral diuretics and discharged in stable condition.  2/22/2024 he presented to the emergency department again with abdominal distention and inability to urinate.  Urology was consulted who placed a Lerner catheter under cystoscopy.  He was found to have a UTI positive for Klebsiella ESBL.  He was placed on meropenem.  He was discharged with home health in place.    Today, he reports he is doing much better since leaving the hospital. He does have some shortness of breath on exertion, but this is alleviated by supplemental O2. He also has some occasional dizziness when standing too fast. No chest pain or palpitations. No orthopnea or PND. No lower extremity edema. No syncope or  presyncope.      Past Medical History:   Diagnosis Date    Morbid obesity (HCC)      History reviewed. No pertinent surgical history.  History reviewed. No pertinent family history.  Social History     Socioeconomic History    Marital status: Single     Spouse name: Not on file    Number of children: Not on file    Years of education: Not on file    Highest education level: Not on file   Occupational History    Not on file   Tobacco Use    Smoking status: Never    Smokeless tobacco: Never   Substance and Sexual Activity    Alcohol use: Not Currently     Comment: rarely    Drug use: Never    Sexual activity: Not on file   Other Topics Concern    Not on file   Social History Narrative    Not on file     Social Determinants of Health     Financial Resource Strain: Not on file   Food Insecurity: Not on file   Transportation Needs: Not on file   Physical Activity: Not on file   Stress: Not on file   Social Connections: Not on file   Intimate Partner Violence: Not on file   Housing Stability: Not on file     No Known Allergies  Outpatient Encounter Medications as of 3/1/2024   Medication Sig Dispense Refill    torsemide (DEMADEX) 20 MG Tab Take 2 Tablets by mouth every day. 180 Tablet 3    spironolactone (ALDACTONE) 100 MG Tab Take 1 Tablet by mouth every day. 90 Tablet 3    lisinopril (PRINIVIL) 10 MG Tab Take 1 Tablet by mouth every day. 90 Tablet 3    atorvastatin (LIPITOR) 40 MG Tab Take 1 Tablet by mouth every evening. 90 Tablet 3    oxybutynin (DITROPAN) 5 MG Tab Take 1 Tablet by mouth 3 times a day as needed (bladder spasms). 30 Tablet 0    tamsulosin (FLOMAX) 0.4 MG capsule Take 1 Capsule by mouth 1/2 hour after breakfast. 30 Capsule 1    aspirin 81 MG EC tablet Take 1 Tablet by mouth every day. 100 Tablet 0    ferrous sulfate 325 (65 Fe) MG tablet Take 1 Tablet by mouth every 48 hours. 30 Tablet 0    [DISCONTINUED] lisinopril (PRINIVIL) 10 MG Tab Take 1 Tablet by mouth every day. 30 Tablet 0    [DISCONTINUED]  atorvastatin (LIPITOR) 40 MG Tab Take 1 Tablet by mouth every evening. 30 Tablet 0    [DISCONTINUED] spironolactone (ALDACTONE) 100 MG Tab Take 1 Tablet by mouth every day. 30 Tablet 3    [DISCONTINUED] torsemide (DEMADEX) 20 MG Tab Take 2 Tablets by mouth every day for 30 days. 60 Tablet 0     No facility-administered encounter medications on file as of 3/1/2024.     Review of Systems   Constitutional: Negative.  Negative for chills, fever and malaise/fatigue.   HENT: Negative.     Eyes: Negative.  Negative for blurred vision and double vision.   Respiratory:  Positive for shortness of breath (on supplemental O2 with exertion). Negative for cough.    Cardiovascular:  Negative for chest pain, palpitations, orthopnea (sleeps upright intermittently due to back pain), claudication, leg swelling and PND.   Gastrointestinal: Negative.  Negative for abdominal pain, nausea and vomiting.   Genitourinary: Negative.    Musculoskeletal:  Positive for back pain. Negative for myalgias.   Skin: Negative.  Negative for rash.   Neurological:  Positive for dizziness (occasionally when standing too fast). Negative for loss of consciousness, weakness and headaches.   Endo/Heme/Allergies: Negative.  Does not bruise/bleed easily.   Psychiatric/Behavioral: Negative.                Objective     /79   Pulse 90   SpO2 96%      Physical Exam  Vitals reviewed.   Constitutional:       General: He is not in acute distress.     Appearance: He is obese.   HENT:      Head: Normocephalic and atraumatic.      Right Ear: External ear normal.      Left Ear: External ear normal.   Eyes:      General: No scleral icterus.     Extraocular Movements: Extraocular movements intact.      Conjunctiva/sclera: Conjunctivae normal.   Pulmonary:      Effort: Pulmonary effort is normal.   Abdominal:      General: There is distension.   Musculoskeletal:         General: Normal range of motion.      Cervical back: Normal range of motion and neck supple.       "Right lower leg: No edema.      Left lower leg: No edema.   Skin:     General: Skin is warm and dry.   Neurological:      General: No focal deficit present.      Mental Status: He is alert and oriented to person, place, and time.   Psychiatric:         Mood and Affect: Mood normal.         Behavior: Behavior normal.         Judgment: Judgment normal.         Lab Results   Component Value Date/Time    CHOLSTRLTOT 88 (L) 02/02/2024 01:31 AM    LDL 53 02/02/2024 01:31 AM    HDL 21 (A) 02/02/2024 01:31 AM    TRIGLYCERIDE 72 02/02/2024 01:31 AM       Lab Results   Component Value Date/Time    SODIUM 135 03/01/2024 01:35 PM    POTASSIUM 4.6 03/01/2024 01:35 PM    CHLORIDE 93 (L) 03/01/2024 01:35 PM    CO2 29 03/01/2024 01:35 PM    GLUCOSE 84 03/01/2024 01:35 PM    BUN 27 (H) 03/01/2024 01:35 PM    CREATININE 1.04 03/01/2024 01:35 PM     Lab Results   Component Value Date/Time    ALKPHOSPHAT 99 03/01/2024 01:35 PM    ASTSGOT 28 03/01/2024 01:35 PM    ALTSGPT 14 03/01/2024 01:35 PM    TBILIRUBIN 1.3 03/01/2024 01:35 PM       Cardiovascular imaging and procedures:    EKG 2/2/2024  Personally interpreted by me as sinus rhythm with an incomplete right bundle branch block.     Echocardiogram 2/3/2024  CONCLUSIONS  Prior echocardiogram 9/1/2023, pulmonary hypertension is now seen.  The left ventricular ejection fraction is visually estimated to be 55%.   Grossly normal regional wall motion. Flattened septum in diastole (\"D\"-  shaped left ventricle) consistent with RV volume overload.  No evidence of valvular abnormality based on Doppler evaluation.     Echocardiogram 9/1/2023  CONCLUSIONS  Technically difficult study.  Normal left ventricular systolic function. The left ventricular   ejection fraction is visually estimated to be 65%.  The [place] were suboptimally visualized but no Doppler significant   valvular abnormalities.   No prior study is available for comparison.             Assessment & Plan     1. Chronic heart failure " with preserved ejection fraction (HCC)  torsemide (DEMADEX) 20 MG Tab    spironolactone (ALDACTONE) 100 MG Tab      2. Essential hypertension, benign  lisinopril (PRINIVIL) 10 MG Tab      3. Hyperlipidemia, unspecified hyperlipidemia type  atorvastatin (LIPITOR) 40 MG Tab      4. Pulmonary hypertension (HCC)  torsemide (DEMADEX) 20 MG Tab    spironolactone (ALDACTONE) 100 MG Tab      5. At risk for obstructive sleep apnea        6. Right bundle branch block            Medical Decision Making: Today's Assessment/Status/Plan:        HFpEF  Pulmonary HTN  - He is feeling much better since leaving the hospital.  - He remains on supplemental O2 on exertion.   - He appears euvolemic on exam though this is limited by zoom.  - Continue torsemide 40mg daily.  - Continue spironolactone 100mg daily    RBBB  - No angina.  - Discussed possible stress testing but he is not very mobile at this time and there may be limitations due to BMI.  - Given he is asymptomatic, we will defer at this time.    Hypertension  - Blood pressure is well controlled.  - Continue lisinopril 10mg daily.    Dyslipidemia  - Continue atorvastatin    At risk for MC  -Using AVAP  -Follow up with sleep medicine    Follow up in 3 months or sooner with clinical changes.    Encouraged him to reach out via Mychart or telephone with any questions or concerns.     Thank you for allowing me to participate in the care of Cole Jaramillo .    Deisy Arora PA-C, Cardiology  [facility], [address]  [address] [address]  Phone: [phone]  Fax: [phone]    PLEASE NOTE: This note was created using voice recognition software. I have made every reasonable attempt to correct obvious errors, but I expect that there are errors of grammar and possibly content that I did not discover before finalizing the note.

## 2024-03-01 NOTE — PROGRESS NOTES
INFECTIOUS  DISEASE  OUTPATIENT CLINIC  NOTE   Subjective   Primary care provider: Pcp Pt States None.     Reason for Follow Up:   Follow-up for   1. Urinary tract infection due to ESBL Klebsiella        2. Acute cystitis without hematuria        3. Obesity, morbid, BMI 50 or higher (HCC)        4. Prediabetes            HPI: Patient previously seen and treated by ID team as inpatient during hospital admission.   Cole Jaramillo is a 39 y.o. male with a history of obesity and HTN who was admitted on 2/22/2024 with  difficulty urinating and abdominal pain. Urology had to be consulted to place a Lerner catheter under cystoscopy and recommends leaving catheter in place with outpatient follow-up. UA was also concerning for UTI. Urine culture grew Klebsiella ESBL.  He was switched to meropenem and recommends 7 days of ertapenem on discharge, end date 3/1/2024.  Midline was placed and he was set up with home infusion.  Given patient's weight of 600 pounds there was concern with oral dosing with Bactrim as would require large treatment which could possibly cause nephrotoxicity.    03/01/24- This evaluation was conducted via Zoom using secure and encrypted videoconferencing technology. The patient was in their home in the [facility].    The patient's identity was confirmed and verbal consent was obtained for this virtual visit.  Today Patient reports feeling well.  Denies feeling generally ill, fevers/chills, general malaise, headache, n/v/d.  Denies flank pain, bladder spasms, abdominal pain.  Tolerating IV ertapenem with no complaints of side effects.  Most recent labs reviewed from 2/22/2024, WBC WNL 10.4, mild elevation in neutrophils, CMP mostly unremarkable creatinine/GFR WNL.  Pending repeat labs.  Follow-up scheduled with urology on 3/19/2024.  Recommended increase water in diet to 1-2 L water a day.  Avoid foods with excessive sugar.  Diet and weight loss education provided.  Okay for midline to be  removed today.    Current Antimicrobials: IV ertapenem 7 day course for UTI, end date 3/1/24   Previous Antimicrobials:    Other Current Medications:  Home Medications    Medication Sig Taking? Last Dose Authorizing Provider   oxybutynin (DITROPAN) 5 MG Tab Take 1 Tablet by mouth 3 times a day as needed (bladder spasms). Yes Taking Sherif Dean M.D.   tamsulosin (FLOMAX) 0.4 MG capsule Take 1 Capsule by mouth 1/2 hour after breakfast. Yes Taking Sherif Dean M.D.   lisinopril (PRINIVIL) 10 MG Tab Take 1 Tablet by mouth every day. Yes Taking Gomez Kaufman M.D.   aspirin 81 MG EC tablet Take 1 Tablet by mouth every day. Yes Taking Gomez Kaufman M.D.   atorvastatin (LIPITOR) 40 MG Tab Take 1 Tablet by mouth every evening. Yes Taking Gomez Kaufman M.D.   ferrous sulfate 325 (65 Fe) MG tablet Take 1 Tablet by mouth every 48 hours. Yes Taking Gomez Kaufman M.D.   spironolactone (ALDACTONE) 100 MG Tab Take 1 Tablet by mouth every day. Yes Taking Gomez Kaufman M.D.   torsemide (DEMADEX) 20 MG Tab Take 2 Tablets by mouth every day for 30 days. Yes Taking Gomez Kaufman M.D.        PMH:  Past Medical History:   Diagnosis Date    Morbid obesity (HCC)      History reviewed. No pertinent surgical history.  History reviewed. No pertinent family history.  Social History     Socioeconomic History    Marital status: Single     Spouse name: Not on file    Number of children: Not on file    Years of education: Not on file    Highest education level: Not on file   Occupational History    Not on file   Tobacco Use    Smoking status: Never    Smokeless tobacco: Never   Substance and Sexual Activity    Alcohol use: Yes     Comment: rarely    Drug use: Never    Sexual activity: Not on file   Other Topics Concern    Not on file   Social History Narrative    Not on file     Social Determinants of Health     Financial Resource Strain: Not on file   Food Insecurity: Not on file   Transportation Needs: Not on file   Physical Activity: Not on file    Stress: Not on file   Social Connections: Not on file   Intimate Partner Violence: Not on file   Housing Stability: Not on file           Allergies/Intolerances:  No Known Allergies    ROS:   Review of Systems   Constitutional:  Negative for chills, fever, malaise/fatigue and weight loss.   HENT:  Negative for congestion and hearing loss.    Eyes:  Negative for blurred vision and double vision.   Respiratory:  Negative for cough, sputum production, shortness of breath and wheezing.    Cardiovascular:  Negative for chest pain and palpitations.   Gastrointestinal:  Negative for abdominal pain, constipation, diarrhea, nausea and vomiting.   Genitourinary:  Negative for dysuria, flank pain, frequency, hematuria and urgency.   Musculoskeletal:  Negative for myalgias.   Skin:  Negative for itching and rash.   Neurological:  Negative for dizziness, focal weakness and headaches.   Endo/Heme/Allergies:  Does not bruise/bleed easily.   Psychiatric/Behavioral:  The patient is not nervous/anxious.       ROS was reviewed and were negative except as above.    Objective    Most Recent Vital Signs:  Wt (!) 252 kg (555 lb) Comment: patient reported  BMI 77.41 kg/m²     Physical Exam:  Physical Exam     Pertinent Lab/Imaging Results:  [unfilled]  @CMP@  WBC   Date/Time Value Ref Range Status   02/22/2024 08:40 AM 10.4 4.8 - 10.8 K/uL Final     RBC   Date/Time Value Ref Range Status   02/22/2024 08:40 AM 5.38 4.70 - 6.10 M/uL Final     Hemoglobin   Date/Time Value Ref Range Status   02/22/2024 08:40 AM 12.7 (L) 14.0 - 18.0 g/dL Final     Hematocrit   Date/Time Value Ref Range Status   02/22/2024 08:40 AM 42.0 42.0 - 52.0 % Final     MCV   Date/Time Value Ref Range Status   02/22/2024 08:40 AM 78.1 (L) 81.4 - 97.8 fL Final     MCH   Date/Time Value Ref Range Status   02/22/2024 08:40 AM 23.6 (L) 27.0 - 33.0 pg Final     MCHC   Date/Time Value Ref Range Status   02/22/2024 08:40 AM 30.2 (L) 32.3 - 36.5 g/dL Final     Comment:      "Please note new reference range effective 05/22/2023.     MPV   Date/Time Value Ref Range Status   02/22/2024 08:40 AM 9.1 9.0 - 12.9 fL Final      Sodium   Date/Time Value Ref Range Status   02/25/2024 03:00  135 - 145 mmol/L Final     Potassium   Date/Time Value Ref Range Status   02/25/2024 03:00 AM 4.0 3.6 - 5.5 mmol/L Final     Chloride   Date/Time Value Ref Range Status   02/25/2024 03:00 AM 97 96 - 112 mmol/L Final     Co2   Date/Time Value Ref Range Status   02/25/2024 03:00 AM 29 20 - 33 mmol/L Final     Glucose   Date/Time Value Ref Range Status   02/25/2024 03:00  (H) 65 - 99 mg/dL Final     Bun   Date/Time Value Ref Range Status   02/25/2024 03:00 AM 22 8 - 22 mg/dL Final     Creatinine   Date/Time Value Ref Range Status   02/25/2024 03:00 AM 0.78 0.50 - 1.40 mg/dL Final     Alkaline Phosphatase   Date/Time Value Ref Range Status   02/22/2024 08:40  (H) 30 - 99 U/L Final     AST(SGOT)   Date/Time Value Ref Range Status   02/22/2024 08:40 AM 19 12 - 45 U/L Final     ALT(SGPT)   Date/Time Value Ref Range Status   02/22/2024 08:40 AM 17 2 - 50 U/L Final     Total Bilirubin   Date/Time Value Ref Range Status   02/22/2024 08:40 AM 1.6 (H) 0.1 - 1.5 mg/dL Final      No results found for: \"CPKTOTAL\"       No results found for: \"BLOODCULTU\", \"BLDCULT\", \"BCHOLD\"    No results found for: \"BLOODCULTU\", \"BLDCULT\", \"BCHOLD\"       URINE CULTURE (ADD ON)  Order: [id number]  Status: Final result       Visible to patient: Yes (not seen)       Next appt: Today at 08:30 AM in Infectious Diseases (Jhon Daniel A.P.R.N.)    Specimen Information: Urine, Nash Cath   0 Result Notes      Component 8 d ago   Significant Indicator POS Positive (POS)   Source UR   Site URINE, NASH CATH   Culture Result - Abnormal    Culture Result  Abnormal   Klebsiella pneumoniae ESBL  >100,000 cfu/mL  Extended Spectrum Beta-lactamase (ESBL) isolated.  ESBL's may be clinically resistant to therapy " with  Penicillins,Cephalosporins or Aztreonam despite  apparent in vitro susceptibility to some of these agents.  The patient requires contact isolation.  Please contact pharmacy or an Infectious Disease Specialist  if you have any questions about appropriate therapy.    Resulting Agency M        Susceptibility     Klebsiella pneumoniae esbl     YURI     Amoxicillin/CA 16/8 mcg/mL Intermediate     Ampicillin >16 mcg/mL Resistant     Ampicillin/sulbactam >16/8 mcg/mL Resistant     Cefazolin >16 mcg/mL Resistant 1     Cefepime >16 mcg/mL Resistant     Ceftriaxone >32 mcg/mL Resistant     Cefuroxime >16 mcg/mL Resistant     Ciprofloxacin >2 mcg/mL Resistant     Gentamicin >8 mcg/mL Resistant     Levofloxacin 2 mcg/mL Resistant     Minocycline 8 mcg/mL Intermediate     Nitrofurantoin >64 mcg/mL Resistant     Pip/Tazobactam 16 mcg/mL Sensitive     Tigecycline <=2 mcg/mL Sensitive     Tobramycin >8 mcg/mL Resistant     Trimeth/Sulfa <=0.5/9.5 m... Sensitive              1 Breakpoints when Cefazolin is used for therapy of infections  other than uncomplicated UTIs due to Enterobacterales are as  follows:  YURI and Interpretation:  <=2 S  4 I  >=8 R             Narrative  Performed by: M  Indication for culture:->Evaluation for sepsis without a  clear source of infection  Indication for culture:->Evaluation for sepsis without a      Specimen Collected: 02/22/24 11:15 AM Last Resulted: 02/24/24  9:17 AM           Impression/Assessment      1. Urinary tract infection due to ESBL Klebsiella        2. Acute cystitis without hematuria        3. Obesity, morbid, BMI 50 or higher ([facility])        4. Prediabetes            Cole Jaramillo is a 39 y.o. male with a history of obesity and HTN who was admitted on 2/22/2024 with  difficulty urinating and abdominal pain. Urology had to be consulted to place a Lerner catheter under cystoscopy and recommends leaving catheter in place with outpatient follow-up. UA was also concerning for  UTI. Urine culture grew Klebsiella ESBL.  He was switched to meropenem and recommends 7 days of ertapenem on discharge, end date 3/1/2024.  Midline was placed and he was set up with home infusion.  Given patient's weight of 600 pounds there was concern with oral dosing with Bactrim as would require large treatment which could possibly cause nephrotoxicity.    03/01/24- This evaluation was conducted via Zoom using secure and encrypted videoconferencing technology. The patient was in their home in the [facility].    The patient's identity was confirmed and verbal consent was obtained for this virtual visit.  Today Patient reports feeling well.  Denies feeling generally ill, fevers/chills, general malaise, headache, n/v/d.  Denies flank pain, bladder spasms, abdominal pain.  Tolerating IV ertapenem with no complaints of side effects.  Most recent labs reviewed from 2/22/2024, WBC WNL 10.4, mild elevation in neutrophils, CMP mostly unremarkable creatinine/GFR WNL.  Pending repeat labs.  Follow-up scheduled with urology on 3/19/2024.  Recommended increase water in diet to 1-2 L water a day.  Avoid foods with excessive sugar.  Diet and weight loss education provided.  Okay for midline to be removed today.  PLAN:   - Complete last dose of IV ertapenem today, 3/1/2024.  Okay to remove midline after last infusion.  - Medication education provided and S/S of side effects discussed   - Recommend routine follow up with urology  - Diet and weight loss education provided.  - Education provided on S/S of infection and when to report to ER/ call 911         Return visit: PRN. Follow up with primary care physician for chronic medical problems      I have performed a physical exam,  updated ROS and plan today. I have reviewed previous images, labs, and provider notes.      LEXY Colón.    All Patients should seek medical re-evaluation or report to the ER for new, increasing or worsening symptoms. In some circumstances  medical conditions can change from the initial evaluation and may require emergent medical re-evaluation. This includes but is not limited to chest pain, shortness of breath, atypical abdominal pain, atypical headache, ALOC, fever >101, low blood pressure, high respiratory rate (above 30), low oxygen saturation (below 90%), acute delirium, abnormal bleeding, inability to tolerate any intake, weakness on one side of the body, any worsened or concerning conditions.    Please note that this dictation was created using voice recognition software. I have worked with technical experts from [employer] to optimize the interface.  I have made every reasonable attempt to correct obvious errors, but there may be errors of grammar and possibly content that I did not discover before finalizing the note.

## 2024-03-05 DIAGNOSIS — B96.89 URINARY TRACT INFECTION DUE TO ESBL KLEBSIELLA: ICD-10-CM

## 2024-03-05 DIAGNOSIS — N39.0 URINARY TRACT INFECTION DUE TO ESBL KLEBSIELLA: ICD-10-CM

## 2024-03-06 ENCOUNTER — APPOINTMENT (OUTPATIENT)
Dept: INTERNAL MEDICINE | Facility: OTHER | Age: 39
End: 2024-03-06
Payer: COMMERCIAL

## 2024-03-19 ENCOUNTER — OFFICE VISIT (OUTPATIENT)
Dept: UROLOGY | Facility: MEDICAL CENTER | Age: 39
End: 2024-03-19
Payer: COMMERCIAL

## 2024-03-19 ENCOUNTER — TELEPHONE (OUTPATIENT)
Dept: UROLOGY | Facility: MEDICAL CENTER | Age: 39
End: 2024-03-19

## 2024-03-19 VITALS
WEIGHT: 315 LBS | TEMPERATURE: 97.2 F | HEIGHT: 71 IN | OXYGEN SATURATION: 96 % | HEART RATE: 103 BPM | BODY MASS INDEX: 44.1 KG/M2 | SYSTOLIC BLOOD PRESSURE: 104 MMHG | DIASTOLIC BLOOD PRESSURE: 76 MMHG

## 2024-03-19 DIAGNOSIS — N35.912 STRICTURE OF BULBOUS URETHRA IN MALE, UNSPECIFIED STRICTURE TYPE: ICD-10-CM

## 2024-03-19 PROCEDURE — 99204 OFFICE O/P NEW MOD 45 MIN: CPT | Performed by: STUDENT IN AN ORGANIZED HEALTH CARE EDUCATION/TRAINING PROGRAM

## 2024-03-19 PROCEDURE — 3078F DIAST BP <80 MM HG: CPT | Performed by: STUDENT IN AN ORGANIZED HEALTH CARE EDUCATION/TRAINING PROGRAM

## 2024-03-19 PROCEDURE — 3074F SYST BP LT 130 MM HG: CPT | Performed by: STUDENT IN AN ORGANIZED HEALTH CARE EDUCATION/TRAINING PROGRAM

## 2024-03-19 ASSESSMENT — FIBROSIS 4 INDEX: FIB4 SCORE: 0.66

## 2024-03-19 NOTE — PROGRESS NOTES
Assumed care of patient at 1845. Bedside report received from Mildred HERNANDEZ. Assessment complete.  AA&Ox4. Denies CP/SOB.  Reporting 1/10 pain. Declined intervention at this time.  Educated patient regarding pharmacologic and non pharmacologic modalities for pain management.  Skin per flowsheets  Tolerating Reg diet. Denies N/V.  + void.  Last BM 2/21  Pt ambulates with SBA and FWW.  All needs met at this time. Call light within reach. Pt calls appropriately. Bed low and locked, non skid socks in place. Hourly rounding in place.     Called patients mobile number and patients spouse answered the call. Patient has been scheduled for Thursday 3/21 at 3pm.

## 2024-03-19 NOTE — PROGRESS NOTES
Subjective  Cole Jaramillo is a 39 y.o. male who presents today for evaluation of urinary retention secondary to a bulbar urethral stricture s/p cystoscopy and dilation with catheter placement on 2/22/2024. He has had a slow urinary stream for some time and has had an episode in the past where he felt the urge to urinate but was unable to, however, this resolved on it's own. The catheter has been very uncomfortable for him. He says he had some perineal trauma as a child but can't think of anything recent. He denies dysuria or hematuria.    No family history on file.    Social History     Socioeconomic History    Marital status: Single     Spouse name: Not on file    Number of children: Not on file    Years of education: Not on file    Highest education level: Not on file   Occupational History    Not on file   Tobacco Use    Smoking status: Never    Smokeless tobacco: Never   Substance and Sexual Activity    Alcohol use: Not Currently     Comment: rarely    Drug use: Never    Sexual activity: Not on file   Other Topics Concern    Not on file   Social History Narrative    Not on file     Social Determinants of Health     Financial Resource Strain: Not on file   Food Insecurity: Not on file   Transportation Needs: Not on file   Physical Activity: Not on file   Stress: Not on file   Social Connections: Not on file   Intimate Partner Violence: Not on file   Housing Stability: Not on file       No past surgical history on file.    Past Medical History:   Diagnosis Date    Morbid obesity (HCC)        Current Outpatient Medications   Medication Sig Dispense Refill    torsemide (DEMADEX) 20 MG Tab Take 2 Tablets by mouth every day. 180 Tablet 3    spironolactone (ALDACTONE) 100 MG Tab Take 1 Tablet by mouth every day. 90 Tablet 3    lisinopril (PRINIVIL) 10 MG Tab Take 1 Tablet by mouth every day. 90 Tablet 3    atorvastatin (LIPITOR) 40 MG Tab Take 1 Tablet by mouth every evening. 90 Tablet 3    oxybutynin  "(DITROPAN) 5 MG Tab Take 1 Tablet by mouth 3 times a day as needed (bladder spasms). 30 Tablet 0    tamsulosin (FLOMAX) 0.4 MG capsule Take 1 Capsule by mouth 1/2 hour after breakfast. 30 Capsule 1    aspirin 81 MG EC tablet Take 1 Tablet by mouth every day. 100 Tablet 0    ferrous sulfate 325 (65 Fe) MG tablet Take 1 Tablet by mouth every 48 hours. 30 Tablet 0     No current facility-administered medications for this visit.       No Known Allergies    Objective  /76 (BP Location: Right arm, Patient Position: Sitting, BP Cuff Size: Large adult)   Pulse (!) 103   Temp 36.2 °C (97.2 °F) (Temporal)   Ht 1.803 m (5' 11\")   Wt (!) 236 kg (521 lb)   SpO2 96%   Physical Exam  Constitutional:       Appearance: Normal appearance.   HENT:      Head: Normocephalic and atraumatic.   Pulmonary:      Effort: Pulmonary effort is normal.   Genitourinary:     Comments: Catheter in place with yellow urine output.  Skin:     General: Skin is warm and dry.   Neurological:      Mental Status: He is alert.   Psychiatric:         Mood and Affect: Mood normal.         Behavior: Behavior normal.         Labs: none    Imaging: none    Assessment    We reviewed the various treatment options for urethral stricture including direct vision internal urethrotomy, urethral balloon dilation, optilume, excision and primary anastomosis urethroplasty, buccal graft urethroplasty, and perineal urethrostomy. We had a thorough discussion on the risks and benefits of each procedure including bleeding, infection, stricture recurrence, fistula formation, erectile dysfunction, and damage to surrounding structures.     We discussed the possibility of needing a suprapubic tube to be placed prior to any surgical intervention in order to provide urethral rest for 6 weeks, however, this would be an increased risk due to his body habitus. A suprapubic catheter would be indicated if he were to develop urinary retention or was requiring intermittent " catheterization to keep the stricture open. I recommend that he undergo cystoscopy and retrograde urethrogram to evaluate the extent and location of the stricture. The findings of this study will dictate which procedure he is the best candidate for.  As his stricture was recently dilated we will schedule this in the OR in 6 weeks. He was instructed to call our office if his urination worsens prior to this.    The catheter was removed today for an accelerated void trial with 120 cc instilled into the bladder. The patient was able to urinate 120 cc.       Plan    Problem List Items Addressed This Visit    None  Visit Diagnoses       Stricture of bulbous urethra in male, unspecified stricture type            Schedule in OR in 6 weeks for cystoscopy and Retrograde urethrogram  Catheter removed today  Patient given return precautions.

## 2024-03-20 ENCOUNTER — APPOINTMENT (OUTPATIENT)
Dept: ADMISSIONS | Facility: MEDICAL CENTER | Age: 39
End: 2024-03-20
Attending: STUDENT IN AN ORGANIZED HEALTH CARE EDUCATION/TRAINING PROGRAM
Payer: COMMERCIAL

## 2024-03-25 ENCOUNTER — PATIENT MESSAGE (OUTPATIENT)
Dept: CARDIOLOGY | Facility: MEDICAL CENTER | Age: 39
End: 2024-03-25
Payer: COMMERCIAL

## 2024-03-27 ENCOUNTER — TELEPHONE (OUTPATIENT)
Dept: CARDIOLOGY | Facility: MEDICAL CENTER | Age: 39
End: 2024-03-27
Payer: COMMERCIAL

## 2024-03-27 DIAGNOSIS — I10 ESSENTIAL HYPERTENSION, BENIGN: ICD-10-CM

## 2024-03-27 DIAGNOSIS — E78.5 HYPERLIPIDEMIA, UNSPECIFIED HYPERLIPIDEMIA TYPE: ICD-10-CM

## 2024-03-27 RX ORDER — ASPIRIN 81 MG/1
81 TABLET ORAL DAILY
Qty: 90 TABLET | Refills: 3 | Status: SHIPPED | OUTPATIENT
Start: 2024-03-27

## 2024-03-27 NOTE — TELEPHONE ENCOUNTER
AM    Caller: Cole Jaramillo    Topic/issue: Patient returning Ethyl's call from 03/26/24, see patient message dated 03/25/24.    Due to insurance covering the cost of all prescriptions, he would like low dose Asprin sent to [facility] pharmacy:    [facility], [address] [place] [address] Ln · [phone]    Ph: [phone]    Thank you,  Soledad FELIPE

## 2024-03-28 ENCOUNTER — PRE-ADMISSION TESTING (OUTPATIENT)
Dept: ADMISSIONS | Facility: MEDICAL CENTER | Age: 39
End: 2024-03-28
Attending: STUDENT IN AN ORGANIZED HEALTH CARE EDUCATION/TRAINING PROGRAM
Payer: COMMERCIAL

## 2024-03-28 SDOH — ECONOMIC STABILITY: INCOME INSECURITY: IN THE LAST 12 MONTHS, WAS THERE A TIME WHEN YOU WERE NOT ABLE TO PAY THE MORTGAGE OR RENT ON TIME?: NO

## 2024-03-28 SDOH — ECONOMIC STABILITY: TRANSPORTATION INSECURITY
IN THE PAST 12 MONTHS, HAS THE LACK OF TRANSPORTATION KEPT YOU FROM MEDICAL APPOINTMENTS OR FROM GETTING MEDICATIONS?: YES

## 2024-03-28 SDOH — ECONOMIC STABILITY: INCOME INSECURITY: HOW HARD IS IT FOR YOU TO PAY FOR THE VERY BASICS LIKE FOOD, HOUSING, MEDICAL CARE, AND HEATING?: NOT VERY HARD

## 2024-03-28 SDOH — HEALTH STABILITY: PHYSICAL HEALTH: ON AVERAGE, HOW MANY MINUTES DO YOU ENGAGE IN EXERCISE AT THIS LEVEL?: 10 MIN

## 2024-03-28 SDOH — ECONOMIC STABILITY: TRANSPORTATION INSECURITY
IN THE PAST 12 MONTHS, HAS LACK OF TRANSPORTATION KEPT YOU FROM MEETINGS, WORK, OR FROM GETTING THINGS NEEDED FOR DAILY LIVING?: YES

## 2024-03-28 SDOH — ECONOMIC STABILITY: HOUSING INSECURITY: IN THE LAST 12 MONTHS, HOW MANY PLACES HAVE YOU LIVED?: 1

## 2024-03-28 SDOH — HEALTH STABILITY: MENTAL HEALTH
STRESS IS WHEN SOMEONE FEELS TENSE, NERVOUS, ANXIOUS, OR CAN'T SLEEP AT NIGHT BECAUSE THEIR MIND IS TROUBLED. HOW STRESSED ARE YOU?: ONLY A LITTLE

## 2024-03-28 SDOH — ECONOMIC STABILITY: FOOD INSECURITY: WITHIN THE PAST 12 MONTHS, THE FOOD YOU BOUGHT JUST DIDN'T LAST AND YOU DIDN'T HAVE MONEY TO GET MORE.: NEVER TRUE

## 2024-03-28 SDOH — ECONOMIC STABILITY: FOOD INSECURITY: WITHIN THE PAST 12 MONTHS, YOU WORRIED THAT YOUR FOOD WOULD RUN OUT BEFORE YOU GOT MONEY TO BUY MORE.: NEVER TRUE

## 2024-03-28 SDOH — ECONOMIC STABILITY: HOUSING INSECURITY
IN THE LAST 12 MONTHS, WAS THERE A TIME WHEN YOU DID NOT HAVE A STEADY PLACE TO SLEEP OR SLEPT IN A SHELTER (INCLUDING NOW)?: NO

## 2024-03-28 SDOH — HEALTH STABILITY: PHYSICAL HEALTH: ON AVERAGE, HOW MANY DAYS PER WEEK DO YOU ENGAGE IN MODERATE TO STRENUOUS EXERCISE (LIKE A BRISK WALK)?: 3 DAYS

## 2024-03-28 SDOH — ECONOMIC STABILITY: TRANSPORTATION INSECURITY
IN THE PAST 12 MONTHS, HAS LACK OF RELIABLE TRANSPORTATION KEPT YOU FROM MEDICAL APPOINTMENTS, MEETINGS, WORK OR FROM GETTING THINGS NEEDED FOR DAILY LIVING?: YES

## 2024-03-28 ASSESSMENT — LIFESTYLE VARIABLES
AUDIT-C TOTAL SCORE: 1
HOW MANY STANDARD DRINKS CONTAINING ALCOHOL DO YOU HAVE ON A TYPICAL DAY: 1 OR 2
HOW OFTEN DO YOU HAVE A DRINK CONTAINING ALCOHOL: MONTHLY OR LESS
HOW OFTEN DO YOU HAVE SIX OR MORE DRINKS ON ONE OCCASION: NEVER
SKIP TO QUESTIONS 9-10: 1

## 2024-03-28 ASSESSMENT — SOCIAL DETERMINANTS OF HEALTH (SDOH)
HOW OFTEN DO YOU ATTEND CHURCH OR RELIGIOUS SERVICES?: NEVER
HOW OFTEN DO YOU HAVE A DRINK CONTAINING ALCOHOL: MONTHLY OR LESS
DO YOU BELONG TO ANY CLUBS OR ORGANIZATIONS SUCH AS CHURCH GROUPS UNIONS, FRATERNAL OR ATHLETIC GROUPS, OR SCHOOL GROUPS?: YES
HOW MANY DRINKS CONTAINING ALCOHOL DO YOU HAVE ON A TYPICAL DAY WHEN YOU ARE DRINKING: 1 OR 2
IN A TYPICAL WEEK, HOW MANY TIMES DO YOU TALK ON THE PHONE WITH FAMILY, FRIENDS, OR NEIGHBORS?: MORE THAN THREE TIMES A WEEK
ARE YOU MARRIED, WIDOWED, DIVORCED, SEPARATED, NEVER MARRIED, OR LIVING WITH A PARTNER?: NEVER MARRIED
ARE YOU MARRIED, WIDOWED, DIVORCED, SEPARATED, NEVER MARRIED, OR LIVING WITH A PARTNER?: NEVER MARRIED
HOW OFTEN DO YOU GET TOGETHER WITH FRIENDS OR RELATIVES?: MORE THAN THREE TIMES A WEEK
HOW HARD IS IT FOR YOU TO PAY FOR THE VERY BASICS LIKE FOOD, HOUSING, MEDICAL CARE, AND HEATING?: NOT VERY HARD
WITHIN THE PAST 12 MONTHS, YOU WORRIED THAT YOUR FOOD WOULD RUN OUT BEFORE YOU GOT THE MONEY TO BUY MORE: NEVER TRUE
HOW OFTEN DO YOU ATTENT MEETINGS OF THE CLUB OR ORGANIZATION YOU BELONG TO?: MORE THAN 4 TIMES PER YEAR
HOW OFTEN DO YOU ATTEND CHURCH OR RELIGIOUS SERVICES?: NEVER
HOW OFTEN DO YOU GET TOGETHER WITH FRIENDS OR RELATIVES?: MORE THAN THREE TIMES A WEEK
DO YOU BELONG TO ANY CLUBS OR ORGANIZATIONS SUCH AS CHURCH GROUPS UNIONS, FRATERNAL OR ATHLETIC GROUPS, OR SCHOOL GROUPS?: YES
HOW OFTEN DO YOU ATTENT MEETINGS OF THE CLUB OR ORGANIZATION YOU BELONG TO?: MORE THAN 4 TIMES PER YEAR
HOW OFTEN DO YOU HAVE SIX OR MORE DRINKS ON ONE OCCASION: NEVER
IN A TYPICAL WEEK, HOW MANY TIMES DO YOU TALK ON THE PHONE WITH FAMILY, FRIENDS, OR NEIGHBORS?: MORE THAN THREE TIMES A WEEK

## 2024-03-28 NOTE — OR NURSING
Pre-admit phone appt completed. Pt instructed on holding Lisinopril 24 hours before surg. per anesthesia protocol.  Anesthesia called to inform of patient's weight = 521# (per Sarina).

## 2024-04-02 ENCOUNTER — APPOINTMENT (OUTPATIENT)
Dept: INTERNAL MEDICINE | Facility: OTHER | Age: 39
End: 2024-04-02
Payer: COMMERCIAL

## 2024-04-02 VITALS
DIASTOLIC BLOOD PRESSURE: 80 MMHG | HEART RATE: 89 BPM | TEMPERATURE: 97.6 F | OXYGEN SATURATION: 98 % | WEIGHT: 315 LBS | SYSTOLIC BLOOD PRESSURE: 115 MMHG | BODY MASS INDEX: 44.1 KG/M2 | HEIGHT: 71 IN

## 2024-04-02 DIAGNOSIS — R19.00 ABDOMINAL MASS, UNSPECIFIED ABDOMINAL LOCATION: ICD-10-CM

## 2024-04-02 DIAGNOSIS — I50.32 CHRONIC HEART FAILURE WITH PRESERVED EJECTION FRACTION (HFPEF) (HCC): ICD-10-CM

## 2024-04-02 DIAGNOSIS — I87.2 STASIS DERMATITIS OF BOTH LEGS: ICD-10-CM

## 2024-04-02 DIAGNOSIS — Z11.59 NEED FOR HEPATITIS C SCREENING TEST: ICD-10-CM

## 2024-04-02 DIAGNOSIS — B37.2 SKIN YEAST INFECTION: ICD-10-CM

## 2024-04-02 DIAGNOSIS — D50.9 IRON DEFICIENCY ANEMIA, UNSPECIFIED IRON DEFICIENCY ANEMIA TYPE: ICD-10-CM

## 2024-04-02 DIAGNOSIS — I10 PRIMARY HYPERTENSION: ICD-10-CM

## 2024-04-02 DIAGNOSIS — D50.9 MICROCYTIC ANEMIA: ICD-10-CM

## 2024-04-02 DIAGNOSIS — E66.2 OBESITY HYPOVENTILATION SYNDROME (HCC): ICD-10-CM

## 2024-04-02 DIAGNOSIS — R73.03 PREDIABETES: ICD-10-CM

## 2024-04-02 DIAGNOSIS — Z91.89 AT RISK FOR OBSTRUCTIVE SLEEP APNEA: ICD-10-CM

## 2024-04-02 DIAGNOSIS — E66.01 CLASS 3 SEVERE OBESITY DUE TO EXCESS CALORIES WITH SERIOUS COMORBIDITY AND BODY MASS INDEX (BMI) GREATER THAN OR EQUAL TO 70 IN ADULT (HCC): ICD-10-CM

## 2024-04-02 DIAGNOSIS — I27.20 PULMONARY HYPERTENSION (HCC): ICD-10-CM

## 2024-04-02 DIAGNOSIS — N13.5 URETERAL STRICTURE: ICD-10-CM

## 2024-04-02 DIAGNOSIS — Z11.4 SCREENING FOR HIV (HUMAN IMMUNODEFICIENCY VIRUS): ICD-10-CM

## 2024-04-02 PROCEDURE — 99214 OFFICE O/P EST MOD 30 MIN: CPT | Performed by: INTERNAL MEDICINE

## 2024-04-02 PROCEDURE — 3079F DIAST BP 80-89 MM HG: CPT | Performed by: INTERNAL MEDICINE

## 2024-04-02 PROCEDURE — 3074F SYST BP LT 130 MM HG: CPT | Performed by: INTERNAL MEDICINE

## 2024-04-02 RX ORDER — NYSTATIN 100000 [USP'U]/G
1 POWDER TOPICAL 3 TIMES DAILY
COMMUNITY

## 2024-04-02 ASSESSMENT — FIBROSIS 4 INDEX: FIB4 SCORE: 0.66

## 2024-04-02 NOTE — PROGRESS NOTES
CC:  New patient    HISTORY OF THE PRESENT ILLNESS: Patient is a 39 y.o. male who presents as a new patient to establish care. He has PMH for HFpEF, MC on AVAPs, morbid obesity, history of urethral stricture, and iron deficiency anemia.    Morbid Obesity  Patient was hospitalized in 8/2023 for immobility and physical condition. He reports at that time he was at his heaviest around 820lbs. Since then he has lost about 300lbs. He has been working with PT, was recently discharge as he met his goals. He continues to walk daily, short-distance from his house to the mailbox. Also, working with a Dietitianand therapist through an online platform called [employer]. Diet is balanced, cooked chicken, salads, sandwiches.   He has been referred to Dr. Hill for bariatric surgery, however, is requesting a referral to [facility].    HFpEF  Pulmonary hypertension  Obesity hypoventilation syndrome  Patient admitted to the hospital in early February for bilateral lower extremity swelling, dyspnea on exertion and orthopnea.  Had ECHO that demonstrated LVEF 55% with flattening of the septum consistent with RV volume overload secondary to pulmonary HTN. He was aggressively diuresis with IV Lasix and Aldactone. He was also started on AVAPs during the admission, which he tolerated well. Patient was discharge home with AVAPs which he continues to use nightly, reports that since starting treatment his morning headaches have resolved. Has been weaned from supplemental oxygen during exertion. He continues to take Aldactone and Demadex, weight has remained stable around 520lbs.  Lower extremity is also at baseline.Denies any chest pain or orthopnea.    Urethral stricture/urinary retention  Mostly recently hospitalized for bladder obstruction and ESBL UTI. Patient was diagnosed with urethral stricture s/p cystoscopy and dilation with catheter placement on 2/22/2024. He was discharge home with a george catheter in placed, on Oxybuytin and  Flomax and to completed 7 days of ertapenem. Patient has been seen by Urology has his catheter removed 3/19. He denies dysuria, frequency, urgency. States that his stream is slow but is able to empty his bladder. He is schedule for cystoscopy and retrograde urethrogram 4/29/2024. He is no longer taking Oxybutynin or Flomax    Abdominal mass  Patient reports he first noticed an right abdominal mass after losing weight, it occasionally will cause him pain with certain movements during physical therapy such as bending down. His PT was adjusted to not exacerbate pain. He denies any nausea, vomiting, constipation, diarrhea, bloody or melanotic stool. He had a KUB while hospitalized that did not demonstrate any obstruction. Suspects it maybe a hernia.    Health Maintenance:     Screening/Preventative Topics:  Cholesterol Screening: completed  Screen for depression: PHQ-2: 0  Substance Use: No tobacco use or illicit drug use. Infrequent alcohol use- one drink per month.       Immunizations:   Influenza: UTD  Tetanus: patient deferred  Pneumococcal: patient deferred    Allergies: Patient has no known allergies.    Current Outpatient Medications Ordered in Epic   Medication Sig Dispense Refill    metFORMIN (GLUCOPHAGE) 850 MG Tab Take 1 Tablet by mouth every day. 30 Tablet 2    nystatin (MYCOSTATIN) powder Apply 1 Application topically 3 times a day.      aspirin 81 MG EC tablet Take 1 Tablet by mouth every day. 90 Tablet 3    torsemide (DEMADEX) 20 MG Tab Take 2 Tablets by mouth every day. 180 Tablet 3    spironolactone (ALDACTONE) 100 MG Tab Take 1 Tablet by mouth every day. 90 Tablet 3    lisinopril (PRINIVIL) 10 MG Tab Take 1 Tablet by mouth every day. 90 Tablet 3    atorvastatin (LIPITOR) 40 MG Tab Take 1 Tablet by mouth every evening. 90 Tablet 3    ferrous sulfate 325 (65 Fe) MG tablet Take 1 Tablet by mouth every 48 hours. 30 Tablet 0     No current Epic-ordered facility-administered medications on file.       Past  "Medical History:   Diagnosis Date    Congestive heart failure (HCC)     Dental disorder     Heart valve disease 2/1/24    See chart, not sure    High cholesterol     Hypertension     Morbid obesity (HCC)     Pneumonia     Sleep apnea 2/1/24    Possible see chart       Past Surgical History:   Procedure Laterality Date    OTHER      [place] teeth extracted       Social History     Tobacco Use    Smoking status: Never    Smokeless tobacco: Never   Vaping Use    Vaping Use: Never used   Substance Use Topics    Alcohol use: Not Currently     Comment: rarely    Drug use: Never       Social History     Social History Narrative    Not on file       Family History   Problem Relation Age of Onset    Heart Disease Maternal Grandfather         Heat attack    Cancer Paternal Grandfather         Ling cancer from smoking       ROS:   Review of Systems   Constitutional:  Negative for chills, fever and malaise/fatigue.   HENT:  Negative for congestion and sore throat.    Eyes:  Negative for blurred vision and double vision.   Respiratory:  Negative for cough and shortness of breath.    Cardiovascular:  Positive for leg swelling. Negative for chest pain, palpitations and orthopnea.   Gastrointestinal:  Positive for abdominal pain (occasional). Negative for blood in stool, constipation, diarrhea, heartburn, melena, nausea and vomiting.   Genitourinary:  Negative for dysuria, flank pain, frequency, hematuria and urgency.   Skin:  Positive for rash.   Neurological:  Negative for dizziness, weakness and headaches.   Psychiatric/Behavioral:  Negative for depression. The patient is not nervous/anxious.        Exam: /80 (BP Location: Left arm, Patient Position: Sitting, BP Cuff Size: Adult)   Pulse 89   Temp 36.4 °C (97.6 °F) (Temporal)   Ht 1.803 m (5' 11\")   Wt (!) 237 kg (523 lb)   SpO2 98%  Body mass index is 72.94 kg/m².  Physical Exam  Constitutional:       General: He is not in acute distress.     Appearance: He is obese. " He is not toxic-appearing.   HENT:      Head: Normocephalic and atraumatic.      Right Ear: Tympanic membrane and external ear normal.      Left Ear: Tympanic membrane and external ear normal.      Nose: Nose normal.      Mouth/Throat:      Mouth: Mucous membranes are moist.      Pharynx: Oropharynx is clear.   Eyes:      Extraocular Movements: Extraocular movements intact.      Conjunctiva/sclera: Conjunctivae normal.   Cardiovascular:      Rate and Rhythm: Normal rate and regular rhythm.   Pulmonary:      Effort: Pulmonary effort is normal.      Breath sounds: Normal breath sounds.   Abdominal:      General: There is no distension.      Palpations: Abdomen is soft.      Tenderness: There is no guarding.      Comments: Large right periumbilical mass noted, minimally tender to palpation.    Musculoskeletal:      Cervical back: Neck supple.      Right lower leg: Edema present.      Left lower leg: Edema present.   Skin:     General: Skin is warm and dry.      Comments: Venous insufficiency skin changes, with dry scaling, Thre are multiple open sores on the bilateral lower extremities, most notable a 2x 1 inch open sore to the anterolateral left ankle, no evidence of infection.  Well-dermarcated erythematous rash of the groin and under pannus           Assessment/Plan  Chronic heart failure with preserved ejection fraction (HFpEF) (HCC)  Pulmonary hypertension (HCC)  Obesity hypoventilation syndrome   ECHO with LVEF 55% septal flattening consistent with RV volume overload  Patient appears euvolemic on exam, dry weight around 520lbs which has been stable.   Continue Aldactone and Demadex-- recheck CMP  Continue daily weight and keep log  Continue AVAPs-- referral placed to Sleep Medicine,  Continue routine follow-up with Cardiology    Primary hypertension  Controlled, continue diuretics and Lisinopril  Recheck labs  - Basic Metabolic Panel; Future    Class 3 severe obesity due to excess calories with serious  comorbidity and body mass index (BMI) greater than or equal to 70 in adult (HCC)  Prediabetes  Patient has been referred to Dr. Hill, patient requesting referral to [facility].  He is currently participating in therapy and nutrition through [employer] [facility]. He would like to continue with this.  Given patient is prediabetic, will start Metformin--we discuss other weight loss options to include injectables, which he is not amendable to at this time.  Continue follow-up with Dietitian and therapy  - Referral to Bariatric Surgery  - metFORMIN (GLUCOPHAGE) 850 MG Tab; Take 1 Tablet by mouth every day.  Dispense: 30 Tablet; Refill: 2    At risk for obstructive sleep apnea  - Referral to Pulmonary and Sleep Medicine    Ureteral stricture  S/p cystoscopy with dilation 2/22/2024  Catheter removed 3/19/2024  Followed by Urology, plan for repeat cystoscopy and retrograde urethrogram 4/23/2024    Skin yeast infection  Recommend patient restart Nystatin powder and continue using moisture wicking sheets    Stasis dermatitis of both legs  With open sores, continue routine care.   HH helping with wound care and dressing per patient    Microcytic anemia  Iron deficiency anemia, unspecified iron deficiency anemia type  Unclear etiology, patient has been on iron supplements since 2/2024  Recheck labs and referral to GI  - CBC WITH DIFFERENTIAL; Future  - IRON/TOTAL IRON BIND; Future  - FERRITIN; Future  - Referral to Gastroenterology  - OCCULT BLOOD FECES IMMUNOASSAY; Future    Abdominal mass  Right periumbilical, palpable. Possible hernia.  Had KUB inpatient without bowel obstructive  Due to weight, patient not a candidate for CT AP.  Will refer to [facility] as above  ER precautions given for worsening abdominal, N/V, bloody stools and constipation     Screening for HIV (human immunodeficiency virus)  - HIV AG/AB COMBO ASSAY SCREENING; Future     Need for hepatitis C screening test  - HEP C VIRUS ANTIBODY; Future    Other  orders  - nystatin (MYCOSTATIN) powder; Apply 1 Application topically 3 times a day.    Follow-up in 4 weeks.

## 2024-04-03 ASSESSMENT — ENCOUNTER SYMPTOMS
NAUSEA: 0
BLOOD IN STOOL: 0
FLANK PAIN: 0
BLURRED VISION: 0
FEVER: 0
WEAKNESS: 0
DIARRHEA: 0
NERVOUS/ANXIOUS: 0
CHILLS: 0
PALPITATIONS: 0
SHORTNESS OF BREATH: 0
DEPRESSION: 0
ORTHOPNEA: 0
HEADACHES: 0
SORE THROAT: 0
CONSTIPATION: 0
HEARTBURN: 0
DIZZINESS: 0
COUGH: 0
DOUBLE VISION: 0
VOMITING: 0
ABDOMINAL PAIN: 1

## 2024-04-17 ENCOUNTER — PRE-ADMISSION TESTING (OUTPATIENT)
Dept: ADMISSIONS | Facility: MEDICAL CENTER | Age: 39
End: 2024-04-17
Attending: STUDENT IN AN ORGANIZED HEALTH CARE EDUCATION/TRAINING PROGRAM
Payer: COMMERCIAL

## 2024-04-17 DIAGNOSIS — Z01.812 PRE-OPERATIVE LABORATORY EXAMINATION: ICD-10-CM

## 2024-04-17 DIAGNOSIS — Z01.810 PRE-OPERATIVE CARDIOVASCULAR EXAMINATION: ICD-10-CM

## 2024-04-17 LAB
APPEARANCE UR: ABNORMAL
BACTERIA #/AREA URNS HPF: ABNORMAL /HPF
BILIRUB UR QL STRIP.AUTO: NEGATIVE
COLOR UR: YELLOW
EPI CELLS #/AREA URNS HPF: ABNORMAL /HPF
GLUCOSE UR STRIP.AUTO-MCNC: NEGATIVE MG/DL
HYALINE CASTS #/AREA URNS LPF: ABNORMAL /LPF
KETONES UR STRIP.AUTO-MCNC: NEGATIVE MG/DL
LEUKOCYTE ESTERASE UR QL STRIP.AUTO: ABNORMAL
MICRO URNS: ABNORMAL
NITRITE UR QL STRIP.AUTO: POSITIVE
PH UR STRIP.AUTO: 6.5 [PH] (ref 5–8)
PROT UR QL STRIP: 30 MG/DL
RBC # URNS HPF: ABNORMAL /HPF
RBC UR QL AUTO: ABNORMAL
SP GR UR STRIP.AUTO: 1.02
UROBILINOGEN UR STRIP.AUTO-MCNC: 1 MG/DL
WBC #/AREA URNS HPF: ABNORMAL /HPF

## 2024-04-17 PROCEDURE — 87186 SC STD MICRODIL/AGAR DIL: CPT

## 2024-04-17 PROCEDURE — 87086 URINE CULTURE/COLONY COUNT: CPT

## 2024-04-17 PROCEDURE — 81001 URINALYSIS AUTO W/SCOPE: CPT

## 2024-04-17 PROCEDURE — 87077 CULTURE AEROBIC IDENTIFY: CPT

## 2024-04-20 LAB
BACTERIA UR CULT: ABNORMAL
BACTERIA UR CULT: ABNORMAL
SIGNIFICANT IND 70042: ABNORMAL
SITE SITE: ABNORMAL
SOURCE SOURCE: ABNORMAL

## 2024-04-22 DIAGNOSIS — N30.00 ACUTE CYSTITIS WITHOUT HEMATURIA: ICD-10-CM

## 2024-04-22 RX ORDER — SULFAMETHOXAZOLE AND TRIMETHOPRIM 800; 160 MG/1; MG/1
1 TABLET ORAL 2 TIMES DAILY
Qty: 20 TABLET | Refills: 0 | Status: SHIPPED | OUTPATIENT
Start: 2024-04-22 | End: 2024-05-02

## 2024-04-24 ENCOUNTER — HOSPITAL ENCOUNTER (OUTPATIENT)
Dept: LAB | Facility: MEDICAL CENTER | Age: 39
End: 2024-04-24
Attending: INTERNAL MEDICINE
Payer: COMMERCIAL

## 2024-04-24 DIAGNOSIS — Z11.59 NEED FOR HEPATITIS C SCREENING TEST: ICD-10-CM

## 2024-04-24 DIAGNOSIS — D50.9 IRON DEFICIENCY ANEMIA, UNSPECIFIED IRON DEFICIENCY ANEMIA TYPE: ICD-10-CM

## 2024-04-24 DIAGNOSIS — I10 PRIMARY HYPERTENSION: ICD-10-CM

## 2024-04-24 DIAGNOSIS — Z11.4 SCREENING FOR HIV (HUMAN IMMUNODEFICIENCY VIRUS): ICD-10-CM

## 2024-04-24 DIAGNOSIS — D50.9 MICROCYTIC ANEMIA: ICD-10-CM

## 2024-04-24 LAB
ANION GAP SERPL CALC-SCNC: 14 MMOL/L (ref 7–16)
ANISOCYTOSIS BLD QL SMEAR: ABNORMAL
BASOPHILS # BLD AUTO: 0.4 % (ref 0–1.8)
BASOPHILS # BLD: 0.04 K/UL (ref 0–0.12)
BUN SERPL-MCNC: 19 MG/DL (ref 8–22)
CALCIUM SERPL-MCNC: 9.5 MG/DL (ref 8.5–10.5)
CHLORIDE SERPL-SCNC: 99 MMOL/L (ref 96–112)
CO2 SERPL-SCNC: 23 MMOL/L (ref 20–33)
COMMENT 1642: NORMAL
CREAT SERPL-MCNC: 0.99 MG/DL (ref 0.5–1.4)
EOSINOPHIL # BLD AUTO: 0.36 K/UL (ref 0–0.51)
EOSINOPHIL NFR BLD: 4 % (ref 0–6.9)
ERYTHROCYTE [DISTWIDTH] IN BLOOD BY AUTOMATED COUNT: 65.2 FL (ref 35.9–50)
FASTING STATUS PATIENT QL REPORTED: NORMAL
FERRITIN SERPL-MCNC: 244 NG/ML (ref 22–322)
GFR SERPLBLD CREATININE-BSD FMLA CKD-EPI: 99 ML/MIN/1.73 M 2
GLUCOSE SERPL-MCNC: 91 MG/DL (ref 65–99)
HCT VFR BLD AUTO: 38.2 % (ref 42–52)
HCV AB SER QL: NORMAL
HGB BLD-MCNC: 12.1 G/DL (ref 14–18)
HIV 1+2 AB+HIV1 P24 AG SERPL QL IA: NORMAL
IMM GRANULOCYTES # BLD AUTO: 0.06 K/UL (ref 0–0.11)
IMM GRANULOCYTES NFR BLD AUTO: 0.7 % (ref 0–0.9)
IRON SATN MFR SERPL: 17 % (ref 15–55)
IRON SERPL-MCNC: 53 UG/DL (ref 50–180)
LYMPHOCYTES # BLD AUTO: 2.65 K/UL (ref 1–4.8)
LYMPHOCYTES NFR BLD: 29.3 % (ref 22–41)
MCH RBC QN AUTO: 26.8 PG (ref 27–33)
MCHC RBC AUTO-ENTMCNC: 31.7 G/DL (ref 32.3–36.5)
MCV RBC AUTO: 84.5 FL (ref 81.4–97.8)
MICROCYTES BLD QL SMEAR: ABNORMAL
MONOCYTES # BLD AUTO: 0.58 K/UL (ref 0–0.85)
MONOCYTES NFR BLD AUTO: 6.4 % (ref 0–13.4)
MORPHOLOGY BLD-IMP: NORMAL
NEUTROPHILS # BLD AUTO: 5.34 K/UL (ref 1.82–7.42)
NEUTROPHILS NFR BLD: 59.2 % (ref 44–72)
NRBC # BLD AUTO: 0 K/UL
NRBC BLD-RTO: 0 /100 WBC (ref 0–0.2)
PLATELET # BLD AUTO: 459 K/UL (ref 164–446)
PLATELET BLD QL SMEAR: NORMAL
PMV BLD AUTO: 9 FL (ref 9–12.9)
POTASSIUM SERPL-SCNC: 4.6 MMOL/L (ref 3.6–5.5)
RBC # BLD AUTO: 4.52 M/UL (ref 4.7–6.1)
RBC BLD AUTO: PRESENT
SODIUM SERPL-SCNC: 136 MMOL/L (ref 135–145)
TIBC SERPL-MCNC: 316 UG/DL (ref 250–450)
UIBC SERPL-MCNC: 263 UG/DL (ref 110–370)
WBC # BLD AUTO: 9 K/UL (ref 4.8–10.8)

## 2024-04-24 PROCEDURE — 80048 BASIC METABOLIC PNL TOTAL CA: CPT

## 2024-04-24 PROCEDURE — 87389 HIV-1 AG W/HIV-1&-2 AB AG IA: CPT

## 2024-04-24 PROCEDURE — 82728 ASSAY OF FERRITIN: CPT

## 2024-04-24 PROCEDURE — 86803 HEPATITIS C AB TEST: CPT

## 2024-04-24 PROCEDURE — 36415 COLL VENOUS BLD VENIPUNCTURE: CPT

## 2024-04-24 PROCEDURE — 83550 IRON BINDING TEST: CPT

## 2024-04-24 PROCEDURE — 83540 ASSAY OF IRON: CPT

## 2024-04-24 PROCEDURE — 85025 COMPLETE CBC W/AUTO DIFF WBC: CPT

## 2024-04-27 ENCOUNTER — ANESTHESIA EVENT (OUTPATIENT)
Dept: SURGERY | Facility: MEDICAL CENTER | Age: 39
End: 2024-04-27
Payer: COMMERCIAL

## 2024-04-29 ENCOUNTER — APPOINTMENT (OUTPATIENT)
Dept: RADIOLOGY | Facility: MEDICAL CENTER | Age: 39
End: 2024-04-29
Attending: STUDENT IN AN ORGANIZED HEALTH CARE EDUCATION/TRAINING PROGRAM
Payer: COMMERCIAL

## 2024-04-29 ENCOUNTER — ANESTHESIA (OUTPATIENT)
Dept: SURGERY | Facility: MEDICAL CENTER | Age: 39
End: 2024-04-29
Payer: COMMERCIAL

## 2024-04-29 ENCOUNTER — HOSPITAL ENCOUNTER (OUTPATIENT)
Facility: MEDICAL CENTER | Age: 39
End: 2024-04-29
Attending: STUDENT IN AN ORGANIZED HEALTH CARE EDUCATION/TRAINING PROGRAM | Admitting: STUDENT IN AN ORGANIZED HEALTH CARE EDUCATION/TRAINING PROGRAM
Payer: COMMERCIAL

## 2024-04-29 VITALS
BODY MASS INDEX: 44.1 KG/M2 | RESPIRATION RATE: 20 BRPM | HEIGHT: 71 IN | HEART RATE: 83 BPM | SYSTOLIC BLOOD PRESSURE: 126 MMHG | DIASTOLIC BLOOD PRESSURE: 61 MMHG | OXYGEN SATURATION: 96 % | WEIGHT: 315 LBS | TEMPERATURE: 96 F

## 2024-04-29 PROCEDURE — 160035 HCHG PACU - 1ST 60 MINS PHASE I: Performed by: STUDENT IN AN ORGANIZED HEALTH CARE EDUCATION/TRAINING PROGRAM

## 2024-04-29 PROCEDURE — 74450 X-RAY URETHRA/BLADDER: CPT | Mod: 26 | Performed by: STUDENT IN AN ORGANIZED HEALTH CARE EDUCATION/TRAINING PROGRAM

## 2024-04-29 PROCEDURE — 160046 HCHG PACU - 1ST 60 MINS PHASE II: Performed by: STUDENT IN AN ORGANIZED HEALTH CARE EDUCATION/TRAINING PROGRAM

## 2024-04-29 PROCEDURE — 160039 HCHG SURGERY MINUTES - EA ADDL 1 MIN LEVEL 3: Performed by: STUDENT IN AN ORGANIZED HEALTH CARE EDUCATION/TRAINING PROGRAM

## 2024-04-29 PROCEDURE — 700101 HCHG RX REV CODE 250: Performed by: ANESTHESIOLOGY

## 2024-04-29 PROCEDURE — 700111 HCHG RX REV CODE 636 W/ 250 OVERRIDE (IP): Mod: JZ | Performed by: ANESTHESIOLOGY

## 2024-04-29 PROCEDURE — 160025 RECOVERY II MINUTES (STATS): Performed by: STUDENT IN AN ORGANIZED HEALTH CARE EDUCATION/TRAINING PROGRAM

## 2024-04-29 PROCEDURE — 160002 HCHG RECOVERY MINUTES (STAT): Performed by: STUDENT IN AN ORGANIZED HEALTH CARE EDUCATION/TRAINING PROGRAM

## 2024-04-29 PROCEDURE — 74018 RADEX ABDOMEN 1 VIEW: CPT

## 2024-04-29 PROCEDURE — 700117 HCHG RX CONTRAST REV CODE 255: Performed by: STUDENT IN AN ORGANIZED HEALTH CARE EDUCATION/TRAINING PROGRAM

## 2024-04-29 PROCEDURE — 160009 HCHG ANES TIME/MIN: Performed by: STUDENT IN AN ORGANIZED HEALTH CARE EDUCATION/TRAINING PROGRAM

## 2024-04-29 PROCEDURE — 160048 HCHG OR STATISTICAL LEVEL 1-5: Performed by: STUDENT IN AN ORGANIZED HEALTH CARE EDUCATION/TRAINING PROGRAM

## 2024-04-29 PROCEDURE — C1769 GUIDE WIRE: HCPCS | Performed by: STUDENT IN AN ORGANIZED HEALTH CARE EDUCATION/TRAINING PROGRAM

## 2024-04-29 PROCEDURE — 160028 HCHG SURGERY MINUTES - 1ST 30 MINS LEVEL 3: Performed by: STUDENT IN AN ORGANIZED HEALTH CARE EDUCATION/TRAINING PROGRAM

## 2024-04-29 PROCEDURE — 700105 HCHG RX REV CODE 258: Performed by: STUDENT IN AN ORGANIZED HEALTH CARE EDUCATION/TRAINING PROGRAM

## 2024-04-29 RX ORDER — CEFAZOLIN SODIUM 1 G/3ML
INJECTION, POWDER, FOR SOLUTION INTRAMUSCULAR; INTRAVENOUS PRN
Status: DISCONTINUED | OUTPATIENT
Start: 2024-04-29 | End: 2024-04-29 | Stop reason: SURG

## 2024-04-29 RX ORDER — SODIUM CHLORIDE, SODIUM LACTATE, POTASSIUM CHLORIDE, CALCIUM CHLORIDE 600; 310; 30; 20 MG/100ML; MG/100ML; MG/100ML; MG/100ML
INJECTION, SOLUTION INTRAVENOUS CONTINUOUS
Status: DISCONTINUED | OUTPATIENT
Start: 2024-04-29 | End: 2024-04-29 | Stop reason: HOSPADM

## 2024-04-29 RX ORDER — EPHEDRINE SULFATE 50 MG/ML
INJECTION, SOLUTION INTRAVENOUS PRN
Status: DISCONTINUED | OUTPATIENT
Start: 2024-04-29 | End: 2024-04-29 | Stop reason: SURG

## 2024-04-29 RX ORDER — PHENYLEPHRINE HCL IN 0.9% NACL 0.5 MG/5ML
SYRINGE (ML) INTRAVENOUS PRN
Status: DISCONTINUED | OUTPATIENT
Start: 2024-04-29 | End: 2024-04-29 | Stop reason: SURG

## 2024-04-29 RX ORDER — HYDRALAZINE HYDROCHLORIDE 20 MG/ML
5 INJECTION INTRAMUSCULAR; INTRAVENOUS
Status: DISCONTINUED | OUTPATIENT
Start: 2024-04-29 | End: 2024-04-29 | Stop reason: HOSPADM

## 2024-04-29 RX ORDER — MIDAZOLAM HYDROCHLORIDE 1 MG/ML
INJECTION INTRAMUSCULAR; INTRAVENOUS PRN
Status: DISCONTINUED | OUTPATIENT
Start: 2024-04-29 | End: 2024-04-29 | Stop reason: SURG

## 2024-04-29 RX ORDER — SUCCINYLCHOLINE CHLORIDE 20 MG/ML
INJECTION INTRAMUSCULAR; INTRAVENOUS PRN
Status: DISCONTINUED | OUTPATIENT
Start: 2024-04-29 | End: 2024-04-29 | Stop reason: SURG

## 2024-04-29 RX ORDER — LIDOCAINE HYDROCHLORIDE 20 MG/ML
JELLY TOPICAL PRN
Status: DISCONTINUED | OUTPATIENT
Start: 2024-04-29 | End: 2024-04-29 | Stop reason: SURG

## 2024-04-29 RX ORDER — ONDANSETRON 2 MG/ML
4 INJECTION INTRAMUSCULAR; INTRAVENOUS
Status: DISCONTINUED | OUTPATIENT
Start: 2024-04-29 | End: 2024-04-29 | Stop reason: HOSPADM

## 2024-04-29 RX ORDER — DEXAMETHASONE SODIUM PHOSPHATE 4 MG/ML
INJECTION, SOLUTION INTRA-ARTICULAR; INTRALESIONAL; INTRAMUSCULAR; INTRAVENOUS; SOFT TISSUE PRN
Status: DISCONTINUED | OUTPATIENT
Start: 2024-04-29 | End: 2024-04-29 | Stop reason: SURG

## 2024-04-29 RX ORDER — OXYCODONE HYDROCHLORIDE AND ACETAMINOPHEN 5; 325 MG/1; MG/1
2 TABLET ORAL
Status: DISCONTINUED | OUTPATIENT
Start: 2024-04-29 | End: 2024-04-29 | Stop reason: HOSPADM

## 2024-04-29 RX ORDER — DIPHENHYDRAMINE HYDROCHLORIDE 50 MG/ML
12.5 INJECTION INTRAMUSCULAR; INTRAVENOUS
Status: DISCONTINUED | OUTPATIENT
Start: 2024-04-29 | End: 2024-04-29 | Stop reason: HOSPADM

## 2024-04-29 RX ORDER — LIDOCAINE HYDROCHLORIDE 20 MG/ML
INJECTION, SOLUTION EPIDURAL; INFILTRATION; INTRACAUDAL; PERINEURAL PRN
Status: DISCONTINUED | OUTPATIENT
Start: 2024-04-29 | End: 2024-04-29 | Stop reason: SURG

## 2024-04-29 RX ORDER — LABETALOL HYDROCHLORIDE 5 MG/ML
5 INJECTION, SOLUTION INTRAVENOUS
Status: DISCONTINUED | OUTPATIENT
Start: 2024-04-29 | End: 2024-04-29 | Stop reason: HOSPADM

## 2024-04-29 RX ORDER — EPHEDRINE SULFATE 50 MG/ML
5 INJECTION, SOLUTION INTRAVENOUS
Status: DISCONTINUED | OUTPATIENT
Start: 2024-04-29 | End: 2024-04-29 | Stop reason: HOSPADM

## 2024-04-29 RX ORDER — ROCURONIUM BROMIDE 10 MG/ML
INJECTION, SOLUTION INTRAVENOUS PRN
Status: DISCONTINUED | OUTPATIENT
Start: 2024-04-29 | End: 2024-04-29 | Stop reason: SURG

## 2024-04-29 RX ORDER — HALOPERIDOL 5 MG/ML
1 INJECTION INTRAMUSCULAR
Status: DISCONTINUED | OUTPATIENT
Start: 2024-04-29 | End: 2024-04-29 | Stop reason: HOSPADM

## 2024-04-29 RX ORDER — ONDANSETRON 2 MG/ML
INJECTION INTRAMUSCULAR; INTRAVENOUS PRN
Status: DISCONTINUED | OUTPATIENT
Start: 2024-04-29 | End: 2024-04-29 | Stop reason: SURG

## 2024-04-29 RX ORDER — OXYCODONE HYDROCHLORIDE AND ACETAMINOPHEN 5; 325 MG/1; MG/1
1 TABLET ORAL
Status: DISCONTINUED | OUTPATIENT
Start: 2024-04-29 | End: 2024-04-29 | Stop reason: HOSPADM

## 2024-04-29 RX ADMIN — ONDANSETRON 4 MG: 2 INJECTION INTRAMUSCULAR; INTRAVENOUS at 13:51

## 2024-04-29 RX ADMIN — LIDOCAINE HYDROCHLORIDE 3 ML: 20 INJECTION, SOLUTION EPIDURAL; INFILTRATION; INTRACAUDAL at 13:57

## 2024-04-29 RX ADMIN — SODIUM CHLORIDE, POTASSIUM CHLORIDE, SODIUM LACTATE AND CALCIUM CHLORIDE: 600; 310; 30; 20 INJECTION, SOLUTION INTRAVENOUS at 11:53

## 2024-04-29 RX ADMIN — ROCURONIUM BROMIDE 5 MG: 50 INJECTION, SOLUTION INTRAVENOUS at 13:13

## 2024-04-29 RX ADMIN — Medication 100 MCG: at 13:47

## 2024-04-29 RX ADMIN — Medication 100 MCG: at 13:40

## 2024-04-29 RX ADMIN — CEFAZOLIN 3 G: 1 INJECTION, POWDER, FOR SOLUTION INTRAMUSCULAR; INTRAVENOUS at 13:16

## 2024-04-29 RX ADMIN — EPHEDRINE SULFATE 10 MG: 50 INJECTION, SOLUTION INTRAVENOUS at 13:24

## 2024-04-29 RX ADMIN — MIDAZOLAM HYDROCHLORIDE 2 MG: 1 INJECTION, SOLUTION INTRAMUSCULAR; INTRAVENOUS at 13:09

## 2024-04-29 RX ADMIN — EPHEDRINE SULFATE 10 MG: 50 INJECTION, SOLUTION INTRAVENOUS at 13:47

## 2024-04-29 RX ADMIN — LIDOCAINE HYDROCHLORIDE 100 MG: 20 INJECTION, SOLUTION EPIDURAL; INFILTRATION; INTRACAUDAL at 13:14

## 2024-04-29 RX ADMIN — EPHEDRINE SULFATE 10 MG: 50 INJECTION, SOLUTION INTRAVENOUS at 13:40

## 2024-04-29 RX ADMIN — DEXAMETHASONE SODIUM PHOSPHATE 8 MG: 4 INJECTION INTRA-ARTICULAR; INTRALESIONAL; INTRAMUSCULAR; INTRAVENOUS; SOFT TISSUE at 13:24

## 2024-04-29 RX ADMIN — FENTANYL CITRATE 100 MCG: 50 INJECTION, SOLUTION INTRAMUSCULAR; INTRAVENOUS at 13:13

## 2024-04-29 RX ADMIN — Medication 100 MCG: at 13:24

## 2024-04-29 RX ADMIN — PROPOFOL 200 MG: 10 INJECTION, EMULSION INTRAVENOUS at 13:14

## 2024-04-29 RX ADMIN — SUCCINYLCHOLINE CHLORIDE 160 MG: 20 INJECTION, SOLUTION INTRAMUSCULAR; INTRAVENOUS at 13:14

## 2024-04-29 RX ADMIN — LIDOCAINE HYDROCHLORIDE 3 ML: 20 JELLY TOPICAL at 13:14

## 2024-04-29 ASSESSMENT — PAIN DESCRIPTION - PAIN TYPE: TYPE: ACUTE PAIN

## 2024-04-29 ASSESSMENT — FIBROSIS 4 INDEX: FIB4 SCORE: 0.65

## 2024-04-29 NOTE — DISCHARGE INSTRUCTIONS
If any questions arise, call your provider.  If your provider is not available, please feel free to call the Surgical Center at [phone].    MEDICATIONS: Resume taking daily medication.  Take prescribed pain medication with food.  If no medication is prescribed, you may take non-aspirin pain medication if needed.  PAIN MEDICATION CAN BE VERY CONSTIPATING.  Take a stool softener or laxative such as senokot, pericolace, or milk of magnesia if needed.

## 2024-04-29 NOTE — OP REPORT
SURGEON: Dr. Mmee Medina     ANESTHESIA: General (general endotracheal tube)     PRE-OPERATIVE DIAGNOSIS: Urethral Stricture     POST-OPERATIVE DIAGNOSIS: Same     NAME OF PROCEDURE: Cystoscopy Retrograde urethrogram     FINDINGS OF PROCEDURE:      Short <  0.5 cm anterior urethral stricture 6Fr opening, other side of stricture visible, confirmed with retrograde urethrogram.     EBL: 0 cc     COMPLICATIONS: None     INDICATIONS: Cole Jaramillo is a 39 y.o. male who agreed to above procedure for further management of his urethral stricture after complete discussion of risks, benefits, and alternatives. The patient understands the main risks to be dysuria as this is a diagnostic procedure.      PROCEDURE:      Informed consent was obtained. The patient was properly identified and placed in supine position per OR protocol. The patient was given a prophylactic dose of ancef 3 grams. General (general endotracheal tube) was administered.  A time-out was performed with all parties in agreement. The patient was prepped and draped in the usual sterile fashion    A 17 [place] flexible cystoscope, was inserted per urethra until the distal extent of the stricture was encountered. It was estimated to be 6 Fr in the anterior/bulbar urethra.  imaging was obtained with a modified oblique angle, one obturator foramen appeared “closed”, while one appeared “open”. The penis was placed on stretch and contrast was injected retrograde through the cystoscope. Imaging was obtained and demonstrated a stricture starting in the bulbar urethra and extending for approximately < 0.5 cm. This concluded the procedure; the patient tolerated it well and was transferred to the PACU in stable condition.      DISPOSITION: The patient will be discharged home with plan for follow-up in 2 weeks to discuss the definitive surgical plan for management of his stricture.

## 2024-04-29 NOTE — ANESTHESIA PREPROCEDURE EVALUATION
Case: [id number] Date/Time: 04/29/24 1245    Procedures:       CYSTOURETHROSCOPY WITH RETROGRADE URETHROGRAM      URETHROGRAM,RETROGRADE,DIAGNOSTIC    Pre-op diagnosis: URETHRAL STRICTURE    Location: [facility] OR 10 / SURGERY [facility]    Surgeons: Meme Medina M.D.            Relevant Problems   CARDIAC   (positive) Acute right-sided congestive heart failure (HCC)   (positive) Hypertension      Other   (positive) Bladder obstruction   (positive) Chronic heart failure with preserved ejection fraction (HFpEF) (HCC)   (positive) Class 3 severe obesity due to excess calories with serious comorbidity and body mass index (BMI) greater than or equal to 70 in adult (HCC)   (positive) Obesity hypoventilation syndrome (HCC)   BMI 74    Physical Exam    Airway   Mallampati: II  TM distance: >3 FB  Neck ROM: full       Cardiovascular - normal exam  Rhythm: regular  Rate: normal  (-) murmur     Dental - normal exam           Pulmonary - normal exam  Breath sounds clear to auscultation  (+) decreased breath sounds     Abdominal   (+) obese     Neurological - normal exam                   Anesthesia Plan    ASA 4 (BMI 74)   ASA physical status 4 criteria: other (comment)    Plan - general       Airway plan will be ETT          Induction: intravenous    Postoperative Plan: Postoperative administration of opioids is intended.    Pertinent diagnostic labs and testing reviewed    Informed Consent:    Anesthetic plan and risks discussed with patient.    Use of blood products discussed with: patient whom consented to blood products.

## 2024-04-29 NOTE — OR NURSING
Patient tolerating fluids well; no complaints of nausea  Patient on room air.  Has a occasional moist, non productive cough  Denies pain in operative area    1505  Transferred to Phase II via rney  Pertinent post op orders reviewed with receiving RN

## 2024-04-29 NOTE — ANESTHESIA TIME REPORT
Anesthesia Start and Stop Event Times       Date Time Event    4/29/2024 1231 Ready for Procedure     1303 Anesthesia Start     1406 Anesthesia Stop          Responsible Staff  04/29/24      Name Role Begin End    Inder Perez M.D. Anesth 1303 1406          Overtime Reason:  no overtime (within assigned shift)    Comments:

## 2024-04-29 NOTE — ANESTHESIA POSTPROCEDURE EVALUATION
Patient: Cole Jaramillo    Procedure Summary       Date: 04/29/24 Room / Location: [place] [address] / SURGERY [facility]    Anesthesia Start: 1303 Anesthesia Stop: 1408    Procedures:       CYSTOURETHROSCOPY WITH RETROGRADE URETHROGRAM (Pelvis)      URETHROGRAM,RETROGRADE,DIAGNOSTIC (Pelvis) Diagnosis: (URETHRAL STRICTURE)    Surgeons: Meme Medina M.D. Responsible Provider: Inder Perez M.D.    Anesthesia Type: general ASA Status: 4            Final Anesthesia Type: general  Last vitals  BP   Blood Pressure: 126/61    Temp   (!) 35.6 °C (96 °F)    Pulse   83   Resp   20    SpO2   96 %      Anesthesia Post Evaluation    Patient location during evaluation: PACU  Patient participation: complete - patient participated  Level of consciousness: awake and alert    Airway patency: patent  Anesthetic complications: no  Cardiovascular status: hemodynamically stable  Respiratory status: acceptable  Hydration status: euvolemic    PONV: none          No notable events documented.     Nurse Pain Score: 0 (NPRS)

## 2024-04-29 NOTE — OR NURSING
VSS, pt steady with ambulation, meets discharge criteria. Discharged home with family. Wheeled to car with hospital escort. IV dc'd, cathlon intact. Pt to f/u with physician as directed by physician. Pt to return to ER for any emergent sx. Pt verbalizes understanding of discharge instructions and all questions were answered.

## 2024-04-29 NOTE — OR NURSING
Assume care for patient in pre-op. Allergies and NPO status verified. Consents for surgical procedure signed and on chart. PIV started.  Call light within reach. Bed at lowest position.

## 2024-04-29 NOTE — ANESTHESIA PROCEDURE NOTES
Airway    Date/Time: 4/29/2024 1:15 PM    Performed by: Idner Perez M.D.  Authorized by: Inder Perez M.D.    Location:  OR  Urgency:  Elective  Difficult Airway: No    Indications for Airway Management:  Anesthesia      Spontaneous Ventilation: absent    Sedation Level:  Deep  Preoxygenated: Yes    Patient Position:  Sniffing  Mask Difficulty Assessment:  2 - vent by mask + OA or adjuvant +/- NMBA  Final Airway Type:  Endotracheal airway  Final Endotracheal Airway:  ETT  Cuffed: Yes    Technique Used for Successful ETT Placement:  Video laryngoscopy    Insertion Site:  Oral  Blade Type:  Glide  Laryngoscope Blade/Videolaryngoscope Blade Size:  4  ETT Size (mm):  7.5  Measured from:  Teeth  ETT to Teeth (cm):  24  Placement Verified by: auscultation and capnometry    Cormack-Lehane Classification:  Grade I - full view of glottis  Number of Attempts at Approach:  1   Atraumatic DLx1

## 2024-05-02 ENCOUNTER — OFFICE VISIT (OUTPATIENT)
Dept: INTERNAL MEDICINE | Facility: OTHER | Age: 39
End: 2024-05-02
Payer: COMMERCIAL

## 2024-05-02 VITALS
HEART RATE: 85 BPM | BODY MASS INDEX: 44.1 KG/M2 | TEMPERATURE: 96.9 F | HEIGHT: 71 IN | OXYGEN SATURATION: 97 % | WEIGHT: 315 LBS

## 2024-05-02 DIAGNOSIS — I50.32 CHRONIC HEART FAILURE WITH PRESERVED EJECTION FRACTION (HFPEF) (HCC): ICD-10-CM

## 2024-05-02 DIAGNOSIS — D50.9 MICROCYTIC ANEMIA: ICD-10-CM

## 2024-05-02 DIAGNOSIS — J30.89 ENVIRONMENTAL AND SEASONAL ALLERGIES: ICD-10-CM

## 2024-05-02 DIAGNOSIS — R19.00 ABDOMINAL MASS, UNSPECIFIED ABDOMINAL LOCATION: ICD-10-CM

## 2024-05-02 DIAGNOSIS — G47.00 INSOMNIA, UNSPECIFIED TYPE: ICD-10-CM

## 2024-05-02 DIAGNOSIS — D50.9 IRON DEFICIENCY ANEMIA, UNSPECIFIED IRON DEFICIENCY ANEMIA TYPE: ICD-10-CM

## 2024-05-02 DIAGNOSIS — N13.5 URETERAL STRICTURE: ICD-10-CM

## 2024-05-02 DIAGNOSIS — I10 PRIMARY HYPERTENSION: ICD-10-CM

## 2024-05-02 DIAGNOSIS — E66.2 OBESITY HYPOVENTILATION SYNDROME (HCC): ICD-10-CM

## 2024-05-02 DIAGNOSIS — B37.2 SKIN YEAST INFECTION: ICD-10-CM

## 2024-05-02 DIAGNOSIS — E66.01 CLASS 3 SEVERE OBESITY DUE TO EXCESS CALORIES WITH SERIOUS COMORBIDITY AND BODY MASS INDEX (BMI) GREATER THAN OR EQUAL TO 70 IN ADULT (HCC): ICD-10-CM

## 2024-05-02 PROCEDURE — 99214 OFFICE O/P EST MOD 30 MIN: CPT | Performed by: INTERNAL MEDICINE

## 2024-05-02 RX ORDER — UREA 10 %
5 LOTION (ML) TOPICAL NIGHTLY
Qty: 30 TABLET | Refills: 2 | Status: SHIPPED | OUTPATIENT
Start: 2024-05-02

## 2024-05-02 RX ORDER — CETIRIZINE HYDROCHLORIDE 10 MG/1
10 TABLET ORAL DAILY
Qty: 30 TABLET | Refills: 2 | Status: SHIPPED | OUTPATIENT
Start: 2024-05-02

## 2024-05-02 RX ORDER — FERROUS SULFATE 325(65) MG
325 TABLET ORAL
Qty: 30 TABLET | Refills: 2 | Status: SHIPPED | OUTPATIENT
Start: 2024-05-02

## 2024-05-02 RX ORDER — NYSTATIN 100000 [USP'U]/G
1 POWDER TOPICAL 3 TIMES DAILY
Qty: 60 G | Refills: 1 | Status: SHIPPED | OUTPATIENT
Start: 2024-05-02

## 2024-05-02 ASSESSMENT — FIBROSIS 4 INDEX: FIB4 SCORE: 0.65

## 2024-05-02 NOTE — PROGRESS NOTES
5/2/2024  Chief Complaint   Patient presents with    Follow-Up    Lab Results       HISTORY OF PRESENT ILLNESS: Patient is a 39 y.o. male established patient who presents today for follow-up. He report that overall, he is doing well. He has no acute concerns or complaints today.    Morbid Obesity  Patient continues to work on his diet and exercise. He is walking daily, at least 2-3K steps per day. He is no longer requiring a walker. Reports that his stability and mobility is improving. Is feeling a bit more sore than usual as he is more active.  He continues to with a dietitian on the [employer] [facility], he has lost about 15lbs since his last visit.      Urethral stricture/urinary retention  He is currently on Bactrim on his last day of Bactrim (9/10 days), pre-op urinalysis showed Klebsiella pneumoniae ESBL. Patient denies any dysuria, frequency or urgency. No fevers, chills, nausea, vomiting or flank pain.  He underwent cystoscopy retrograde urethrogram with a short anterior urethral stricture. Patient reports that he continues to have a weak stream, but feels that he is able to completely empty bladder. He is to follow-up with Urology in the next few weeks to discuss possible balloon dilation, per patient.    HFpEF  Pulmonary hypertension  Obesity hypoventilation syndrome  He continues to use AVAPs nightly, however, with recent nasal congestion it has made it a little difficult to tolerated face mask. He has h/o of seasonal allergies which he takes allergy medications occasionally. He denies any fevers, chills, nausea, shortness of breath or chest pain.  He is no longer requiring supplemental oxygen and is asking for oxygen discontinuation order.  He continue to take Aldactone and Demadex, lower extremity swelling has continued to improved. Denies any orthopnea, dizziness or light-headedness.     Insomnia  In the last 2 weeks he had trouble sleeping, both with falling and staying asleep. He has history of insomnia was  was on Ambien for a period of time when working the [employer] shift. He sleep about 7 hours per night, tries to avoid blue light at least a couple hours before bedtime.         Past Medical History:   Diagnosis Date    Congestive heart failure (HCC)     Dental disorder     Heart valve disease 2/1/24    See chart, not sure    High cholesterol     Hypertension     Morbid obesity (HCC)     Pneumonia     Sleep apnea 2/1/24    Possible see chart       Patient Active Problem List    Diagnosis Date Noted    Acute cystitis with hematuria 02/22/2024    Bladder obstruction 02/22/2024    Abdominal mass 02/22/2024    Cardiorenal syndrome 02/11/2024    Anasarca 02/05/2024    Acute right-sided congestive heart failure (HCC) 02/04/2024    Obesity hypoventilation syndrome ([facility]) 02/02/2024    Volume overload 02/01/2024    Chronic heart failure with preserved ejection fraction (HFpEF) ([facility]) 02/01/2024    Bilateral leg edema 02/01/2024    At risk for obstructive sleep apnea 02/01/2024    Elevated troponin 02/01/2024    Weakness 02/01/2024    Iron deficiency anemia 08/31/2023    Abdominal wall cellulitis 08/28/2023    Acute respiratory failure with hypoxia ([facility]) 08/28/2023    Class 3 severe obesity due to excess calories with serious comorbidity and body mass index (BMI) greater than or equal to 70 in adult ([facility]) 08/28/2023    Hypertension 08/28/2023    Shortness of breath 08/28/2023       Allergies:Patient has no known allergies.    Current Outpatient Medications   Medication Sig Dispense Refill    ferrous sulfate 325 (65 Fe) MG tablet Take 1 Tablet by mouth every 48 hours. 30 Tablet 2    cetirizine (ZYRTEC) 10 MG Tab Take 1 Tablet by mouth every day. 30 Tablet 2    nystatin (MYCOSTATIN) powder Apply 1 g topically 3 times a day. 60 g 1    melatonin 5 mg Tab Take 1 Tablet by mouth every evening. 30 Tablet 2    metFORMIN (GLUCOPHAGE) 850 MG Tab Take 1 Tablet by mouth every day. 30 Tablet 2    aspirin 81 MG EC tablet Take 1 Tablet by  "mouth every day. 90 Tablet 3    torsemide (DEMADEX) 20 MG Tab Take 2 Tablets by mouth every day. 180 Tablet 3    spironolactone (ALDACTONE) 100 MG Tab Take 1 Tablet by mouth every day. 90 Tablet 3    lisinopril (PRINIVIL) 10 MG Tab Take 1 Tablet by mouth every day. 90 Tablet 3    atorvastatin (LIPITOR) 40 MG Tab Take 1 Tablet by mouth every evening. 90 Tablet 3     No current facility-administered medications for this visit.       Social History     Tobacco Use    Smoking status: Never    Smokeless tobacco: Never   Vaping Use    Vaping Use: Never used   Substance Use Topics    Alcohol use: Not Currently     Comment: rarely    Drug use: Never       Family History   Problem Relation Age of Onset    Heart Disease Maternal Grandfather         Heat attack    Cancer Paternal Grandfather         Ling cancer from smoking         Review of Systems:   Review of Systems   Constitutional:  Negative for chills, fever and malaise/fatigue.   HENT:  Negative for congestion and sore throat.    Eyes:  Negative for blurred vision and double vision.   Respiratory:  Negative for cough and shortness of breath.    Cardiovascular:   Negative for chest pain, palpitations, leg swelling and orthopnea.   Gastrointestinal:  Positive for abdominal pain (occasional). Negative for blood in stool, constipation, diarrhea, heartburn, melena, nausea and vomiting.   Genitourinary:  Negative for dysuria, flank pain, frequency, hematuria and urgency.   Neurological:  Negative for dizziness, weakness and headaches.   Psychiatric/Behavioral:  Negative for depression. The patient is not nervous/anxious.         Exam:  Pulse 85   Temp 36.1 °C (96.9 °F) (Temporal)   Ht 1.803 m (5' 11\")   Wt (!) 231 kg (509 lb)   SpO2 97%  Body mass index is 70.99 kg/m².  Physical Exam:  Constitutional:       General: He is not in acute distress.     Appearance: He is obese. He is not toxic-appearing.   HENT:      Head: Normocephalic and atraumatic.      Right Ear: " Tympanic membrane and external ear normal.      Left Ear: Tympanic membrane and external ear normal.      Nose: Nose normal.      Mouth/Throat:      Mouth: Mucous membranes are moist.      Pharynx: Oropharynx is clear.   Eyes:      Extraocular Movements: Extraocular movements intact.      Conjunctiva/sclera: Conjunctivae normal.   Cardiovascular:      Rate and Rhythm: Normal rate and regular rhythm.   Pulmonary:      Effort: Pulmonary effort is normal.      Breath sounds: Normal breath sounds.   Abdominal:      General: There is no distension.      Palpations: Abdomen is soft.      Tenderness: There is no guarding.      Comments: Large right periumbilical mass noted, minimally tender to palpation.    Musculoskeletal:      Cervical back: Neck supple.      Right lower leg: Edema (1+) present.      Left lower leg: Edema (1+) present.   Skin:     General: Skin is warm and dry.      Comments: Venous insufficiency skin changes. Improving open sore to the anterolateral left ankle, no evidence of infection.      Assessment/Plan:     Microcytic anemia  Iron deficiency anemia, unspecified iron deficiency anemia type  Iron studies have improved with iron supplement. However, patient remains anemia, suspect there is a component of ACD (obesity, CHF, pulm HTN)  Continue iron supplement for now and monitor labs.  - ferrous sulfate 325 (65 Fe) MG tablet; Take 1 Tablet by mouth every 48 hours.  Dispense: 30 Tablet; Refill: 2    Environmental and seasonal allergies  Recommend nightly nasal saline rinse started Zytrec  We discuss Flonase, however, patient declined at this time as he reports that he has trouble with administrating medication intranasally.  - cetirizine (ZYRTEC) 10 MG Tab; Take 1 Tablet by mouth every day.  Dispense: 30 Tablet; Refill: 2    Chronic heart failure with preserved ejection fraction (HFpEF) (HCC)  Obesity hypoventilation syndrome (HCC)  Pulmonary hypertension  ECHO with LVEF 55% septal flattening consistent  with RV volume overload  Patient euvolemic on exam.   Continue Aldactone and Demadex--labs reviewed  Continue daily weight and keep log  Continue AVAPs--patient has appt with sleep medicine 6/2024  Continue routine follow-up with Cardiology  Patient maintained oxygen saturation above 90% with 6 minute walk, okay to discontinue home oxygen order  - 6 Minute Walk; Future    Class 3 severe obesity due to excess calories with serious comorbidity and body mass index (BMI) greater than or equal to 70 in adult (HCC)  Continue follow-up with Dietitian and CBT  Was referred to [facility], he is to schedule an appt with them.    Ureteral stricture  Had cystoscope with short anterior stricture  Patient to follow-up with Urology to discuss surgical intervention in the next few weeks, possible balloon dilation.    Primary hypertension  Controlled, continue diuretics and Lisinopril     Insomnia, unspecified type  Has previously been on Ambien, wants to try alternative options.  We discuss melatonin, trazodone and hydroxyzine. Patient opted for melatonin, may consider Trazodone if ineffective  - melatonin 5 mg Tab; Take 1 Tablet by mouth every evening.  Dispense: 30 Tablet; Refill: 2    Skin yeast infection  Improving, continue Nystatin powder  - nystatin (MYCOSTATIN) powder; Apply 1 g topically 3 times a day.  Dispense: 60 g; Refill: 1  All imaging results and lab results and consult notes are reviewed at this visit.    Followup: Return in about 2 months (around 7/2/2024).

## 2024-05-29 DIAGNOSIS — E66.01 CLASS 3 SEVERE OBESITY DUE TO EXCESS CALORIES WITH SERIOUS COMORBIDITY AND BODY MASS INDEX (BMI) GREATER THAN OR EQUAL TO 70 IN ADULT (HCC): ICD-10-CM

## 2024-06-10 ENCOUNTER — OFFICE VISIT (OUTPATIENT)
Dept: UROLOGY | Facility: MEDICAL CENTER | Age: 39
End: 2024-06-10
Payer: COMMERCIAL

## 2024-06-10 VITALS
BODY MASS INDEX: 70.99 KG/M2 | SYSTOLIC BLOOD PRESSURE: 150 MMHG | HEART RATE: 83 BPM | OXYGEN SATURATION: 94 % | TEMPERATURE: 97.6 F | HEIGHT: 71 IN | DIASTOLIC BLOOD PRESSURE: 80 MMHG

## 2024-06-10 DIAGNOSIS — N35.912 STRICTURE OF BULBOUS URETHRA IN MALE, UNSPECIFIED STRICTURE TYPE: ICD-10-CM

## 2024-06-10 PROCEDURE — 3077F SYST BP >= 140 MM HG: CPT | Performed by: STUDENT IN AN ORGANIZED HEALTH CARE EDUCATION/TRAINING PROGRAM

## 2024-06-10 PROCEDURE — 3079F DIAST BP 80-89 MM HG: CPT | Performed by: STUDENT IN AN ORGANIZED HEALTH CARE EDUCATION/TRAINING PROGRAM

## 2024-06-10 PROCEDURE — 99213 OFFICE O/P EST LOW 20 MIN: CPT | Performed by: STUDENT IN AN ORGANIZED HEALTH CARE EDUCATION/TRAINING PROGRAM

## 2024-06-10 NOTE — PROGRESS NOTES
Subjective  Cole Jaramillo is a 39 y.o. male who presents today for evaluation of urinary retention secondary to a bulbar urethral stricture s/p cystoscopy and dilation with catheter placement on 2/22/2024. He has had a slow urinary stream for some time and has had an episode in the past where he felt the urge to urinate but was unable to, however, this resolved on it's own. The catheter has been very uncomfortable for him. He says he had some perineal trauma as a child but can't think of anything recent. He denies dysuria or hematuria.    Interval Update:     6/10/2024: He underwent cystoscopy and RUG on 4/29/2024. He has been doing well since that time. He has noticed slowing of his urinary stream and says it is moderate. He still feels he is emptying well at this time. No dysuria or hematuria  .  Family History   Problem Relation Age of Onset    Heart Disease Maternal Grandfather         Heat attack    Cancer Paternal Grandfather         Ling cancer from smoking       Social History     Socioeconomic History    Marital status: Single     Spouse name: Not on file    Number of children: Not on file    Years of education: Not on file    Highest education level: 12th grade   Occupational History    Not on file   Tobacco Use    Smoking status: Never    Smokeless tobacco: Never   Vaping Use    Vaping status: Never Used   Substance and Sexual Activity    Alcohol use: Not Currently     Comment: rarely    Drug use: Never    Sexual activity: Not Currently     Partners: Female     Birth control/protection: Condom   Other Topics Concern    Not on file   Social History Narrative    Not on file     Social Determinants of Health     Financial Resource Strain: Low Risk  (3/28/2024)    Overall Financial Resource Strain (CARDIA)     Difficulty of Paying Living Expenses: Not very hard   Food Insecurity: No Food Insecurity (3/28/2024)    Hunger Vital Sign     Worried About Running Out of Food in the Last Year: Never true      Ran Out of Food in the Last Year: Never true   Transportation Needs: Unmet Transportation Needs (3/28/2024)    PRAPARE - Transportation     Lack of Transportation (Medical): Yes     Lack of Transportation (Non-Medical): Yes   Physical Activity: Insufficiently Active (3/28/2024)    Exercise Vital Sign     Days of Exercise per Week: 3 days     Minutes of Exercise per Session: 10 min   Stress: No Stress Concern Present (3/28/2024)    [employer] [place] [employer] - Occupational Stress Questionnaire     Feeling of Stress : Only a little   Social Connections: Moderately Isolated (3/28/2024)    Social Connection and Isolation Panel [NHANES]     Frequency of Communication with Friends and Family: More than three times a week     Frequency of Social Gatherings with Friends and Family: More than three times a week     Attends Congregational Services: Never     Active Member of Clubs or Organizations: Yes     Attends Club or Organization Meetings: More than 4 times per year     Marital Status: Never    Intimate Partner Violence: Not on file   Housing Stability: Low Risk  (3/28/2024)    Housing Stability Vital Sign     Unable to Pay for Housing in the Last Year: No     Number of Places Lived in the Last Year: 1     Unstable Housing in the Last Year: No       Past Surgical History:   Procedure Laterality Date    ND CYSTOURETHROSCOPY N/A 4/29/2024    Procedure: CYSTOURETHROSCOPY WITH RETROGRADE URETHROGRAM;  Surgeon: Meme Medina M.D.;  Location: SURGERY [facility];  Service: Urology    ND INJECT FOR RETROGRADE URETHOCYSTO N/A 4/29/2024    Procedure: URETHROGRAM,RETROGRADE,DIAGNOSTIC;  Surgeon: Meme Medina M.D.;  Location: SURGERY [facility];  Service: Urology    OTHER      [place] teeth extracted       Past Medical History:   Diagnosis Date    Congestive heart failure (HCC)     Dental disorder     Heart valve disease 2/1/24    See chart, not sure    High cholesterol     Hypertension     Morbid obesity (HCC)      "Pneumonia     Sleep apnea 2/1/24    Possible see chart       Current Outpatient Medications   Medication Sig Dispense Refill    metFORMIN (GLUCOPHAGE) 850 MG Tab Take 1 Tablet by mouth every day. 30 Tablet 2    ferrous sulfate 325 (65 Fe) MG tablet Take 1 Tablet by mouth every 48 hours. 30 Tablet 2    cetirizine (ZYRTEC) 10 MG Tab Take 1 Tablet by mouth every day. 30 Tablet 2    nystatin (MYCOSTATIN) powder Apply 1 g topically 3 times a day. 60 g 1    melatonin 5 mg Tab Take 1 Tablet by mouth every evening. 30 Tablet 2    aspirin 81 MG EC tablet Take 1 Tablet by mouth every day. 90 Tablet 3    torsemide (DEMADEX) 20 MG Tab Take 2 Tablets by mouth every day. 180 Tablet 3    spironolactone (ALDACTONE) 100 MG Tab Take 1 Tablet by mouth every day. 90 Tablet 3    lisinopril (PRINIVIL) 10 MG Tab Take 1 Tablet by mouth every day. 90 Tablet 3    atorvastatin (LIPITOR) 40 MG Tab Take 1 Tablet by mouth every evening. 90 Tablet 3     No current facility-administered medications for this visit.       No Known Allergies    Objective  BP (!) 150/80 (BP Location: Right arm, Patient Position: Sitting, BP Cuff Size: Large adult)   Pulse 83   Temp 36.4 °C (97.6 °F) (Temporal)   Ht 1.803 m (5' 11\")   SpO2 94%   Physical Exam  Constitutional:       Appearance: Normal appearance.   HENT:      Head: Normocephalic and atraumatic.   Pulmonary:      Effort: Pulmonary effort is normal.   Skin:     General: Skin is warm and dry.   Neurological:      Mental Status: He is alert.   Psychiatric:         Mood and Affect: Mood normal.         Behavior: Behavior normal.         Labs: none    Imaging: none    Assessment    We reviewed the various treatment options for urethral stricture including direct vision internal urethrotomy, urethral balloon dilation, optilume, excision and primary anastomosis urethroplasty, buccal graft urethroplasty, and perineal urethrostomy. We had a thorough discussion on the risks and benefits of each procedure " including bleeding, infection, stricture recurrence, fistula formation, erectile dysfunction, and damage to surrounding structures.     Based off of the findings of the cystoscopy and RUG of a 0.5 cm bulbar urethral stricture I recommend optilume for stricture management. He is agreeable with this plan. We will schedule his surgery as soon as it is available.    Plan    Problem List Items Addressed This Visit    None    Schedule in OR for optilume when available  RTC one month for symptom check

## 2024-06-19 ENCOUNTER — OFFICE VISIT (OUTPATIENT)
Dept: SLEEP MEDICINE | Facility: MEDICAL CENTER | Age: 39
End: 2024-06-19
Attending: INTERNAL MEDICINE
Payer: COMMERCIAL

## 2024-06-19 VITALS
DIASTOLIC BLOOD PRESSURE: 78 MMHG | HEART RATE: 105 BPM | SYSTOLIC BLOOD PRESSURE: 110 MMHG | OXYGEN SATURATION: 96 % | RESPIRATION RATE: 22 BRPM | HEIGHT: 71 IN | BODY MASS INDEX: 44.1 KG/M2 | WEIGHT: 315 LBS

## 2024-06-19 DIAGNOSIS — J96.92 RESPIRATORY FAILURE WITH HYPERCAPNIA, UNSPECIFIED CHRONICITY (HCC): ICD-10-CM

## 2024-06-19 DIAGNOSIS — G47.30 BREATHING-RELATED SLEEP DISORDER: ICD-10-CM

## 2024-06-19 DIAGNOSIS — R06.81 WITNESSED EPISODE OF APNEA: ICD-10-CM

## 2024-06-19 DIAGNOSIS — F51.02 ADJUSTMENT INSOMNIA: ICD-10-CM

## 2024-06-19 DIAGNOSIS — R06.83 SNORING: ICD-10-CM

## 2024-06-19 DIAGNOSIS — E66.2 OBESITY HYPOVENTILATION SYNDROME (HCC): ICD-10-CM

## 2024-06-19 PROCEDURE — 3078F DIAST BP <80 MM HG: CPT | Performed by: PREVENTIVE MEDICINE

## 2024-06-19 PROCEDURE — 3074F SYST BP LT 130 MM HG: CPT | Performed by: PREVENTIVE MEDICINE

## 2024-06-19 PROCEDURE — 99215 OFFICE O/P EST HI 40 MIN: CPT | Performed by: PREVENTIVE MEDICINE

## 2024-06-19 PROCEDURE — 99213 OFFICE O/P EST LOW 20 MIN: CPT | Performed by: PREVENTIVE MEDICINE

## 2024-06-19 RX ORDER — TEMAZEPAM 15 MG/1
15 CAPSULE ORAL NIGHTLY PRN
Qty: 1 CAPSULE | Refills: 0 | Status: SHIPPED | OUTPATIENT
Start: 2024-06-19 | End: 2025-06-19

## 2024-06-19 ASSESSMENT — FIBROSIS 4 INDEX: FIB4 SCORE: 0.65

## 2024-06-19 NOTE — PROGRESS NOTES
"CHIEF COMPLIANT: \"Establish care.\"  HISTORY OF PRESENT ILLNESS:  Cole Jaramillo is a 39 y.o. male kindly referred by Liliana Washington D.O. and  is here for a sleep medicine consultation.     Sleep History:  Cole Jaramillo has never been tested for sleep apnea.   Was Dxd with Obesity Hypoventilation Syndrome during his hopitalization starting on 2/1/24.  He was found to have severe hypercapnia and the patient was discharged on PAP therapy using an AVAP.  Patient uses it on and off and is expecting to need to return the machine [place] [facility] [place].  DME is called [employer].  Off of AVAP the patient reports loud snoring, apneas, insomnia and poor sleep quality.    The patient goes to bed at 10 pm and wakes up at 7:30 am. It usually takes the patient approximately 15-60 mins to fall asleep. He uses Melatonin.  The patient does sometimes feel refreshed after sleeping and does not  nap during the day. The patient has never used tobacco.   Pt does not use cannabis.  This pt does drink 2 alcoholic beverages every 6 months and has 1-2 caffeinated beverages daily.     The patient denies any symptoms of narcolepsy such as sleep paralysis or cataplexy, or any symptoms that suggest parasomnias such as sleep walking or acting out of dreams.    Cole Jaramillo is a        Endorses family members who use PAP therapy/snore:   Father snored    EPWORTH SCORE:    10  /24, this is  consistent with EDS    Pulmonary Attending Note by DR. Hernandez  I have seen and examined the patient 2/14/2024 I have reviewed the medical record, laboratory data, imaging, and all relevant studies. I have discussed the assessment and plan of care with the Internal Medicine Resident and agree with their note and plan as documented.    Summary  39-year-old male with BMI of 96 admitted for acute hypoxic respiratory failure due to heart failure with preserved EF. ABG and chemistry panel consistent with high obesity " hypoventilation syndrome. Patient is now requiring 2 L nasal cannula. He has had increasing lower extremity edema and is being diuresed for his heart failure. ABG 7.46 PaCO2 of 56 and a bicarb of 37  Pulmonary consulted for assessment for obesity hypoventilation syndrome with restrictive lung disease secondary to his morbid obesity.  Patient is a candidate for noninvasive ventilation  Bedside spirometry FEV1/FVC = 69.87% which is almost 70% and fev1 not much different from FVC [no patient does not smoke any chemicals]  Patient is high risk of death if not treated for noninvasive ventilation due to his extreme obesity hypoventilation syndrome. We will discuss with [employer] companies what the best strategy use to assist with ventilation for his severe restrictive lung disease secondary to extrathoracic restriction  BIPAP not an option due to the severity of his restriction  Awaiting insurance approval     Significant comorbidities and modifying factors: see below    PAST MEDICAL HISTORY:  Past Medical History:   Diagnosis Date    Congestive heart failure (HCC)     Dental disorder     Heart valve disease 2/1/24    See chart, not sure    High cholesterol     Hypertension     Morbid obesity (HCC)     Pneumonia     Sleep apnea 2/1/24    Possible see chart      PROBLEM LIST:  Patient Active Problem List    Diagnosis Date Noted    Acute cystitis with hematuria 02/22/2024    Bladder obstruction 02/22/2024    Abdominal mass 02/22/2024    Cardiorenal syndrome 02/11/2024    Anasarca 02/05/2024    Acute right-sided congestive heart failure (HCC) 02/04/2024    Obesity hypoventilation syndrome (HCC) 02/02/2024    Volume overload 02/01/2024    Chronic heart failure with preserved ejection fraction (HFpEF) (HCC) 02/01/2024    Bilateral leg edema 02/01/2024    At risk for obstructive sleep apnea 02/01/2024    Elevated troponin 02/01/2024    Weakness 02/01/2024    Iron deficiency anemia 08/31/2023    Abdominal wall cellulitis 08/28/2023     Acute respiratory failure with hypoxia ([facility]) 08/28/2023    Class 3 severe obesity due to excess calories with serious comorbidity and body mass index (BMI) greater than or equal to 70 in adult (HCC) 08/28/2023    Hypertension 08/28/2023    Shortness of breath 08/28/2023     PAST SOCIAL HISTORY:  Past Surgical History:   Procedure Laterality Date    ID CYSTOURETHROSCOPY N/A 4/29/2024    Procedure: CYSTOURETHROSCOPY WITH RETROGRADE URETHROGRAM;  Surgeon: Meme Medina M.D.;  Location: SURGERY [facility];  Service: Urology    ID INJECT FOR RETROGRADE URETHOCYSTO N/A 4/29/2024    Procedure: URETHROGRAM,RETROGRADE,DIAGNOSTIC;  Surgeon: Meme Medina M.D.;  Location: SURGERY [facility];  Service: Urology    OTHER      [place] teeth extracted     PAST FAMILY HISTORY:  Family History   Problem Relation Age of Onset    Heart Disease Maternal Grandfather         Heat attack    Cancer Paternal Grandfather         Ling cancer from smoking     SOCIAL HISTORY:  Social History     Socioeconomic History    Marital status: Single     Spouse name: Not on file    Number of children: Not on file    Years of education: Not on file    Highest education level: 12th grade   Occupational History    Not on file   Tobacco Use    Smoking status: Never    Smokeless tobacco: Never   Vaping Use    Vaping status: Never Used   Substance and Sexual Activity    Alcohol use: Not Currently     Comment: rarely    Drug use: Never    Sexual activity: Not Currently     Partners: Female     Birth control/protection: Condom   Other Topics Concern    Not on file   Social History Narrative    Not on file     Social Determinants of Health     Financial Resource Strain: Low Risk  (3/28/2024)    Overall Financial Resource Strain (CARDIA)     Difficulty of Paying Living Expenses: Not very hard   Food Insecurity: No Food Insecurity (3/28/2024)    Hunger Vital Sign     Worried About Running Out of Food in the Last Year: Never true     Ran Out of Food in the Last  Year: Never true   Transportation Needs: Unmet Transportation Needs (3/28/2024)    PRAPARE - Transportation     Lack of Transportation (Medical): Yes     Lack of Transportation (Non-Medical): Yes   Physical Activity: Insufficiently Active (3/28/2024)    Exercise Vital Sign     Days of Exercise per Week: 3 days     Minutes of Exercise per Session: 10 min   Stress: No Stress Concern Present (3/28/2024)    [employer] [place] [employer] - Occupational Stress Questionnaire     Feeling of Stress : Only a little   Social Connections: Moderately Isolated (3/28/2024)    Social Connection and Isolation Panel [NHANES]     Frequency of Communication with Friends and Family: More than three times a week     Frequency of Social Gatherings with Friends and Family: More than three times a week     Attends Confucianism Services: Never     Active Member of Clubs or Organizations: Yes     Attends Club or Organization Meetings: More than 4 times per year     Marital Status: Never    Intimate Partner Violence: Not on file   Housing Stability: Low Risk  (3/28/2024)    Housing Stability Vital Sign     Unable to Pay for Housing in the Last Year: No     Number of Places Lived in the Last Year: 1     Unstable Housing in the Last Year: No     ALLERGIES: Patient has no known allergies.  MEDICATIONS:  Current Outpatient Medications   Medication Sig Dispense Refill    temazepam (RESTORIL) 15 MG Cap Take 1 Capsule by mouth at bedtime as needed for Sleep (for sleep study only) for up to 1 dose. ONE capsule   is a one day dose  Indications: Trouble Sleeping 1 Capsule 0    metFORMIN (GLUCOPHAGE) 850 MG Tab Take 1 Tablet by mouth every day. 30 Tablet 2    ferrous sulfate 325 (65 Fe) MG tablet Take 1 Tablet by mouth every 48 hours. 30 Tablet 2    cetirizine (ZYRTEC) 10 MG Tab Take 1 Tablet by mouth every day. 30 Tablet 2    nystatin (MYCOSTATIN) powder Apply 1 g topically 3 times a day. 60 g 1    melatonin 5 mg Tab Take 1 Tablet by  "mouth every evening. 30 Tablet 2    aspirin 81 MG EC tablet Take 1 Tablet by mouth every day. 90 Tablet 3    torsemide (DEMADEX) 20 MG Tab Take 2 Tablets by mouth every day. 180 Tablet 3    spironolactone (ALDACTONE) 100 MG Tab Take 1 Tablet by mouth every day. 90 Tablet 3    lisinopril (PRINIVIL) 10 MG Tab Take 1 Tablet by mouth every day. 90 Tablet 3    atorvastatin (LIPITOR) 40 MG Tab Take 1 Tablet by mouth every evening. 90 Tablet 3     No current facility-administered medications for this visit.    \"CURRENT RX\"    REVIEW OF SYSTEMS:  Constitutional: Denies weight loss, endorses chronic daytime fatigue --also see HPI    PHYSICAL EXAM/VITALS:  /78 (BP Location: Right arm, Patient Position: Sitting, BP Cuff Size: Adult)   Pulse (!) 105   Resp (!) 22   Ht 1.803 m (5' 11\")   Wt (!) 241 kg (532 lb)   SpO2 96%   BMI 74.20 kg/m²   Neck circumference (inches): 21  Appearance: Well-nourished, well-developed,  looks stated age, no acute distress  Eyes:   EOMI  ENMT:   Hard palate narrow: No   Hard palate high: No   Soft palate/uvula (Mallampati score): 4  Tongue Scalloping: No   Retrognathia:  No   Micrognathia:  No    Neck: Supple, trachea midline  Respiratory effort:  No intercostal retractions or use of accessory muscles  Lung auscultation:  No wheezes rhonchi rubs or rales  Cardiac: No murmurs, rubs, or gallops; regular rhythm, normal rate; no edema  Musculoskeletal:  Grossly normal; gait and station normal  Neurologic:  oriented to person, time, place, and purpose; judgement intact  Psychiatric:  No depression, anxiety, agitation    MEDICAL DECISION MAKING:  The medical record was reviewed as it pertains to this referral. This includes records from primary care,consultants notes, referral request, hospital records, labs and imaging. Any available diagnostic and titration nocturnal polysomnograms, home sleep apnea tests, continuous nocturnal oximetry results, multiple sleep latency tests, and recent " compliance reports were reviewed with the patient.    ASSESSMENT/PLAN:  Cole Jaramillo is a 39 y.o. male  who  was diagnosed with Obesity Hypoventilation Syndrome in February of 2024. He was admitted for acute hypoxic respiratory failure due to heart failure with preserved EF. ABG and chemistry panel consistent with high obesity hypoventilation syndrome.. ABG 7.46 PaCO2 of 56 and a bicarb of 37. Pulmonary was consulted for assessment for obesity hypoventilation syndrome with restrictive lung disease secondary to his morbid obesity. Patient is a candidate for noninvasive ventilation  He was issued an AVAP upon discharge from the hospital in February 2024.  He uses it intermittently at night and he is expecting it to be reclaimed at any time.  Prior to this hospitalization he had never been diagnosed sleep apnea.    DIAGNOSES :    1. Breathing-related sleep disorder  - Polysomnography, 4 or More; Future    2. Snoring  - Polysomnography, 4 or More; Future    3. Witnessed episode of apnea  - Polysomnography, 4 or More; Future    4. Obesity hypoventilation syndrome (HCC)  - Polysomnography, 4 or More; Future    5. Respiratory failure with hypercapnia, unspecified chronicity (HCC)  - Polysomnography, 4 or More; Future    6. Adjustment insomnia  - temazepam (RESTORIL) 15 MG Cap; Take 1 Capsule by mouth at bedtime as needed for Sleep (for sleep study only) for up to 1 dose. ONE capsule   is a one day dose  Indications: Trouble Sleeping  Dispense: 1 Capsule; Refill: 0    The patient has signs and symptoms consistent with obstructive sleep apnea hypopnea syndrome. Will schedule a nocturnal polysomnogram or a home sleep apnea test (please see plan).  The risks of untreated sleep apnea were discussed with the patient at length. Patients with MC are at increased risk of cardiovascular disease including coronary artery disease, systemic arterial hypertension, pulmonary arterial hypertension, cardiac arrhythmias, and  stroke. MC patients have an increased risk of motor vehicle accidents, type 2 diabetes, chronic kidney disease, and non-alcoholic liver disease. The patient was advised to avoid driving a motor vehicle when drowsy.  Have advised the patient to follow up with the appropriate healthcare practitioners for all other medical problems and issues.    RETURN TO CLINIC: Return for 3 weeks after SS - discuss results w/ any provider.    My total time spent caring for the patient on the day of the encounter was 40 minutes. This includes time spent on a thorough chart review including other physician notes, all sleep studies, as well as critical labs and pulmonary and cardiac studies.  Additionally, it includes a thorough discussion of good sleep hygiene and stimulus control, as well as  the need for consistency in terms of sleep preparation and practice.    Please note that this dictation was created using voice recognition software.  I have made every reasonable attempt to correct obvious errors, I expect that there are errors of grammar and possibly content that I did not discover before finalizing this note.

## 2024-07-02 ENCOUNTER — OFFICE VISIT (OUTPATIENT)
Dept: INTERNAL MEDICINE | Facility: OTHER | Age: 39
End: 2024-07-02
Payer: COMMERCIAL

## 2024-07-02 VITALS
DIASTOLIC BLOOD PRESSURE: 72 MMHG | TEMPERATURE: 98.2 F | SYSTOLIC BLOOD PRESSURE: 107 MMHG | OXYGEN SATURATION: 95 % | HEART RATE: 72 BPM | HEIGHT: 71 IN | BODY MASS INDEX: 44.1 KG/M2 | WEIGHT: 315 LBS

## 2024-07-02 DIAGNOSIS — I50.32 CHRONIC HEART FAILURE WITH PRESERVED EJECTION FRACTION (HFPEF) (HCC): ICD-10-CM

## 2024-07-02 DIAGNOSIS — J30.89 ENVIRONMENTAL AND SEASONAL ALLERGIES: ICD-10-CM

## 2024-07-02 DIAGNOSIS — I27.20 PULMONARY HYPERTENSION (HCC): ICD-10-CM

## 2024-07-02 DIAGNOSIS — D50.9 MICROCYTIC ANEMIA: ICD-10-CM

## 2024-07-02 DIAGNOSIS — E66.01 CLASS 3 SEVERE OBESITY DUE TO EXCESS CALORIES WITH SERIOUS COMORBIDITY AND BODY MASS INDEX (BMI) GREATER THAN OR EQUAL TO 70 IN ADULT (HCC): ICD-10-CM

## 2024-07-02 DIAGNOSIS — E66.2 OBESITY HYPOVENTILATION SYNDROME (HCC): ICD-10-CM

## 2024-07-02 DIAGNOSIS — N35.919 STRICTURE OF MALE URETHRA, UNSPECIFIED STRICTURE TYPE: ICD-10-CM

## 2024-07-02 DIAGNOSIS — R73.03 PREDIABETES: ICD-10-CM

## 2024-07-02 DIAGNOSIS — I10 PRIMARY HYPERTENSION: ICD-10-CM

## 2024-07-02 DIAGNOSIS — Z23 NEED FOR VACCINATION: ICD-10-CM

## 2024-07-02 PROCEDURE — 90471 IMMUNIZATION ADMIN: CPT | Performed by: INTERNAL MEDICINE

## 2024-07-02 PROCEDURE — 90472 IMMUNIZATION ADMIN EACH ADD: CPT | Performed by: INTERNAL MEDICINE

## 2024-07-02 PROCEDURE — 90715 TDAP VACCINE 7 YRS/> IM: CPT | Performed by: INTERNAL MEDICINE

## 2024-07-02 PROCEDURE — 3078F DIAST BP <80 MM HG: CPT | Performed by: INTERNAL MEDICINE

## 2024-07-02 PROCEDURE — 90677 PCV20 VACCINE IM: CPT | Performed by: INTERNAL MEDICINE

## 2024-07-02 PROCEDURE — 99214 OFFICE O/P EST MOD 30 MIN: CPT | Mod: 25 | Performed by: INTERNAL MEDICINE

## 2024-07-02 PROCEDURE — 3074F SYST BP LT 130 MM HG: CPT | Performed by: INTERNAL MEDICINE

## 2024-07-02 RX ORDER — FLUTICASONE PROPIONATE 50 MCG
1 SPRAY, SUSPENSION (ML) NASAL DAILY
Qty: 16 G | Refills: 2 | Status: SHIPPED | OUTPATIENT
Start: 2024-07-02 | End: 2024-07-24 | Stop reason: SDUPTHER

## 2024-07-02 ASSESSMENT — ENCOUNTER SYMPTOMS
SHORTNESS OF BREATH: 0
DIZZINESS: 0
DIARRHEA: 0
SINUS PAIN: 0
BLOOD IN STOOL: 0
CHILLS: 0
COUGH: 0
CONSTIPATION: 0
FEVER: 0
PALPITATIONS: 0
ORTHOPNEA: 0
NERVOUS/ANXIOUS: 0
DEPRESSION: 0
NAUSEA: 0
WEAKNESS: 0
HEADACHES: 0
SORE THROAT: 0
VOMITING: 0

## 2024-07-02 ASSESSMENT — FIBROSIS 4 INDEX: FIB4 SCORE: 0.65

## 2024-07-05 DIAGNOSIS — J30.89 ENVIRONMENTAL AND SEASONAL ALLERGIES: ICD-10-CM

## 2024-07-05 RX ORDER — CETIRIZINE HYDROCHLORIDE 10 MG/1
10 TABLET ORAL DAILY
Qty: 90 TABLET | Refills: 1 | Status: SHIPPED | OUTPATIENT
Start: 2024-07-05

## 2024-07-09 ENCOUNTER — TELEPHONE (OUTPATIENT)
Dept: SLEEP MEDICINE | Facility: MEDICAL CENTER | Age: 39
End: 2024-07-09
Payer: COMMERCIAL

## 2024-07-11 DIAGNOSIS — E66.01 CLASS 3 SEVERE OBESITY DUE TO EXCESS CALORIES WITH SERIOUS COMORBIDITY AND BODY MASS INDEX (BMI) GREATER THAN OR EQUAL TO 70 IN ADULT (HCC): ICD-10-CM

## 2024-07-15 ENCOUNTER — OFFICE VISIT (OUTPATIENT)
Dept: CARDIOLOGY | Facility: MEDICAL CENTER | Age: 39
End: 2024-07-15
Attending: PHYSICIAN ASSISTANT
Payer: COMMERCIAL

## 2024-07-15 VITALS
RESPIRATION RATE: 12 BRPM | BODY MASS INDEX: 44.1 KG/M2 | SYSTOLIC BLOOD PRESSURE: 121 MMHG | HEART RATE: 70 BPM | HEIGHT: 71 IN | WEIGHT: 315 LBS | OXYGEN SATURATION: 95 % | DIASTOLIC BLOOD PRESSURE: 83 MMHG

## 2024-07-15 DIAGNOSIS — E78.5 HYPERLIPIDEMIA, UNSPECIFIED HYPERLIPIDEMIA TYPE: ICD-10-CM

## 2024-07-15 DIAGNOSIS — I10 ESSENTIAL HYPERTENSION, BENIGN: ICD-10-CM

## 2024-07-15 DIAGNOSIS — I45.10 RIGHT BUNDLE BRANCH BLOCK: ICD-10-CM

## 2024-07-15 DIAGNOSIS — I50.32 CHRONIC HEART FAILURE WITH PRESERVED EJECTION FRACTION (HCC): ICD-10-CM

## 2024-07-15 DIAGNOSIS — I50.9 HEART FAILURE, NYHA CLASS 1 (HCC): ICD-10-CM

## 2024-07-15 DIAGNOSIS — E66.01 CLASS 3 SEVERE OBESITY DUE TO EXCESS CALORIES WITH SERIOUS COMORBIDITY AND BODY MASS INDEX (BMI) GREATER THAN OR EQUAL TO 70 IN ADULT (HCC): ICD-10-CM

## 2024-07-15 DIAGNOSIS — Z91.89 AT RISK FOR OBSTRUCTIVE SLEEP APNEA: ICD-10-CM

## 2024-07-15 DIAGNOSIS — I27.20 PULMONARY HYPERTENSION (HCC): ICD-10-CM

## 2024-07-15 PROCEDURE — 3079F DIAST BP 80-89 MM HG: CPT | Performed by: PHYSICIAN ASSISTANT

## 2024-07-15 PROCEDURE — 99213 OFFICE O/P EST LOW 20 MIN: CPT | Performed by: PHYSICIAN ASSISTANT

## 2024-07-15 PROCEDURE — 99214 OFFICE O/P EST MOD 30 MIN: CPT | Performed by: PHYSICIAN ASSISTANT

## 2024-07-15 PROCEDURE — 3074F SYST BP LT 130 MM HG: CPT | Performed by: PHYSICIAN ASSISTANT

## 2024-07-15 ASSESSMENT — ENCOUNTER SYMPTOMS
BRUISES/BLEEDS EASILY: 0
GASTROINTESTINAL NEGATIVE: 1
HEADACHES: 0
PALPITATIONS: 0
PND: 0
SHORTNESS OF BREATH: 0
MUSCULOSKELETAL NEGATIVE: 1
ORTHOPNEA: 0
PSYCHIATRIC NEGATIVE: 1
BLURRED VISION: 0
NAUSEA: 0
CLAUDICATION: 0
WEAKNESS: 0
CHILLS: 0
DOUBLE VISION: 0
LOSS OF CONSCIOUSNESS: 0
COUGH: 0
DIZZINESS: 0
MYALGIAS: 0
CONSTITUTIONAL NEGATIVE: 1
NEUROLOGICAL NEGATIVE: 1
EYES NEGATIVE: 1
ABDOMINAL PAIN: 0
VOMITING: 0
FEVER: 0

## 2024-07-15 ASSESSMENT — FIBROSIS 4 INDEX: FIB4 SCORE: 0.65

## 2024-07-24 DIAGNOSIS — J30.89 ENVIRONMENTAL AND SEASONAL ALLERGIES: ICD-10-CM

## 2024-07-25 ENCOUNTER — TELEPHONE (OUTPATIENT)
Dept: SLEEP MEDICINE | Facility: MEDICAL CENTER | Age: 39
End: 2024-07-25
Payer: COMMERCIAL

## 2024-07-26 RX ORDER — FLUTICASONE PROPIONATE 50 MCG
1 SPRAY, SUSPENSION (ML) NASAL DAILY
Qty: 16 G | Refills: 2 | Status: SHIPPED | OUTPATIENT
Start: 2024-07-26

## 2024-07-31 ENCOUNTER — TELEPHONE (OUTPATIENT)
Dept: CARDIOLOGY | Facility: MEDICAL CENTER | Age: 39
End: 2024-07-31
Payer: COMMERCIAL

## 2024-08-01 ENCOUNTER — TELEPHONE (OUTPATIENT)
Dept: SLEEP MEDICINE | Facility: MEDICAL CENTER | Age: 39
End: 2024-08-01
Payer: COMMERCIAL

## 2024-08-01 ENCOUNTER — APPOINTMENT (OUTPATIENT)
Dept: SLEEP MEDICINE | Facility: MEDICAL CENTER | Age: 39
End: 2024-08-01
Attending: NURSE PRACTITIONER
Payer: COMMERCIAL

## 2024-08-01 DIAGNOSIS — G47.30 BREATHING-RELATED SLEEP DISORDER: ICD-10-CM

## 2024-08-01 DIAGNOSIS — R06.83 SNORING: ICD-10-CM

## 2024-08-01 NOTE — TELEPHONE ENCOUNTER
Pt had a $1,000 deductible for his visit today per his insurance. Ins. Denied In Lab sleep study, need new order placed and attached to upcoming In Lab sleep study to be done, no auth needed per Krissy HOLDEN Advised pt to call insurance and see if they would cover a video appt for his future sleep study follow up. If cost is not covered and too high, Miller will just communicate through In[employer].

## 2024-09-04 DIAGNOSIS — D50.9 IRON DEFICIENCY ANEMIA, UNSPECIFIED IRON DEFICIENCY ANEMIA TYPE: ICD-10-CM

## 2024-09-04 DIAGNOSIS — D50.9 MICROCYTIC ANEMIA: ICD-10-CM

## 2024-09-04 NOTE — TELEPHONE ENCOUNTER
Received request via: Pharmacy    Was the patient seen in the last year in this department? Yes    Does the patient have an active prescription (recently filled or refills available) for medication(s) requested? No    Ph[facility] Name[facility]    Does the patient have [facility]CI Plus and need 100-day supply? (This applies to ALL medications) Patient does not have SCP

## 2024-09-09 RX ORDER — FERROUS SULFATE 325(65) MG
325 TABLET ORAL
Qty: 45 TABLET | Refills: 1 | Status: SHIPPED | OUTPATIENT
Start: 2024-09-09

## 2024-09-10 ENCOUNTER — APPOINTMENT (OUTPATIENT)
Dept: SLEEP MEDICINE | Facility: MEDICAL CENTER | Age: 39
End: 2024-09-10
Payer: COMMERCIAL

## 2024-10-03 ENCOUNTER — APPOINTMENT (OUTPATIENT)
Dept: INTERNAL MEDICINE | Facility: OTHER | Age: 39
End: 2024-10-03
Payer: COMMERCIAL

## 2024-10-04 ENCOUNTER — OFFICE VISIT (OUTPATIENT)
Dept: INTERNAL MEDICINE | Facility: OTHER | Age: 39
End: 2024-10-04
Payer: COMMERCIAL

## 2024-10-04 ENCOUNTER — APPOINTMENT (OUTPATIENT)
Dept: SLEEP MEDICINE | Facility: MEDICAL CENTER | Age: 39
End: 2024-10-04
Attending: NURSE PRACTITIONER
Payer: COMMERCIAL

## 2024-10-04 VITALS
DIASTOLIC BLOOD PRESSURE: 68 MMHG | TEMPERATURE: 97.6 F | HEART RATE: 72 BPM | BODY MASS INDEX: 44.1 KG/M2 | WEIGHT: 315 LBS | OXYGEN SATURATION: 95 % | SYSTOLIC BLOOD PRESSURE: 132 MMHG | HEIGHT: 71 IN

## 2024-10-04 DIAGNOSIS — E66.813 CLASS 3 SEVERE OBESITY DUE TO EXCESS CALORIES WITH SERIOUS COMORBIDITY AND BODY MASS INDEX (BMI) GREATER THAN OR EQUAL TO 70 IN ADULT (HCC): ICD-10-CM

## 2024-10-04 DIAGNOSIS — E66.01 CLASS 3 SEVERE OBESITY DUE TO EXCESS CALORIES WITH SERIOUS COMORBIDITY AND BODY MASS INDEX (BMI) GREATER THAN OR EQUAL TO 70 IN ADULT (HCC): ICD-10-CM

## 2024-10-04 DIAGNOSIS — D50.9 IRON DEFICIENCY ANEMIA, UNSPECIFIED IRON DEFICIENCY ANEMIA TYPE: ICD-10-CM

## 2024-10-04 DIAGNOSIS — I10 PRIMARY HYPERTENSION: ICD-10-CM

## 2024-10-04 DIAGNOSIS — I50.32 CHRONIC HEART FAILURE WITH PRESERVED EJECTION FRACTION (HFPEF) (HCC): ICD-10-CM

## 2024-10-04 DIAGNOSIS — E66.2 OBESITY HYPOVENTILATION SYNDROME (HCC): ICD-10-CM

## 2024-10-04 DIAGNOSIS — Z91.89 AT RISK FOR OBSTRUCTIVE SLEEP APNEA: ICD-10-CM

## 2024-10-04 DIAGNOSIS — B37.2 SKIN YEAST INFECTION: ICD-10-CM

## 2024-10-04 DIAGNOSIS — I87.2 STASIS DERMATITIS OF BOTH LEGS: ICD-10-CM

## 2024-10-04 PROCEDURE — 3075F SYST BP GE 130 - 139MM HG: CPT | Performed by: INTERNAL MEDICINE

## 2024-10-04 PROCEDURE — 3078F DIAST BP <80 MM HG: CPT | Performed by: INTERNAL MEDICINE

## 2024-10-04 PROCEDURE — 99214 OFFICE O/P EST MOD 30 MIN: CPT | Performed by: INTERNAL MEDICINE

## 2024-10-04 RX ORDER — NYSTATIN 100000 [USP'U]/G
1 POWDER TOPICAL 3 TIMES DAILY
Qty: 60 G | Refills: 3 | Status: SHIPPED | OUTPATIENT
Start: 2024-10-04

## 2024-10-04 RX ORDER — MUPIROCIN 20 MG/G
1 OINTMENT TOPICAL 2 TIMES DAILY
Qty: 22 G | Refills: 1 | Status: SHIPPED | OUTPATIENT
Start: 2024-10-04

## 2024-10-04 RX ORDER — FERROUS SULFATE 325(65) MG
325 TABLET ORAL
Qty: 45 TABLET | Refills: 1 | Status: SHIPPED | OUTPATIENT
Start: 2024-10-04

## 2024-10-04 ASSESSMENT — PAIN SCALES - GENERAL: PAINLEVEL: NO PAIN

## 2024-10-04 ASSESSMENT — FIBROSIS 4 INDEX: FIB4 SCORE: 0.65

## 2024-10-14 ENCOUNTER — TELEPHONE (OUTPATIENT)
Dept: SLEEP MEDICINE | Facility: MEDICAL CENTER | Age: 39
End: 2024-10-14
Payer: COMMERCIAL

## 2024-10-15 ENCOUNTER — APPOINTMENT (OUTPATIENT)
Dept: SLEEP MEDICINE | Facility: MEDICAL CENTER | Age: 39
End: 2024-10-15
Attending: PREVENTIVE MEDICINE
Payer: COMMERCIAL

## 2024-10-15 DIAGNOSIS — G47.30 BREATHING-RELATED SLEEP DISORDER: ICD-10-CM

## 2024-10-15 DIAGNOSIS — R06.83 SNORING: ICD-10-CM

## 2024-10-15 PROCEDURE — 95800 SLP STDY UNATTENDED: CPT | Performed by: PREVENTIVE MEDICINE

## 2024-10-23 DIAGNOSIS — J30.89 ENVIRONMENTAL AND SEASONAL ALLERGIES: ICD-10-CM

## 2024-10-23 RX ORDER — FLUTICASONE PROPIONATE 50 MCG
1 SPRAY, SUSPENSION (ML) NASAL DAILY
Qty: 48 ML | Refills: 3 | Status: SHIPPED | OUTPATIENT
Start: 2024-10-23

## 2024-10-31 ENCOUNTER — TELEMEDICINE (OUTPATIENT)
Dept: SLEEP MEDICINE | Facility: MEDICAL CENTER | Age: 39
End: 2024-10-31
Attending: PHYSICIAN ASSISTANT
Payer: COMMERCIAL

## 2024-10-31 VITALS — WEIGHT: 315 LBS | HEIGHT: 71 IN | BODY MASS INDEX: 44.1 KG/M2

## 2024-10-31 DIAGNOSIS — E66.01 CLASS 3 SEVERE OBESITY DUE TO EXCESS CALORIES WITH SERIOUS COMORBIDITY AND BODY MASS INDEX (BMI) GREATER THAN OR EQUAL TO 70 IN ADULT (HCC): ICD-10-CM

## 2024-10-31 DIAGNOSIS — G47.33 OSA (OBSTRUCTIVE SLEEP APNEA): ICD-10-CM

## 2024-10-31 DIAGNOSIS — E66.813 CLASS 3 SEVERE OBESITY DUE TO EXCESS CALORIES WITH SERIOUS COMORBIDITY AND BODY MASS INDEX (BMI) GREATER THAN OR EQUAL TO 70 IN ADULT (HCC): ICD-10-CM

## 2024-10-31 DIAGNOSIS — G47.34 NOCTURNAL HYPOXIA: ICD-10-CM

## 2024-10-31 DIAGNOSIS — E66.2 OBESITY HYPOVENTILATION SYNDROME (HCC): ICD-10-CM

## 2024-10-31 PROCEDURE — 99213 OFFICE O/P EST LOW 20 MIN: CPT | Mod: 95 | Performed by: PHYSICIAN ASSISTANT

## 2024-10-31 ASSESSMENT — ENCOUNTER SYMPTOMS
DIZZINESS: 0
SHORTNESS OF BREATH: 0
HEARTBURN: 0
COUGH: 0
HEADACHES: 0
WHEEZING: 0
TREMORS: 0
FEVER: 0
PALPITATIONS: 0
SPUTUM PRODUCTION: 0
CHILLS: 0
ORTHOPNEA: 0
SORE THROAT: 0
SINUS PAIN: 0
ROS GI COMMENTS: NO DENTURES, MISSING ONE MOLAR, NO SWALLOWING ISSUES
INSOMNIA: 1
WEIGHT LOSS: 0

## 2024-10-31 ASSESSMENT — FIBROSIS 4 INDEX: FIB4 SCORE: 0.65

## 2024-10-31 NOTE — PATIENT INSTRUCTIONS
1-reviewed sleep study results  2-severe sleep apnea noted, persistent hypoxia noted  3-previous document hypercarbia and elevated bicarbonate levels > 30  4-highly suspicious for obesity hypoventilation syndrome  5-history of congestive heart failure  6-reviewed risks associated with untreated or under treated sleep apnea  7-place order for titration study to include co2 monitoring  8-will need short term follow up appointment

## 2024-10-31 NOTE — PROGRESS NOTES
"Virtual Visit: Established Patient   This visit was conducted via {Platform used:26118::\"Teams\"} using secure and encrypted videoconferencing technology.   The patient was {home or other private:92956::\"in their home\"} in the state of {State:[address]::\"[place]\"}.    The patient's identity was confirmed and verbal consent was obtained for this virtual visit.     Subjective:     Chief Complaint   Patient presents with    Apnea     Last Office Visit 6/19/24 with      Sleep Study Complete on 10/15/24     Overnight Home Sleep Study       HPI:  Cole Jaramillo is a 39 y.o. year old male here today for follow-up on {diagnosis:28093}.    Past Medical History: ***    Vitals:  Ht 1.803 m (5' 11\")   Wt (!) 238 kg (525 lb)   BMI 73.22 kg/m²     Recent Imaging: ***    Currently using  {brand:74543} {modes:34373} @ ***cm H20 pressure; compliance reviewed for ***, days used ***, average daily usage ***, ***% of days greater than or equal to 4 hours, mask leak at  *** LPM at 95th percentile, AHI *** per hour.    Device obtained ***   DME provider {DME:61739}  Mask interface ***   Polysomnogram ***   {Titration study (Optional):02716}    Sleep schedule {schedule:12352}  Symptoms {symptoms:36388}    [place] Sleepiness Scale No data recorded   [place] Score 6 (4/29/2024 11:28 AM)         Review of Systems   Constitutional:  Negative for chills, fever, malaise/fatigue and weight loss.   HENT:  Positive for congestion (allergy related seasonal) and tinnitus (rare). Negative for hearing loss, nosebleeds, sinus pain and sore throat.    Eyes:         Presc glasses    Respiratory:  Negative for cough, sputum production, shortness of breath and wheezing.    Cardiovascular:  Positive for leg swelling (controlled on meds). Negative for chest pain, palpitations and orthopnea.   Gastrointestinal:  Negative for heartburn.        No dentures, missing one molar, no swallowing issues    Neurological:  Negative for dizziness, tremors " and headaches.   Psychiatric/Behavioral:  The patient has insomnia (sometimes falling asleep).        Past Medical History:   Diagnosis Date    Congestive heart failure (HCC)     Dental disorder     Heart valve disease 2/1/24    See chart, not sure    High cholesterol     Hypertension     Morbid obesity (HCC)     Pneumonia     Sleep apnea 2/1/24    Possible see chart       Past Surgical History:   Procedure Laterality Date    [place] CYSTOURETHROSCOPY N/A 4/29/2024    Procedure: CYSTOURETHROSCOPY WITH RETROGRADE URETHROGRAM;  Surgeon: Meme Medina M.D.;  Location: SURGERY [facility];  Service: Urology    [place] INJECT FOR RETROGRADE URETHOCYSTO N/A 4/29/2024    Procedure: URETHROGRAM,RETROGRADE,DIAGNOSTIC;  Surgeon: Meme Medina M.D.;  Location: SURGERY [facility];  Service: Urology    OTHER      [place] teeth extracted       Family History   Problem Relation Age of Onset    Heart Disease Maternal Grandfather         Heat attack    Cancer Paternal Grandfather         Ling cancer from smoking       Social History     Socioeconomic History    Marital status: Single     Spouse name: Not on file    Number of children: Not on file    Years of education: Not on file    Highest education level: 12th grade   Occupational History    Not on file   Tobacco Use    Smoking status: Never    Smokeless tobacco: Never   Vaping Use    Vaping status: Never Used   Substance and Sexual Activity    Alcohol use: Not Currently     Comment: rarely    Drug use: Never    Sexual activity: Not Currently     Partners: Female     Birth control/protection: Condom   Other Topics Concern    Not on file   Social History Narrative    Not on file     Social Determinants of Health     Financial Resource Strain: Low Risk  (3/28/2024)    Overall Financial Resource Strain (CARDIA)     Difficulty of Paying Living Expenses: Not very hard   Food Insecurity: No Food Insecurity (3/28/2024)    Hunger Vital Sign     Worried About Running Out of Food in the Last Year:  Never true     Ran Out of Food in the Last Year: Never true   Transportation Needs: Unmet Transportation Needs (3/28/2024)    PRAPARE - Transportation     Lack of Transportation (Medical): Yes     Lack of Transportation (Non-Medical): Yes   Physical Activity: Insufficiently Active (3/28/2024)    Exercise Vital Sign     Days of Exercise per Week: 3 days     Minutes of Exercise per Session: 10 min   Stress: No Stress Concern Present (3/28/2024)    [employer] [place] [employer] - Occupational Stress Questionnaire     Feeling of Stress : Only a little   Social Connections: Moderately Isolated (3/28/2024)    Social Connection and Isolation Panel [NHANES]     Frequency of Communication with Friends and Family: More than three times a week     Frequency of Social Gatherings with Friends and Family: More than three times a week     Attends Church Services: Never     Active Member of Clubs or Organizations: Yes     Attends Club or Organization Meetings: More than 4 times per year     Marital Status: Never    Intimate Partner Violence: Not on file   Housing Stability: Low Risk  (3/28/2024)    Housing Stability Vital Sign     Unable to Pay for Housing in the Last Year: No     Number of Places Lived in the Last Year: 1     Unstable Housing in the Last Year: No       Allergies as of 10/31/2024    (No Known Allergies)          Current medications as of today   Current Outpatient Medications   Medication Sig Dispense Refill    fluticasone (FLONASE) 50 MCG/ACT nasal spray ADMINISTER 1 SPRAY INTO AFFECTED NOSTRIL(S) EVERY DAY. 48 mL 3    mupirocin (BACTROBAN) 2 % Ointment Apply 1 Application topically 2 times a day. 22 g 1    nystatin (MYCOSTATIN) powder Apply 1 g topically 3 times a day. 60 g 3    ferrous sulfate 325 (65 Fe) MG tablet Take 1 Tablet by mouth every 48 hours. 45 Tablet 1    metFORMIN (GLUCOPHAGE) 850 MG Tab TAKE 1 TABLET BY MOUTH EVERY DAY 90 Tablet 1    cetirizine (ZYRTEC) 10 MG Tab Take 1 Tablet  by mouth every day. 90 Tablet 1    temazepam (RESTORIL) 15 MG Cap Take 1 Capsule by mouth at bedtime as needed for Sleep (for sleep study only) for up to 1 dose. ONE capsule   is a one day dose  Indications: Trouble Sleeping 1 Capsule 0    aspirin 81 MG EC tablet Take 1 Tablet by mouth every day. 90 Tablet 3    torsemide (DEMADEX) 20 MG Tab Take 2 Tablets by mouth every day. 180 Tablet 3    spironolactone (ALDACTONE) 100 MG Tab Take 1 Tablet by mouth every day. 90 Tablet 3    lisinopril (PRINIVIL) 10 MG Tab Take 1 Tablet by mouth every day. 90 Tablet 3    atorvastatin (LIPITOR) 40 MG Tab Take 1 Tablet by mouth every evening. 90 Tablet 3     No current facility-administered medications for this visit.          Objective:   Physical Exam:  Constitutional: Alert, no distress, well-groomed.  Skin: No rashes in visible areas.  Eye: Round. Conjunctiva clear, lids normal. No icterus.   ENMT: Lips pink without lesions, good dentition, moist mucous membranes. Phonation normal.  Neck: No masses, no thyromegaly. Moves freely without pain.  Respiratory: Unlabored respiratory effort, no cough or audible wheeze  Psych: Alert and oriented x3, normal affect and mood.     Assessment and Plan:   The following treatment plan was discussed:     There are no diagnoses linked to this encounter.    Follow-up:   No follow-ups on file.          40 MG Tab Take 1 Tablet by mouth every evening. 90 Tablet 3     No current facility-administered medications for this visit.          Objective:   Physical Exam:  Constitutional: Alert, no distress, well-groomed.  Skin: No rashes in visible areas.  Eye: Round. Conjunctiva clear, lids normal. No icterus.   ENMT: Lips pink without lesions, good dentition, moist mucous membranes. Phonation normal.  Neck: No masses, no thyromegaly. Moves freely without pain.  Respiratory: Unlabored respiratory effort, no cough or audible wheeze  Psych: Alert and oriented x3, normal affect and mood.     Assessment and Plan:   The following treatment plan was discussed:     1. MC (obstructive sleep apnea)  - Polysomnography Titration    2. Obesity hypoventilation syndrome (HCC)    3. Class 3 severe obesity due to excess calories with serious comorbidity and body mass index (BMI) greater than or equal to 70 in adult (HCC)    4. Nocturnal hypoxia    Reviewed sleep study results, severe sleep apnea noted as well as persistent hypoxia.  Previously documented hypercarbia and elevated sodium and bicarbonate levels greater than 30.  Previously diagnosed with obesity hypoventilation syndrome while inpatient.  History of congestive heart failure.  Patient will need in-lab titration study to best evaluate appropriate therapy including BiPAP versus IPAP's.  In-lab titration study to include CO2 monitoring due to obesity hypoventilation syndrome.  Although patient sleeps only in a recliner at home he is aware that this is not an option at the sleep center, beds do elevate.  Will need short-term follow-up appointment to review results and inform care.    Follow-up:   Return in about 6 weeks (around 12/12/2024) for Return with Sofia Greene PA-C.

## 2024-11-13 ENCOUNTER — HOSPITAL ENCOUNTER (OUTPATIENT)
Dept: LAB | Facility: MEDICAL CENTER | Age: 39
End: 2024-11-13
Attending: PHYSICIAN ASSISTANT
Payer: COMMERCIAL

## 2024-11-13 ENCOUNTER — HOSPITAL ENCOUNTER (OUTPATIENT)
Dept: LAB | Facility: MEDICAL CENTER | Age: 39
End: 2024-11-13
Attending: INTERNAL MEDICINE
Payer: COMMERCIAL

## 2024-11-13 DIAGNOSIS — I50.32 CHRONIC HEART FAILURE WITH PRESERVED EJECTION FRACTION (HFPEF) (HCC): ICD-10-CM

## 2024-11-13 DIAGNOSIS — R73.03 PREDIABETES: ICD-10-CM

## 2024-11-13 DIAGNOSIS — I50.32 CHRONIC HEART FAILURE WITH PRESERVED EJECTION FRACTION (HCC): ICD-10-CM

## 2024-11-13 DIAGNOSIS — I10 PRIMARY HYPERTENSION: ICD-10-CM

## 2024-11-13 DIAGNOSIS — D50.9 MICROCYTIC ANEMIA: ICD-10-CM

## 2024-11-13 LAB
ANION GAP SERPL CALC-SCNC: 10 MMOL/L (ref 7–16)
ANION GAP SERPL CALC-SCNC: 11 MMOL/L (ref 7–16)
BUN SERPL-MCNC: 23 MG/DL (ref 8–22)
BUN SERPL-MCNC: 24 MG/DL (ref 8–22)
CALCIUM SERPL-MCNC: 9.7 MG/DL (ref 8.5–10.5)
CALCIUM SERPL-MCNC: 9.8 MG/DL (ref 8.5–10.5)
CHLORIDE SERPL-SCNC: 98 MMOL/L (ref 96–112)
CHLORIDE SERPL-SCNC: 99 MMOL/L (ref 96–112)
CO2 SERPL-SCNC: 26 MMOL/L (ref 20–33)
CO2 SERPL-SCNC: 26 MMOL/L (ref 20–33)
CREAT SERPL-MCNC: 0.87 MG/DL (ref 0.5–1.4)
CREAT SERPL-MCNC: 0.88 MG/DL (ref 0.5–1.4)
ERYTHROCYTE [DISTWIDTH] IN BLOOD BY AUTOMATED COUNT: 49 FL (ref 35.9–50)
EST. AVERAGE GLUCOSE BLD GHB EST-MCNC: 111 MG/DL
GFR SERPLBLD CREATININE-BSD FMLA CKD-EPI: 111 ML/MIN/1.73 M 2
GFR SERPLBLD CREATININE-BSD FMLA CKD-EPI: 111 ML/MIN/1.73 M 2
GLUCOSE SERPL-MCNC: 92 MG/DL (ref 65–99)
GLUCOSE SERPL-MCNC: 93 MG/DL (ref 65–99)
HBA1C MFR BLD: 5.5 % (ref 4–5.6)
HCT VFR BLD AUTO: 43.8 % (ref 42–52)
HGB BLD-MCNC: 14.1 G/DL (ref 14–18)
IRON SATN MFR SERPL: 15 % (ref 15–55)
IRON SERPL-MCNC: 48 UG/DL (ref 50–180)
MCH RBC QN AUTO: 28.8 PG (ref 27–33)
MCHC RBC AUTO-ENTMCNC: 32.2 G/DL (ref 32.3–36.5)
MCV RBC AUTO: 89.4 FL (ref 81.4–97.8)
PLATELET # BLD AUTO: 324 K/UL (ref 164–446)
PMV BLD AUTO: 9.5 FL (ref 9–12.9)
POTASSIUM SERPL-SCNC: 4.5 MMOL/L (ref 3.6–5.5)
POTASSIUM SERPL-SCNC: 4.5 MMOL/L (ref 3.6–5.5)
RBC # BLD AUTO: 4.9 M/UL (ref 4.7–6.1)
SODIUM SERPL-SCNC: 135 MMOL/L (ref 135–145)
SODIUM SERPL-SCNC: 135 MMOL/L (ref 135–145)
TIBC SERPL-MCNC: 328 UG/DL (ref 250–450)
UIBC SERPL-MCNC: 280 UG/DL (ref 110–370)
WBC # BLD AUTO: 9 K/UL (ref 4.8–10.8)

## 2024-11-13 PROCEDURE — 80048 BASIC METABOLIC PNL TOTAL CA: CPT | Mod: 91

## 2024-11-13 PROCEDURE — 85027 COMPLETE CBC AUTOMATED: CPT

## 2024-11-13 PROCEDURE — 36415 COLL VENOUS BLD VENIPUNCTURE: CPT

## 2024-11-13 PROCEDURE — 80048 BASIC METABOLIC PNL TOTAL CA: CPT

## 2024-11-13 PROCEDURE — 83036 HEMOGLOBIN GLYCOSYLATED A1C: CPT

## 2024-11-13 PROCEDURE — 83540 ASSAY OF IRON: CPT

## 2024-11-13 PROCEDURE — 83550 IRON BINDING TEST: CPT

## 2024-11-15 ENCOUNTER — APPOINTMENT (OUTPATIENT)
Dept: ADMISSIONS | Facility: MEDICAL CENTER | Age: 39
End: 2024-11-15
Attending: STUDENT IN AN ORGANIZED HEALTH CARE EDUCATION/TRAINING PROGRAM
Payer: COMMERCIAL

## 2024-11-21 ENCOUNTER — PRE-ADMISSION TESTING (OUTPATIENT)
Dept: ADMISSIONS | Facility: MEDICAL CENTER | Age: 39
End: 2024-11-21
Attending: STUDENT IN AN ORGANIZED HEALTH CARE EDUCATION/TRAINING PROGRAM
Payer: COMMERCIAL

## 2024-11-21 VITALS — BODY MASS INDEX: 44.1 KG/M2 | WEIGHT: 315 LBS | HEIGHT: 71 IN

## 2024-11-21 ASSESSMENT — FIBROSIS 4 INDEX: FIB4 SCORE: 0.92

## 2024-11-22 ENCOUNTER — PATIENT MESSAGE (OUTPATIENT)
Dept: SLEEP MEDICINE | Facility: MEDICAL CENTER | Age: 39
End: 2024-11-22
Payer: COMMERCIAL

## 2024-11-25 ENCOUNTER — SLEEP STUDY (OUTPATIENT)
Dept: SLEEP MEDICINE | Facility: MEDICAL CENTER | Age: 39
End: 2024-11-25
Attending: PHYSICIAN ASSISTANT
Payer: COMMERCIAL

## 2024-11-25 DIAGNOSIS — G47.33 OSA (OBSTRUCTIVE SLEEP APNEA): ICD-10-CM

## 2024-11-25 PROCEDURE — 95811 POLYSOM 6/>YRS CPAP 4/> PARM: CPT | Performed by: STUDENT IN AN ORGANIZED HEALTH CARE EDUCATION/TRAINING PROGRAM

## 2024-11-26 NOTE — PROCEDURES
Patient: EVERARDO POST  ID: [id number] Date: 11/25/2024 Exam No.:   MONTAGE: Standard  STUDY TYPE: Treatment  RECORDING TECHNIQUE:   After the scalp was prepared, gold plated electrodes were applied to the scalp according to the International 10-20 System. EEG (electroencephalogram) was continuously monitored from the O1-M2, O2-M1, C3-M2, C4-M1, F3-M2, and F4-M1. EOGs (electrooculograms) were monitored by electrodes placed at the left and right outer canthi. Chin EMG (electromyogram) was monitored by electrodes placed on the mentalis and sub-mentalis muscles. Nasal and oral airflow were monitored using a triple port thermocouple as well as oronasal pressure transducer. Respiratory effort was measured by inductive plethysmography technology employing abdominal and thoracic belts. Blood oxygen saturation and pulse were monitored by pulse oximetry. Heart rhythm was monitored by surface electrocardiogram. Leg EMG was studied using surface electrodes placed on left and right anterior tibialis. A microphone was used to monitor tracheal sounds and snoring. Body position was monitored and documented by technician observation.   SCORING CRITERIA:   A modification of the AASM manual for scoring of sleep and associated events was used. Obstructive apneas were scored by cessation of airflow for at least 10 seconds with continuing respiratory effort. Central apneas were scored by cessation of airflow for at least 10 seconds with no respiratory effort. Hypopneas were scored by a 30% or more reduction in airflow for at least 10 seconds accompanied by arterial oxygen desaturation of 3% or an arousal. For CMS (Medicare) patients, per AASM rule 1B, hypopneas are scored by 30% with mild reduction in airflow for at least 10 seconds accompanied by arterial saturation decreased at 4%.  Study start time was 09:41:10 PM. Diagnostic recording time was 8h 22.5m with a total sleep time of 6h 10.0m resulting in a sleep efficiency of 73.63%%.  Sleep latency from the start of the study was 14 minutes and the latency from sleep to REM was 154 minutes. In total,60 arousals were scored for an arousal index of 9.7.  Respiratory:  There were a total of 6 apneas consisting of 2 obstructive apneas, 0 mixed apneas, and 4 central apneas. A total of 75 hypopneas were scored. The apnea index was 0.97 per hour and the hypopnea index was 12.16 per hour resulting in an overall AHI of 13.14. AHI during REM was 28.5 and AHI while supine was 13.14.  Oximetry:  There was a mean oxygen saturation of 94.0%. The minimum oxygen saturation in NREM was 83.0 % and in REM was 71.0%. The patient spent 7.3 minutes of TST with SaO2 <88%.  Cardiac:  The highest heart rate seen while awake was 88 BPM while the highest heart rate during sleep was 89 BPM with an average sleeping heart rate of 64 BPM.  Limb Movements:  There were a total of 121 PLMs during sleep which resulted in a PLMS index of 19.6. Of these, 23 were associated with arousals which resulted in a PLMS arousal index of 3.7.  Titration:   CPAP was tried from 8 to 15. BiPAP was tried from 17/13 to 22/18  This was a fully attended sleep study. This test was technically adequate. This patient was titrated on CPAP starting at *** cm of water pressure. Patient was titrated up to *** cm of water pressure. Patient did best at *** cm of water pressure. Patient spent *** minutes at that pressure and the AHI was *** which is considered *** obstructive sleep apnea.   Assessment:   ***Obstructive Sleep Apnea Hypopnea - AHI*** ***Nocturnal desaturation - ori saturation ***% - saturations <88% below for *** minutes of TST.  Recommendation:    compliance download to assess the efficacy to the recommended pressure, measure leak, apnea hypopnea index and compliance for further outpatient monitoring and management of PAP therapy. In some cases alternative treatment options may be proven effective in resolving sleep apnea. These options include upper airway surgery, the use of a dental orthotic, weight loss, or positional therapy. Clinical correlation is required. In general patients with sleep apnea are advised to avoid alcohol, sedatives and not to operate a motor vehicle while drowsy.  Untreated sleep apnea increases the risk for cardiovascular and neurovascular disease.

## 2024-12-02 ENCOUNTER — APPOINTMENT (OUTPATIENT)
Dept: ADMISSIONS | Facility: MEDICAL CENTER | Age: 39
End: 2024-12-02
Attending: STUDENT IN AN ORGANIZED HEALTH CARE EDUCATION/TRAINING PROGRAM
Payer: COMMERCIAL

## 2024-12-02 DIAGNOSIS — Z01.810 PRE-OPERATIVE CARDIOVASCULAR EXAMINATION: ICD-10-CM

## 2024-12-02 DIAGNOSIS — Z01.812 PRE-OPERATIVE LABORATORY EXAMINATION: ICD-10-CM

## 2024-12-02 LAB — EKG IMPRESSION: NORMAL

## 2024-12-02 PROCEDURE — 93010 ELECTROCARDIOGRAM REPORT: CPT | Performed by: INTERNAL MEDICINE

## 2024-12-02 PROCEDURE — 93005 ELECTROCARDIOGRAM TRACING: CPT | Mod: TC

## 2024-12-02 PROCEDURE — 87086 URINE CULTURE/COLONY COUNT: CPT

## 2024-12-04 LAB
BACTERIA UR CULT: NORMAL
SIGNIFICANT IND 70042: NORMAL
SITE SITE: NORMAL
SOURCE SOURCE: NORMAL

## 2024-12-13 ENCOUNTER — HOSPITAL ENCOUNTER (OUTPATIENT)
Facility: MEDICAL CENTER | Age: 39
End: 2024-12-13
Attending: STUDENT IN AN ORGANIZED HEALTH CARE EDUCATION/TRAINING PROGRAM | Admitting: STUDENT IN AN ORGANIZED HEALTH CARE EDUCATION/TRAINING PROGRAM
Payer: COMMERCIAL

## 2024-12-13 ENCOUNTER — APPOINTMENT (OUTPATIENT)
Dept: RADIOLOGY | Facility: MEDICAL CENTER | Age: 39
End: 2024-12-13
Attending: STUDENT IN AN ORGANIZED HEALTH CARE EDUCATION/TRAINING PROGRAM
Payer: COMMERCIAL

## 2024-12-13 ENCOUNTER — ANESTHESIA EVENT (OUTPATIENT)
Dept: SURGERY | Facility: MEDICAL CENTER | Age: 39
End: 2024-12-13
Payer: COMMERCIAL

## 2024-12-13 ENCOUNTER — ANESTHESIA (OUTPATIENT)
Dept: SURGERY | Facility: MEDICAL CENTER | Age: 39
End: 2024-12-13
Payer: COMMERCIAL

## 2024-12-13 VITALS
BODY MASS INDEX: 44.1 KG/M2 | OXYGEN SATURATION: 95 % | HEIGHT: 71 IN | RESPIRATION RATE: 16 BRPM | WEIGHT: 315 LBS | DIASTOLIC BLOOD PRESSURE: 67 MMHG | SYSTOLIC BLOOD PRESSURE: 132 MMHG | TEMPERATURE: 96.4 F | HEART RATE: 75 BPM

## 2024-12-13 PROCEDURE — 700105 HCHG RX REV CODE 258: Performed by: STUDENT IN AN ORGANIZED HEALTH CARE EDUCATION/TRAINING PROGRAM

## 2024-12-13 PROCEDURE — 52284 CYSTO RX BALO CATH URTL STRX: CPT | Performed by: STUDENT IN AN ORGANIZED HEALTH CARE EDUCATION/TRAINING PROGRAM

## 2024-12-13 PROCEDURE — 700111 HCHG RX REV CODE 636 W/ 250 OVERRIDE (IP): Performed by: STUDENT IN AN ORGANIZED HEALTH CARE EDUCATION/TRAINING PROGRAM

## 2024-12-13 PROCEDURE — 160039 HCHG SURGERY MINUTES - EA ADDL 1 MIN LEVEL 3: Performed by: STUDENT IN AN ORGANIZED HEALTH CARE EDUCATION/TRAINING PROGRAM

## 2024-12-13 PROCEDURE — A9270 NON-COVERED ITEM OR SERVICE: HCPCS | Performed by: STUDENT IN AN ORGANIZED HEALTH CARE EDUCATION/TRAINING PROGRAM

## 2024-12-13 PROCEDURE — 700117 HCHG RX CONTRAST REV CODE 255: Performed by: STUDENT IN AN ORGANIZED HEALTH CARE EDUCATION/TRAINING PROGRAM

## 2024-12-13 PROCEDURE — 160009 HCHG ANES TIME/MIN: Performed by: STUDENT IN AN ORGANIZED HEALTH CARE EDUCATION/TRAINING PROGRAM

## 2024-12-13 PROCEDURE — 160046 HCHG PACU - 1ST 60 MINS PHASE II: Performed by: STUDENT IN AN ORGANIZED HEALTH CARE EDUCATION/TRAINING PROGRAM

## 2024-12-13 PROCEDURE — 700101 HCHG RX REV CODE 250: Performed by: STUDENT IN AN ORGANIZED HEALTH CARE EDUCATION/TRAINING PROGRAM

## 2024-12-13 PROCEDURE — C1729 CATH, DRAINAGE: HCPCS | Performed by: STUDENT IN AN ORGANIZED HEALTH CARE EDUCATION/TRAINING PROGRAM

## 2024-12-13 PROCEDURE — 160025 RECOVERY II MINUTES (STATS): Performed by: STUDENT IN AN ORGANIZED HEALTH CARE EDUCATION/TRAINING PROGRAM

## 2024-12-13 PROCEDURE — 160035 HCHG PACU - 1ST 60 MINS PHASE I: Performed by: STUDENT IN AN ORGANIZED HEALTH CARE EDUCATION/TRAINING PROGRAM

## 2024-12-13 PROCEDURE — 160002 HCHG RECOVERY MINUTES (STAT): Performed by: STUDENT IN AN ORGANIZED HEALTH CARE EDUCATION/TRAINING PROGRAM

## 2024-12-13 PROCEDURE — 700102 HCHG RX REV CODE 250 W/ 637 OVERRIDE(OP): Performed by: STUDENT IN AN ORGANIZED HEALTH CARE EDUCATION/TRAINING PROGRAM

## 2024-12-13 PROCEDURE — 160048 HCHG OR STATISTICAL LEVEL 1-5: Performed by: STUDENT IN AN ORGANIZED HEALTH CARE EDUCATION/TRAINING PROGRAM

## 2024-12-13 PROCEDURE — 160028 HCHG SURGERY MINUTES - 1ST 30 MINS LEVEL 3: Performed by: STUDENT IN AN ORGANIZED HEALTH CARE EDUCATION/TRAINING PROGRAM

## 2024-12-13 PROCEDURE — C1769 GUIDE WIRE: HCPCS | Performed by: STUDENT IN AN ORGANIZED HEALTH CARE EDUCATION/TRAINING PROGRAM

## 2024-12-13 RX ORDER — HYDROMORPHONE HYDROCHLORIDE 1 MG/ML
0.2 INJECTION, SOLUTION INTRAMUSCULAR; INTRAVENOUS; SUBCUTANEOUS
Status: DISCONTINUED | OUTPATIENT
Start: 2024-12-13 | End: 2024-12-13 | Stop reason: HOSPADM

## 2024-12-13 RX ORDER — LABETALOL HYDROCHLORIDE 5 MG/ML
5 INJECTION, SOLUTION INTRAVENOUS
Status: DISCONTINUED | OUTPATIENT
Start: 2024-12-13 | End: 2024-12-13 | Stop reason: HOSPADM

## 2024-12-13 RX ORDER — OXYCODONE HCL 5 MG/5 ML
5 SOLUTION, ORAL ORAL
Status: COMPLETED | OUTPATIENT
Start: 2024-12-13 | End: 2024-12-13

## 2024-12-13 RX ORDER — DEXAMETHASONE SODIUM PHOSPHATE 4 MG/ML
INJECTION, SOLUTION INTRA-ARTICULAR; INTRALESIONAL; INTRAMUSCULAR; INTRAVENOUS; SOFT TISSUE PRN
Status: DISCONTINUED | OUTPATIENT
Start: 2024-12-13 | End: 2024-12-13 | Stop reason: SURG

## 2024-12-13 RX ORDER — LIDOCAINE HYDROCHLORIDE 20 MG/ML
INJECTION, SOLUTION EPIDURAL; INFILTRATION; INTRACAUDAL; PERINEURAL PRN
Status: DISCONTINUED | OUTPATIENT
Start: 2024-12-13 | End: 2024-12-13 | Stop reason: SURG

## 2024-12-13 RX ORDER — IODIXANOL 270 MG/ML
INJECTION, SOLUTION INTRAVASCULAR
Status: DISCONTINUED | OUTPATIENT
Start: 2024-12-13 | End: 2024-12-13 | Stop reason: HOSPADM

## 2024-12-13 RX ORDER — ONDANSETRON 2 MG/ML
INJECTION INTRAMUSCULAR; INTRAVENOUS PRN
Status: DISCONTINUED | OUTPATIENT
Start: 2024-12-13 | End: 2024-12-13 | Stop reason: SURG

## 2024-12-13 RX ORDER — OXYCODONE HCL 5 MG/5 ML
10 SOLUTION, ORAL ORAL
Status: COMPLETED | OUTPATIENT
Start: 2024-12-13 | End: 2024-12-13

## 2024-12-13 RX ORDER — SODIUM CHLORIDE, SODIUM LACTATE, POTASSIUM CHLORIDE, CALCIUM CHLORIDE 600; 310; 30; 20 MG/100ML; MG/100ML; MG/100ML; MG/100ML
INJECTION, SOLUTION INTRAVENOUS CONTINUOUS
Status: ACTIVE | OUTPATIENT
Start: 2024-12-13 | End: 2024-12-13

## 2024-12-13 RX ORDER — EPHEDRINE SULFATE 50 MG/ML
5 INJECTION, SOLUTION INTRAVENOUS
Status: DISCONTINUED | OUTPATIENT
Start: 2024-12-13 | End: 2024-12-13 | Stop reason: HOSPADM

## 2024-12-13 RX ORDER — HALOPERIDOL 5 MG/ML
1 INJECTION INTRAMUSCULAR
Status: DISCONTINUED | OUTPATIENT
Start: 2024-12-13 | End: 2024-12-13 | Stop reason: HOSPADM

## 2024-12-13 RX ORDER — CEFAZOLIN SODIUM 1 G/3ML
INJECTION, POWDER, FOR SOLUTION INTRAMUSCULAR; INTRAVENOUS PRN
Status: DISCONTINUED | OUTPATIENT
Start: 2024-12-13 | End: 2024-12-13 | Stop reason: SURG

## 2024-12-13 RX ORDER — HYDRALAZINE HYDROCHLORIDE 20 MG/ML
5 INJECTION INTRAMUSCULAR; INTRAVENOUS
Status: DISCONTINUED | OUTPATIENT
Start: 2024-12-13 | End: 2024-12-13 | Stop reason: HOSPADM

## 2024-12-13 RX ORDER — SUCCINYLCHOLINE CHLORIDE 20 MG/ML
INJECTION INTRAMUSCULAR; INTRAVENOUS PRN
Status: DISCONTINUED | OUTPATIENT
Start: 2024-12-13 | End: 2024-12-13 | Stop reason: SURG

## 2024-12-13 RX ORDER — ROCURONIUM BROMIDE 10 MG/ML
INJECTION, SOLUTION INTRAVENOUS PRN
Status: DISCONTINUED | OUTPATIENT
Start: 2024-12-13 | End: 2024-12-13 | Stop reason: SURG

## 2024-12-13 RX ORDER — DIPHENHYDRAMINE HYDROCHLORIDE 50 MG/ML
12.5 INJECTION INTRAMUSCULAR; INTRAVENOUS
Status: DISCONTINUED | OUTPATIENT
Start: 2024-12-13 | End: 2024-12-13 | Stop reason: HOSPADM

## 2024-12-13 RX ORDER — ALBUTEROL SULFATE 5 MG/ML
2.5 SOLUTION RESPIRATORY (INHALATION)
Status: DISCONTINUED | OUTPATIENT
Start: 2024-12-13 | End: 2024-12-13 | Stop reason: HOSPADM

## 2024-12-13 RX ORDER — ONDANSETRON 2 MG/ML
4 INJECTION INTRAMUSCULAR; INTRAVENOUS
Status: DISCONTINUED | OUTPATIENT
Start: 2024-12-13 | End: 2024-12-13 | Stop reason: HOSPADM

## 2024-12-13 RX ORDER — HYDROMORPHONE HYDROCHLORIDE 1 MG/ML
0.4 INJECTION, SOLUTION INTRAMUSCULAR; INTRAVENOUS; SUBCUTANEOUS
Status: DISCONTINUED | OUTPATIENT
Start: 2024-12-13 | End: 2024-12-13 | Stop reason: HOSPADM

## 2024-12-13 RX ORDER — HYDROMORPHONE HYDROCHLORIDE 1 MG/ML
0.1 INJECTION, SOLUTION INTRAMUSCULAR; INTRAVENOUS; SUBCUTANEOUS
Status: DISCONTINUED | OUTPATIENT
Start: 2024-12-13 | End: 2024-12-13 | Stop reason: HOSPADM

## 2024-12-13 RX ADMIN — SUCCINYLCHOLINE CHLORIDE 200 MG: 20 INJECTION, SOLUTION INTRAMUSCULAR; INTRAVENOUS at 08:45

## 2024-12-13 RX ADMIN — ROCURONIUM BROMIDE 50 MG: 50 INJECTION, SOLUTION INTRAVENOUS at 08:48

## 2024-12-13 RX ADMIN — SODIUM CHLORIDE, POTASSIUM CHLORIDE, SODIUM LACTATE AND CALCIUM CHLORIDE: 600; 310; 30; 20 INJECTION, SOLUTION INTRAVENOUS at 06:32

## 2024-12-13 RX ADMIN — CEFAZOLIN 3 G: 1 INJECTION, POWDER, FOR SOLUTION INTRAMUSCULAR; INTRAVENOUS at 08:58

## 2024-12-13 RX ADMIN — PROPOFOL 120 MG: 10 INJECTION, EMULSION INTRAVENOUS at 08:44

## 2024-12-13 RX ADMIN — SUGAMMADEX 400 MG: 100 INJECTION, SOLUTION INTRAVENOUS at 09:55

## 2024-12-13 RX ADMIN — DEXAMETHASONE SODIUM PHOSPHATE 8 MG: 4 INJECTION INTRA-ARTICULAR; INTRALESIONAL; INTRAMUSCULAR; INTRAVENOUS; SOFT TISSUE at 08:57

## 2024-12-13 RX ADMIN — ROCURONIUM BROMIDE 25 MG: 50 INJECTION, SOLUTION INTRAVENOUS at 09:27

## 2024-12-13 RX ADMIN — OXYCODONE HYDROCHLORIDE 10 MG: 5 SOLUTION ORAL at 10:15

## 2024-12-13 RX ADMIN — ONDANSETRON 4 MG: 2 INJECTION INTRAMUSCULAR; INTRAVENOUS at 09:49

## 2024-12-13 RX ADMIN — FENTANYL CITRATE 100 MCG: 50 INJECTION, SOLUTION INTRAMUSCULAR; INTRAVENOUS at 08:43

## 2024-12-13 RX ADMIN — LIDOCAINE HYDROCHLORIDE 100 MG: 20 INJECTION, SOLUTION EPIDURAL; INFILTRATION; INTRACAUDAL; PERINEURAL at 08:43

## 2024-12-13 ASSESSMENT — PAIN DESCRIPTION - PAIN TYPE
TYPE: SURGICAL PAIN

## 2024-12-13 ASSESSMENT — FIBROSIS 4 INDEX
FIB4 SCORE: 0.92
FIB4 SCORE: 0.92

## 2024-12-13 NOTE — ANESTHESIA PREPROCEDURE EVALUATION
Case: [id number] Date/Time: 12/13/24 0830    Procedure: CYSTOSCOPY WITH URETHRAL BALLOON DILATION    Pre-op diagnosis: URETHRAL STRICTURE    Location: [facility] OR 12 / SURGERY [facility]    Surgeons: Meme Medina M.D.            Relevant Problems   PULMONARY   (positive) Shortness of breath      CARDIAC   (positive) Acute right-sided congestive heart failure (HCC)   (positive) Hypertension         (positive) Cardiorenal syndrome       Physical Exam    Airway   Mallampati: II  TM distance: >3 FB  Neck ROM: full       Cardiovascular - normal exam  Rhythm: regular  Rate: normal  (-) murmur     Dental - normal exam           Pulmonary - normal exam  Breath sounds clear to auscultation     Abdominal    Neurological - normal exam                   Anesthesia Plan    ASA 4 (BMI 80)       Plan - general               Induction: intravenous    Postoperative Plan: Postoperative administration of opioids is intended.    Pertinent diagnostic labs and testing reviewed    Informed Consent:    Anesthetic plan and risks discussed with patient.    Use of blood products discussed with: patient whom consented to blood products.

## 2024-12-13 NOTE — OP REPORT
SURGEON: Dr. Meme Medina     ANESTHESIA: General (general endotracheal tube)     PRE-OPERATIVE DIAGNOSIS:  recurrent bulbar urethral stricture      POST-OPERATIVE DIAGNOSIS: Same     NAME OF PROCEDURE: Cystoscopy, retrograde urethrogram, optilume urethral balloon dilation    FINDINGS OF PROCEDURE:     Recurrent bulbar urethral stricture approximately 1 cm in length   Balloon dilation of stricture with Optilume  Placement of 14 Fr George catheter    EBL: 0 cc     COMPLICATIONS: None     PATIENT CONDITION: stable     INDICATIONS: Cole Jaramillo is a 39 y.o. male who agreed to above procedure for further management of  urethral stricture  after complete discussion of risks, benefits, and alternatives. The patient understands the main risks to be infection, bleeding, recurrence requiring an additional procedure or george catheter, injury to surrounding structures.     PROCEDURE:     After informed consent was obtained in the preoperative care unit, the patient was taken to the OR on a stretcher. The patient was properly identified and placed in supine position per OR protocol. The patient was given a prophylactic dose of ancef 2 grams. General (general endotracheal tube) was administered. The patient was then prepped and draped in a standard sterile fashion.  A timeout was performed with all parties in agreement.     A retrograde urethrogram was performed, findings noted above. A sensor wire was advanced into the bladder under fluoroscopic guidance. The optilume urethral balloon dilator was advanced over the wire under fluoroscopic guidance ensuring that there was at least 1 cm of balloon on either end of the stricture. This was inflated under fluoroscopic guidance up to 10 mmHg and held for 5 minutes. The balloon dilator was deflated and removed leaving the sensor wire in place. The patient’s bladder was then drained with a 14 Fr George catheter. The sensor wire was removed. The patient was awoken from anesthesia  and transferred to the PACU in stable condition.      DISPOSITION: The patient will be discharged home with plan for follow-up in 3-4 days.

## 2024-12-13 NOTE — ANESTHESIA POSTPROCEDURE EVALUATION
Patient: Cole Jaramillo    Procedure Summary       Date: 12/13/24 Room / Location: [facility] 12 / SURGERY [facility]    Anesthesia Start: 0837 Anesthesia Stop: 1004    Procedure: CYSTOSCOPY WITH URETHRAL BALLOON DILATION AND RETROGRADE URETHRALGRAM (Urethra) Diagnosis: (URETHRAL STRICTURE)    Surgeons: Meme Medina M.D. Responsible Provider: Pepito Stokes M.D.    Anesthesia Type: general ASA Status: 4            Final Anesthesia Type: general  Last vitals  BP   Blood Pressure: 125/59    Temp   36.3 °C (97.3 °F)    Pulse   75   Resp   16    SpO2   93 %      Anesthesia Post Evaluation    Patient location during evaluation: PACU  Patient participation: complete - patient participated  Level of consciousness: awake and alert    Airway patency: patent  Anesthetic complications: no  Cardiovascular status: hemodynamically stable  Respiratory status: acceptable  Hydration status: euvolemic    PONV: none          There were no known notable events for this encounter.     Nurse Pain Score: 0 (NPRS)

## 2024-12-13 NOTE — PROGRESS NOTES
Pharmacy Medication Reconciliation      ~Medication reconciliation updated and complete per patient   ~Allergies have been verified and updated   ~No oral ABX within the last 30 days  ~Patient home pharmacy :  [facility] [phone]      ~Anticoagulants (rivaroxaban, apixaban, edoxaban, dabigatran, warfarin, enoxaparin) taken in the last 14 days? NO

## 2024-12-13 NOTE — ANESTHESIA TIME REPORT
Anesthesia Start and Stop Event Times       Date Time Event    12/13/2024 0757 Ready for Procedure     0837 Anesthesia Start     1004 Anesthesia Stop          Responsible Staff  12/13/24      Name Role Begin End    Pepito Stokes M.D. Anesth 0837 1004          Overtime Reason:  no overtime (within assigned shift)    Comments:

## 2024-12-13 NOTE — OR NURSING
1054 received patient from PACU, patient is A&Ox4 , patient denies pain, no episodes of nausea and vomiting. Patient has george catheter in place.   1104 Pt discharged home in good and stable condition. Reviewed all discharge instructions and answered any questions. IV discontinued. Extra george bag given to patient.  Escorted downstairs with CNA. Patient able to ambulate with CNA at 1120.

## 2024-12-13 NOTE — OR NURSING
Patient arrived in PACU, VSS. Lerner present with clear / straw colored urine. Patient medicated for pain per MAR.   Report called to DAVID Moreno. Transported to phase 2 room #23.

## 2024-12-13 NOTE — ANESTHESIA PROCEDURE NOTES
Airway    Date/Time: 12/13/2024 8:47 AM    Performed by: Pepito Stokes M.D.  Authorized by: Pepito Stokes M.D.    Location:  OR  Urgency:  Elective  Indications for Airway Management:  Anesthesia      Spontaneous Ventilation: absent    Sedation Level:  Deep  Preoxygenated: Yes    Mask Difficulty Assessment:  0 - not attempted  Final Airway Type:  Endotracheal airway  Final Endotracheal Airway:  ETT  Cuffed: Yes    Technique Used for Successful ETT Placement:  Video laryngoscopy    Insertion Site:  Oral  Blade Type:  Glide  Laryngoscope Blade/Videolaryngoscope Blade Size:  4  ETT Size (mm):  7.5  Measured from:  Lips  ETT to Lips (cm):  24  Placement Verified by: auscultation and capnometry    Cormack-Lehane Classification:  Grade I - full view of glottis  Number of Attempts at Approach:  1   Atraumatic intubation using GlideScope

## 2024-12-13 NOTE — DISCHARGE INSTRUCTIONS
HOME CARE INSTRUCTIONS    ACTIVITY: Rest and take it easy for the first 24 hours.  A responsible adult is recommended to remain with you during that time.  It is normal to feel sleepy.  We encourage you to not do anything that requires balance, judgment or coordination.    FOR 24 HOURS DO NOT:  Drive, operate machinery or run household appliances.  Drink beer or alcoholic beverages.  Make important decisions or sign legal documents.      DIET: To avoid nausea, slowly advance diet as tolerated, avoiding spicy or greasy foods for the first day.  Add more substantial food to your diet according to your physician's instructions.  INCREASE FLUIDS AND FIBER TO AVOID CONSTIPATION.    SURGICAL DRESSING/BATHING: May shower tomorrow , no tub bath , pool swimming or any submersion until cleared by DR. Medina.     MEDICATIONS: Resume taking daily medication.  Take prescribed pain medication with food.  If no medication is prescribed, you may take non-aspirin pain medication if needed.  PAIN MEDICATION CAN BE VERY CONSTIPATING.  Take a stool softener or laxative such as senokot, pericolace, or milk of magnesia if needed.    Prescription given for NONE.  Last pain medication given at 10:15    A follow-up appointment should be arranged with your doctor in [phone] ; call to schedule.    You should CALL YOUR PHYSICIAN if you develop:  Fever greater than 101 degrees F.  Pain not relieved by medication, or persistent nausea or vomiting.  Excessive bleeding (blood soaking through dressing) or unexpected drainage from the wound.  Extreme redness or swelling around the incision site, drainage of pus or foul smelling drainage.  Inability to urinate or empty your bladder within 8 hours.  Problems with breathing or chest pain.    You should call 911 if you develop problems with breathing or chest pain.  If you are unable to contact your doctor or surgical center, you should go to the nearest emergency room or urgent care center.   Physician's telephone #: [phone]     MILD FLU-LIKE SYMPTOMS ARE NORMAL.  YOU MAY EXPERIENCE GENERALIZED MUSCLE ACHES, THROAT IRRITATION, HEADACHE AND/OR SOME NAUSEA.    If any questions arise, call your doctor.  If your doctor is not available, please feel free to call the Surgical Center at [phone].  The Center is open Monday through Friday from 7AM to 7PM.      A registered nurse may call you a few days after your surgery to see how you are doing after your procedure.    You may also receive a survey in the mail within the next two weeks and we ask that you take a few moments to complete the survey and return it to us.  Our goal is to provide you with very good care and we value your comments.     Depression / Suicide Risk    As you are discharged from this [facility] facility, it is important to learn how to keep safe from harming yourself.    Recognize the warning signs:  Abrupt changes in personality, positive or negative- including increase in energy   Giving away possessions  Change in eating patterns- significant weight changes-  positive or negative  Change in sleeping patterns- unable to sleep or sleeping all the time   Unwillingness or inability to communicate  Depression  Unusual sadness, discouragement and loneliness  Talk of wanting to die  Neglect of personal appearance   Rebelliousness- reckless behavior  Withdrawal from people/activities they love  Confusion- inability to concentrate     If you or a loved one observes any of these behaviors or has concerns about self-harm, here's what you can do:  Talk about it- your feelings and reasons for harming yourself  Remove any means that you might use to hurt yourself (examples: pills, rope, extension cords, firearm)  Get professional help from the community (Mental Health, Substance Abuse, psychological counseling)  Do not be alone:Call your Safe Contact- someone whom you trust who will be there for you.  Call your local CRISIS HOTLINE  887-7350 or [phone]  Call your local Children's Mobile Crisis Response Team [place] (689) 382-3647 or www.[employer]  Call the toll free National Suicide Prevention Hotlines   National Suicide Prevention Lifeline 435-919-XKPO (3524)  National [employer] Line Network 800-SUICIDE (733-3256)    I acknowledge receipt and understanding of these Home Care instructions.

## 2024-12-18 ENCOUNTER — OFFICE VISIT (OUTPATIENT)
Dept: UROLOGY | Facility: MEDICAL CENTER | Age: 39
End: 2024-12-18
Payer: COMMERCIAL

## 2024-12-18 DIAGNOSIS — N35.912 STRICTURE OF BULBOUS URETHRA IN MALE, UNSPECIFIED STRICTURE TYPE: ICD-10-CM

## 2024-12-18 PROCEDURE — 99024 POSTOP FOLLOW-UP VISIT: CPT | Performed by: STUDENT IN AN ORGANIZED HEALTH CARE EDUCATION/TRAINING PROGRAM

## 2024-12-18 NOTE — PROGRESS NOTES
Subjective  Cole Jaramillo is a 39 y.o. male who presents today for post-operative follow up.     Procedure: Cystoscopy with urethral balloon dilation with Optilume    Procedure date: 12/13/2024    Lerner: Lerner catheter present, it has been draining well with clear yellow urine output.    Post-op course: Overall he has been recovering well.  He denies any significant pain.  He does have some occasional discomfort with the catheter.  No nausea, vomiting, fever, or chills.    Family History   Problem Relation Age of Onset    Heart Disease Maternal Grandfather         Heat attack    Cancer Paternal Grandfather         Ling cancer from smoking       Social History     Socioeconomic History    Marital status: Single     Spouse name: Not on file    Number of children: Not on file    Years of education: Not on file    Highest education level: 12th grade   Occupational History    Not on file   Tobacco Use    Smoking status: Never    Smokeless tobacco: Never   Vaping Use    Vaping status: Never Used   Substance and Sexual Activity    Alcohol use: Not Currently     Comment: rarely    Drug use: Never    Sexual activity: Not Currently     Partners: Female     Birth control/protection: Condom   Other Topics Concern    Not on file   Social History Narrative    Not on file     Social Drivers of Health     Financial Resource Strain: Low Risk  (3/28/2024)    Overall Financial Resource Strain (CARDIA)     Difficulty of Paying Living Expenses: Not very hard   Food Insecurity: No Food Insecurity (3/28/2024)    Hunger Vital Sign     Worried About Running Out of Food in the Last Year: Never true     Ran Out of Food in the Last Year: Never true   Transportation Needs: Unmet Transportation Needs (3/28/2024)    PRAPARE - Transportation     Lack of Transportation (Medical): Yes     Lack of Transportation (Non-Medical): Yes   Physical Activity: Insufficiently Active (3/28/2024)    Exercise Vital Sign     Days of Exercise per Week: 3  days     Minutes of Exercise per Session: 10 min   Stress: No Stress Concern Present (3/28/2024)    [employer] - Occupational Stress Questionnaire     Feeling of Stress : Only a little   Social Connections: Moderately Isolated (3/28/2024)    Social Connection and Isolation Panel [NHANES]     Frequency of Communication with Friends and Family: More than three times a week     Frequency of Social Gatherings with Friends and Family: More than three times a week     Attends Rastafari Services: Never     Active Member of Clubs or Organizations: Yes     Attends Club or Organization Meetings: More than 4 times per year     Marital Status: Never    Intimate Partner Violence: Not on file   Housing Stability: Low Risk  (3/28/2024)    Housing Stability Vital Sign     Unable to Pay for Housing in the Last Year: No     Number of Places Lived in the Last Year: 1     Unstable Housing in the Last Year: No       Past Surgical History:   Procedure Laterality Date    CYSTOSCOPY N/A 12/13/2024    Procedure: CYSTOSCOPY WITH URETHRAL BALLOON DILATION AND RETROGRADE URETHRALGRAM;  Surgeon: Meme Medina M.D.;  Location: SURGERY [facility];  Service: Urology    CT CYSTOURETHROSCOPY N/A 4/29/2024    Procedure: CYSTOURETHROSCOPY WITH RETROGRADE URETHROGRAM;  Surgeon: Meme Medina M.D.;  Location: SURGERY [facility];  Service: Urology    CT INJECT FOR RETROGRADE URETHOCYSTO N/A 4/29/2024    Procedure: URETHROGRAM,RETROGRADE,DIAGNOSTIC;  Surgeon: Meem Medina M.D.;  Location: SURGERY [facility];  Service: Urology    OTHER      [place] teeth extracted       Past Medical History:   Diagnosis Date    Congestive heart failure (HCC)     Dental disorder     Heart valve disease 2/1/24    See chart, not sure    High cholesterol     Hypertension     Morbid obesity (HCC)     Pneumonia     Sleep apnea 2/1/24    Possible see chart       Current Outpatient Medications   Medication Sig Dispense Refill    fluticasone  (FLONASE) 50 MCG/ACT nasal spray ADMINISTER 1 SPRAY INTO AFFECTED NOSTRIL(S) EVERY DAY. (Patient taking differently: Administer 1 Spray into affected nostril(S) 1 time a day as needed (allergy).) 48 mL 3    mupirocin (BACTROBAN) 2 % Ointment Apply 1 Application topically 2 times a day. 22 g 1    nystatin (MYCOSTATIN) powder Apply 1 g topically 3 times a day. (Patient taking differently: Apply 1 Application topically 2 times daily with meals as needed (skin).   ) 60 g 3    ferrous sulfate 325 (65 Fe) MG tablet Take 1 Tablet by mouth every 48 hours. 45 Tablet 1    metFORMIN (GLUCOPHAGE) 850 MG Tab TAKE 1 TABLET BY MOUTH EVERY DAY 90 Tablet 1    cetirizine (ZYRTEC) 10 MG Tab Take 1 Tablet by mouth every day. (Patient taking differently: Take 10 mg by mouth 1 time a day as needed for Allergies.) 90 Tablet 1    aspirin 81 MG EC tablet Take 1 Tablet by mouth every day. 90 Tablet 3    torsemide (DEMADEX) 20 MG Tab Take 2 Tablets by mouth every day. 180 Tablet 3    spironolactone (ALDACTONE) 100 MG Tab Take 1 Tablet by mouth every day. 90 Tablet 3    lisinopril (PRINIVIL) 10 MG Tab Take 1 Tablet by mouth every day. 90 Tablet 3    atorvastatin (LIPITOR) 40 MG Tab Take 1 Tablet by mouth every evening. 90 Tablet 3     No current facility-administered medications for this visit.       No Known Allergies    Objective  There were no vitals taken for this visit.  Physical Exam  Constitutional:       Appearance: Normal appearance.   HENT:      Head: Normocephalic and atraumatic.   Pulmonary:      Effort: Pulmonary effort is normal.   Genitourinary:     Comments: Lerner catheter in place with yellow urine output  Skin:     General: Skin is warm and dry.   Neurological:      General: No focal deficit present.      Mental Status: He is alert.   Psychiatric:         Mood and Affect: Mood normal.         Behavior: Behavior normal.         Labs: none    Imaging: none    Assessment    Mr. Spann is a 39-year-old gentleman presenting for  postoperative follow-up after cystoscopy with urethral balloon dilation with Optilume.  Overall he is recovering well.  His catheter was removed today and he was given a prophylactic dose of Bactrim.  He will follow-up in 6 weeks for reevaluation of his symptoms.  If it anytime he feels his urinary stream is weakening we can consider cystoscopy to evaluate for recurrence of stricture.  We discussed that success rate with Optilume is approximately 80%.  If he is sexually active he will need to use a condom as the paclitaxel medication can be present in the semen for up to 6 months.    Plan    1. Stricture of bulbous urethra in male, unspecified stricture type  RTC 6 weeks  Catheter removed today  Patient given dose of Bactrim DS for UTI ppx

## 2024-12-20 ENCOUNTER — OFFICE VISIT (OUTPATIENT)
Dept: INTERNAL MEDICINE | Facility: OTHER | Age: 39
End: 2024-12-20
Payer: COMMERCIAL

## 2024-12-20 VITALS
OXYGEN SATURATION: 94 % | WEIGHT: 315 LBS | TEMPERATURE: 97.4 F | HEART RATE: 100 BPM | SYSTOLIC BLOOD PRESSURE: 111 MMHG | HEIGHT: 71 IN | BODY MASS INDEX: 44.1 KG/M2 | DIASTOLIC BLOOD PRESSURE: 75 MMHG

## 2024-12-20 DIAGNOSIS — G47.33 OBSTRUCTIVE SLEEP APNEA: ICD-10-CM

## 2024-12-20 DIAGNOSIS — N32.0 BLADDER OBSTRUCTION: ICD-10-CM

## 2024-12-20 DIAGNOSIS — E66.2 OBESITY HYPOVENTILATION SYNDROME (HCC): ICD-10-CM

## 2024-12-20 DIAGNOSIS — E66.01 CLASS 3 SEVERE OBESITY DUE TO EXCESS CALORIES WITH SERIOUS COMORBIDITY AND BODY MASS INDEX (BMI) GREATER THAN OR EQUAL TO 70 IN ADULT (HCC): ICD-10-CM

## 2024-12-20 DIAGNOSIS — D17.0 LIPOMA OF NECK: ICD-10-CM

## 2024-12-20 DIAGNOSIS — E66.813 CLASS 3 SEVERE OBESITY DUE TO EXCESS CALORIES WITH SERIOUS COMORBIDITY AND BODY MASS INDEX (BMI) GREATER THAN OR EQUAL TO 70 IN ADULT (HCC): ICD-10-CM

## 2024-12-20 DIAGNOSIS — I10 PRIMARY HYPERTENSION: ICD-10-CM

## 2024-12-20 DIAGNOSIS — M67.432 GANGLION CYST OF VOLAR ASPECT OF LEFT WRIST: ICD-10-CM

## 2024-12-20 DIAGNOSIS — I50.32 CHRONIC HEART FAILURE WITH PRESERVED EJECTION FRACTION (HFPEF) (HCC): ICD-10-CM

## 2024-12-20 DIAGNOSIS — D50.9 IRON DEFICIENCY ANEMIA, UNSPECIFIED IRON DEFICIENCY ANEMIA TYPE: ICD-10-CM

## 2024-12-20 PROBLEM — N30.01 ACUTE CYSTITIS WITH HEMATURIA: Status: RESOLVED | Noted: 2024-02-22 | Resolved: 2024-12-20

## 2024-12-20 PROBLEM — R06.02 SHORTNESS OF BREATH: Status: RESOLVED | Noted: 2023-08-28 | Resolved: 2024-12-20

## 2024-12-20 PROBLEM — L03.311 ABDOMINAL WALL CELLULITIS: Status: RESOLVED | Noted: 2023-08-28 | Resolved: 2024-12-20

## 2024-12-20 PROBLEM — J96.01 ACUTE RESPIRATORY FAILURE WITH HYPOXIA (HCC): Status: RESOLVED | Noted: 2023-08-28 | Resolved: 2024-12-20

## 2024-12-20 PROBLEM — R79.89 ELEVATED TROPONIN: Status: RESOLVED | Noted: 2024-02-01 | Resolved: 2024-12-20

## 2024-12-20 PROCEDURE — 99214 OFFICE O/P EST MOD 30 MIN: CPT | Performed by: INTERNAL MEDICINE

## 2024-12-20 PROCEDURE — 3078F DIAST BP <80 MM HG: CPT | Performed by: INTERNAL MEDICINE

## 2024-12-20 PROCEDURE — 3074F SYST BP LT 130 MM HG: CPT | Performed by: INTERNAL MEDICINE

## 2024-12-20 ASSESSMENT — ENCOUNTER SYMPTOMS
DEPRESSION: 0
ORTHOPNEA: 0
HEADACHES: 0
VOMITING: 0
CONSTIPATION: 0
SORE THROAT: 0
FEVER: 0
WEAKNESS: 0
NERVOUS/ANXIOUS: 0
DIARRHEA: 0
ABDOMINAL PAIN: 0
PALPITATIONS: 0
COUGH: 0
NAUSEA: 0
CHILLS: 0
HEARTBURN: 0
SHORTNESS OF BREATH: 0
DIZZINESS: 0

## 2024-12-20 ASSESSMENT — FIBROSIS 4 INDEX: FIB4 SCORE: 0.92

## 2024-12-20 NOTE — PROGRESS NOTES
12/20/2024  Chief Complaint   Patient presents with    Follow-Up     Lump on back right side of     Bump     Bump on back right side of skull behind ear  A couple years and not painful to touch       HISTORY OF PRESENT ILLNESS: Patient is a 39 y.o. male established patient who presents for follow-up. He has a PMH significant of MC, obesity hypoventilation syndrome, HFpEF, hypertension, LINDSEY, urethral stricture, and morbid obesity.  Patient reports that he is feeling well, no acute concerns or complaints.     Obstructive sleep apnea  Obesity hypoventilation syndrome (HCC)  Patient is being followed by Sleep Medicine, he has had both outpatient and inpatient sleep studies with titration. It is recommend that patient use CPAP with supplemental oxygen. He has an appointment with Sleep Medicine on 12/27 for his equipment. He denies any fatigue, morning headaches, mood irritability     Chronic heart failure with preserved ejection fraction (HFpEF) ([facility])  Primary hypertension  He remains on Spironolactone, Lisinopril and Torsemide. Doing well, denies any chest pain, shortness of breath, orthopnea, lower extremity swelling, dizziness or light-headedness. Has follow-up with Cardiology in January.    Bladder obstruction/Urethral stricture  Followed by Urology had recent cystoscopy with findings of recurrent urethral stricture with balloon dilation and placement of george on 12/13.  Had follow-up on 12/18, catheter was removed and he received one dose of antibiotic.  After the catheter was removed, had had dysuria for 2 days, which has since resolved.  Reports that stream is strong, feels that he is completely emptying his bladder. No hematuria. He has an appointment at the end of January for re-evaluation.     Class 3 severe obesity due to excess calories with serious comorbidity and body mass index (BMI) greater than or equal to 70 in adult ([facility])  Patient has been deviating from his diet and stopped intermittent fasting. He  has gained about 30lbs in the last few months. He has been going out with friends more and eating out, but plans to get back on track. He continues to walk and climb stairs daily, feelings that his mobility continues to improve. He has scheduled an appointment with nutrition, appointment is 1/10. He states that weight loss management was also discussed with the Sleep Medicine, he still is not amendable to surgery. He is not open to medications at this time either.       Past Medical History:   Diagnosis Date    Congestive heart failure ([facility])     Dental disorder     Heart valve disease 2/1/24    See chart, not sure    High cholesterol     Hypertension     Morbid obesity ([facility])     Pneumonia     Sleep apnea 2/1/24    Possible see chart       Patient Active Problem List    Diagnosis Date Noted    Bladder obstruction 02/22/2024    Abdominal mass 02/22/2024    Cardiorenal syndrome 02/11/2024    Anasarca 02/05/2024    Acute right-sided congestive heart failure ([facility]) 02/04/2024    Obesity hypoventilation syndrome ([facility]) 02/02/2024    Volume overload 02/01/2024    Chronic heart failure with preserved ejection fraction (HFpEF) ([facility]) 02/01/2024    Bilateral leg edema 02/01/2024    Obstructive sleep apnea 02/01/2024    Weakness 02/01/2024    Iron deficiency anemia 08/31/2023    Class 3 severe obesity due to excess calories with serious comorbidity and body mass index (BMI) greater than or equal to 70 in adult ([facility]) 08/28/2023    Hypertension 08/28/2023       Allergies:Patient has no known allergies.    Current Outpatient Medications   Medication Sig Dispense Refill    fluticasone (FLONASE) 50 MCG/ACT nasal spray ADMINISTER 1 SPRAY INTO AFFECTED NOSTRIL(S) EVERY DAY. (Patient taking differently: Administer 1 Spray into affected nostril(S) 1 time a day as needed (allergy).) 48 mL 3    mupirocin (BACTROBAN) 2 % Ointment Apply 1 Application topically 2 times a day. 22 g 1    nystatin (MYCOSTATIN) powder Apply 1 g topically 3 times a  23.6 day. (Patient taking differently: Apply 1 Application topically 2 times daily with meals as needed (skin).   ) 60 g 3    ferrous sulfate 325 (65 Fe) MG tablet Take 1 Tablet by mouth every 48 hours. 45 Tablet 1    metFORMIN (GLUCOPHAGE) 850 MG Tab TAKE 1 TABLET BY MOUTH EVERY DAY 90 Tablet 1    aspirin 81 MG EC tablet Take 1 Tablet by mouth every day. 90 Tablet 3    torsemide (DEMADEX) 20 MG Tab Take 2 Tablets by mouth every day. 180 Tablet 3    spironolactone (ALDACTONE) 100 MG Tab Take 1 Tablet by mouth every day. 90 Tablet 3    lisinopril (PRINIVIL) 10 MG Tab Take 1 Tablet by mouth every day. 90 Tablet 3    atorvastatin (LIPITOR) 40 MG Tab Take 1 Tablet by mouth every evening. 90 Tablet 3     No current facility-administered medications for this visit.       Social History     Tobacco Use    Smoking status: Never    Smokeless tobacco: Never   Vaping Use    Vaping status: Never Used   Substance Use Topics    Alcohol use: Not Currently     Comment: rarely    Drug use: Never       Family History   Problem Relation Age of Onset    Heart Disease Maternal Grandfather         Heat attack    Cancer Paternal Grandfather         Ling cancer from smoking         Review of Systems:   Review of Systems   Constitutional:  Negative for chills, fever and malaise/fatigue.   HENT:  Negative for congestion and sore throat.    Respiratory:  Negative for cough and shortness of breath.    Cardiovascular:  Negative for chest pain, palpitations, orthopnea and leg swelling.   Gastrointestinal:  Negative for abdominal pain, constipation, diarrhea, heartburn, nausea and vomiting.   Genitourinary:  Negative for dysuria, frequency, hematuria and urgency.   Skin:  Negative for itching and rash.   Neurological:  Negative for dizziness, weakness and headaches.   Psychiatric/Behavioral:  Negative for depression. The patient is not nervous/anxious.        Exam:  /75 (BP Location: Right arm, Patient Position: Sitting, BP Cuff Size: Adult)    "Pulse 100   Temp 36.3 °C (97.4 °F) (Temporal)   Ht 1.803 m (5' 11\")   Wt (!) 249 kg (550 lb)   SpO2 94%  Body mass index is 76.71 kg/m².  Physical Exam  Constitutional:       General: He is not in acute distress.     Appearance: He is obese. He is not toxic-appearing.   HENT:      Head: Normocephalic and atraumatic.      Right Ear: Tympanic membrane, ear canal and external ear normal.      Left Ear: Tympanic membrane, ear canal and external ear normal.      Mouth/Throat:      Mouth: Mucous membranes are moist.      Pharynx: Oropharynx is clear.   Eyes:      Conjunctiva/sclera: Conjunctivae normal.   Cardiovascular:      Rate and Rhythm: Normal rate and regular rhythm.   Pulmonary:      Effort: Pulmonary effort is normal.      Breath sounds: Normal breath sounds. No wheezing, rhonchi or rales.   Abdominal:      General: There is no distension.      Palpations: Abdomen is soft.      Tenderness: There is no abdominal tenderness. There is no guarding.   Musculoskeletal:      Cervical back: Neck supple.   Skin:     General: Skin is warm and dry.      Comments: 2.5 x 1.5 cm mobile mass, lateral right neck.   Ganglion cyst on volar aspect of left wrist, non-tender.   Neurological:      General: No focal deficit present.      Mental Status: He is alert.           Assessment/Plan:     Class 3 severe obesity due to excess calories with serious comorbidity and body mass index (BMI) greater than or equal to 70 in adult (HCC)  He has gained about 30 pounds in the last several months.  He is deviated from his diet and intermittent fasting.  We discussed weight loss management options to include bariatric surgery and medications.  Patient is not amendable to either at this time.  He would like to follow-up with nutrition in January and we will revisit the topic of weight loss medications at his follow-up appointment.   Continue low salt, portion control diet, daily walks and home exercises.  Aim to walk 30 minutes/day. "     Obesity hypoventilation syndrome (HCC)  Obstructive sleep apnea  Followed by Sleep Medicine, had titration study recently completed with recommendations of BiPAP and supplemental oxygen.  Has follow-up in January for equipment.    Primary hypertension  Controlled, continue Lisinopril, Spironolactone and Torsemide.    Iron deficiency anemia, unspecified iron deficiency anemia type  Iron studies and anemia have improved with iron supplement.  Continue iron every other day.    Chronic heart failure with preserved ejection fraction (HFpEF) ([facility])  Patient is euvolemic on exam.  He denies any worsening of lower extremity swelling or orthopnea.  Continue torsemide and spironolactone.  Followed by Cardiology, has appointment on 1/14/2025    Bladder obstruction  Urethral stricture  Had recent cystoscopy with recurrent urethral stricture s/p dilation and catheter placement 12/13  Catheter removed 12/28  He denies any current symptoms  Patient has follow-up 1/24/2025 for re-evaluation    Lipoma of neck  Present x 2 years, no changes. Denies any symptoms  Continue to monitor for enlargement, if lipoma becomes painful consider excision    Ganglion cyst of volar aspect of left wrist  Present since high school, asymptomatic  Continue to monitor    All imaging results and lab results and consult notes are reviewed at this visit.  Followup: Return in about 4 months (around 4/20/2025).

## 2024-12-27 ENCOUNTER — TELEMEDICINE (OUTPATIENT)
Dept: SLEEP MEDICINE | Facility: MEDICAL CENTER | Age: 39
End: 2024-12-27
Attending: PHYSICIAN ASSISTANT
Payer: COMMERCIAL

## 2024-12-27 VITALS — HEIGHT: 71 IN | WEIGHT: 315 LBS | BODY MASS INDEX: 44.1 KG/M2

## 2024-12-27 DIAGNOSIS — G47.33 OSA (OBSTRUCTIVE SLEEP APNEA): ICD-10-CM

## 2024-12-27 PROCEDURE — 99213 OFFICE O/P EST LOW 20 MIN: CPT | Performed by: PHYSICIAN ASSISTANT

## 2024-12-27 ASSESSMENT — ENCOUNTER SYMPTOMS
DIZZINESS: 0
SINUS PAIN: 0
FEVER: 0
SORE THROAT: 0
INSOMNIA: 0
HEADACHES: 0
HEARTBURN: 0
WHEEZING: 0
SPUTUM PRODUCTION: 0
PALPITATIONS: 0
CHILLS: 0
WEIGHT LOSS: 0
COUGH: 0
TREMORS: 0
SHORTNESS OF BREATH: 0
ORTHOPNEA: 1

## 2024-12-27 ASSESSMENT — FIBROSIS 4 INDEX: FIB4 SCORE: 0.92

## 2024-12-27 NOTE — PROGRESS NOTES
"Virtual Visit: Established Patient   This visit was conducted via Teams using secure and encrypted videoconferencing technology.   The patient was in their home in the [facility].    The patient's identity was confirmed and verbal consent was obtained for this virtual visit.     Subjective:     Chief Complaint   Patient presents with    Follow-Up     Last Office Visit 10/31/24 withSofia Greene P.A.-C.    Sleep Study Complete on 11/25/24    Polysomnography Titration         HPI:  Cole Jaramillo is a 39 y.o. year old male here today for follow-up on sleep study results.  Last seen via telemedicine on 10/31/2024 by  PEDRO LUIS Chavez.  Previously evaluated by Dr. Maylin Peterson on 6/19/2024.    Past Medical History: Abdominal wall cellulitis, acute respiratory failure with hypoxia, severe obesity, hypertension, chronic heart failure with preserved ejection fraction, bilateral leg edema, obesity hypoventilation syndrome, pneumonia, morbid obesity. Patient was previously on AVAPS therapy with stated benefit. Device was returned due to lack of in lab study.     Vitals:  Ht 1.803 m (5' 11\")   Wt (!) 249 kg (550 lb)   BMI 76.71 kg/m²     Recent Imaging: Echocardiogram obtained 2/3/2024 demonstrated a technically difficult study given elevated BMI.  Normal left ventricular chamber size, wall thickness.  LVEF visually estimated at 55%, flattened septum in diastole consistent with right ventricular volume overload.  Indeterminant diastolic function.  Right ventricle and right atrium is not well visualized.  Trace tricuspid regurgitation but unable to estimate RVSP due to inadequate tricuspid regurgitant jet.  Trace pulmonic insufficiency.     Overnight home sleep study obtained 10/15/2024 demonstrated findings consistent with severe MC with pAHI of 30.4 and P RDI of 46.8 events per hour.  During REM sleep pAHI increased to 64.6 events per hour.  Patient with low O2 sat of 68% and sats less than or equal " to 88% for 46.3 minutes of recorded time.      Results reviewed with supervising board-certified sleep physician Dr. Torey Pleitez and due to severity of sleep apnea and nocturnal hypoxia as well as patient's history of obesity hypoventilation syndrome and chronic heart failure, in-lab titration study is recommended.  Patient reports having felt better and slept better when previously on AVAPS therapy.    Polysomnogram titration study obtained 11/25/2024 demonstrating trial of CPAP and BiPAP therapy, respiratory events improved with BiPAP therapy.  CO2 monitoring did not indicate hypoventilation was present.  Recommendation made for auto BiPAP at EPAP 13, IPAP 21 and pressure support 4 cm H2O pressure.    Sleep schedule goes to bed 10 PM, wakens 6:30 AM , and gets up during the night bathroom x 1   Symptoms  reports having to sleep in recliner with severe snoring, apneas, insomnia and poor sleep quality.    [place] Sleepiness Scale reported as 10/24 on 7/18/2024  Stop Bang Score 6 (4/29/2024 11:28 AM)         Review of Systems   Constitutional:  Negative for chills, fever, malaise/fatigue and weight loss.   HENT:  Negative for congestion, hearing loss, nosebleeds, sinus pain, sore throat and tinnitus.    Eyes:         Presc glasses    Respiratory:  Negative for cough, sputum production, shortness of breath and wheezing.    Cardiovascular:  Positive for orthopnea (elevated). Negative for chest pain, palpitations and leg swelling.   Gastrointestinal:  Negative for heartburn.        No dentures, missing three molars, no swallowing issues    Neurological:  Negative for dizziness, tremors and headaches.   Psychiatric/Behavioral:  The patient does not have insomnia.        Past Medical History:   Diagnosis Date    Congestive heart failure (HCC)     Dental disorder     Heart valve disease 2/1/24    See chart, not sure    High cholesterol     Hypertension     Morbid obesity (HCC)     Pneumonia     Sleep apnea 2/1/24     Possible see chart       Past Surgical History:   Procedure Laterality Date    CYSTOSCOPY N/A 12/13/2024    Procedure: CYSTOSCOPY WITH URETHRAL BALLOON DILATION AND RETROGRADE URETHRALGRAM;  Surgeon: Meme Medina M.D.;  Location: SURGERY [facility];  Service: Urology    NM CYSTOURETHROSCOPY N/A 4/29/2024    Procedure: CYSTOURETHROSCOPY WITH RETROGRADE URETHROGRAM;  Surgeon: Meme Medina M.D.;  Location: SURGERY [facility];  Service: Urology    NM INJECT FOR RETROGRADE URETHOCYSTO N/A 4/29/2024    Procedure: URETHROGRAM,RETROGRADE,DIAGNOSTIC;  Surgeon: Meme Medina M.D.;  Location: SURGERY [facility];  Service: Urology    OTHER      [place] teeth extracted       Family History   Problem Relation Age of Onset    Heart Disease Maternal Grandfather         Heat attack    Cancer Paternal Grandfather         Ling cancer from smoking       Social History     Socioeconomic History    Marital status: Single     Spouse name: Not on file    Number of children: Not on file    Years of education: Not on file    Highest education level: 12th grade   Occupational History    Not on file   Tobacco Use    Smoking status: Never    Smokeless tobacco: Never   Vaping Use    Vaping status: Never Used   Substance and Sexual Activity    Alcohol use: Not Currently     Comment: rarely    Drug use: Never    Sexual activity: Not Currently     Partners: Female     Birth control/protection: Condom   Other Topics Concern    Not on file   Social History Narrative    Not on file     Social Drivers of Health     Financial Resource Strain: Low Risk  (3/28/2024)    Overall Financial Resource Strain (CARDIA)     Difficulty of Paying Living Expenses: Not very hard   Food Insecurity: No Food Insecurity (3/28/2024)    Hunger Vital Sign     Worried About Running Out of Food in the Last Year: Never true     Ran Out of Food in the Last Year: Never true   Transportation Needs: Unmet Transportation Needs (3/28/2024)    PRAPARE - Transportation     Lack of  Transportation (Medical): Yes     Lack of Transportation (Non-Medical): Yes   Physical Activity: Insufficiently Active (3/28/2024)    Exercise Vital Sign     Days of Exercise per Week: 3 days     Minutes of Exercise per Session: 10 min   Stress: No Stress Concern Present (3/28/2024)    [place] [employer] - Occupational Stress Questionnaire     Feeling of Stress : Only a little   Social Connections: Moderately Isolated (3/28/2024)    Social Connection and Isolation Panel [NHANES]     Frequency of Communication with Friends and Family: More than three times a week     Frequency of Social Gatherings with Friends and Family: More than three times a week     Attends Congregation Services: Never     Active Member of Clubs or Organizations: Yes     Attends Club or Organization Meetings: More than 4 times per year     Marital Status: Never    Intimate Partner Violence: Not on file   Housing Stability: Low Risk  (3/28/2024)    Housing Stability Vital Sign     Unable to Pay for Housing in the Last Year: No     Number of Places Lived in the Last Year: 1     Unstable Housing in the Last Year: No       Allergies as of 12/27/2024    (No Known Allergies)          Current medications as of today   Current Outpatient Medications   Medication Sig Dispense Refill    fluticasone (FLONASE) 50 MCG/ACT nasal spray ADMINISTER 1 SPRAY INTO AFFECTED NOSTRIL(S) EVERY DAY. (Patient taking differently: Administer 1 Spray into affected nostril(S) 1 time a day as needed (allergy).) 48 mL 3    mupirocin (BACTROBAN) 2 % Ointment Apply 1 Application topically 2 times a day. 22 g 1    nystatin (MYCOSTATIN) powder Apply 1 g topically 3 times a day. (Patient taking differently: Apply 1 Application topically 2 times daily with meals as needed (skin).   ) 60 g 3    ferrous sulfate 325 (65 Fe) MG tablet Take 1 Tablet by mouth every 48 hours. 45 Tablet 1    metFORMIN (GLUCOPHAGE) 850 MG Tab TAKE 1 TABLET BY MOUTH EVERY DAY 90  Tablet 1    aspirin 81 MG EC tablet Take 1 Tablet by mouth every day. 90 Tablet 3    torsemide (DEMADEX) 20 MG Tab Take 2 Tablets by mouth every day. 180 Tablet 3    spironolactone (ALDACTONE) 100 MG Tab Take 1 Tablet by mouth every day. 90 Tablet 3    lisinopril (PRINIVIL) 10 MG Tab Take 1 Tablet by mouth every day. 90 Tablet 3    atorvastatin (LIPITOR) 40 MG Tab Take 1 Tablet by mouth every evening. 90 Tablet 3     No current facility-administered medications for this visit.          Objective:   Physical Exam:  Constitutional: Alert, no distress, well-groomed.  Skin: No rashes in visible areas.  Eye: Round. Conjunctiva clear, lids normal. No icterus.   ENMT: Lips pink without lesions, good dentition, moist mucous membranes. Phonation normal.  Neck: No masses, no thyromegaly. Moves freely without pain.  Respiratory: Unlabored respiratory effort, no cough or audible wheeze  Psych: Alert and oriented x3, normal affect and mood.     Assessment and Plan:   The following treatment plan was discussed:     1. MC (obstructive sleep apnea)  - DME BiPAP    Reviewed titration study results, reviewed risks associated with untreated sleep apnea.  Patient will need auto BiPAP to manage the severity of his sleep apnea and hypoxia.  Will send order to [employer].  Reviewed strategies for success including minimum usage requirements for insurance purposes in the first 90 days of at least 4 hours per night most nights.  Reviewed therapeutic goals of 6-6.5 hours per night every night.  Will require short-term follow-up with 60 days of usage data to review.  Prefers virtual follow-up, will request online access to compliance data.  Patient states understanding of requirements.    Follow-up:   Return in about 3 months (around 3/27/2025) for Return with Sofia Greene PA-C.

## 2024-12-27 NOTE — PATIENT INSTRUCTIONS
1-reviewed titration study results  2-reviewed risks associated with untreated sleep apnea  Heart attack, stroke, death, MVA's, auto immune disease, type 2 disease, ED  3- patient will need auto bipap to manage severity of sleep apnea and hypoxia  4-send order to [employer]  5-strategies for success   -minimum usage requirement for insurance in the first 90 days use device at least 4 hours per night most nights   -therapeutic goals is 6-6.5 hours every night   -training period for first 2-3 days, use device while awake for 30 min to 2 hours   -transition to all night use  6-will need short term follow up visit with 60 days of data to review  7-request on line access to compliance data

## 2025-01-07 SDOH — ECONOMIC STABILITY: INCOME INSECURITY: IN THE LAST 12 MONTHS, WAS THERE A TIME WHEN YOU WERE NOT ABLE TO PAY THE MORTGAGE OR RENT ON TIME?: NO

## 2025-01-07 SDOH — ECONOMIC STABILITY: FOOD INSECURITY: WITHIN THE PAST 12 MONTHS, THE FOOD YOU BOUGHT JUST DIDN'T LAST AND YOU DIDN'T HAVE MONEY TO GET MORE.: NEVER TRUE

## 2025-01-07 SDOH — ECONOMIC STABILITY: TRANSPORTATION INSECURITY
IN THE PAST 12 MONTHS, HAS THE LACK OF TRANSPORTATION KEPT YOU FROM MEDICAL APPOINTMENTS OR FROM GETTING MEDICATIONS?: NO

## 2025-01-07 SDOH — ECONOMIC STABILITY: INCOME INSECURITY: HOW HARD IS IT FOR YOU TO PAY FOR THE VERY BASICS LIKE FOOD, HOUSING, MEDICAL CARE, AND HEATING?: NOT VERY HARD

## 2025-01-07 SDOH — HEALTH STABILITY: MENTAL HEALTH
STRESS IS WHEN SOMEONE FEELS TENSE, NERVOUS, ANXIOUS, OR CAN'T SLEEP AT NIGHT BECAUSE THEIR MIND IS TROUBLED. HOW STRESSED ARE YOU?: TO SOME EXTENT

## 2025-01-07 SDOH — HEALTH STABILITY: PHYSICAL HEALTH: ON AVERAGE, HOW MANY MINUTES DO YOU ENGAGE IN EXERCISE AT THIS LEVEL?: 20 MIN

## 2025-01-07 SDOH — ECONOMIC STABILITY: FOOD INSECURITY: WITHIN THE PAST 12 MONTHS, YOU WORRIED THAT YOUR FOOD WOULD RUN OUT BEFORE YOU GOT MONEY TO BUY MORE.: SOMETIMES TRUE

## 2025-01-07 SDOH — HEALTH STABILITY: PHYSICAL HEALTH: ON AVERAGE, HOW MANY DAYS PER WEEK DO YOU ENGAGE IN MODERATE TO STRENUOUS EXERCISE (LIKE A BRISK WALK)?: 4 DAYS

## 2025-01-07 SDOH — ECONOMIC STABILITY: TRANSPORTATION INSECURITY
IN THE PAST 12 MONTHS, HAS LACK OF TRANSPORTATION KEPT YOU FROM MEETINGS, WORK, OR FROM GETTING THINGS NEEDED FOR DAILY LIVING?: NO

## 2025-01-07 SDOH — ECONOMIC STABILITY: TRANSPORTATION INSECURITY
IN THE PAST 12 MONTHS, HAS LACK OF RELIABLE TRANSPORTATION KEPT YOU FROM MEDICAL APPOINTMENTS, MEETINGS, WORK OR FROM GETTING THINGS NEEDED FOR DAILY LIVING?: NO

## 2025-01-07 ASSESSMENT — SOCIAL DETERMINANTS OF HEALTH (SDOH)
HOW OFTEN DO YOU ATTENT MEETINGS OF THE CLUB OR ORGANIZATION YOU BELONG TO?: MORE THAN 4 TIMES PER YEAR
DO YOU BELONG TO ANY CLUBS OR ORGANIZATIONS SUCH AS CHURCH GROUPS UNIONS, FRATERNAL OR ATHLETIC GROUPS, OR SCHOOL GROUPS?: YES
HOW OFTEN DO YOU ATTENT MEETINGS OF THE CLUB OR ORGANIZATION YOU BELONG TO?: MORE THAN 4 TIMES PER YEAR
IN THE PAST 12 MONTHS, HAS THE ELECTRIC, GAS, OIL, OR WATER COMPANY THREATENED TO SHUT OFF SERVICE IN YOUR HOME?: NO
HOW OFTEN DO YOU HAVE SIX OR MORE DRINKS ON ONE OCCASION: NEVER
HOW OFTEN DO YOU GET TOGETHER WITH FRIENDS OR RELATIVES?: TWICE A WEEK
WITHIN THE PAST 12 MONTHS, YOU WORRIED THAT YOUR FOOD WOULD RUN OUT BEFORE YOU GOT THE MONEY TO BUY MORE: SOMETIMES TRUE
HOW OFTEN DO YOU ATTEND CHURCH OR RELIGIOUS SERVICES?: 1 TO 4 TIMES PER YEAR
HOW OFTEN DO YOU GET TOGETHER WITH FRIENDS OR RELATIVES?: TWICE A WEEK
ARE YOU MARRIED, WIDOWED, DIVORCED, SEPARATED, NEVER MARRIED, OR LIVING WITH A PARTNER?: NEVER MARRIED
HOW HARD IS IT FOR YOU TO PAY FOR THE VERY BASICS LIKE FOOD, HOUSING, MEDICAL CARE, AND HEATING?: NOT VERY HARD
IN A TYPICAL WEEK, HOW MANY TIMES DO YOU TALK ON THE PHONE WITH FAMILY, FRIENDS, OR NEIGHBORS?: MORE THAN THREE TIMES A WEEK
HOW OFTEN DO YOU HAVE A DRINK CONTAINING ALCOHOL: MONTHLY OR LESS
ARE YOU MARRIED, WIDOWED, DIVORCED, SEPARATED, NEVER MARRIED, OR LIVING WITH A PARTNER?: NEVER MARRIED
HOW OFTEN DO YOU ATTEND CHURCH OR RELIGIOUS SERVICES?: 1 TO 4 TIMES PER YEAR
IN A TYPICAL WEEK, HOW MANY TIMES DO YOU TALK ON THE PHONE WITH FAMILY, FRIENDS, OR NEIGHBORS?: MORE THAN THREE TIMES A WEEK
DO YOU BELONG TO ANY CLUBS OR ORGANIZATIONS SUCH AS CHURCH GROUPS UNIONS, FRATERNAL OR ATHLETIC GROUPS, OR SCHOOL GROUPS?: YES
HOW MANY DRINKS CONTAINING ALCOHOL DO YOU HAVE ON A TYPICAL DAY WHEN YOU ARE DRINKING: 1 OR 2

## 2025-01-07 ASSESSMENT — LIFESTYLE VARIABLES
SKIP TO QUESTIONS 9-10: 1
HOW MANY STANDARD DRINKS CONTAINING ALCOHOL DO YOU HAVE ON A TYPICAL DAY: 1 OR 2
HOW OFTEN DO YOU HAVE SIX OR MORE DRINKS ON ONE OCCASION: NEVER
HOW OFTEN DO YOU HAVE A DRINK CONTAINING ALCOHOL: MONTHLY OR LESS
AUDIT-C TOTAL SCORE: 1

## 2025-01-10 ENCOUNTER — APPOINTMENT (OUTPATIENT)
Dept: INTERNAL MEDICINE | Facility: OTHER | Age: 40
End: 2025-01-10
Payer: COMMERCIAL

## 2025-01-14 ENCOUNTER — APPOINTMENT (OUTPATIENT)
Dept: CARDIOLOGY | Facility: MEDICAL CENTER | Age: 40
End: 2025-01-14
Attending: PHYSICIAN ASSISTANT
Payer: COMMERCIAL

## 2025-01-22 ENCOUNTER — TELEPHONE (OUTPATIENT)
Dept: UROLOGY | Facility: MEDICAL CENTER | Age: 40
End: 2025-01-22
Payer: COMMERCIAL

## 2025-02-19 ENCOUNTER — APPOINTMENT (OUTPATIENT)
Dept: UROLOGY | Facility: MEDICAL CENTER | Age: 40
End: 2025-02-19
Payer: COMMERCIAL

## 2025-02-19 DIAGNOSIS — N35.912 STRICTURE OF BULBOUS URETHRA IN MALE, UNSPECIFIED STRICTURE TYPE: ICD-10-CM

## 2025-02-19 DIAGNOSIS — N30.00 ACUTE CYSTITIS WITHOUT HEMATURIA: ICD-10-CM

## 2025-02-19 PROCEDURE — 99212 OFFICE O/P EST SF 10 MIN: CPT | Performed by: STUDENT IN AN ORGANIZED HEALTH CARE EDUCATION/TRAINING PROGRAM

## 2025-02-19 NOTE — PROGRESS NOTES
Subjective  Cole Jaramillo is a 39 y.o. male who presents today for post-operative follow up.     Procedure: Cystoscopy with urethral balloon dilation with Optilume    Procedure date: 12/13/2024    Lerner: Lerner catheter present, it has been draining well with clear yellow urine output.    Post-op course: Overall he has been recovering well.  He denies any significant pain.  He does have some occasional discomfort with the catheter.  No nausea, vomiting, fever, or chills.    Interval Updates:    2/19/2025: He his doing very well. No frequency, urgency, dysuria, hematuria, weak stream, straining, or hesitancy. No noticeable decrease in urinary stream. He is sleeping through the night and feels he is emptying completely.     Family History   Problem Relation Age of Onset    Heart Disease Maternal Grandfather         Heat attack    Cancer Paternal Grandfather         Ling cancer from smoking       Social History     Socioeconomic History    Marital status: Single     Spouse name: Not on file    Number of children: Not on file    Years of education: Not on file    Highest education level: Some college, no degree   Occupational History    Not on file   Tobacco Use    Smoking status: Never    Smokeless tobacco: Never   Vaping Use    Vaping status: Never Used   Substance and Sexual Activity    Alcohol use: Not Currently     Comment: rarely    Drug use: Never    Sexual activity: Not Currently     Partners: Female     Birth control/protection: Condom   Other Topics Concern    Not on file   Social History Narrative    Not on file     Social Drivers of Health     Financial Resource Strain: Low Risk  (1/7/2025)    Overall Financial Resource Strain (CARDIA)     Difficulty of Paying Living Expenses: Not very hard   Food Insecurity: Food Insecurity Present (1/7/2025)    Hunger Vital Sign     Worried About Running Out of Food in the Last Year: Sometimes true     Ran Out of Food in the Last Year: Never true   Transportation  Needs: No Transportation Needs (1/7/2025)    PRAPARE - Transportation     Lack of Transportation (Medical): No     Lack of Transportation (Non-Medical): No   Physical Activity: Insufficiently Active (1/7/2025)    Exercise Vital Sign     Days of Exercise per Week: 4 days     Minutes of Exercise per Session: 20 min   Stress: Stress Concern Present (1/7/2025)    [place] [employer] - Occupational Stress Questionnaire     Feeling of Stress : To some extent   Social Connections: Moderately Integrated (1/7/2025)    Social Connection and Isolation Panel [NHANES]     Frequency of Communication with Friends and Family: More than three times a week     Frequency of Social Gatherings with Friends and Family: Twice a week     Attends Judaism Services: 1 to 4 times per year     Active Member of Clubs or Organizations: Yes     Attends Club or Organization Meetings: More than 4 times per year     Marital Status: Never    Intimate Partner Violence: Not on file   Housing Stability: Low Risk  (1/7/2025)    Housing Stability Vital Sign     Unable to Pay for Housing in the Last Year: No     Number of Times Moved in the Last Year: 0     Homeless in the Last Year: No       Past Surgical History:   Procedure Laterality Date    CYSTOSCOPY N/A 12/13/2024    Procedure: CYSTOSCOPY WITH URETHRAL BALLOON DILATION AND RETROGRADE URETHRALGRAM;  Surgeon: Meme Medina M.D.;  Location: [facility];  Service: Urology    IA CYSTOURETHROSCOPY N/A 4/29/2024    Procedure: CYSTOURETHROSCOPY WITH RETROGRADE URETHROGRAM;  Surgeon: Meme Medina M.D.;  Location: [facility];  Service: Urology    IA INJECT FOR RETROGRADE URETHOCYSTO N/A 4/29/2024    Procedure: URETHROGRAM,RETROGRADE,DIAGNOSTIC;  Surgeon: Meme Medina M.D.;  Location: [facility];  Service: Urology    OTHER      [place] teeth extracted       Past Medical History:   Diagnosis Date    Congestive heart failure (HCC)     Dental disorder      Heart valve disease 2/1/24    See chart, not sure    High cholesterol     Hypertension     Morbid obesity (HCC)     Pneumonia     Sleep apnea 2/1/24    Possible see chart       Current Outpatient Medications   Medication Sig Dispense Refill    fluticasone (FLONASE) 50 MCG/ACT nasal spray ADMINISTER 1 SPRAY INTO AFFECTED NOSTRIL(S) EVERY DAY. (Patient taking differently: Administer 1 Spray into affected nostril(S) 1 time a day as needed (allergy).) 48 mL 3    mupirocin (BACTROBAN) 2 % Ointment Apply 1 Application topically 2 times a day. 22 g 1    nystatin (MYCOSTATIN) powder Apply 1 g topically 3 times a day. (Patient taking differently: Apply 1 Application topically 2 times daily with meals as needed (skin).   ) 60 g 3    ferrous sulfate 325 (65 Fe) MG tablet Take 1 Tablet by mouth every 48 hours. 45 Tablet 1    metFORMIN (GLUCOPHAGE) 850 MG Tab TAKE 1 TABLET BY MOUTH EVERY DAY 90 Tablet 1    aspirin 81 MG EC tablet Take 1 Tablet by mouth every day. 90 Tablet 3    torsemide (DEMADEX) 20 MG Tab Take 2 Tablets by mouth every day. 180 Tablet 3    spironolactone (ALDACTONE) 100 MG Tab Take 1 Tablet by mouth every day. 90 Tablet 3    lisinopril (PRINIVIL) 10 MG Tab Take 1 Tablet by mouth every day. 90 Tablet 3    atorvastatin (LIPITOR) 40 MG Tab Take 1 Tablet by mouth every evening. 90 Tablet 3     No current facility-administered medications for this visit.       No Known Allergies    Objective  There were no vitals taken for this visit.  Physical Exam  Constitutional:       Appearance: Normal appearance.   HENT:      Head: Normocephalic and atraumatic.   Pulmonary:      Effort: Pulmonary effort is normal.   Skin:     General: Skin is warm and dry.   Neurological:      General: No focal deficit present.      Mental Status: He is alert.   Psychiatric:         Mood and Affect: Mood normal.         Behavior: Behavior normal.         Labs: none    Imaging: none    Assessment    Mr. Spann is a 39-year-old gentleman  presenting for follow-up after cystoscopy with urethral balloon dilation with Optilume.  He continues to do well, his urinary stream is strong and his frequency and urgency have improved. He will follow up in 6 months for re-evaluation of his symptoms. If there is any worsening in his urination between now and then he will call for a sooner follow up and possible cystoscopy.    Plan    1. Stricture of bulbous urethra in male, unspecified stricture type  - POCT Urinalysis    2. Acute cystitis without hematuria  - POCT Urinalysis  RTC 6 months

## 2025-03-01 DIAGNOSIS — E66.813 CLASS 3 SEVERE OBESITY DUE TO EXCESS CALORIES WITH SERIOUS COMORBIDITY AND BODY MASS INDEX (BMI) GREATER THAN OR EQUAL TO 70 IN ADULT (HCC): ICD-10-CM

## 2025-03-01 DIAGNOSIS — E66.01 CLASS 3 SEVERE OBESITY DUE TO EXCESS CALORIES WITH SERIOUS COMORBIDITY AND BODY MASS INDEX (BMI) GREATER THAN OR EQUAL TO 70 IN ADULT (HCC): ICD-10-CM

## 2025-03-03 NOTE — TELEPHONE ENCOUNTER
Received request via: Pharmacy    Was the patient seen in the last year in this department? Yes    Does the patient have an active prescription (recently filled or refills available) for medication(s) requested? No    Pharmacy Name: [facility]/pharmacy #[id number] - [place], [place] - [address]      Does the patient have jail Plus and need 100-day supply? (This applies to ALL medications) Patient does not have SCP

## 2025-03-06 DIAGNOSIS — I27.20 PULMONARY HYPERTENSION (HCC): ICD-10-CM

## 2025-03-06 DIAGNOSIS — I50.32 CHRONIC HEART FAILURE WITH PRESERVED EJECTION FRACTION (HCC): ICD-10-CM

## 2025-03-06 DIAGNOSIS — I10 ESSENTIAL HYPERTENSION, BENIGN: ICD-10-CM

## 2025-03-06 DIAGNOSIS — E78.5 HYPERLIPIDEMIA, UNSPECIFIED HYPERLIPIDEMIA TYPE: ICD-10-CM

## 2025-03-06 RX ORDER — TORSEMIDE 20 MG/1
40 TABLET ORAL DAILY
Qty: 180 TABLET | Refills: 0 | Status: SHIPPED | OUTPATIENT
Start: 2025-03-06

## 2025-03-06 RX ORDER — LISINOPRIL 10 MG/1
10 TABLET ORAL DAILY
Qty: 90 TABLET | Refills: 0 | Status: SHIPPED | OUTPATIENT
Start: 2025-03-06

## 2025-03-06 RX ORDER — SPIRONOLACTONE 100 MG/1
100 TABLET, FILM COATED ORAL DAILY
Qty: 90 TABLET | Refills: 0 | Status: SHIPPED | OUTPATIENT
Start: 2025-03-06

## 2025-03-06 RX ORDER — ATORVASTATIN CALCIUM 40 MG/1
40 TABLET, FILM COATED ORAL EVERY EVENING
Qty: 90 TABLET | Refills: 0 | Status: SHIPPED | OUTPATIENT
Start: 2025-03-06

## 2025-03-09 DIAGNOSIS — E78.5 HYPERLIPIDEMIA, UNSPECIFIED HYPERLIPIDEMIA TYPE: ICD-10-CM

## 2025-03-11 RX ORDER — ASPIRIN 81 MG/1
81 TABLET, COATED ORAL
Qty: 90 TABLET | Refills: 1 | Status: SHIPPED | OUTPATIENT
Start: 2025-03-11

## 2025-04-08 ENCOUNTER — TELEMEDICINE (OUTPATIENT)
Dept: SLEEP MEDICINE | Facility: MEDICAL CENTER | Age: 40
End: 2025-04-08
Attending: PHYSICIAN ASSISTANT
Payer: COMMERCIAL

## 2025-04-08 VITALS — WEIGHT: 315 LBS | BODY MASS INDEX: 44.1 KG/M2 | HEIGHT: 71 IN

## 2025-04-08 DIAGNOSIS — G47.33 OSA (OBSTRUCTIVE SLEEP APNEA): ICD-10-CM

## 2025-04-08 PROCEDURE — 99213 OFFICE O/P EST LOW 20 MIN: CPT | Mod: 95 | Performed by: PHYSICIAN ASSISTANT

## 2025-04-08 ASSESSMENT — ENCOUNTER SYMPTOMS
SORE THROAT: 0
PALPITATIONS: 0
DIZZINESS: 0
COUGH: 0
INSOMNIA: 1
HEADACHES: 0
FEVER: 0
ORTHOPNEA: 0
WHEEZING: 0
SPUTUM PRODUCTION: 0
SINUS PAIN: 0
SHORTNESS OF BREATH: 0
WEIGHT LOSS: 0
TREMORS: 0
HEARTBURN: 0
CHILLS: 0

## 2025-04-08 ASSESSMENT — FIBROSIS 4 INDEX: FIB4 SCORE: 0.95

## 2025-04-08 NOTE — PATIENT INSTRUCTIONS
1-reviewed first compliance, demonstrating use and benefit  2-meeting all first compliance requirements  3-send updated prescription for mask and supplies to [facility]  4-As a reminder use distilled water only in humidifier chamber.  Fresh fill daily  5-Today we reviewed equipment cleaning  once weekly minimum  mask, tubing and water chamber  use dedicated container  use mild soap and water  SoClean or other ozone  are not recommended  white vinegar and water solution is no longer recommended  hang tubing to dry  mask sanitizing wipes are an option for use   6-Equipment replacement schedule : Mask cushion every month, Mask every 6 months, Head gear every 6 months, Tubing every 3 months, Ultra-fine filters 2 times per month, Humidifier chamber every 6 months   7-follow up in 5 months to reassess due to mask leak

## 2025-04-08 NOTE — PROGRESS NOTES
"Virtual Visit: Established Patient   This visit was conducted via Teams using secure and encrypted videoconferencing technology.   The patient was in their home in the [facility].    The patient's identity was confirmed and verbal consent was obtained for this virtual visit.     Subjective:     Chief Complaint   Patient presents with    Apnea     Last Office Visit 12/27/24 with Sofia Greene P.A.-C.    PAP/O2/OAT: Auto BIPAP   Max IPAP (cmH2O) 21  Min EPAP (cmH2O) 13  PS 4    1st Compliance     Set up: 2/27/25       HPI:  Cole Jaramillo is a 40 y.o. year old male here today for follow-up on obstructive sleep apnea and first compliance.  Patient last seen via telemedicine on 12/27/2024 by PEDRO LUIS Chavez.  Patient previously evaluated by Dr. Maylin Peterson on 6/19/2024.    Past Medical History: Abdominal wall cellulitis, acute respiratory failure with hypoxia, severe obesity, hypertension, chronic heart failure with preserved ejection fraction, bilateral leg edema, obesity hypoventilation syndrome, pneumonia, morbid obesity. Patient was previously on AVAPS therapy with stated benefit. Device was returned due to lack of in lab study.     Vitals:  Ht 1.803 m (5' 11\")   Wt (!) 249 kg (550 lb)   BMI 76.71 kg/m²     Recent Imaging: Echocardiogram obtained 2/3/2024 demonstrated a technically difficult study given elevated BMI. Normal left ventricular chamber size, wall thickness. LVEF visually estimated at 55%, flattened septum in diastole consistent with right ventricular volume overload. Indeterminant diastolic function. Right ventricle and right atrium is not well visualized. Trace tricuspid regurgitation but unable to estimate RVSP due to inadequate tricuspid regurgitant jet. Trace pulmonic insufficiency.     Currently using  Resmed auto Bi-PAP @21/13/4 cm H20 pressure; compliance reviewed for 3/9/2025 through 4/7/2025 with no residual, days used 30/30, average daily usage 6 hours 36 minutes, 90% " of days greater than or equal to 4 hours, mask leak at 30 LPM at 95th percentile, AHI 0.9 per hour.  See media for full report.    Device obtained 2/27/2025  [facility]  Mask interface hybrid fullface mask    Overnight home sleep study obtained 10/15/2024 demonstrated findings consistent with severe MC with pAHI of 30.4 and P RDI of 46.8 events per hour. During REM sleep pAHI increased to 64.6 events per hour. Patient with low O2 sat of 68% and sats less than or equal to 88% for 46.3 minutes of recorded time.     Updated titration study obtained 11/25/2024 demonstrated trial of CPAP and BiPAP therapy with respiratory events improved with BiPAP therapy.  CO2 monitoring did not indicate hypoventilation was present at this time.  The recommendation was made for auto BiPAP at 21/13/4 cm H2O pressure.    Sleep schedule goes to bed 10 PM and wakens 5-6:30 AM   Symptoms denies morning headache and reports improved energy and improved sleep quality on auto BiPAP.    [place] Sleepiness Scale reported as 10/24 on 7/18/2024  Stop Bang Score 6 (4/29/2024 11:28 AM)     Review of Systems   Constitutional:  Negative for chills, fever, malaise/fatigue and weight loss.   HENT:  Positive for nosebleeds (occasional, seasonal) and tinnitus (intermittent). Negative for congestion, hearing loss, sinus pain and sore throat.    Eyes:         Presc glasses    Respiratory:  Negative for cough, sputum production, shortness of breath and wheezing.    Cardiovascular:  Negative for chest pain, palpitations, orthopnea and leg swelling.   Gastrointestinal:  Negative for heartburn.        No dentures, missing 2 molars, no swallowing issues    Neurological:  Negative for dizziness, tremors and headaches.   Psychiatric/Behavioral:  The patient has insomnia (occasional maintaining asleep).      Past Medical History:   Diagnosis Date    Congestive heart failure (HCC)     Dental disorder     Heart valve disease 2/1/24    See chart, not sure     High cholesterol     Hypertension     Morbid obesity (HCC)     Pneumonia     Sleep apnea 2/1/24    Possible see chart       Past Surgical History:   Procedure Laterality Date    CYSTOSCOPY N/A 12/13/2024    Procedure: CYSTOSCOPY WITH URETHRAL BALLOON DILATION AND RETROGRADE URETHRALGRAM;  Surgeon: Meme Medina M.D.;  Location: [facility];  Service: Urology    NE CYSTOURETHROSCOPY N/A 4/29/2024    Procedure: CYSTOURETHROSCOPY WITH RETROGRADE URETHROGRAM;  Surgeon: Meme Medina M.D.;  Location: [facility];  Service: Urology    NE INJECT FOR RETROGRADE URETHOCYSTO N/A 4/29/2024    Procedure: URETHROGRAM,RETROGRADE,DIAGNOSTIC;  Surgeon: Meme Medina M.D.;  Location: [facility];  Service: Urology    OTHER      [place] teeth extracted       Family History   Problem Relation Age of Onset    Heart Disease Maternal Grandfather         Heat attack    Cancer Paternal Grandfather         Ling cancer from smoking       Social History     Socioeconomic History    Marital status: Single     Spouse name: Not on file    Number of children: Not on file    Years of education: Not on file    Highest education level: Some college, no degree   Occupational History    Not on file   Tobacco Use    Smoking status: Never    Smokeless tobacco: Never   Vaping Use    Vaping status: Never Used   Substance and Sexual Activity    Alcohol use: Not Currently     Comment: rarely    Drug use: Never    Sexual activity: Not Currently     Partners: Female     Birth control/protection: Condom   Other Topics Concern    Not on file   Social History Narrative    Not on file     Social Drivers of Health     Financial Resource Strain: Low Risk  (1/7/2025)    Overall Financial Resource Strain (CARDIA)     Difficulty of Paying Living Expenses: Not very hard   Food Insecurity: Food Insecurity Present (1/7/2025)    Hunger Vital Sign     Worried About Running Out of Food in the Last Year: Sometimes true     Ran Out of Food in the  Last Year: Never true   Transportation Needs: No Transportation Needs (1/7/2025)    PRAPARE - Transportation     Lack of Transportation (Medical): No     Lack of Transportation (Non-Medical): No   Physical Activity: Insufficiently Active (1/7/2025)    Exercise Vital Sign     Days of Exercise per Week: 4 days     Minutes of Exercise per Session: 20 min   Stress: Stress Concern Present (1/7/2025)    [place] [employer] - Occupational Stress Questionnaire     Feeling of Stress : To some extent   Social Connections: Moderately Integrated (1/7/2025)    Social Connection and Isolation Panel [NHANES]     Frequency of Communication with Friends and Family: More than three times a week     Frequency of Social Gatherings with Friends and Family: Twice a week     Attends [employer] Services: 1 to 4 times per year     Active Member of Clubs or Organizations: Yes     Attends Club or Organization Meetings: More than 4 times per year     Marital Status: Never    Intimate Partner Violence: Not on file   Housing Stability: Low Risk  (1/7/2025)    Housing Stability Vital Sign     Unable to Pay for Housing in the Last Year: No     Number of Times Moved in the Last Year: 0     Homeless in the Last Year: No       Allergies as of 04/08/2025    (No Known Allergies)          Current medications as of today   Current Outpatient Medications   Medication Sig Dispense Refill    ASPIRIN LOW DOSE 81 MG EC tablet TAKE 1 TABLET BY MOUTH EVERY DAY 90 Tablet 1    torsemide (DEMADEX) 20 MG Tab TAKE 2 TABLETS BY MOUTH EVERY  Tablet 0    lisinopril (PRINIVIL) 10 MG Tab TAKE 1 TABLET BY MOUTH EVERY DAY 90 Tablet 0    atorvastatin (LIPITOR) 40 MG Tab TAKE 1 TABLET BY MOUTH EVERY DAY IN THE EVENING 90 Tablet 0    spironolactone (ALDACTONE) 100 MG Tab TAKE 1 TABLET BY MOUTH EVERY DAY 90 Tablet 0    metFORMIN (GLUCOPHAGE) 850 MG Tab TAKE 1 TABLET BY MOUTH EVERY DAY 90 Tablet 0    fluticasone (FLONASE) 50 MCG/ACT nasal spray  ADMINISTER 1 SPRAY INTO AFFECTED NOSTRIL(S) EVERY DAY. (Patient taking differently: Administer 1 Spray into affected nostril(S) 1 time a day as needed (allergy).) 48 mL 3    mupirocin (BACTROBAN) 2 % Ointment Apply 1 Application topically 2 times a day. 22 g 1    nystatin (MYCOSTATIN) powder Apply 1 g topically 3 times a day. (Patient taking differently: Apply 1 Application topically 2 times daily with meals as needed (skin).   ) 60 g 3    ferrous sulfate 325 (65 Fe) MG tablet Take 1 Tablet by mouth every 48 hours. 45 Tablet 1     No current facility-administered medications for this visit.          Objective:   Physical Exam:  Constitutional: Alert, no distress, well-groomed.  Skin: No rashes in visible areas.  Eye: Round. Conjunctiva clear, lids normal. No icterus.   ENMT: Lips pink without lesions, fair dentition, moist mucous membranes. Phonation normal.  Neck: No masses, no thyromegaly. Moves freely without pain.  Respiratory: Unlabored respiratory effort, no cough or audible wheeze  Psych: Alert and oriented x3, normal affect and mood.     Assessment and Plan:   The following treatment plan was discussed:     1. MC (obstructive sleep apnea)  - DME Mask and Supplies    Reviewed first compliance, demonstrating use and benefit.  Meeting all first compliance requirements.  Mask leak was noted, will send updated prescription/order for mask and supplies to [facility].  Reminded to use distilled water only in humidifier chamber with fresh fill daily.  Reviewed equipment cleaning and equipment replacement recommendations.  Patient will follow-up in about 5 months to reassess due to mask leak.    Follow-up:   Return in about 5 months (around 9/8/2025) for Return with Sofia Greene PA-C.

## 2025-04-09 ENCOUNTER — APPOINTMENT (OUTPATIENT)
Dept: INTERNAL MEDICINE | Facility: OTHER | Age: 40
End: 2025-04-09
Payer: COMMERCIAL

## 2025-04-16 ENCOUNTER — OFFICE VISIT (OUTPATIENT)
Dept: INTERNAL MEDICINE | Facility: OTHER | Age: 40
End: 2025-04-16
Payer: COMMERCIAL

## 2025-04-16 VITALS
OXYGEN SATURATION: 94 % | BODY MASS INDEX: 44.1 KG/M2 | WEIGHT: 315 LBS | TEMPERATURE: 98.1 F | HEART RATE: 70 BPM | DIASTOLIC BLOOD PRESSURE: 63 MMHG | HEIGHT: 71 IN | SYSTOLIC BLOOD PRESSURE: 138 MMHG

## 2025-04-16 DIAGNOSIS — G47.33 OBSTRUCTIVE SLEEP APNEA: ICD-10-CM

## 2025-04-16 DIAGNOSIS — E78.5 DYSLIPIDEMIA: ICD-10-CM

## 2025-04-16 DIAGNOSIS — E66.813 CLASS 3 SEVERE OBESITY DUE TO EXCESS CALORIES WITH SERIOUS COMORBIDITY AND BODY MASS INDEX (BMI) GREATER THAN OR EQUAL TO 70 IN ADULT: ICD-10-CM

## 2025-04-16 DIAGNOSIS — I50.32 CHRONIC HEART FAILURE WITH PRESERVED EJECTION FRACTION (HFPEF) (HCC): ICD-10-CM

## 2025-04-16 DIAGNOSIS — B37.2 SKIN YEAST INFECTION: ICD-10-CM

## 2025-04-16 DIAGNOSIS — R73.03 PREDIABETES: ICD-10-CM

## 2025-04-16 DIAGNOSIS — N32.0 BLADDER OBSTRUCTION: ICD-10-CM

## 2025-04-16 DIAGNOSIS — I10 PRIMARY HYPERTENSION: ICD-10-CM

## 2025-04-16 DIAGNOSIS — D50.9 IRON DEFICIENCY ANEMIA, UNSPECIFIED IRON DEFICIENCY ANEMIA TYPE: ICD-10-CM

## 2025-04-16 PROCEDURE — 3075F SYST BP GE 130 - 139MM HG: CPT | Performed by: INTERNAL MEDICINE

## 2025-04-16 PROCEDURE — 3078F DIAST BP <80 MM HG: CPT | Performed by: INTERNAL MEDICINE

## 2025-04-16 PROCEDURE — 99214 OFFICE O/P EST MOD 30 MIN: CPT | Performed by: INTERNAL MEDICINE

## 2025-04-16 ASSESSMENT — FIBROSIS 4 INDEX: FIB4 SCORE: 0.95

## 2025-04-16 ASSESSMENT — PATIENT HEALTH QUESTIONNAIRE - PHQ9: CLINICAL INTERPRETATION OF PHQ2 SCORE: 0

## 2025-04-16 NOTE — PROGRESS NOTES
4/16/2025  Chief Complaint   Patient presents with    Follow-Up     Follow up- no new concerns        HISTORY OF PRESENT ILLNESS: Patient is a 40 y.o. male established patient who presents today for the following.      Obstructive sleep apnea  He is compliant with BiPAP, reports that 90% time he wakes up feeling refreshed. He is working with Sleep Medicine to obtain a better fitting mask.   He has occasional morning headaches. He will takes nap a few times a week, but tries to avoid them as they interfere with night-time sleep.     Class 3 severe obesity due to excess calories with serious comorbidity and body mass index (BMI) greater than or equal to 70 in adult ([facility])  He was gained some weight over the last couple months. He generally eats two meals a day and will have a snack once. Since he is more social, he has been eating out with friends more and wavering on his diet. Eating pizza, burgers and dessert.   He walks daily 20-30 minutes and tries to climb 3 story stairs at his apartment once daily.  He denies any shortness of breath or orthropnea, no WALLACE    Chronic heart failure with preserved ejection fraction (HFpEF) ([facility])  He continues to take lisinopril, torsemide and spironolactone Denies any dizziness, light-headedness, muscle cramping, shortness of breath, WALLACE, orthopnea or worsening lower extremity swelling.  He is followed by Cardiology    Bladder obstruction  Underwent cystoscopy with urethral balloon dilation with Optilume. He reports urinary stream is good and he is able to empty his bladder completely. He denies any dysuria, frequency, urgency, hematuria or straining. He is followed with Urology    Primary hypertension  He is compliant with medication, denies frequent headaches, chest pain, shortness of breath or dizziness.       Past Medical History:   Diagnosis Date    Congestive heart failure ([facility])     Dental disorder     Heart valve disease 2/1/24    See chart, not sure    High cholesterol      Hypertension     Morbid obesity (HCC)     Pneumonia     Sleep apnea 2/1/24    Possible see chart       Patient Active Problem List    Diagnosis Date Noted    Bladder obstruction 02/22/2024    Abdominal mass 02/22/2024    Cardiorenal syndrome 02/11/2024    Anasarca 02/05/2024    Acute right-sided congestive heart failure (HCC) 02/04/2024    Obesity hypoventilation syndrome (HCC) 02/02/2024    Volume overload 02/01/2024    Chronic heart failure with preserved ejection fraction (HFpEF) ([facility]) 02/01/2024    Bilateral leg edema 02/01/2024    Obstructive sleep apnea 02/01/2024    Weakness 02/01/2024    Iron deficiency anemia 08/31/2023    Class 3 severe obesity due to excess calories with serious comorbidity and body mass index (BMI) greater than or equal to 70 in adult 08/28/2023    Hypertension 08/28/2023       Allergies:Patient has no known allergies.    Current Outpatient Medications   Medication Sig Dispense Refill    ASPIRIN LOW DOSE 81 MG EC tablet TAKE 1 TABLET BY MOUTH EVERY DAY 90 Tablet 1    torsemide (DEMADEX) 20 MG Tab TAKE 2 TABLETS BY MOUTH EVERY  Tablet 0    lisinopril (PRINIVIL) 10 MG Tab TAKE 1 TABLET BY MOUTH EVERY DAY 90 Tablet 0    atorvastatin (LIPITOR) 40 MG Tab TAKE 1 TABLET BY MOUTH EVERY DAY IN THE EVENING 90 Tablet 0    spironolactone (ALDACTONE) 100 MG Tab TAKE 1 TABLET BY MOUTH EVERY DAY 90 Tablet 0    metFORMIN (GLUCOPHAGE) 850 MG Tab TAKE 1 TABLET BY MOUTH EVERY DAY 90 Tablet 0    fluticasone (FLONASE) 50 MCG/ACT nasal spray ADMINISTER 1 SPRAY INTO AFFECTED NOSTRIL(S) EVERY DAY. (Patient taking differently: Administer 1 Spray into affected nostril(S) 1 time a day as needed (allergy).) 48 mL 3    mupirocin (BACTROBAN) 2 % Ointment Apply 1 Application topically 2 times a day. 22 g 1    nystatin (MYCOSTATIN) powder Apply 1 g topically 3 times a day. (Patient taking differently: Apply 1 Application topically 2 times daily with meals as needed (skin).   ) 60 g 3    ferrous sulfate 325 (65  "Fe) MG tablet Take 1 Tablet by mouth every 48 hours. 45 Tablet 1     No current facility-administered medications for this visit.       Social History     Tobacco Use    Smoking status: Never    Smokeless tobacco: Never   Vaping Use    Vaping status: Never Used   Substance Use Topics    Alcohol use: Not Currently     Comment: rarely    Drug use: Never       Family History   Problem Relation Age of Onset    Heart Disease Maternal Grandfather         Heat attack    Cancer Paternal Grandfather         Ling cancer from smoking         Review of Systems:   Review of Systems   Constitutional:  Negative for chills, fever, malaise/fatigue and weight loss.   HENT:  Negative for congestion and sore throat.    Eyes:  Negative for blurred vision.   Respiratory:  Negative for cough and shortness of breath.    Cardiovascular:  Negative for chest pain, palpitations, orthopnea and leg swelling.   Gastrointestinal:  Negative for abdominal pain, blood in stool, constipation, diarrhea, melena, nausea and vomiting.   Genitourinary:  Negative for dysuria, flank pain, frequency, hematuria and urgency.   Musculoskeletal:  Negative for falls.   Skin:  Negative for rash.   Neurological:  Positive for headaches. Negative for dizziness and weakness.   Psychiatric/Behavioral:  Negative for depression. The patient is not nervous/anxious and does not have insomnia.        Exam:  /63 (BP Location: Left arm, Patient Position: Sitting, BP Cuff Size: Large adult)   Pulse 70   Temp 36.7 °C (98.1 °F) (Temporal)   Ht 1.803 m (5' 11\")   Wt (!) 252 kg (555 lb)   SpO2 94%  Body mass index is 77.41 kg/m².  Physical Exam  Constitutional:       General: He is not in acute distress.     Appearance: He is obese. He is not toxic-appearing.   HENT:      Head: Normocephalic and atraumatic.      Right Ear: Tympanic membrane and external ear normal.      Left Ear: Tympanic membrane and external ear normal.      Nose: Nose normal.      Mouth/Throat:      " Mouth: Mucous membranes are moist.      Pharynx: Oropharynx is clear.   Eyes:      Extraocular Movements: Extraocular movements intact.      Conjunctiva/sclera: Conjunctivae normal.   Cardiovascular:      Rate and Rhythm: Normal rate and regular rhythm.   Pulmonary:      Effort: Pulmonary effort is normal.      Breath sounds: Normal breath sounds.   Musculoskeletal:      Cervical back: Neck supple.      Right lower leg: Edema present.      Left lower leg: Edema present.   Skin:     General: Skin is warm and dry.      Comments: Healing wound of left lower leg-anterior. No uclerations or evidence of infection.  Erythematous well-demarcated rash right groin   Neurological:      Mental Status: He is alert. Mental status is at baseline.   Psychiatric:         Mood and Affect: Mood normal.         Behavior: Behavior normal.         Thought Content: Thought content normal.         Judgment: Judgment normal.           Assessment/Plan:     Obstructive sleep apnea  Chronic, stable. Component of OHV syndrome and nocturnal hypoxia  Continue BiPAP nightly  Followed by Sleep Medicine    Class 3 severe obesity due to excess calories with serious comorbidity and body mass index (BMI) greater than or equal to 70 in adult  Counseling was provided please see below for some details:  - Encouraged diet high in fruits, vegetables, and fiber. And a diet low in salt and processed foods because of their cholesterol, saturated fat, and trans fatty acids content.    - Saturated fats are also found in creams, cheeses, butter, mayonnaise, and fried foods.  - Encouraged a minimum of 15 minutes of moderate intensity aerobic exercise (eg, brisk walking) is recommended on five days each week. Or 20 minutes of vigorous-intensity aerobic exercise (eg, jogging) on three days each week.     Chronic heart failure with preserved ejection fraction (HFpEF) (HCC)  Stable, patient euvolemic on exam.  Denies any worsening of lower extremity swelling or  orthopnea.  Continue torsemide and spironolactone.  Repeat CMP    Bladder obstruction  S/p cystoscopy w/ Optilume. Doing well.  Follows with Urology    Primary hypertension  Chronic, stable.   Continue Lisinopril, Torsemide and Spironolactone  Recommend low salt diet and exercise.  - Comp Metabolic Panel; Future    Prediabetes  - HEMOGLOBIN A1C; Future    Iron deficiency anemia, unspecified iron deficiency anemia type  Unclear source, improved with iron supplements. However, no longer taking.  Was referred to GI last year,  but has been placed on a wait-list for appt. Patient requesting a new referral elsewhere.   - Referral to Gastroenterology  - CBC WITHOUT DIFFERENTIAL; Future  - IRON/TOTAL IRON BIND; Future  - OCCULT BLOOD FECES IMMUNOASSAY; Future    Dyslipidemia  Continue Lipitor  Annual lipids  - Lipid Profile; Future    Skin yeast infection  Continue nystatin powder  Keep area dry and clean, may use moisture wicking fabric strioes    All imaging results and lab results and consult notes are reviewed at this visit.  Followup: Return in about 3 months (around 7/16/2025).

## 2025-04-22 ASSESSMENT — ENCOUNTER SYMPTOMS
FLANK PAIN: 0
HEADACHES: 1
NERVOUS/ANXIOUS: 0
WEAKNESS: 0
FEVER: 0
DIZZINESS: 0
COUGH: 0
SHORTNESS OF BREATH: 0
WEIGHT LOSS: 0
PALPITATIONS: 0
ABDOMINAL PAIN: 0
NAUSEA: 0
CONSTIPATION: 0
BLURRED VISION: 0
CHILLS: 0
FALLS: 0
SORE THROAT: 0
ORTHOPNEA: 0
INSOMNIA: 0
VOMITING: 0
DEPRESSION: 0
BLOOD IN STOOL: 0
DIARRHEA: 0

## 2025-04-24 ENCOUNTER — NON-PROVIDER VISIT (OUTPATIENT)
Dept: INTERNAL MEDICINE | Facility: OTHER | Age: 40
End: 2025-04-24
Payer: COMMERCIAL

## 2025-04-24 DIAGNOSIS — R73.03 PRE-DIABETES: ICD-10-CM

## 2025-04-24 DIAGNOSIS — E66.01 MORBID OBESITY (HCC): ICD-10-CM

## 2025-04-24 NOTE — PROGRESS NOTES
"Cole is a 40 y.o. male here for nutrition counseling/dietitian assessment for obesity and prediabetes     Fasting glucose in 2/25/2024 was 102  A1c in 11/2024 was 5.5%   Metformin started 3/3/2025    Was in the hospital early last year    Weighed 700-800 lbs   Lost a lot of weight with diuretics and treating sleep apnea  Got down to 500# but then has come up  Mostly sticking to 550-590#   Noticing some bad habits coming back with being able to eat more     This morning's weight at home was 555#     Lowest adult body weight - 270 to 280 in hospital     Struggles with feeling hungry and boredom eating - \"always feeling like I am going to be hungry\"  Tries to stay out of the gas station (loves gummy bears)     24 hour recall:  B - skips usually - \"I try not to eat it\"  Drank \"a lot of tea\" - 2 pitchers with no sugar, just lemon, ate one lime  L - taco bowl - homemade - ground beef with seasoning, sour cream, cilantro, lime, handful of cheese, onion/tomatoes, 1/2 handful of corn tortilla chips   S - sugar free mints   D - 2 cups rice and 2 chicken thighs     Beverages: iced tea (no sugar), water, Bobbi drinks, diet soda, no more energy drinks outside of rare instances, no milk, some times OJ   Alcohol: none     Eating out: port of subs when he does get something quick  2x/week - [employer], [employer], Pizza (dozen chicken wings and salad - house salad with ranch)     Lives with roommate     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 In discussions it seemed to be a revelation today for him to understand his tendencies around eating out of emotion (stress but mostly avoidance) and we worked through some thoughts around helping him with that. Discussed strategies on how to set up his " plate at meals and support himself with getting outside vs sitting and eating when he is overwhelmed/stressed. Set goals; rtc with RD in 1-3 months    Time spent: 60 minutes

## 2025-04-24 NOTE — PATIENT INSTRUCTIONS
Goals:  It was great to meet you Cole!   Work on setting up your plate with 1/2 the plate full of veggies you like and then fill in 1/4 plate lean protein and 1/4 plate high fiber carbohydrates   When you feel overwhelmed with a work activity, go outside and get some fresh air vs eating

## 2025-04-24 NOTE — Clinical Note
REFERRAL APPROVAL NOTICE         Sent on April 24, 2025                   Cole Jaramillo  [address]  [place] [address]                   Dear Mr. Jaramillo,    After a careful review of the medical information and benefit coverage, [facility] has processed your referral. See below for additional details.    If applicable, you must be actively enrolled with your insurance for coverage of the authorized service. If you have any questions regarding your coverage, please contact your insurance directly.    REFERRAL INFORMATION   Referral #:  [id number]  Referred-To Provider    Referred-By Provider:  Gastroenterology    Liliana Washington D.O.   [facility]      [address] [place] [address]  [phone] [address] [place] [id number] [phone]    Referral Start Date:  04/24/2025  Referral End Date:   04/25/2026             SCHEDULING  If you do not already have an appointment, please call [phone] to make an appointment.     MORE INFORMATION  If you do not already have a [employer] account, sign up at: [email or web address]  You can access your medical information, make appointments, see lab results, billing information, and more.  If you have questions regarding this referral, please contact  the [facility] Referrals department at:             [phone]. Monday - Friday 8:00AM - 5:00PM.     Sincerely,    [facility]

## 2025-04-25 VITALS — WEIGHT: 315 LBS | BODY MASS INDEX: 77.41 KG/M2

## 2025-04-25 ASSESSMENT — FIBROSIS 4 INDEX: FIB4 SCORE: 0.95

## 2025-06-07 DIAGNOSIS — I50.32 CHRONIC HEART FAILURE WITH PRESERVED EJECTION FRACTION (HCC): ICD-10-CM

## 2025-06-07 DIAGNOSIS — E78.5 HYPERLIPIDEMIA, UNSPECIFIED HYPERLIPIDEMIA TYPE: ICD-10-CM

## 2025-06-07 DIAGNOSIS — I10 ESSENTIAL HYPERTENSION, BENIGN: ICD-10-CM

## 2025-06-07 DIAGNOSIS — E66.813 CLASS 3 SEVERE OBESITY DUE TO EXCESS CALORIES WITH SERIOUS COMORBIDITY AND BODY MASS INDEX (BMI) GREATER THAN OR EQUAL TO 70 IN ADULT: ICD-10-CM

## 2025-06-07 DIAGNOSIS — I27.20 PULMONARY HYPERTENSION (HCC): ICD-10-CM

## 2025-06-09 RX ORDER — LISINOPRIL 10 MG/1
10 TABLET ORAL DAILY
Qty: 90 TABLET | Refills: 0 | Status: SHIPPED | OUTPATIENT
Start: 2025-06-09

## 2025-06-09 RX ORDER — SPIRONOLACTONE 100 MG/1
100 TABLET, FILM COATED ORAL DAILY
Qty: 90 TABLET | Refills: 0 | Status: SHIPPED | OUTPATIENT
Start: 2025-06-09

## 2025-06-09 RX ORDER — TORSEMIDE 20 MG/1
40 TABLET ORAL DAILY
Qty: 180 TABLET | Refills: 0 | Status: SHIPPED | OUTPATIENT
Start: 2025-06-09

## 2025-06-09 RX ORDER — ATORVASTATIN CALCIUM 40 MG/1
40 TABLET, FILM COATED ORAL EVERY EVENING
Qty: 90 TABLET | Refills: 0 | Status: SHIPPED | OUTPATIENT
Start: 2025-06-09

## 2025-06-09 NOTE — TELEPHONE ENCOUNTER
90 day courtesy refill for lisinopril, atorvastatin, torsemide, and aldactone sent. Pt needs labs/follow up appt. Last seen 7/15/24. [employer] message sent to pt.

## 2025-06-09 NOTE — TELEPHONE ENCOUNTER
Received request via: Pharmacy    Was the patient seen in the last year in this department? Yes    Does the patient have an active prescription (recently filled or refills available) for medication(s) requested? No    Pharmacy Name: [facility]/pharmacy #[id number] - [place], [place] - [address]      Does the patient have pen[facility]entiary Plus and need 100-day supply? (This applies to ALL medications) Patient does not have SCP

## 2025-07-12 DIAGNOSIS — D50.9 IRON DEFICIENCY ANEMIA, UNSPECIFIED IRON DEFICIENCY ANEMIA TYPE: ICD-10-CM

## 2025-07-14 NOTE — TELEPHONE ENCOUNTER
Received request via: Pharmacy    Was the patient seen in the last year in this department? Yes    Does the patient have an active prescription (recently filled or refills available) for medication(s) requested? No    Pharmacy Name: [facility]/pharmacy #[id number] - [place], [place] - [address]     Does the patient have nursing home Plus and need 100-day supply? (This applies to ALL medications) Patient does not have SCP

## 2025-07-15 RX ORDER — FERROUS SULFATE 325(65) MG
325 TABLET ORAL
Qty: 45 TABLET | Refills: 1 | Status: SHIPPED | OUTPATIENT
Start: 2025-07-15

## (undated) DEVICE — SET LEADWIRE 5 LEAD BEDSIDE DISPOSABLE ECG (1SET OF 5/EA)

## (undated) DEVICE — SYRINGE 10 ML CONTROL LL (25EA/BX 4BX/CA)

## (undated) DEVICE — JELLY SURGILUBE STERILE FOIL 5 GM (150EA/BX)

## (undated) DEVICE — SET EXTENSION WITH 2 PORTS (48EA/CA) ***PART #2C8610 IS A SUBSTITUTE*****

## (undated) DEVICE — SODIUM CHL. IRRIGATION 0.9% 3000ML (4EA/CA 65CA/PF)

## (undated) DEVICE — JELLY SURGILUBE STERILE TUBE 4.25 OZ (1/EA)

## (undated) DEVICE — CATH, COUNCIL TIP 16 FR

## (undated) DEVICE — COVER LIGHT HANDLE FLEXIBLE - SOFT (2EA/PK 80PK/CA)

## (undated) DEVICE — WIRE GUIDE SENSOR DUAL FLEX - 5/BX

## (undated) DEVICE — LACTATED RINGERS INJ 1000 ML - (14EA/CA 60CA/PF)

## (undated) DEVICE — COVER FOOT UNIVERSAL DISP. - (25EA/CA)

## (undated) DEVICE — TUBING CLEARLINK DUO-VENT - C-FLO (48EA/CA)

## (undated) DEVICE — WATER IRRIGATION STERILE 1000ML (12EA/CA)

## (undated) DEVICE — BAG DRAINAGE LINGEMAN CYSTO FOR GE/OEC 2600/2800 TABLES (20EA/CA)

## (undated) DEVICE — SLEEVE, VASO, THIGH, MED

## (undated) DEVICE — SENSOR OXIMETER ADULT SPO2 RD SET (20EA/BX)

## (undated) DEVICE — GOWN SURGEONS X-LARGE - DISP. (30/CA)

## (undated) DEVICE — CANISTER SUCTION 3000ML MECHANICAL FILTER AUTO SHUTOFF MEDI-VAC NONSTERILE LF DISP  (40EA/CA)

## (undated) DEVICE — SYRINGE 50ML CATHETER TIP (40EA/BX 4BX/CA)

## (undated) DEVICE — GLOVE BIOGEL SZ 6.5 SURGICAL PF LTX (50PR/BX 4BX/CA)

## (undated) DEVICE — SET IRRIGATION CYSTOSCOPY Y-TYPE L81 IN (20EA/CA)

## (undated) DEVICE — GOWN WARMING STANDARD FLEX - (30/CA)

## (undated) DEVICE — PACK CYSTOSCOPY III - (8/CA)

## (undated) DEVICE — TOWELS CLOTH SURGICAL - (4/PK 20PK/CA)

## (undated) DEVICE — BAG DRAINAGE URINARY CLOSED 2000ML (20EA/CA)

## (undated) DEVICE — BAG URODRAIN WITH TUBING - (20/CA)

## (undated) DEVICE — BAG SPONGE COUNT 10.25 X 32 - BLUE (250/CA)

## (undated) DEVICE — SPONGE GAUZESTER 4 X 4 4PLY - (128PK/CA)

## (undated) DEVICE — Device

## (undated) DEVICE — CANISTER SUCTION 3000ML MECHANICAL FILTER AUTO SHUTOFF MEDI-VAC NONSTERILE LF DISP (40EA/CA)

## (undated) DEVICE — COVER LIGHT HANDLE ALC PLUS DISP (18EA/BX)

## (undated) DEVICE — CONTAINER SPECIMEN BAG OR - STERILE 4 OZ W/LID (100EA/CA)

## (undated) DEVICE — MEDICINE CUP STERILE 2 OZ - (100/CA)

## (undated) DEVICE — SUCTION INSTRUMENT YANKAUER BULBOUS TIP W/O VENT (50EA/CA)

## (undated) DEVICE — WATER IRRIG. STER 3000 ML - (4/CA)

## (undated) DEVICE — GOWN SURGICAL X-LARGE ULTRA - FILM-REINFORCED (20/CA)

## (undated) DEVICE — SYRINGE STERILE 10 ML LL (200/BX)

## (undated) DEVICE — CONNECTOR HOSE NEPTUNE FOR CYSTO ROOM

## (undated) DEVICE — SUTURE GENERAL

## (undated) DEVICE — CATHETER URETHRAL FOLEY SILICONE OD14 FR 10 ML (10EA/CA)